# Patient Record
Sex: MALE | Race: WHITE | NOT HISPANIC OR LATINO | Employment: UNEMPLOYED | URBAN - NONMETROPOLITAN AREA
[De-identification: names, ages, dates, MRNs, and addresses within clinical notes are randomized per-mention and may not be internally consistent; named-entity substitution may affect disease eponyms.]

---

## 2017-01-16 RX ORDER — CLONAZEPAM 0.5 MG/1
TABLET ORAL
Qty: 60 TABLET | Refills: 0 | OUTPATIENT
Start: 2017-01-16 | End: 2017-02-14 | Stop reason: SDUPTHER

## 2017-01-23 RX ORDER — ATORVASTATIN CALCIUM 40 MG/1
40 TABLET, FILM COATED ORAL DAILY
Qty: 90 TABLET | Refills: 0 | Status: SHIPPED | OUTPATIENT
Start: 2017-01-23 | End: 2017-04-24 | Stop reason: SDUPTHER

## 2017-01-23 RX ORDER — AMLODIPINE BESYLATE 10 MG/1
10 TABLET ORAL DAILY
Qty: 90 TABLET | Refills: 0 | Status: SHIPPED | OUTPATIENT
Start: 2017-01-23 | End: 2017-05-05 | Stop reason: SDUPTHER

## 2017-01-23 RX ORDER — LOSARTAN POTASSIUM 100 MG/1
100 TABLET ORAL DAILY
Qty: 90 TABLET | Refills: 0 | Status: SHIPPED | OUTPATIENT
Start: 2017-01-23 | End: 2017-05-05 | Stop reason: SDUPTHER

## 2017-01-23 RX ORDER — METOPROLOL TARTRATE 100 MG/1
100 TABLET ORAL 2 TIMES DAILY
Qty: 180 TABLET | Refills: 0 | Status: SHIPPED | OUTPATIENT
Start: 2017-01-23 | End: 2017-05-05 | Stop reason: SDUPTHER

## 2017-02-01 ENCOUNTER — OFFICE VISIT (OUTPATIENT)
Dept: CARDIOLOGY | Facility: CLINIC | Age: 52
End: 2017-02-01

## 2017-02-01 VITALS
WEIGHT: 208.6 LBS | SYSTOLIC BLOOD PRESSURE: 130 MMHG | HEART RATE: 71 BPM | HEIGHT: 75 IN | BODY MASS INDEX: 25.94 KG/M2 | OXYGEN SATURATION: 99 % | DIASTOLIC BLOOD PRESSURE: 88 MMHG

## 2017-02-01 DIAGNOSIS — I10 ESSENTIAL HYPERTENSION: ICD-10-CM

## 2017-02-01 DIAGNOSIS — F41.1 GENERALIZED ANXIETY DISORDER: ICD-10-CM

## 2017-02-01 DIAGNOSIS — E83.52 HYPERCALCEMIA: ICD-10-CM

## 2017-02-01 DIAGNOSIS — E78.1 HYPERLIPIDEMIA TYPE IV: Primary | ICD-10-CM

## 2017-02-01 DIAGNOSIS — K21.9 GASTROESOPHAGEAL REFLUX DISEASE WITHOUT ESOPHAGITIS: ICD-10-CM

## 2017-02-01 DIAGNOSIS — R53.82 CHRONIC FATIGUE: ICD-10-CM

## 2017-02-01 DIAGNOSIS — F10.21 ALCOHOL DEPENDENCE IN REMISSION (HCC): ICD-10-CM

## 2017-02-01 DIAGNOSIS — I25.10 CORONARY ARTERY DISEASE INVOLVING NATIVE CORONARY ARTERY OF NATIVE HEART WITHOUT ANGINA PECTORIS: ICD-10-CM

## 2017-02-01 PROCEDURE — 99214 OFFICE O/P EST MOD 30 MIN: CPT | Performed by: INTERNAL MEDICINE

## 2017-02-01 RX ORDER — ASPIRIN 81 MG/1
81 TABLET ORAL DAILY
Qty: 90 TABLET | Refills: 1 | Status: SHIPPED | OUTPATIENT
Start: 2017-02-01 | End: 2017-12-04 | Stop reason: SDUPTHER

## 2017-02-01 NOTE — PROGRESS NOTES
subjective     Chief Complaint   Patient presents with   • Fatigue   • Coronary Artery Disease   • Hypertension     History of Present Illness    Coronary artery disease  Onel Jimenez has known coronary artery disease   Patient had coronary angiography done by Dr. Almanzar and 6/8/2015.  Patient was found to have severe multivessel disease showing 40% ostial left main 40% LAD after the takeoff of first diagonal, total occlusion of secondary to his marginal receiving left left collaterals.  RCA was dominant vessel there was 6 cm long 80-90% lesion in the proximal portion of PDA.  Posterolateral artery was totally occluded receiving left-to-right collaterals both PDA and posterolateral artery filled by collaterals.   It was felt that is not amenable to intervention and patient will require coronary angiography.  Patient is aware that he will need coronary artery bypass graft surgery however is completely asymptomatic and wishes to continue on medical management at this time.   he has not used any nitroglycerin. No drug side effects. he is trying to follow diet also. he is quite satisfied with the progress.     HYPERTENSION  Onel Jimenez has long-standing essential hypertension for years. he is taking medications regularly. There are no medication side effects. Blood pressure is mostly very well controlled. There has been no headache nausea chest pain. There has been no syncopal or presyncopal episode. he denies episodes of hypo-tension or accelerated hypertension. Patient feels that he needs to increase his Norvasc to 15 mg daily. Patient was advised that he needs to stay with 10 and will add a diuretic pill     Hyperlipidemia     Onel Jimenez has long-standing history of hyperlipidemia. Has been trying to lose weight following diet and exercise. Patient is tolerating medications very well. There has been no side effects. Latest lipid levels have been good. But no lab work in 6 months     Anxiety  Patient has mild anxiety  disorder.  He is taking Klonopin 0.5 daily with that he is doing very well.  He has had this anxiety for quite some time so mild and does not interfere with his daily activities.    GERD  Onel Jimenez has gastroesophageal reflux disorder however he significantly better on medications. There is no nausea or vomiting. No hematemesis or melena. No abdominal pain. Mild epigastric heartburns are noted. Appetite is good bowel movements are normal.     Patient Active Problem List   Diagnosis   • CAD (coronary artery disease), 90% proximal RCA, total occlusion of posterior lateral branch of RCA, total occlusion of second marginal with left to left collaterals, 40% LAD, 40% left main   • Anxiety disorder   • Chronic alcoholism   • Essential hypertension   • Hyperlipidemia type IV,uncontrolled   • Gastroesophageal reflux disease without esophagitis       Social History   Substance Use Topics   • Smoking status: Former Smoker     Packs/day: 1.00     Years: 20.00     Types: Cigarettes     Quit date: 2003   • Smokeless tobacco: Current User     Types: Chew      Comment: chew 1 can per week   • Alcohol use 6.0 oz/week     6 Cans of beer, 4 Shots of liquor per week       No Known Allergies      Current Outpatient Prescriptions:   •  amLODIPine (NORVASC) 10 MG tablet, Take 1 tablet by mouth Daily., Disp: 90 tablet, Rfl: 0  •  aspirin 81 MG EC tablet, Take 1 tablet by mouth Daily., Disp: 90 tablet, Rfl: 1  •  atorvastatin (LIPITOR) 40 MG tablet, Take 1 tablet by mouth Daily., Disp: 90 tablet, Rfl: 0  •  clonazePAM (KlonoPIN) 0.5 MG tablet, TAKE ONE TABLET BY MOUTH TWO TIMES A DAY FOR ANXIETY, Disp: 60 tablet, Rfl: 0  •  clopidogrel (PLAVIX) 75 MG tablet, TAKE ONE TABLET BY MOUTH EVERY DAY FOR BLOOD CIRCULATION, Disp: 90 tablet, Rfl: 0  •  fenofibrate (TRICOR) 145 MG tablet, TAKE ONE TABLET BY MOUTH DAILY FOR CHOLESTROL, Disp: 90 tablet, Rfl: 0  •  hydrochlorothiazide (MICROZIDE) 12.5 MG capsule, Take 1 capsule by mouth Daily., Disp:  "90 capsule, Rfl: 3  •  losartan (COZAAR) 100 MG tablet, Take 1 tablet by mouth Daily., Disp: 90 tablet, Rfl: 0  •  metoprolol tartrate (LOPRESSOR) 100 MG tablet, Take 1 tablet by mouth 2 (Two) Times a Day., Disp: 180 tablet, Rfl: 0  •  pantoprazole (PROTONIX) 40 MG EC tablet, Take 1 tablet by mouth Daily., Disp: 90 tablet, Rfl: 1      The following portions of the patient's history were reviewed and updated as appropriate: allergies, current medications, past family history, past medical history, past social history, past surgical history and problem list.    Review of Systems   Constitution: Negative.   HENT: Negative.    Eyes: Negative.    Cardiovascular: Negative.    Respiratory: Negative.    Hematologic/Lymphatic: Negative.    Musculoskeletal: Negative.    Gastrointestinal: Negative.    Neurological: Negative.           Objective:     Visit Vitals   • /88 (BP Location: Left arm, Patient Position: Sitting)   • Pulse 71   • Ht 75\" (190.5 cm)   • Wt 208 lb 9.6 oz (94.6 kg)   • SpO2 99%   • BMI 26.07 kg/m2     Physical Exam   Constitutional: He appears well-developed and well-nourished.   HENT:   Head: Normocephalic and atraumatic.   Mouth/Throat: Oropharynx is clear and moist.   Eyes: Conjunctivae and EOM are normal. Pupils are equal, round, and reactive to light. No scleral icterus.   Neck: Normal range of motion. Neck supple. No JVD present. No tracheal deviation present. No thyromegaly present.   Cardiovascular: Normal rate, regular rhythm, normal heart sounds and intact distal pulses.  Exam reveals no friction rub.    No murmur heard.  Pulmonary/Chest: Effort normal and breath sounds normal. No respiratory distress. He has no wheezes. He has no rales. He exhibits no tenderness.   Abdominal: Soft. Bowel sounds are normal. He exhibits no distension and no mass. There is no tenderness. There is no rebound and no guarding.   Musculoskeletal: Normal range of motion. He exhibits no edema, tenderness or " deformity.   Lymphadenopathy:     He has no cervical adenopathy.   Neurological: He is alert. He has normal reflexes. No cranial nerve deficit. He exhibits normal muscle tone. Coordination normal.   Skin: Skin is warm and dry.   Psychiatric: He has a normal mood and affect. His behavior is normal. Judgment and thought content normal.         Lab Review  Lab Results   Component Value Date     11/16/2016    K 4.9 11/16/2016     11/16/2016    BUN 12 11/16/2016    CREATININE 1.08 11/16/2016    GLUCOSE 86 11/16/2016    CALCIUM 11.1 (H) 11/16/2016    ALT 19 11/16/2016    ALKPHOS 29 (L) 11/16/2016    LABIL2 1.7 11/16/2016     Lab Results   Component Value Date    CKTOTAL 53 11/16/2016     Lab Results   Component Value Date    WBC 8.21 11/16/2016    HGB 15.4 11/16/2016    HCT 47.5 11/16/2016     11/16/2016     Lab Results   Component Value Date    INR 0.99 06/05/2015     Lab Results   Component Value Date    MG 1.9 01/07/2016     Lab Results   Component Value Date    PSA 0.38 05/04/2016    TSH 1.009 06/05/2015     No results found for: BNP  Lab Results   Component Value Date    CHLPL 182 05/04/2016     Lab Results   Component Value Date    CHOL 221 (H) 11/16/2016    TRIG 236 (H) 11/16/2016    HDL 53 (L) 11/16/2016    LDLCALC 121 (H) 11/16/2016    VLDL 47.2 11/16/2016    LDLHDL 2.28 11/16/2016         Procedures     I personally viewed and interpreted the patient's LAB data         Assessment:     1. Hyperlipidemia type IV,uncontrolled    2. Essential hypertension    3. Coronary artery disease involving native coronary artery of native heart without angina pectoris    4. Gastroesophageal reflux disease without esophagitis    5. Alcohol dependence in remission    6. Generalized anxiety disorder    7. Chronic fatigue    8. Hypercalcemia          Plan:   Lab work was abnormal last time with LDL of 121.  Total cholesterol 221 triglyceride 236     Patient is taking Lipitor 40 mg daily and TriCor 145 mg daily  lab work will be checked next visit meanwhile aggressive risk factor modification was stressed.    Coronary artery disease patient has significant coronary artery disease he is totally asymptomatic.  He is aware that he will need to notify me as soon as possible with even minor symptoms.  Currently he is active and is asymptomatic.  Patient is taking Plavix and aspirin.    Hypertension is very well controlled.  Patient is taking Norvasc 10 mg daily metoprolol 100 twice a day Cozaar 100 mg daily and hydrochlorothiazide 12.5 daily.    Patient has no GI symptoms currently controlled with Protonix.  Risk factor modification was again stressed.  His calcium was elevated will check parathormone level next visit.        The nature of cardiac risk has been fully discussed with this patient. I have made him aware of his LDL target goal given his cardiovascular risk analysis. I have discussed the appropriate diet. The need for lifelong compliance in order to reduce risk is stressed. A regular exercise program is recommended to help achieve and maintain normal body weight, fitness and improve lipid balance.    Return in about 3 months (around 5/1/2017).

## 2017-02-09 RX ORDER — FENOFIBRATE 145 MG/1
145 TABLET, COATED ORAL DAILY
Qty: 90 TABLET | Refills: 0 | Status: SHIPPED | OUTPATIENT
Start: 2017-02-09 | End: 2017-06-05 | Stop reason: SDUPTHER

## 2017-02-09 RX ORDER — HYDROCHLOROTHIAZIDE 12.5 MG/1
12.5 CAPSULE, GELATIN COATED ORAL DAILY
Qty: 90 CAPSULE | Refills: 0 | Status: SHIPPED | OUTPATIENT
Start: 2017-02-09 | End: 2017-05-05 | Stop reason: SDUPTHER

## 2017-02-15 ENCOUNTER — HOSPITAL ENCOUNTER (EMERGENCY)
Facility: HOSPITAL | Age: 52
Discharge: HOME | End: 2017-02-15
Attending: EMERGENCY MEDICINE | Admitting: EMERGENCY MEDICINE

## 2017-02-15 ENCOUNTER — APPOINTMENT (OUTPATIENT)
Dept: GENERAL RADIOLOGY | Facility: HOSPITAL | Age: 52
End: 2017-02-15

## 2017-02-15 VITALS
RESPIRATION RATE: 18 BRPM | OXYGEN SATURATION: 100 % | BODY MASS INDEX: 26.11 KG/M2 | DIASTOLIC BLOOD PRESSURE: 84 MMHG | TEMPERATURE: 98 F | WEIGHT: 210 LBS | SYSTOLIC BLOOD PRESSURE: 117 MMHG | HEIGHT: 75 IN | HEART RATE: 61 BPM

## 2017-02-15 DIAGNOSIS — R07.89 CHEST PAIN, ATYPICAL: Primary | ICD-10-CM

## 2017-02-15 LAB
ALBUMIN SERPL-MCNC: 5 G/DL (ref 3.5–5)
ALBUMIN/GLOB SERPL: 1.7 G/DL (ref 1.5–2.5)
ALP SERPL-CCNC: 30 U/L (ref 40–129)
ALT SERPL W P-5'-P-CCNC: 20 U/L (ref 10–44)
ANION GAP SERPL CALCULATED.3IONS-SCNC: 10.4 MMOL/L (ref 3.6–11.2)
AST SERPL-CCNC: 25 U/L (ref 10–34)
BASOPHILS # BLD AUTO: 0.05 10*3/MM3 (ref 0–0.3)
BASOPHILS NFR BLD AUTO: 0.6 % (ref 0–2)
BILIRUB SERPL-MCNC: 0.8 MG/DL (ref 0.2–1.8)
BUN BLD-MCNC: 15 MG/DL (ref 7–21)
BUN/CREAT SERPL: 11.8 (ref 7–25)
CALCIUM SPEC-SCNC: 10.1 MG/DL (ref 7.7–10)
CHLORIDE SERPL-SCNC: 98 MMOL/L (ref 99–112)
CK MB SERPL-CCNC: 0.24 NG/ML (ref 0–5)
CK MB SERPL-RTO: 0.3 % (ref 0–3)
CK SERPL-CCNC: 81 U/L (ref 24–204)
CO2 SERPL-SCNC: 26.6 MMOL/L (ref 24.3–31.9)
CREAT BLD-MCNC: 1.27 MG/DL (ref 0.43–1.29)
DEPRECATED RDW RBC AUTO: 42.3 FL (ref 37–54)
EOSINOPHIL # BLD AUTO: 0.07 10*3/MM3 (ref 0–0.7)
EOSINOPHIL NFR BLD AUTO: 0.8 % (ref 0–5)
ERYTHROCYTE [DISTWIDTH] IN BLOOD BY AUTOMATED COUNT: 12.9 % (ref 11.5–14.5)
GFR SERPL CREATININE-BSD FRML MDRD: 59 ML/MIN/1.73
GLOBULIN UR ELPH-MCNC: 2.9 GM/DL
GLUCOSE BLD-MCNC: 89 MG/DL (ref 70–110)
HCT VFR BLD AUTO: 46.2 % (ref 42–52)
HGB BLD-MCNC: 15.7 G/DL (ref 14–18)
IMM GRANULOCYTES # BLD: 0.01 10*3/MM3 (ref 0–0.03)
IMM GRANULOCYTES NFR BLD: 0.1 % (ref 0–0.5)
LIPASE SERPL-CCNC: 92 U/L (ref 13–60)
LYMPHOCYTES # BLD AUTO: 2.34 10*3/MM3 (ref 1–3)
LYMPHOCYTES NFR BLD AUTO: 27.9 % (ref 21–51)
MCH RBC QN AUTO: 30.7 PG (ref 27–33)
MCHC RBC AUTO-ENTMCNC: 34 G/DL (ref 33–37)
MCV RBC AUTO: 90.2 FL (ref 80–94)
MONOCYTES # BLD AUTO: 1.2 10*3/MM3 (ref 0.1–0.9)
MONOCYTES NFR BLD AUTO: 14.3 % (ref 0–10)
MYOGLOBIN SERPL-MCNC: 32 NG/ML (ref 0–109)
NEUTROPHILS # BLD AUTO: 4.73 10*3/MM3 (ref 1.4–6.5)
NEUTROPHILS NFR BLD AUTO: 56.3 % (ref 30–70)
OSMOLALITY SERPL CALC.SUM OF ELEC: 270.4 MOSM/KG (ref 273–305)
PLATELET # BLD AUTO: 281 10*3/MM3 (ref 130–400)
PMV BLD AUTO: 10.2 FL (ref 6–10)
POTASSIUM BLD-SCNC: 3.9 MMOL/L (ref 3.5–5.3)
PROT SERPL-MCNC: 7.9 G/DL (ref 6–8)
RBC # BLD AUTO: 5.12 10*6/MM3 (ref 4.7–6.1)
SODIUM BLD-SCNC: 135 MMOL/L (ref 135–153)
TROPONIN I SERPL-MCNC: <0.006 NG/ML
TROPONIN I SERPL-MCNC: <0.006 NG/ML
WBC NRBC COR # BLD: 8.4 10*3/MM3 (ref 4.5–12.5)

## 2017-02-15 PROCEDURE — 99285 EMERGENCY DEPT VISIT HI MDM: CPT

## 2017-02-15 PROCEDURE — 71010 HC CHEST PA OR AP: CPT

## 2017-02-15 PROCEDURE — 84484 ASSAY OF TROPONIN QUANT: CPT | Performed by: EMERGENCY MEDICINE

## 2017-02-15 PROCEDURE — 83690 ASSAY OF LIPASE: CPT | Performed by: EMERGENCY MEDICINE

## 2017-02-15 PROCEDURE — 80053 COMPREHEN METABOLIC PANEL: CPT | Performed by: EMERGENCY MEDICINE

## 2017-02-15 PROCEDURE — 85025 COMPLETE CBC W/AUTO DIFF WBC: CPT | Performed by: EMERGENCY MEDICINE

## 2017-02-15 PROCEDURE — 71010 XR CHEST 1 VW: CPT | Performed by: RADIOLOGY

## 2017-02-15 PROCEDURE — 93005 ELECTROCARDIOGRAM TRACING: CPT | Performed by: EMERGENCY MEDICINE

## 2017-02-15 PROCEDURE — 82553 CREATINE MB FRACTION: CPT | Performed by: EMERGENCY MEDICINE

## 2017-02-15 PROCEDURE — 82550 ASSAY OF CK (CPK): CPT | Performed by: EMERGENCY MEDICINE

## 2017-02-15 PROCEDURE — 83874 ASSAY OF MYOGLOBIN: CPT | Performed by: EMERGENCY MEDICINE

## 2017-02-15 PROCEDURE — 93010 ELECTROCARDIOGRAM REPORT: CPT | Performed by: INTERNAL MEDICINE

## 2017-02-15 PROCEDURE — 36415 COLL VENOUS BLD VENIPUNCTURE: CPT

## 2017-02-15 RX ORDER — CLONAZEPAM 0.5 MG/1
TABLET ORAL
Qty: 60 TABLET | Refills: 0 | OUTPATIENT
Start: 2017-02-15 | End: 2017-03-16 | Stop reason: SDUPTHER

## 2017-02-15 RX ORDER — SODIUM CHLORIDE 0.9 % (FLUSH) 0.9 %
10 SYRINGE (ML) INJECTION AS NEEDED
Status: DISCONTINUED | OUTPATIENT
Start: 2017-02-15 | End: 2017-02-16 | Stop reason: HOSPADM

## 2017-02-15 NOTE — ED NOTES
Pt reports that he has been having right shoulder pain all week, but that he has also been working on cars all week.  Pt reports that he did not have any specific type of chest pains but that he had a discomfort in his chest x 2 hours ago and denies any pains at this time.  Pt reports that he took on Baby ASA at home prior to EMS arrival.  Pt denies any pain at this time.  Pt also reports that he has a blockage in his heart but has not had a stent placed.  Pt complaining that he has been having dizziness.  Kanika Montgomery RN  02/15/17 1810       Kanika Montgomery RN  02/15/17 1812       Kanika Montgomery RN  02/15/17 1832

## 2017-02-16 NOTE — ED NOTES
Report received from Kanika NESBITT. Patient is resting in bed with no acute distress. Call light in reach.      Eufemia Alvarado RN  02/15/17 2218

## 2017-02-16 NOTE — DISCHARGE INSTRUCTIONS
Home in care of family.  Follow-up with Dr. Canales in the office tomorrow for recheck.  Return to the emergency department immediately if symptoms worsen/any problems.

## 2017-03-17 RX ORDER — CLONAZEPAM 0.5 MG/1
TABLET ORAL
Qty: 60 TABLET | Refills: 0 | OUTPATIENT
Start: 2017-03-17 | End: 2017-04-19 | Stop reason: SDUPTHER

## 2017-04-19 ENCOUNTER — LAB (OUTPATIENT)
Dept: CARDIOLOGY | Facility: CLINIC | Age: 52
End: 2017-04-19

## 2017-04-19 DIAGNOSIS — E78.1 HYPERLIPIDEMIA TYPE IV: ICD-10-CM

## 2017-04-19 DIAGNOSIS — I25.10 CAD IN NATIVE ARTERY: ICD-10-CM

## 2017-04-19 DIAGNOSIS — Z79.899 HIGH RISK MEDICATIONS (NOT ANTICOAGULANTS) LONG-TERM USE: ICD-10-CM

## 2017-04-19 DIAGNOSIS — E83.52 HYPERCALCEMIA: ICD-10-CM

## 2017-04-19 DIAGNOSIS — F10.21 ALCOHOL DEPENDENCE IN REMISSION (HCC): ICD-10-CM

## 2017-04-19 DIAGNOSIS — E78.2 MIXED HYPERLIPIDEMIA: ICD-10-CM

## 2017-04-19 DIAGNOSIS — I10 ESSENTIAL HYPERTENSION: ICD-10-CM

## 2017-04-19 LAB
25(OH)D3 SERPL-MCNC: 16 NG/ML
ALBUMIN SERPL-MCNC: 5.1 G/DL (ref 3.5–5)
ALBUMIN/GLOB SERPL: 1.8 G/DL (ref 1.5–2.5)
ALP SERPL-CCNC: 30 U/L (ref 40–129)
ALT SERPL W P-5'-P-CCNC: 19 U/L (ref 10–44)
ANION GAP SERPL CALCULATED.3IONS-SCNC: 9.9 MMOL/L (ref 3.6–11.2)
AST SERPL-CCNC: 25 U/L (ref 10–34)
BASOPHILS # BLD AUTO: 0.06 10*3/MM3 (ref 0–0.3)
BASOPHILS NFR BLD AUTO: 1 % (ref 0–2)
BILIRUB SERPL-MCNC: 0.5 MG/DL (ref 0.2–1.8)
BUN BLD-MCNC: 18 MG/DL (ref 7–21)
BUN/CREAT SERPL: 15.3 (ref 7–25)
CALCIUM SPEC-SCNC: 10 MG/DL (ref 7.7–10)
CALCIUM SPEC-SCNC: 10.1 MG/DL (ref 7.7–10)
CHLORIDE SERPL-SCNC: 107 MMOL/L (ref 99–112)
CHOLEST SERPL-MCNC: 207 MG/DL (ref 0–200)
CK SERPL-CCNC: 92 U/L (ref 24–204)
CO2 SERPL-SCNC: 25.1 MMOL/L (ref 24.3–31.9)
CREAT BLD-MCNC: 1.18 MG/DL (ref 0.43–1.29)
DEPRECATED RDW RBC AUTO: 44.7 FL (ref 37–54)
EOSINOPHIL # BLD AUTO: 0.08 10*3/MM3 (ref 0–0.7)
EOSINOPHIL NFR BLD AUTO: 1.3 % (ref 0–5)
ERYTHROCYTE [DISTWIDTH] IN BLOOD BY AUTOMATED COUNT: 13.5 % (ref 11.5–14.5)
GFR SERPL CREATININE-BSD FRML MDRD: 64 ML/MIN/1.73
GLOBULIN UR ELPH-MCNC: 2.8 GM/DL
GLUCOSE BLD-MCNC: 107 MG/DL (ref 70–110)
HCT VFR BLD AUTO: 47.4 % (ref 42–52)
HDLC SERPL-MCNC: 44 MG/DL (ref 60–100)
HGB BLD-MCNC: 15.5 G/DL (ref 14–18)
IMM GRANULOCYTES # BLD: 0.02 10*3/MM3 (ref 0–0.03)
IMM GRANULOCYTES NFR BLD: 0.3 % (ref 0–0.5)
LDLC SERPL CALC-MCNC: 94 MG/DL (ref 0–100)
LDLC/HDLC SERPL: 2.14 {RATIO}
LYMPHOCYTES # BLD AUTO: 1.5 10*3/MM3 (ref 1–3)
LYMPHOCYTES NFR BLD AUTO: 24.2 % (ref 21–51)
MCH RBC QN AUTO: 30.2 PG (ref 27–33)
MCHC RBC AUTO-ENTMCNC: 32.7 G/DL (ref 33–37)
MCV RBC AUTO: 92.2 FL (ref 80–94)
MONOCYTES # BLD AUTO: 0.61 10*3/MM3 (ref 0.1–0.9)
MONOCYTES NFR BLD AUTO: 9.8 % (ref 0–10)
NEUTROPHILS # BLD AUTO: 3.94 10*3/MM3 (ref 1.4–6.5)
NEUTROPHILS NFR BLD AUTO: 63.4 % (ref 30–70)
OSMOLALITY SERPL CALC.SUM OF ELEC: 285.5 MOSM/KG (ref 273–305)
PLATELET # BLD AUTO: 233 10*3/MM3 (ref 130–400)
PMV BLD AUTO: 9.9 FL (ref 6–10)
POTASSIUM BLD-SCNC: 3.4 MMOL/L (ref 3.5–5.3)
PROT SERPL-MCNC: 7.9 G/DL (ref 6–8)
PTH-INTACT SERPL-MCNC: <5.5 PG/ML (ref 14–72)
RBC # BLD AUTO: 5.14 10*6/MM3 (ref 4.7–6.1)
SODIUM BLD-SCNC: 142 MMOL/L (ref 135–153)
TRIGL SERPL-MCNC: 344 MG/DL (ref 0–150)
VIT B12 BLD-MCNC: 290 PG/ML (ref 211–911)
VLDLC SERPL-MCNC: 68.8 MG/DL
WBC NRBC COR # BLD: 6.21 10*3/MM3 (ref 4.5–12.5)

## 2017-04-19 PROCEDURE — 85025 COMPLETE CBC W/AUTO DIFF WBC: CPT | Performed by: INTERNAL MEDICINE

## 2017-04-19 PROCEDURE — 82550 ASSAY OF CK (CPK): CPT | Performed by: INTERNAL MEDICINE

## 2017-04-19 PROCEDURE — 83970 ASSAY OF PARATHORMONE: CPT | Performed by: INTERNAL MEDICINE

## 2017-04-19 PROCEDURE — 82306 VITAMIN D 25 HYDROXY: CPT | Performed by: INTERNAL MEDICINE

## 2017-04-19 PROCEDURE — 82607 VITAMIN B-12: CPT | Performed by: INTERNAL MEDICINE

## 2017-04-19 PROCEDURE — 80053 COMPREHEN METABOLIC PANEL: CPT | Performed by: INTERNAL MEDICINE

## 2017-04-19 PROCEDURE — 80061 LIPID PANEL: CPT | Performed by: INTERNAL MEDICINE

## 2017-04-19 RX ORDER — CLONAZEPAM 0.5 MG/1
0.5 TABLET ORAL 2 TIMES DAILY PRN
Qty: 60 TABLET | Refills: 0 | OUTPATIENT
Start: 2017-04-19 | End: 2017-05-17 | Stop reason: SDUPTHER

## 2017-04-20 ENCOUNTER — OFFICE VISIT (OUTPATIENT)
Dept: CARDIOLOGY | Facility: CLINIC | Age: 52
End: 2017-04-20

## 2017-04-20 VITALS
SYSTOLIC BLOOD PRESSURE: 125 MMHG | BODY MASS INDEX: 25.84 KG/M2 | HEART RATE: 71 BPM | HEIGHT: 75 IN | WEIGHT: 207.8 LBS | DIASTOLIC BLOOD PRESSURE: 83 MMHG | OXYGEN SATURATION: 96 %

## 2017-04-20 DIAGNOSIS — I10 ESSENTIAL HYPERTENSION: ICD-10-CM

## 2017-04-20 DIAGNOSIS — E78.1 HYPERLIPIDEMIA TYPE IV: ICD-10-CM

## 2017-04-20 DIAGNOSIS — I25.10 CORONARY ARTERY DISEASE INVOLVING NATIVE CORONARY ARTERY OF NATIVE HEART WITHOUT ANGINA PECTORIS: Primary | ICD-10-CM

## 2017-04-20 DIAGNOSIS — K21.9 GASTROESOPHAGEAL REFLUX DISEASE WITHOUT ESOPHAGITIS: ICD-10-CM

## 2017-04-20 DIAGNOSIS — R07.9 CHEST PAIN, UNSPECIFIED TYPE: ICD-10-CM

## 2017-04-20 DIAGNOSIS — F41.1 GENERALIZED ANXIETY DISORDER: ICD-10-CM

## 2017-04-20 DIAGNOSIS — F10.21 ALCOHOL DEPENDENCE IN REMISSION (HCC): ICD-10-CM

## 2017-04-20 PROCEDURE — 99214 OFFICE O/P EST MOD 30 MIN: CPT | Performed by: INTERNAL MEDICINE

## 2017-04-20 PROCEDURE — 93000 ELECTROCARDIOGRAM COMPLETE: CPT | Performed by: INTERNAL MEDICINE

## 2017-04-20 NOTE — PROGRESS NOTES
subjective     Chief Complaint   Patient presents with   • Coronary Artery Disease   • Hypertension   • Hyperlipidemia     History of Present Illness  Patient is here for cardiac follow-up.  He states that he has been doing very well.  He went to the emergency room in February and was told to follow-up with me the next day.  Patient did not keep the appointment.  He says that he was doing so well he did not feel he needed to be seen that soon.    Coronary artery disease  Onel Jimenez has known coronary artery disease   Patient had coronary angiography done by Dr. Almanzar and 6/8/2015. Patient was found to have severe multivessel disease showing 40% ostial left main, 40% LAD after the takeoff of first diagonal, total occlusion of circumflex marginal receiving left to  left collaterals. RCA was dominant vessel there was 6 cm long 80-90% lesion in the proximal portion of PDA. Posterolateral artery was totally occluded receiving left-to-right collaterals both PDA and posterolateral artery filled by collaterals.     It was felt that disease is not amenable to intervention and patient will require coronary bypass surgery.  Patient is aware that he will need coronary artery bypass graft surgery however is completely asymptomatic and wishes to continue on medical management at this time.  he has not used any nitroglycerin. No drug side effects. he is trying to follow diet also. he is quite satisfied with the progress.      HYPERTENSION  Onel Jimenez has long-standing essential hypertension for years. he is taking medications regularly. There are no medication side effects. Blood pressure is mostly very well controlled. There has been no headache nausea chest pain. There has been no syncopal or presyncopal episode. he denies episodes of hypo-tension or accelerated hypertension. Patient feels that he needs to increase his Norvasc to 15 mg daily. Patient was advised that he needs to stay with 10 and will add a diuretic  pill      Hyperlipidemia      Onel Jimenez has long-standing history of hyperlipidemia. Has been trying to lose weight following diet and exercise. Patient is tolerating medications very well. There has been no side effects. Latest lipid levels have been good. But no lab work in 6 months      Anxiety  Patient has mild anxiety disorder. He is taking Klonopin 0.5 daily with that he is doing very well. He has had this anxiety for quite some time so mild and does not interfere with his daily activities.     GERD  Onel Jimenez has gastroesophageal reflux disorder however he significantly better on medications. There is no nausea or vomiting. No hematemesis or melena. No abdominal pain. Mild epigastric heartburns are noted. Appetite is good bowel movements are normal.      Patient Active Problem List   Diagnosis   • CAD (coronary artery disease), 90% proximal RCA, total occlusion of posterior lateral branch of RCA, total occlusion of second marginal with left to left collaterals, 40% LAD, 40% left main   • Anxiety disorder   • Chronic alcoholism   • Essential hypertension   • Hyperlipidemia type IV,uncontrolled   • Gastroesophageal reflux disease without esophagitis       Social History   Substance Use Topics   • Smoking status: Former Smoker     Packs/day: 1.00     Years: 20.00     Types: Cigarettes     Quit date: 2003   • Smokeless tobacco: Current User     Types: Chew      Comment: chew 1 can per week   • Alcohol use 6.0 oz/week     6 Cans of beer, 4 Shots of liquor per week       No Known Allergies      Current Outpatient Prescriptions:   •  amLODIPine (NORVASC) 10 MG tablet, Take 1 tablet by mouth Daily., Disp: 90 tablet, Rfl: 0  •  aspirin 81 MG EC tablet, Take 1 tablet by mouth Daily., Disp: 90 tablet, Rfl: 1  •  atorvastatin (LIPITOR) 40 MG tablet, Take 1 tablet by mouth Daily., Disp: 90 tablet, Rfl: 0  •  clonazePAM (KlonoPIN) 0.5 MG tablet, Take 1 tablet by mouth 2 (Two) Times a Day As Needed for Seizures., Disp:  "60 tablet, Rfl: 0  •  clopidogrel (PLAVIX) 75 MG tablet, TAKE ONE TABLET BY MOUTH EVERY DAY FOR BLOOD CIRCULATION, Disp: 90 tablet, Rfl: 0  •  fenofibrate (TRICOR) 145 MG tablet, Take 1 tablet by mouth Daily., Disp: 90 tablet, Rfl: 0  •  hydrochlorothiazide (MICROZIDE) 12.5 MG capsule, Take 1 capsule by mouth Daily., Disp: 90 capsule, Rfl: 0  •  losartan (COZAAR) 100 MG tablet, Take 1 tablet by mouth Daily., Disp: 90 tablet, Rfl: 0  •  metoprolol tartrate (LOPRESSOR) 100 MG tablet, Take 1 tablet by mouth 2 (Two) Times a Day., Disp: 180 tablet, Rfl: 0  •  pantoprazole (PROTONIX) 40 MG EC tablet, Take 1 tablet by mouth Daily., Disp: 90 tablet, Rfl: 1      The following portions of the patient's history were reviewed and updated as appropriate: allergies, current medications, past family history, past medical history, past social history, past surgical history and problem list.    Review of Systems   Constitution: Negative.   HENT: Negative.    Eyes: Negative.    Cardiovascular: Negative.  Negative for chest pain, claudication, cyanosis, dyspnea on exertion, irregular heartbeat, leg swelling, near-syncope, orthopnea, palpitations, paroxysmal nocturnal dyspnea and syncope.   Respiratory: Negative.    Hematologic/Lymphatic: Negative.    Musculoskeletal: Negative.    Gastrointestinal: Negative.    Neurological: Negative.           Objective:     /83 (BP Location: Left arm, Patient Position: Sitting)  Pulse 71  Ht 75\" (190.5 cm)  Wt 207 lb 12.8 oz (94.3 kg)  SpO2 96%  BMI 25.97 kg/m2  Physical Exam   Constitutional: He appears well-developed and well-nourished.   HENT:   Head: Normocephalic and atraumatic.   Mouth/Throat: Oropharynx is clear and moist.   Eyes: Conjunctivae and EOM are normal. Pupils are equal, round, and reactive to light. No scleral icterus.   Neck: Normal range of motion. Neck supple. No JVD present. No tracheal deviation present. No thyromegaly present.   Cardiovascular: Normal rate, regular " rhythm, normal heart sounds and intact distal pulses.  Exam reveals no friction rub.    No murmur heard.  Pulmonary/Chest: Effort normal and breath sounds normal. No respiratory distress. He has no wheezes. He has no rales. He exhibits no tenderness.   Abdominal: Soft. Bowel sounds are normal. He exhibits no distension and no mass. There is no tenderness. There is no rebound and no guarding.   Musculoskeletal: Normal range of motion. He exhibits no edema, tenderness or deformity.   Lymphadenopathy:     He has no cervical adenopathy.   Neurological: He is alert. He has normal reflexes. No cranial nerve deficit. He exhibits normal muscle tone. Coordination normal.   Skin: Skin is warm and dry.   Psychiatric: He has a normal mood and affect. His behavior is normal. Judgment and thought content normal.         Lab Review  Lab Results   Component Value Date     04/19/2017    K 3.4 (L) 04/19/2017     04/19/2017    BUN 18 04/19/2017    CREATININE 1.18 04/19/2017    GLUCOSE 107 04/19/2017    CALCIUM 10.1 (H) 04/19/2017    CALCIUM 10.0 04/19/2017    ALT 19 04/19/2017    ALKPHOS 30 (L) 04/19/2017    LABIL2 1.8 04/19/2017     Lab Results   Component Value Date    CKTOTAL 92 04/19/2017     Lab Results   Component Value Date    WBC 6.21 04/19/2017    HGB 15.5 04/19/2017    HCT 47.4 04/19/2017     04/19/2017     Lab Results   Component Value Date    INR 0.99 06/05/2015     Lab Results   Component Value Date    MG 1.9 01/07/2016     Lab Results   Component Value Date    PSA 0.38 05/04/2016    TSH 1.009 06/05/2015     No results found for: BNP  Lab Results   Component Value Date    CHLPL 182 05/04/2016     Lab Results   Component Value Date    CHOL 207 (H) 04/19/2017    TRIG 344 (H) 04/19/2017    HDL 44 (L) 04/19/2017    LDLCALC 94 04/19/2017    VLDL 68.8 04/19/2017    LDLHDL 2.14 04/19/2017           ECG 12 Lead  Date/Time: 4/23/2017 1:04 PM  Performed by: SHIRA BARRAZA  Authorized by: SHIRA BARRAZA  WALKER   Rhythm: sinus rhythm and A-V block  Rate: normal  Conduction: 1st degree  ST Segments: ST segments normal  T Waves: T waves normal  QRS axis: normal  Other: no other findings  Clinical impression: abnormal ECG  Comments: Borderline first degree AV block otherwise normal EKG             I personally viewed and interpreted the patient's LAB data         Assessment:     1. Coronary artery disease involving native coronary artery of native heart without angina pectoris    2. Essential hypertension    3. Hyperlipidemia type IV,uncontrolled    4. Gastroesophageal reflux disease without esophagitis    5. Generalized anxiety disorder    6. Alcohol dependence in remission          Plan:      Patient has severe coronary artery disease and will require coronary artery bypass graft surgery.  Patient is still asymptomatic and does not wish to pursue.  Overall he seems to be doing very well from cardiac standpoint.    Lipids are better.  LDL was 121 and now it is 94.  Goal of below 70 was discussed with the patient.  Total cholesterol and triglyceride still significantly elevated.    Hypertension is very well controlled. Patient is taking Norvasc 10 mg daily metoprolol 100 twice a day Cozaar 100 mg daily and hydrochlorothiazide 12.5 daily.    Patient is still drinking alcohol he says that he drinks 1 heart drink in the morning and about 3 beers a day.  Further reduction in alcohol consumption was discussed with the patient    Potassium is little bit low.  K Dur 10 daily was recommended.  Follow-up scheduled    No Follow-up on file.

## 2017-04-24 RX ORDER — ATORVASTATIN CALCIUM 40 MG/1
40 TABLET, FILM COATED ORAL DAILY
Qty: 90 TABLET | Refills: 0 | Status: SHIPPED | OUTPATIENT
Start: 2017-04-24 | End: 2017-05-10 | Stop reason: SDUPTHER

## 2017-04-24 RX ORDER — POTASSIUM CHLORIDE 750 MG/1
10 TABLET, FILM COATED, EXTENDED RELEASE ORAL DAILY
Qty: 90 TABLET | Refills: 0 | Status: SHIPPED | OUTPATIENT
Start: 2017-04-24 | End: 2017-08-03 | Stop reason: SDUPTHER

## 2017-04-27 RX ORDER — PANTOPRAZOLE SODIUM 40 MG/1
40 TABLET, DELAYED RELEASE ORAL DAILY
Qty: 90 TABLET | Refills: 1 | Status: SHIPPED | OUTPATIENT
Start: 2017-04-27 | End: 2017-12-04 | Stop reason: SDUPTHER

## 2017-05-05 RX ORDER — METOPROLOL TARTRATE 100 MG/1
100 TABLET ORAL 2 TIMES DAILY
Qty: 180 TABLET | Refills: 0 | Status: SHIPPED | OUTPATIENT
Start: 2017-05-05 | End: 2017-08-03 | Stop reason: SDUPTHER

## 2017-05-05 RX ORDER — LOSARTAN POTASSIUM 100 MG/1
100 TABLET ORAL DAILY
Qty: 90 TABLET | Refills: 0 | Status: SHIPPED | OUTPATIENT
Start: 2017-05-05 | End: 2017-08-03 | Stop reason: SDUPTHER

## 2017-05-05 RX ORDER — AMLODIPINE BESYLATE 10 MG/1
10 TABLET ORAL DAILY
Qty: 90 TABLET | Refills: 0 | Status: SHIPPED | OUTPATIENT
Start: 2017-05-05 | End: 2017-05-11 | Stop reason: SDUPTHER

## 2017-05-05 RX ORDER — HYDROCHLOROTHIAZIDE 12.5 MG/1
12.5 CAPSULE, GELATIN COATED ORAL DAILY
Qty: 90 CAPSULE | Refills: 0 | Status: SHIPPED | OUTPATIENT
Start: 2017-05-05 | End: 2017-05-11 | Stop reason: SDUPTHER

## 2017-05-10 RX ORDER — ATORVASTATIN CALCIUM 40 MG/1
40 TABLET, FILM COATED ORAL DAILY
Qty: 90 TABLET | Refills: 0 | Status: SHIPPED | OUTPATIENT
Start: 2017-05-10 | End: 2017-08-03 | Stop reason: SDUPTHER

## 2017-05-11 ENCOUNTER — TELEPHONE (OUTPATIENT)
Dept: CARDIOLOGY | Facility: CLINIC | Age: 52
End: 2017-05-11

## 2017-05-11 RX ORDER — HYDROCHLOROTHIAZIDE 12.5 MG/1
12.5 CAPSULE, GELATIN COATED ORAL DAILY
Qty: 90 CAPSULE | Refills: 0 | Status: SHIPPED | OUTPATIENT
Start: 2017-05-11 | End: 2017-08-03 | Stop reason: SDUPTHER

## 2017-05-11 RX ORDER — AMLODIPINE BESYLATE 10 MG/1
10 TABLET ORAL DAILY
Qty: 90 TABLET | Refills: 0 | Status: SHIPPED | OUTPATIENT
Start: 2017-05-11 | End: 2017-10-20 | Stop reason: SDUPTHER

## 2017-05-17 RX ORDER — CLONAZEPAM 0.5 MG/1
0.5 TABLET ORAL 2 TIMES DAILY PRN
Qty: 60 TABLET | Refills: 0 | OUTPATIENT
Start: 2017-05-17 | End: 2017-06-19 | Stop reason: SDUPTHER

## 2017-05-22 RX ORDER — CLOPIDOGREL BISULFATE 75 MG/1
75 TABLET ORAL DAILY
Qty: 90 TABLET | Refills: 0 | Status: SHIPPED | OUTPATIENT
Start: 2017-05-22 | End: 2017-09-05 | Stop reason: SDUPTHER

## 2017-06-05 RX ORDER — FENOFIBRATE 145 MG/1
TABLET, COATED ORAL
Qty: 30 TABLET | Refills: 0 | Status: SHIPPED | OUTPATIENT
Start: 2017-06-05 | End: 2017-07-11 | Stop reason: SDUPTHER

## 2017-06-19 RX ORDER — CLONAZEPAM 0.5 MG/1
0.5 TABLET ORAL 2 TIMES DAILY PRN
Qty: 60 TABLET | Refills: 0 | OUTPATIENT
Start: 2017-06-19 | End: 2017-07-19 | Stop reason: SDUPTHER

## 2017-07-11 RX ORDER — FENOFIBRATE 145 MG/1
145 TABLET, COATED ORAL DAILY
Qty: 30 TABLET | Refills: 0 | Status: SHIPPED | OUTPATIENT
Start: 2017-07-11 | End: 2017-08-03 | Stop reason: SDUPTHER

## 2017-07-19 RX ORDER — CLONAZEPAM 0.5 MG/1
0.5 TABLET ORAL 2 TIMES DAILY PRN
Qty: 60 TABLET | Refills: 0 | OUTPATIENT
Start: 2017-07-19 | End: 2017-08-18 | Stop reason: SDUPTHER

## 2017-07-24 ENCOUNTER — OFFICE VISIT (OUTPATIENT)
Dept: CARDIOLOGY | Facility: CLINIC | Age: 52
End: 2017-07-24

## 2017-07-24 VITALS
DIASTOLIC BLOOD PRESSURE: 70 MMHG | RESPIRATION RATE: 18 BRPM | WEIGHT: 202 LBS | BODY MASS INDEX: 25.12 KG/M2 | HEIGHT: 75 IN | HEART RATE: 69 BPM | OXYGEN SATURATION: 97 % | SYSTOLIC BLOOD PRESSURE: 110 MMHG

## 2017-07-24 DIAGNOSIS — Z12.5 SCREENING FOR PROSTATE CANCER: ICD-10-CM

## 2017-07-24 DIAGNOSIS — Z12.11 ENCOUNTER FOR SCREENING COLONOSCOPY: ICD-10-CM

## 2017-07-24 DIAGNOSIS — K21.9 GASTROESOPHAGEAL REFLUX DISEASE WITHOUT ESOPHAGITIS: ICD-10-CM

## 2017-07-24 DIAGNOSIS — I25.10 CORONARY ARTERY DISEASE INVOLVING NATIVE CORONARY ARTERY OF NATIVE HEART WITHOUT ANGINA PECTORIS: Primary | ICD-10-CM

## 2017-07-24 DIAGNOSIS — F41.1 GENERALIZED ANXIETY DISORDER: ICD-10-CM

## 2017-07-24 DIAGNOSIS — E78.1 HYPERLIPIDEMIA TYPE IV: ICD-10-CM

## 2017-07-24 DIAGNOSIS — I10 ESSENTIAL HYPERTENSION: ICD-10-CM

## 2017-07-24 PROCEDURE — 99214 OFFICE O/P EST MOD 30 MIN: CPT | Performed by: INTERNAL MEDICINE

## 2017-07-24 NOTE — PROGRESS NOTES
subjective     Chief Complaint   Patient presents with   • Coronary Artery Disease   • Hypertension   • Hyperlipidemia     History of Present Illness  Coronary artery disease  Onel Jimenez has known coronary artery disease   Patient had coronary angiography done by Dr. Almanzar and 6/8/2015. Patient was found to have severe multivessel disease showing 40% ostial left main, 40% LAD after the takeoff of first diagonal, total occlusion of circumflex marginal receiving left to  left collaterals. RCA was dominant vessel there was 6 cm long 80-90% lesion in the proximal portion of PDA. Posterolateral artery was totally occluded receiving left-to-right collaterals both PDA and posterolateral artery filled by collaterals.      It was felt that disease is not amenable to intervention and patient will require coronary bypass surgery.  Patient is aware that he will need coronary artery bypass graft surgery however is completely asymptomatic and wishes to continue on medical management at this time.  he has not used any nitroglycerin. No drug side effects. he is trying to follow diet also. he is quite satisfied with the progress.      HYPERTENSION  Onel Jimenez has long-standing essential hypertension for years. he is taking medications regularly. There are no medication side effects. Blood pressure is mostly very well controlled. There has been no headache nausea chest pain. There has been no syncopal or presyncopal episode. he denies episodes of hypo-tension or accelerated hypertension. Patient feels that he needs to increase his Norvasc to 15 mg daily. Patient was advised that he needs to stay with 10 and will add a diuretic pill      Hyperlipidemia      Onel Jimenez has long-standing history of hyperlipidemia. Has been trying to lose weight following diet and exercise. Patient is tolerating medications very well. There has been no side effects. Latest lipid levels have been good. But no lab work in 6  months       Anxiety  Patient has mild anxiety disorder. He is taking Klonopin 0.5 daily with that he is doing very well. He has had this anxiety for quite some time so mild and does not interfere with his daily activities.      GERD  Onel Jimenez has gastroesophageal reflux disorder however he significantly better on medications. There is no nausea or vomiting. No hematemesis or melena. No abdominal pain. Mild epigastric heartburns are noted. Appetite is good bowel movements are normal.        Past Surgical History:   Procedure Laterality Date   • CARDIAC CATHETERIZATION  06/06/2015   • CARDIOVASCULAR STRESS TEST  06/06/2015   • ECHO - CONVERTED  06/06/2015   • EYE SURGERY       Family History   Problem Relation Age of Onset   • Heart attack Father    • Heart disease Father    • Heart failure Father    • Heart disease Sister    • Heart failure Sister    • Heart disease Brother    • Heart failure Brother    • Heart failure Sister    • Heart disease Sister      Past Medical History:   Diagnosis Date   • Anxiety disorder    • CAD (coronary artery disease)    • Chronic alcoholism    • Hyperlipidemia    • Hypertension    • WPW (Zakiya-Parkinson-White syndrome)      Patient Active Problem List   Diagnosis   • CAD (coronary artery disease), 90% proximal RCA, total occlusion of posterior lateral branch of RCA, total occlusion of second marginal with left to left collaterals, 40% LAD, 40% left main   • Anxiety disorder   • Chronic alcoholism   • Essential hypertension   • Hyperlipidemia type IV,uncontrolled   • Gastroesophageal reflux disease without esophagitis   • Encounter for screening colonoscopy       Social History   Substance Use Topics   • Smoking status: Former Smoker     Packs/day: 1.00     Years: 20.00     Types: Cigarettes     Quit date: 2003   • Smokeless tobacco: Current User     Types: Chew      Comment: chew 1 can per week   • Alcohol use 6.0 oz/week     6 Cans of beer, 4 Shots of liquor per week       No  Known Allergies    Current Outpatient Prescriptions on File Prior to Visit   Medication Sig   • amLODIPine (NORVASC) 10 MG tablet Take 1 tablet by mouth Daily.   • aspirin 81 MG EC tablet Take 1 tablet by mouth Daily.   • atorvastatin (LIPITOR) 40 MG tablet Take 1 tablet by mouth Daily.   • clonazePAM (KlonoPIN) 0.5 MG tablet Take 1 tablet by mouth 2 (Two) Times a Day As Needed for Seizures.   • clopidogrel (PLAVIX) 75 MG tablet Take 1 tablet by mouth Daily.   • fenofibrate (TRICOR) 145 MG tablet Take 1 tablet by mouth Daily.   • hydrochlorothiazide (MICROZIDE) 12.5 MG capsule Take 1 capsule by mouth Daily.   • losartan (COZAAR) 100 MG tablet Take 1 tablet by mouth Daily.   • metoprolol tartrate (LOPRESSOR) 100 MG tablet Take 1 tablet by mouth 2 (Two) Times a Day.   • pantoprazole (PROTONIX) 40 MG EC tablet Take 1 tablet by mouth Daily.   • potassium chloride (K-DUR) 10 MEQ CR tablet Take 1 tablet by mouth Daily.     No current facility-administered medications on file prior to visit.          The following portions of the patient's history were reviewed and updated as appropriate: allergies, current medications, past family history, past medical history, past social history, past surgical history and problem list.    Review of Systems   Constitution: Negative.   HENT: Negative.  Negative for congestion and headaches.    Eyes: Negative.    Cardiovascular: Negative.  Negative for chest pain, cyanosis, dyspnea on exertion, irregular heartbeat, leg swelling, near-syncope, orthopnea, palpitations, paroxysmal nocturnal dyspnea and syncope.   Respiratory: Negative.  Negative for shortness of breath.    Endocrine: Negative.    Hematologic/Lymphatic: Negative.    Skin: Negative.    Musculoskeletal: Negative.    Gastrointestinal: Positive for heartburn.   Genitourinary: Negative.    Neurological: Negative.    Psychiatric/Behavioral: The patient is nervous/anxious.    Allergic/Immunologic: Negative.           Objective:  "    /70 (BP Location: Left arm, Patient Position: Sitting, Cuff Size: Adult)  Pulse 69  Resp 18  Ht 75\" (190.5 cm)  Wt 202 lb (91.6 kg)  SpO2 97%  BMI 25.25 kg/m2  Physical Exam   Constitutional: He appears well-developed and well-nourished.   HENT:   Head: Normocephalic and atraumatic.   Mouth/Throat: Oropharynx is clear and moist.   Eyes: Conjunctivae and EOM are normal. Pupils are equal, round, and reactive to light. No scleral icterus.   Neck: Normal range of motion. Neck supple. No JVD present. No tracheal deviation present. No thyromegaly present.   Cardiovascular: Normal rate, regular rhythm, normal heart sounds and intact distal pulses.  Exam reveals no friction rub.    No murmur heard.  Pulmonary/Chest: Effort normal and breath sounds normal. No respiratory distress. He has no wheezes. He has no rales. He exhibits no tenderness.   Abdominal: Soft. Bowel sounds are normal. He exhibits no distension and no mass. There is no tenderness. There is no rebound and no guarding.   Musculoskeletal: Normal range of motion. He exhibits no edema, tenderness or deformity.   Lymphadenopathy:     He has no cervical adenopathy.   Neurological: He is alert. He has normal reflexes. No cranial nerve deficit. He exhibits normal muscle tone. Coordination normal.   Skin: Skin is warm and dry.   Psychiatric: He has a normal mood and affect. His behavior is normal. Judgment and thought content normal.         Lab Review  Lab Results   Component Value Date     04/19/2017    K 3.4 (L) 04/19/2017     04/19/2017    BUN 18 04/19/2017    CREATININE 1.18 04/19/2017    GLUCOSE 107 04/19/2017    CALCIUM 10.1 (H) 04/19/2017    CALCIUM 10.0 04/19/2017    ALT 19 04/19/2017    ALKPHOS 30 (L) 04/19/2017    LABIL2 1.8 04/19/2017     Lab Results   Component Value Date    CKTOTAL 92 04/19/2017     Lab Results   Component Value Date    WBC 6.21 04/19/2017    HGB 15.5 04/19/2017    HCT 47.4 04/19/2017     04/19/2017 "     Lab Results   Component Value Date    INR 0.99 06/05/2015     Lab Results   Component Value Date    MG 1.9 01/07/2016     Lab Results   Component Value Date    PSA 0.38 05/04/2016    TSH 1.009 06/05/2015     No results found for: BNP  Lab Results   Component Value Date    CHOL 207 (H) 04/19/2017    CHLPL 182 05/04/2016    TRIG 344 (H) 04/19/2017    HDL 44 (L) 04/19/2017    LDLCALC 94 04/19/2017    VLDL 68.8 04/19/2017    LDLHDL 2.14 04/19/2017         Procedures       I personally viewed and interpreted the patient's LAB data         Assessment:     1. Coronary artery disease involving native coronary artery of native heart without angina pectoris    2. Essential hypertension    3. Hyperlipidemia type IV,uncontrolled    4. Gastroesophageal reflux disease without esophagitis    5. Generalized anxiety disorder    6. Screening for prostate cancer    7. Encounter for screening colonoscopy          Plan:      Patient has known coronary artery disease with 90% proximal RCA and total occlusion of posterior lateral branch of RCA, total occlusion of second marginal with left to left collaterals.  Patient also has 40% LAD and left main.  Patient seems to be doing exceptionally well.  He is fairly active and is totally asymptomatic.  Aggressive risk factor modification was emphasized.  Lab work has been scheduled for next visit.  Blood pressure is very well controlled.   health maintenance discussed.  Patient wishes to undergo colonoscopy which was arranged.  Patient will see Dr. Fiore    Cessation of tobacco use and alcohol use was stressed.    No Follow-up on file.

## 2017-08-03 RX ORDER — HYDROCHLOROTHIAZIDE 12.5 MG/1
12.5 CAPSULE, GELATIN COATED ORAL DAILY
Qty: 30 CAPSULE | Refills: 0 | Status: SHIPPED | OUTPATIENT
Start: 2017-08-03 | End: 2017-09-05 | Stop reason: SDUPTHER

## 2017-08-03 RX ORDER — FENOFIBRATE 145 MG/1
145 TABLET, COATED ORAL DAILY
Qty: 30 TABLET | Refills: 0 | Status: SHIPPED | OUTPATIENT
Start: 2017-08-03 | End: 2017-09-05 | Stop reason: SDUPTHER

## 2017-08-03 RX ORDER — LOSARTAN POTASSIUM 100 MG/1
100 TABLET ORAL DAILY
Qty: 30 TABLET | Refills: 0 | Status: SHIPPED | OUTPATIENT
Start: 2017-08-03 | End: 2017-08-30 | Stop reason: SDUPTHER

## 2017-08-03 RX ORDER — METOPROLOL TARTRATE 100 MG/1
100 TABLET ORAL 2 TIMES DAILY
Qty: 60 TABLET | Refills: 0 | Status: SHIPPED | OUTPATIENT
Start: 2017-08-03 | End: 2017-09-05 | Stop reason: SDUPTHER

## 2017-08-03 RX ORDER — ATORVASTATIN CALCIUM 40 MG/1
40 TABLET, FILM COATED ORAL DAILY
Qty: 30 TABLET | Refills: 0 | Status: SHIPPED | OUTPATIENT
Start: 2017-08-03 | End: 2017-09-06 | Stop reason: SDUPTHER

## 2017-08-03 RX ORDER — POTASSIUM CHLORIDE 750 MG/1
10 TABLET, FILM COATED, EXTENDED RELEASE ORAL DAILY
Qty: 30 TABLET | Refills: 0 | Status: SHIPPED | OUTPATIENT
Start: 2017-08-03 | End: 2017-09-05 | Stop reason: SDUPTHER

## 2017-08-18 RX ORDER — CLONAZEPAM 0.5 MG/1
0.5 TABLET ORAL 2 TIMES DAILY PRN
Qty: 60 TABLET | Refills: 0 | OUTPATIENT
Start: 2017-08-18 | End: 2017-09-19 | Stop reason: SDUPTHER

## 2017-08-30 RX ORDER — LOSARTAN POTASSIUM 100 MG/1
100 TABLET ORAL DAILY
Qty: 30 TABLET | Refills: 0 | Status: SHIPPED | OUTPATIENT
Start: 2017-08-30 | End: 2017-10-12 | Stop reason: SDUPTHER

## 2017-09-05 RX ORDER — HYDROCHLOROTHIAZIDE 12.5 MG/1
12.5 CAPSULE, GELATIN COATED ORAL DAILY
Qty: 90 CAPSULE | Refills: 0 | Status: SHIPPED | OUTPATIENT
Start: 2017-09-05 | End: 2017-11-24 | Stop reason: SDUPTHER

## 2017-09-05 RX ORDER — METOPROLOL TARTRATE 100 MG/1
100 TABLET ORAL 2 TIMES DAILY
Qty: 180 TABLET | Refills: 0 | Status: SHIPPED | OUTPATIENT
Start: 2017-09-05 | End: 2017-11-24 | Stop reason: SDUPTHER

## 2017-09-05 RX ORDER — CLOPIDOGREL BISULFATE 75 MG/1
75 TABLET ORAL DAILY
Qty: 90 TABLET | Refills: 0 | Status: SHIPPED | OUTPATIENT
Start: 2017-09-05 | End: 2017-11-24 | Stop reason: SDUPTHER

## 2017-09-05 RX ORDER — FENOFIBRATE 145 MG/1
145 TABLET, COATED ORAL DAILY
Qty: 90 TABLET | Refills: 0 | Status: SHIPPED | OUTPATIENT
Start: 2017-09-05 | End: 2017-11-24 | Stop reason: SDUPTHER

## 2017-09-05 RX ORDER — POTASSIUM CHLORIDE 750 MG/1
10 TABLET, FILM COATED, EXTENDED RELEASE ORAL DAILY
Qty: 90 TABLET | Refills: 0 | Status: SHIPPED | OUTPATIENT
Start: 2017-09-05 | End: 2017-11-24 | Stop reason: SDUPTHER

## 2017-09-06 RX ORDER — ATORVASTATIN CALCIUM 40 MG/1
40 TABLET, FILM COATED ORAL DAILY
Qty: 30 TABLET | Refills: 0 | Status: SHIPPED | OUTPATIENT
Start: 2017-09-06 | End: 2017-10-19 | Stop reason: SDUPTHER

## 2017-09-19 RX ORDER — CLONAZEPAM 0.5 MG/1
0.5 TABLET ORAL 2 TIMES DAILY PRN
Qty: 60 TABLET | Refills: 0 | OUTPATIENT
Start: 2017-09-19 | End: 2017-10-23 | Stop reason: SDUPTHER

## 2017-10-12 RX ORDER — LOSARTAN POTASSIUM 100 MG/1
100 TABLET ORAL DAILY
Qty: 30 TABLET | Refills: 0 | Status: SHIPPED | OUTPATIENT
Start: 2017-10-12 | End: 2017-12-04 | Stop reason: SDUPTHER

## 2017-10-19 RX ORDER — ATORVASTATIN CALCIUM 40 MG/1
40 TABLET, FILM COATED ORAL DAILY
Qty: 30 TABLET | Refills: 0 | Status: SHIPPED | OUTPATIENT
Start: 2017-10-19 | End: 2017-12-04 | Stop reason: SDUPTHER

## 2017-10-20 RX ORDER — AMLODIPINE BESYLATE 10 MG/1
10 TABLET ORAL DAILY
Qty: 30 TABLET | Refills: 0 | Status: SHIPPED | OUTPATIENT
Start: 2017-10-20 | End: 2017-11-20 | Stop reason: SDUPTHER

## 2017-10-23 RX ORDER — CLONAZEPAM 0.5 MG/1
0.5 TABLET ORAL 2 TIMES DAILY PRN
Qty: 60 TABLET | Refills: 0 | OUTPATIENT
Start: 2017-10-23 | End: 2017-12-04 | Stop reason: SDUPTHER

## 2017-11-20 RX ORDER — AMLODIPINE BESYLATE 10 MG/1
10 TABLET ORAL DAILY
Qty: 30 TABLET | Refills: 0 | Status: SHIPPED | OUTPATIENT
Start: 2017-11-20 | End: 2017-12-04 | Stop reason: SDUPTHER

## 2017-11-22 ENCOUNTER — TELEPHONE (OUTPATIENT)
Dept: CARDIOLOGY | Facility: CLINIC | Age: 52
End: 2017-11-22

## 2017-11-22 NOTE — TELEPHONE ENCOUNTER
Tried to call the patient to let him know that Dr. Canales would send him Nexium 40 mg or Prilosec 40 mg in to his pharmacy. Was unable to reach the patient. Will not send in either until I speak with the patient.                 ----- Message from Alex Canales MD sent at 11/22/2017 12:03 PM EST -----  Regarding: RE: Medication PA Brienial   Nexium 40 mg daily , dexilant or omeprazole  ----- Message -----     From: Aspen Butts MA     Sent: 11/22/2017  10:02 AM       To: Alex Canales MD  Subject: Medication PA Brienalayna                             Received a letter from [facility] that said they would not cover Pantoprazole Sodium 40 mg for this patient. Would you like to prescribe another medication that may be paid for by his insurance?

## 2017-11-27 RX ORDER — METOPROLOL TARTRATE 100 MG/1
TABLET ORAL
Qty: 60 TABLET | Refills: 0 | Status: SHIPPED | OUTPATIENT
Start: 2017-11-27 | End: 2017-12-04 | Stop reason: SDUPTHER

## 2017-11-27 RX ORDER — POTASSIUM CHLORIDE 750 MG/1
TABLET, FILM COATED, EXTENDED RELEASE ORAL
Qty: 30 TABLET | Refills: 0 | Status: SHIPPED | OUTPATIENT
Start: 2017-11-27 | End: 2017-12-04 | Stop reason: SDUPTHER

## 2017-11-27 RX ORDER — HYDROCHLOROTHIAZIDE 12.5 MG/1
CAPSULE, GELATIN COATED ORAL
Qty: 30 CAPSULE | Refills: 0 | Status: SHIPPED | OUTPATIENT
Start: 2017-11-27 | End: 2017-12-04 | Stop reason: SDUPTHER

## 2017-11-27 RX ORDER — FENOFIBRATE 145 MG/1
TABLET, COATED ORAL
Qty: 30 TABLET | Refills: 0 | Status: SHIPPED | OUTPATIENT
Start: 2017-11-27 | End: 2017-12-04 | Stop reason: SDUPTHER

## 2017-11-27 RX ORDER — CLOPIDOGREL BISULFATE 75 MG/1
TABLET ORAL
Qty: 30 TABLET | Refills: 0 | Status: SHIPPED | OUTPATIENT
Start: 2017-11-27 | End: 2017-12-04 | Stop reason: SDUPTHER

## 2017-12-02 ENCOUNTER — LAB (OUTPATIENT)
Dept: LAB | Facility: HOSPITAL | Age: 52
End: 2017-12-02
Attending: INTERNAL MEDICINE

## 2017-12-02 DIAGNOSIS — E78.1 HYPERLIPIDEMIA TYPE IV: ICD-10-CM

## 2017-12-02 LAB
CHOLEST SERPL-MCNC: 211 MG/DL (ref 0–200)
HDLC SERPL-MCNC: 45 MG/DL (ref 60–100)
LDLC SERPL CALC-MCNC: 124 MG/DL (ref 0–100)
LDLC/HDLC SERPL: 2.76 {RATIO}
TRIGL SERPL-MCNC: 209 MG/DL (ref 0–150)
VLDLC SERPL-MCNC: 41.8 MG/DL

## 2017-12-02 PROCEDURE — 80061 LIPID PANEL: CPT

## 2017-12-04 ENCOUNTER — OFFICE VISIT (OUTPATIENT)
Dept: CARDIOLOGY | Facility: CLINIC | Age: 52
End: 2017-12-04

## 2017-12-04 VITALS
DIASTOLIC BLOOD PRESSURE: 72 MMHG | HEART RATE: 76 BPM | OXYGEN SATURATION: 96 % | WEIGHT: 186.8 LBS | SYSTOLIC BLOOD PRESSURE: 118 MMHG | BODY MASS INDEX: 23.23 KG/M2 | HEIGHT: 75 IN

## 2017-12-04 DIAGNOSIS — E78.1 HYPERLIPIDEMIA TYPE IV: ICD-10-CM

## 2017-12-04 DIAGNOSIS — I25.10 CORONARY ARTERY DISEASE INVOLVING NATIVE CORONARY ARTERY OF NATIVE HEART WITHOUT ANGINA PECTORIS: Primary | ICD-10-CM

## 2017-12-04 DIAGNOSIS — F41.1 GENERALIZED ANXIETY DISORDER: ICD-10-CM

## 2017-12-04 DIAGNOSIS — N40.0 BENIGN PROSTATIC HYPERPLASIA WITHOUT LOWER URINARY TRACT SYMPTOMS: ICD-10-CM

## 2017-12-04 DIAGNOSIS — I10 ESSENTIAL HYPERTENSION: ICD-10-CM

## 2017-12-04 DIAGNOSIS — K21.9 GASTROESOPHAGEAL REFLUX DISEASE WITHOUT ESOPHAGITIS: ICD-10-CM

## 2017-12-04 PROCEDURE — 90686 IIV4 VACC NO PRSV 0.5 ML IM: CPT | Performed by: INTERNAL MEDICINE

## 2017-12-04 PROCEDURE — 99214 OFFICE O/P EST MOD 30 MIN: CPT | Performed by: INTERNAL MEDICINE

## 2017-12-04 PROCEDURE — 90471 IMMUNIZATION ADMIN: CPT | Performed by: INTERNAL MEDICINE

## 2017-12-04 RX ORDER — POTASSIUM CHLORIDE 750 MG/1
10 TABLET, FILM COATED, EXTENDED RELEASE ORAL DAILY
Qty: 30 TABLET | Refills: 3 | Status: SHIPPED | OUTPATIENT
Start: 2017-12-04 | End: 2018-03-20 | Stop reason: SDUPTHER

## 2017-12-04 RX ORDER — METOPROLOL TARTRATE 100 MG/1
100 TABLET ORAL 2 TIMES DAILY
Qty: 60 TABLET | Refills: 3 | Status: SHIPPED | OUTPATIENT
Start: 2017-12-04 | End: 2018-03-20 | Stop reason: SDUPTHER

## 2017-12-04 RX ORDER — LOSARTAN POTASSIUM 100 MG/1
TABLET ORAL
Qty: 30 TABLET | Refills: 0 | Status: CANCELLED | OUTPATIENT
Start: 2017-12-04

## 2017-12-04 RX ORDER — AMLODIPINE BESYLATE 10 MG/1
10 TABLET ORAL DAILY
Qty: 30 TABLET | Refills: 0 | Status: SHIPPED | OUTPATIENT
Start: 2017-12-04 | End: 2018-01-14 | Stop reason: SDUPTHER

## 2017-12-04 RX ORDER — PANTOPRAZOLE SODIUM 40 MG/1
40 TABLET, DELAYED RELEASE ORAL DAILY
Qty: 30 TABLET | Refills: 3 | Status: SHIPPED | OUTPATIENT
Start: 2017-12-04 | End: 2018-03-20 | Stop reason: SDUPTHER

## 2017-12-04 RX ORDER — ASPIRIN 81 MG/1
81 TABLET ORAL DAILY
Qty: 90 TABLET | Refills: 1 | Status: SHIPPED | OUTPATIENT
Start: 2017-12-04 | End: 2018-03-20 | Stop reason: SDUPTHER

## 2017-12-04 RX ORDER — LOSARTAN POTASSIUM 100 MG/1
100 TABLET ORAL DAILY
Qty: 30 TABLET | Refills: 0 | Status: SHIPPED | OUTPATIENT
Start: 2017-12-04 | End: 2018-01-23 | Stop reason: SDUPTHER

## 2017-12-04 RX ORDER — FENOFIBRATE 145 MG/1
145 TABLET, COATED ORAL DAILY
Qty: 30 TABLET | Refills: 3 | Status: SHIPPED | OUTPATIENT
Start: 2017-12-04 | End: 2018-03-20 | Stop reason: SDUPTHER

## 2017-12-04 RX ORDER — ATORVASTATIN CALCIUM 40 MG/1
40 TABLET, FILM COATED ORAL DAILY
Qty: 30 TABLET | Refills: 0 | Status: SHIPPED | OUTPATIENT
Start: 2017-12-04 | End: 2017-12-31 | Stop reason: SDUPTHER

## 2017-12-04 RX ORDER — CLONAZEPAM 0.5 MG/1
0.5 TABLET ORAL 2 TIMES DAILY PRN
Qty: 60 TABLET | Refills: 0 | OUTPATIENT
Start: 2017-12-04 | End: 2018-01-10 | Stop reason: SDUPTHER

## 2017-12-04 RX ORDER — HYDROCHLOROTHIAZIDE 12.5 MG/1
12.5 CAPSULE, GELATIN COATED ORAL EVERY MORNING
Qty: 30 CAPSULE | Refills: 3 | Status: SHIPPED | OUTPATIENT
Start: 2017-12-04 | End: 2018-03-20 | Stop reason: SDUPTHER

## 2017-12-04 RX ORDER — CLOPIDOGREL BISULFATE 75 MG/1
75 TABLET ORAL DAILY
Qty: 90 TABLET | Refills: 0 | Status: SHIPPED | OUTPATIENT
Start: 2017-12-04 | End: 2018-03-20 | Stop reason: SDUPTHER

## 2017-12-04 NOTE — PROGRESS NOTES
subjective     Chief Complaint   Patient presents with   • Coronary Artery Disease   • Hypertension   • Heartburn     History of Present Illness    Patient is 52 years old white male who is here for follow-up.  He has known coronary artery disease.  He is doing very well denies any chest pain palpitations or shortness of breath.    He also has hypertension blood pressure is very well controlled with current medications.  There has been no drug side effects.    Patient has hyperlipidemia.  He is taking his medications regularly however lipids are abnormal.  Lab work also showed hypokalemia.  Patient has a gastroesophageal reflux disorder.  He is taking Protonix and feeling okay with it.    Past Surgical History:   Procedure Laterality Date   • CARDIAC CATHETERIZATION  06/06/2015   • CARDIOVASCULAR STRESS TEST  06/06/2015   • ECHO - CONVERTED  06/06/2015   • EYE SURGERY       Family History   Problem Relation Age of Onset   • Heart attack Father    • Heart disease Father    • Heart failure Father    • Heart disease Sister    • Heart failure Sister    • Heart disease Brother    • Heart failure Brother    • Heart failure Sister    • Heart disease Sister      Past Medical History:   Diagnosis Date   • Anxiety disorder    • CAD (coronary artery disease)    • Chronic alcoholism    • Hyperlipidemia    • Hypertension    • WPW (Zakiya-Parkinson-White syndrome)      Patient Active Problem List   Diagnosis   • CAD (coronary artery disease), 90% proximal RCA, total occlusion of posterior lateral branch of RCA, total occlusion of second marginal with left to left collaterals, 40% LAD, 40% left main   • Anxiety disorder   • Chronic alcoholism   • Essential hypertension   • Hyperlipidemia type IV,uncontrolled   • Gastroesophageal reflux disease without esophagitis   • Encounter for screening colonoscopy       Social History   Substance Use Topics   • Smoking status: Former Smoker     Packs/day: 1.00     Years: 20.00     Types:  Cigarettes     Quit date: 2003   • Smokeless tobacco: Current User     Types: Chew      Comment: chew 1 can per week   • Alcohol use 6.0 oz/week     6 Cans of beer, 4 Shots of liquor per week       No Known Allergies    Current Outpatient Prescriptions on File Prior to Visit   Medication Sig   • amLODIPine (NORVASC) 10 MG tablet Take 1 tablet by mouth Daily.   • aspirin 81 MG EC tablet Take 1 tablet by mouth Daily.   • atorvastatin (LIPITOR) 40 MG tablet Take 1 tablet by mouth Daily.   • clonazePAM (KlonoPIN) 0.5 MG tablet Take 1 tablet by mouth 2 (Two) Times a Day As Needed for Seizures.   • clopidogrel (PLAVIX) 75 MG tablet TAKE ONE TABLET BY MOUTH DAILY   • fenofibrate (TRICOR) 145 MG tablet TAKE ONE TABLET BY MOUTH DAILY FOR CHOLESTEROL   • hydrochlorothiazide (MICROZIDE) 12.5 MG capsule TAKE ONE CAPSULE BY MOUTH DAILY FOR FLUID   • losartan (COZAAR) 100 MG tablet Take 1 tablet by mouth Daily.   • metoprolol tartrate (LOPRESSOR) 100 MG tablet TAKE ONE TABLET BY MOUTH TWICE A DAY FOR BLOOD PRESSURE   • pantoprazole (PROTONIX) 40 MG EC tablet Take 1 tablet by mouth Daily.   • potassium chloride (K-DUR) 10 MEQ CR tablet TAKE ONE TABLET BY MOUTH DAILY     No current facility-administered medications on file prior to visit.          The following portions of the patient's history were reviewed and updated as appropriate: allergies, current medications, past family history, past medical history, past social history, past surgical history and problem list.    Review of Systems   Constitution: Negative.   HENT: Negative.  Negative for congestion.    Eyes: Negative.    Cardiovascular: Negative.  Negative for chest pain, cyanosis, dyspnea on exertion, irregular heartbeat, leg swelling, near-syncope, orthopnea, palpitations, paroxysmal nocturnal dyspnea and syncope.   Respiratory: Negative.  Negative for shortness of breath.    Hematologic/Lymphatic: Negative.    Musculoskeletal: Negative.    Gastrointestinal: Negative.   "  Neurological: Negative.  Negative for headaches.          Objective:     /72 (BP Location: Left arm, Patient Position: Sitting)  Pulse 76  Ht 75\" (190.5 cm)  Wt 186 lb 12.8 oz (84.7 kg)  SpO2 96%  BMI 23.35 kg/m2  Physical Exam   Constitutional: He appears well-developed and well-nourished. No distress.   HENT:   Head: Normocephalic and atraumatic.   Mouth/Throat: Oropharynx is clear and moist. No oropharyngeal exudate.   Eyes: Conjunctivae and EOM are normal. Pupils are equal, round, and reactive to light. No scleral icterus.   Neck: Normal range of motion. Neck supple. No JVD present. No tracheal deviation present. No thyromegaly present.   Cardiovascular: Normal rate, regular rhythm, normal heart sounds and intact distal pulses.  PMI is not displaced.  Exam reveals no gallop, no friction rub and no decreased pulses.    No murmur heard.  Pulses:       Carotid pulses are 3+ on the right side, and 3+ on the left side.       Radial pulses are 3+ on the right side, and 3+ on the left side.   Pulmonary/Chest: Effort normal and breath sounds normal. No respiratory distress. He has no wheezes. He has no rales. He exhibits no tenderness.   Abdominal: Soft. Bowel sounds are normal. He exhibits no distension, no abdominal bruit and no mass. There is no splenomegaly or hepatomegaly. There is no tenderness. There is no rebound and no guarding.   Musculoskeletal: Normal range of motion. He exhibits no edema, tenderness or deformity.   Lymphadenopathy:     He has no cervical adenopathy.   Neurological: He is alert. He has normal reflexes. No cranial nerve deficit. He exhibits normal muscle tone. Coordination normal.   Skin: Skin is warm and dry. No rash noted. He is not diaphoretic. No erythema.   Psychiatric: He has a normal mood and affect. His behavior is normal. Judgment and thought content normal.         Lab Review  Lab Results   Component Value Date     04/19/2017    K 3.4 (L) 04/19/2017     " 04/19/2017    BUN 18 04/19/2017    CREATININE 1.18 04/19/2017    GLUCOSE 107 04/19/2017    CALCIUM 10.1 (H) 04/19/2017    CALCIUM 10.0 04/19/2017    ALT 19 04/19/2017    ALKPHOS 30 (L) 04/19/2017    LABIL2 1.8 04/19/2017     Lab Results   Component Value Date    CKTOTAL 92 04/19/2017     Lab Results   Component Value Date    WBC 6.21 04/19/2017    HGB 15.5 04/19/2017    HCT 47.4 04/19/2017     04/19/2017     Lab Results   Component Value Date    INR 0.99 06/05/2015     Lab Results   Component Value Date    MG 1.9 01/07/2016     Lab Results   Component Value Date    PSA 0.38 05/04/2016    TSH 1.009 06/05/2015     No results found for: BNP  Lab Results   Component Value Date    CHLPL 182 05/04/2016    CHOL 211 (H) 12/02/2017    TRIG 209 (H) 12/02/2017    HDL 45 (L) 12/02/2017    LDLCALC 124 (H) 12/02/2017    VLDL 41.8 12/02/2017    LDLHDL 2.76 12/02/2017         Procedures       I personally viewed and interpreted the patient's LAB data         Assessment:     1. Coronary artery disease involving native coronary artery of native heart without angina pectoris    2. Essential hypertension    3. Hyperlipidemia type IV,uncontrolled    4. Gastroesophageal reflux disease without esophagitis    5. Generalized anxiety disorder          Plan:      Patient has known coronary artery disease with 90% proximal RCA, total occlusion of posterior lateral branch of RCA and total occlusion of second marginal with left to left collateral.  Patient also has 40% LAD and 40% left main.  He has been doing very well.  He denies any chest pain palpitations or shortness of breath.  Currently is doing very well.  Aggressive risk factor modification will be continued.    Patient will continue taking aspirin and Plavix.  Blood pressure is very well controlled.  Patient was advised to continue losartan 100 mg daily, Lopressor 100 mg twice a day,  Norvasc 10 mg daily and hydrochlorothiazide 12.5 daily.    Lipids have not been very well  controlled  LDL is 124.  Patient was advised to increase Lipitor 80 mg daily .  He will continue TriCor 145 daily.  Potassium was low at 3.4 patient will take K-Dur 10 milliequivalent daily.  Her medications were continued.  Healthy lifestyle discussed with the patient.  Follow-up scheduled.

## 2017-12-05 ENCOUNTER — TELEPHONE (OUTPATIENT)
Dept: CARDIOLOGY | Facility: CLINIC | Age: 52
End: 2017-12-05

## 2017-12-28 NOTE — ED PROVIDER NOTES
Subjective   HPI Comments: Patient is a 51-year-old white male who tells me that he pulled his shoulder about a week ago working on a car, and he's been sore in his right shoulder ever since.  Today he had some sharp pain shooting from his right shoulder into his right pectoralis area and thus comes to the emergency department to be checked as he has been instructed to do for any chest pain of any kind.  He is not having any chest pain now.  He states that it was brief, sharp pains shooting down from his right shoulder.  Nothing of a lasting nature.  He has not had any shortness of breath, nausea, vomiting, diaphoresis, radiation of this pain, syncope/near syncope or any other associated symptoms or complaints.    His past medical history is notable for coronary artery disease, but he has not had to undergo any interventions.  He is treated medically and followed closely by Dr. Canales    Social history the patient reports that he drinks alcohol 2-3 times weekly.  He reports that he does not smoke or use any drugs.    Patient is a 51 y.o. male presenting with chest pain.   History provided by:  Patient  Chest Pain   Associated symptoms: altered mental status    Associated symptoms: no abdominal pain, no back pain, no claudication, no cough, no diaphoresis, no dizziness, no fever, no headache, no lower extremity edema, no nausea, no near-syncope, no palpitations, no shortness of breath, no syncope and no vomiting        Review of Systems   Constitutional: Negative for chills, diaphoresis and fever.   HENT: Negative for congestion and sinus pressure.    Eyes: Negative for pain.   Respiratory: Negative for cough, shortness of breath, wheezing and stridor.    Cardiovascular: Positive for chest pain. Negative for palpitations, claudication, syncope and near-syncope.   Gastrointestinal: Negative for abdominal distention, abdominal pain, diarrhea, nausea and vomiting.   Endocrine: Negative for polydipsia and polyphagia.    Genitourinary: Negative for difficulty urinating and flank pain.   Musculoskeletal: Negative for back pain, neck pain and neck stiffness.   Skin: Negative for color change and rash.   Neurological: Negative for dizziness, seizures, syncope and headaches.   Psychiatric/Behavioral: Negative for confusion.   All other systems reviewed and are negative.      Past Medical History   Diagnosis Date   • Anxiety disorder    • CAD (coronary artery disease)    • Chronic alcoholism    • Hyperlipidemia    • Hypertension    • WPW (Zakiya-Parkinson-White syndrome)        No Known Allergies    Past Surgical History   Procedure Laterality Date   • Eye surgery         Family History   Problem Relation Age of Onset   • Heart attack Father    • Heart disease Father    • Heart failure Father    • Heart disease Sister    • Heart failure Sister    • Heart disease Brother    • Heart failure Brother    • Heart failure Sister    • Heart disease Sister        Social History     Social History   • Marital status:      Spouse name: N/A   • Number of children: N/A   • Years of education: N/A     Social History Main Topics   • Smoking status: Former Smoker     Packs/day: 1.00     Years: 20.00     Types: Cigarettes     Quit date: 2003   • Smokeless tobacco: Current User     Types: Chew      Comment: chew 1 can per week   • Alcohol use 6.0 oz/week     6 Cans of beer, 4 Shots of liquor per week   • Drug use: Yes     Special: Hydrocodone      Comment: occ   • Sexual activity: Not Asked     Other Topics Concern   • None     Social History Narrative   • None           Objective   Physical Exam   Constitutional: He is oriented to person, place, and time. He appears well-developed and well-nourished. No distress.   HENT:   Head: Normocephalic and atraumatic.   Eyes: EOM are normal. Pupils are equal, round, and reactive to light. No scleral icterus.   Neck: Normal range of motion. Neck supple. No rigidity. No tracheal deviation present.    Cardiovascular: Normal rate, regular rhythm and intact distal pulses.    Pulmonary/Chest: Effort normal and breath sounds normal. No respiratory distress. He exhibits tenderness (there is some mild tenderness of the right pectoralis musculature.  Patient reports that this  palpation reproduces a pain that is similar to that of his chief complaint.).   Abdominal: Soft. Bowel sounds are normal. There is no tenderness. There is no rebound and no guarding.   Musculoskeletal: Normal range of motion. He exhibits no edema or deformity.   There is some mild tenderness of the muscles about the right shoulder.   Neurological: He is alert and oriented to person, place, and time. He has normal strength. No sensory deficit. He exhibits normal muscle tone. Coordination normal. GCS eye subscore is 4. GCS verbal subscore is 5. GCS motor subscore is 6.   Skin: Skin is warm and dry. No cyanosis. No pallor.   Psychiatric: He has a normal mood and affect. His behavior is normal.   Vitals reviewed.      Procedures  EKG shows sinus rhythm with a rate of 64.  There is some baseline artifact.  No apparent acute ischemia or injury pattern.  XR Chest 1 View   ED Interpretation   No apparent acute disease pending radiologist        Results for orders placed or performed during the hospital encounter of 02/15/17   Comprehensive Metabolic Panel   Result Value Ref Range    Glucose 89 70 - 110 mg/dL    BUN 15 7 - 21 mg/dL    Creatinine 1.27 0.43 - 1.29 mg/dL    Sodium 135 135 - 153 mmol/L    Potassium 3.9 3.5 - 5.3 mmol/L    Chloride 98 (L) 99 - 112 mmol/L    CO2 26.6 24.3 - 31.9 mmol/L    Calcium 10.1 (H) 7.7 - 10.0 mg/dL    Total Protein 7.9 6.0 - 8.0 g/dL    Albumin 5.00 3.50 - 5.00 g/dL    ALT (SGPT) 20 10 - 44 U/L    AST (SGOT) 25 10 - 34 U/L    Alkaline Phosphatase 30 (L) 40 - 129 U/L    Total Bilirubin 0.8 0.2 - 1.8 mg/dL    eGFR Non African Amer 59 (L) >60 mL/min/1.73    Globulin 2.9 gm/dL    A/G Ratio 1.7 1.5 - 2.5 g/dL     BUN/Creatinine Ratio 11.8 7.0 - 25.0    Anion Gap 10.4 3.6 - 11.2 mmol/L   Lipase   Result Value Ref Range    Lipase 92 (H) 13 - 60 U/L   CK   Result Value Ref Range    Creatine Kinase 81 24 - 204 U/L   Myoglobin, Serum   Result Value Ref Range    Myoglobin 32.0 0.0 - 109.0 ng/mL   CK-MB   Result Value Ref Range    CKMB 0.24 0.00 - 5.00 ng/mL   Troponin   Result Value Ref Range    Troponin I <0.006 <=0.040 ng/mL   CBC Auto Differential   Result Value Ref Range    WBC 8.40 4.50 - 12.50 10*3/mm3    RBC 5.12 4.70 - 6.10 10*6/mm3    Hemoglobin 15.7 14.0 - 18.0 g/dL    Hematocrit 46.2 42.0 - 52.0 %    MCV 90.2 80.0 - 94.0 fL    MCH 30.7 27.0 - 33.0 pg    MCHC 34.0 33.0 - 37.0 g/dL    RDW 12.9 11.5 - 14.5 %    RDW-SD 42.3 37.0 - 54.0 fl    MPV 10.2 (H) 6.0 - 10.0 fL    Platelets 281 130 - 400 10*3/mm3    Neutrophil % 56.3 30.0 - 70.0 %    Lymphocyte % 27.9 21.0 - 51.0 %    Monocyte % 14.3 (H) 0.0 - 10.0 %    Eosinophil % 0.8 0.0 - 5.0 %    Basophil % 0.6 0.0 - 2.0 %    Immature Grans % 0.1 0.0 - 0.5 %    Neutrophils, Absolute 4.73 1.40 - 6.50 10*3/mm3    Lymphocytes, Absolute 2.34 1.00 - 3.00 10*3/mm3    Monocytes, Absolute 1.20 (H) 0.10 - 0.90 10*3/mm3    Eosinophils, Absolute 0.07 0.00 - 0.70 10*3/mm3    Basophils, Absolute 0.05 0.00 - 0.30 10*3/mm3    Immature Grans, Absolute 0.01 0.00 - 0.03 10*3/mm3   Osmolality, Calculated   Result Value Ref Range    Osmolality Calc 270.4 (L) 273.0 - 305.0 mOsm/kg   CK-MB Index   Result Value Ref Range    CK-MB Index 0.3 0.0 - 3.0 %   Troponin   Result Value Ref Range    Troponin I <0.006 <=0.040 ng/mL              ED Course  ED Course   Comment By Time   Patient's emergency department course is been entirely uneventful.  He is remained asymptomatic throughout his stay.  We discussed his case and all of his test results.  He does not wish to be admitted to the hospital for further evaluation, but rather prefers to follow up with Dr. Canales in the office.  He agrees that if his  symptoms worsen in any way he will return to the emergency department immediately for further evaluation, but he does not think that this was very likely to be his heart tonight and therefore does not want to be admitted for further workup. Grant Mar MD 02/15 2151                  MDM  Number of Diagnoses or Management Options  Chest pain, atypical:   Patient Progress  Patient progress: stable      Final diagnoses:   Chest pain, atypical             Please note that portions of this note were completed with a voice recognition program. Efforts were made to edit the dictations, but occasionally words are mistranscribed.       Grant Mar MD  02/15/17 [id number]     EMS

## 2018-01-03 RX ORDER — ATORVASTATIN CALCIUM 40 MG/1
TABLET, FILM COATED ORAL
Qty: 30 TABLET | Refills: 0 | Status: SHIPPED | OUTPATIENT
Start: 2018-01-03 | End: 2018-01-31 | Stop reason: SDUPTHER

## 2018-01-10 RX ORDER — CLONAZEPAM 0.5 MG/1
TABLET ORAL
Qty: 60 TABLET | Refills: 0 | Status: SHIPPED | OUTPATIENT
Start: 2018-01-10 | End: 2018-02-08 | Stop reason: SDUPTHER

## 2018-01-15 RX ORDER — AMLODIPINE BESYLATE 10 MG/1
TABLET ORAL
Qty: 30 TABLET | Refills: 0 | Status: SHIPPED | OUTPATIENT
Start: 2018-01-15 | End: 2018-02-14 | Stop reason: SDUPTHER

## 2018-01-23 RX ORDER — LOSARTAN POTASSIUM 100 MG/1
TABLET ORAL
Qty: 90 TABLET | Refills: 0 | Status: SHIPPED | OUTPATIENT
Start: 2018-01-23 | End: 2018-03-20 | Stop reason: SDUPTHER

## 2018-01-31 RX ORDER — ATORVASTATIN CALCIUM 40 MG/1
TABLET, FILM COATED ORAL
Qty: 30 TABLET | Refills: 0 | Status: SHIPPED | OUTPATIENT
Start: 2018-01-31 | End: 2018-02-26 | Stop reason: SDUPTHER

## 2018-02-09 RX ORDER — CLONAZEPAM 0.5 MG/1
TABLET ORAL
Qty: 56 TABLET | Refills: 0 | OUTPATIENT
Start: 2018-02-09 | End: 2018-03-20 | Stop reason: SDUPTHER

## 2018-02-14 RX ORDER — AMLODIPINE BESYLATE 10 MG/1
TABLET ORAL
Qty: 30 TABLET | Refills: 0 | Status: SHIPPED | OUTPATIENT
Start: 2018-02-14 | End: 2018-03-15 | Stop reason: SDUPTHER

## 2018-02-27 RX ORDER — ATORVASTATIN CALCIUM 40 MG/1
TABLET, FILM COATED ORAL
Qty: 30 TABLET | Refills: 0 | Status: SHIPPED | OUTPATIENT
Start: 2018-02-27 | End: 2018-03-20 | Stop reason: SDUPTHER

## 2018-03-15 RX ORDER — AMLODIPINE BESYLATE 10 MG/1
TABLET ORAL
Qty: 30 TABLET | Refills: 5 | Status: SHIPPED | OUTPATIENT
Start: 2018-03-15 | End: 2018-03-20 | Stop reason: SDUPTHER

## 2018-03-16 RX ORDER — CLONAZEPAM 0.5 MG/1
TABLET ORAL
Qty: 56 TABLET | Refills: 0 | OUTPATIENT
Start: 2018-03-16

## 2018-03-20 ENCOUNTER — LAB (OUTPATIENT)
Dept: LAB | Facility: HOSPITAL | Age: 53
End: 2018-03-20

## 2018-03-20 ENCOUNTER — OFFICE VISIT (OUTPATIENT)
Dept: CARDIOLOGY | Facility: CLINIC | Age: 53
End: 2018-03-20

## 2018-03-20 VITALS
BODY MASS INDEX: 23.38 KG/M2 | DIASTOLIC BLOOD PRESSURE: 64 MMHG | OXYGEN SATURATION: 97 % | HEIGHT: 75 IN | SYSTOLIC BLOOD PRESSURE: 110 MMHG | WEIGHT: 188 LBS | HEART RATE: 72 BPM

## 2018-03-20 DIAGNOSIS — I25.10 CORONARY ARTERY DISEASE INVOLVING NATIVE CORONARY ARTERY OF NATIVE HEART WITHOUT ANGINA PECTORIS: Primary | ICD-10-CM

## 2018-03-20 DIAGNOSIS — E78.1 HYPERLIPIDEMIA TYPE IV: ICD-10-CM

## 2018-03-20 DIAGNOSIS — I10 ESSENTIAL HYPERTENSION: ICD-10-CM

## 2018-03-20 DIAGNOSIS — K21.9 GASTROESOPHAGEAL REFLUX DISEASE WITHOUT ESOPHAGITIS: ICD-10-CM

## 2018-03-20 DIAGNOSIS — Z12.5 SCREENING FOR PROSTATE CANCER: ICD-10-CM

## 2018-03-20 DIAGNOSIS — F41.1 GENERALIZED ANXIETY DISORDER: ICD-10-CM

## 2018-03-20 LAB
ALBUMIN SERPL-MCNC: 4.8 G/DL (ref 3.5–5)
ALBUMIN/GLOB SERPL: 1.6 G/DL (ref 1.5–2.5)
ALP SERPL-CCNC: 37 U/L (ref 40–129)
ALT SERPL W P-5'-P-CCNC: 13 U/L (ref 10–44)
ANION GAP SERPL CALCULATED.3IONS-SCNC: 5.6 MMOL/L (ref 3.6–11.2)
AST SERPL-CCNC: 17 U/L (ref 10–34)
BASOPHILS # BLD AUTO: 0.08 10*3/MM3 (ref 0–0.3)
BASOPHILS NFR BLD AUTO: 0.8 % (ref 0–2)
BILIRUB SERPL-MCNC: 0.4 MG/DL (ref 0.2–1.8)
BUN BLD-MCNC: 26 MG/DL (ref 7–21)
BUN/CREAT SERPL: 22.6 (ref 7–25)
CALCIUM SPEC-SCNC: 9.9 MG/DL (ref 7.7–10)
CHLORIDE SERPL-SCNC: 104 MMOL/L (ref 99–112)
CHOLEST SERPL-MCNC: 199 MG/DL (ref 0–200)
CK SERPL-CCNC: 34 U/L (ref 24–204)
CO2 SERPL-SCNC: 30.4 MMOL/L (ref 24.3–31.9)
CREAT BLD-MCNC: 1.15 MG/DL (ref 0.43–1.29)
DEPRECATED RDW RBC AUTO: 43.6 FL (ref 37–54)
EOSINOPHIL # BLD AUTO: 0.22 10*3/MM3 (ref 0–0.7)
EOSINOPHIL NFR BLD AUTO: 2.2 % (ref 0–5)
ERYTHROCYTE [DISTWIDTH] IN BLOOD BY AUTOMATED COUNT: 13.1 % (ref 11.5–14.5)
GFR SERPL CREATININE-BSD FRML MDRD: 66 ML/MIN/1.73
GLOBULIN UR ELPH-MCNC: 3 GM/DL
GLUCOSE BLD-MCNC: 99 MG/DL (ref 70–110)
HCT VFR BLD AUTO: 44.7 % (ref 42–52)
HDLC SERPL-MCNC: 48 MG/DL (ref 60–100)
HGB BLD-MCNC: 14.6 G/DL (ref 14–18)
IMM GRANULOCYTES # BLD: 0.04 10*3/MM3 (ref 0–0.03)
IMM GRANULOCYTES NFR BLD: 0.4 % (ref 0–0.5)
LDLC SERPL CALC-MCNC: 97 MG/DL (ref 0–100)
LDLC/HDLC SERPL: 2.02 {RATIO}
LYMPHOCYTES # BLD AUTO: 2.49 10*3/MM3 (ref 1–3)
LYMPHOCYTES NFR BLD AUTO: 24.9 % (ref 21–51)
MCH RBC QN AUTO: 30.6 PG (ref 27–33)
MCHC RBC AUTO-ENTMCNC: 32.7 G/DL (ref 33–37)
MCV RBC AUTO: 93.7 FL (ref 80–94)
MONOCYTES # BLD AUTO: 0.98 10*3/MM3 (ref 0.1–0.9)
MONOCYTES NFR BLD AUTO: 9.8 % (ref 0–10)
NEUTROPHILS # BLD AUTO: 6.17 10*3/MM3 (ref 1.4–6.5)
NEUTROPHILS NFR BLD AUTO: 61.9 % (ref 30–70)
OSMOLALITY SERPL CALC.SUM OF ELEC: 284.2 MOSM/KG (ref 273–305)
PLATELET # BLD AUTO: 346 10*3/MM3 (ref 130–400)
PMV BLD AUTO: 9.9 FL (ref 6–10)
POTASSIUM BLD-SCNC: 5 MMOL/L (ref 3.5–5.3)
PROT SERPL-MCNC: 7.8 G/DL (ref 6–8)
PSA SERPL-MCNC: 0.3 NG/ML (ref 0–4)
RBC # BLD AUTO: 4.77 10*6/MM3 (ref 4.7–6.1)
SODIUM BLD-SCNC: 140 MMOL/L (ref 135–153)
TRIGL SERPL-MCNC: 271 MG/DL (ref 0–150)
VLDLC SERPL-MCNC: 54.2 MG/DL
WBC NRBC COR # BLD: 9.98 10*3/MM3 (ref 4.5–12.5)

## 2018-03-20 PROCEDURE — 80053 COMPREHEN METABOLIC PANEL: CPT

## 2018-03-20 PROCEDURE — 84153 ASSAY OF PSA TOTAL: CPT

## 2018-03-20 PROCEDURE — 82550 ASSAY OF CK (CPK): CPT

## 2018-03-20 PROCEDURE — 85025 COMPLETE CBC W/AUTO DIFF WBC: CPT

## 2018-03-20 PROCEDURE — 99214 OFFICE O/P EST MOD 30 MIN: CPT | Performed by: INTERNAL MEDICINE

## 2018-03-20 PROCEDURE — 80061 LIPID PANEL: CPT

## 2018-03-20 RX ORDER — METOPROLOL TARTRATE 100 MG/1
100 TABLET ORAL EVERY 12 HOURS SCHEDULED
Qty: 180 TABLET | Refills: 3 | Status: SHIPPED | OUTPATIENT
Start: 2018-03-20 | End: 2019-03-20 | Stop reason: SDUPTHER

## 2018-03-20 RX ORDER — AMLODIPINE BESYLATE 10 MG/1
10 TABLET ORAL DAILY
Qty: 90 TABLET | Refills: 3 | Status: SHIPPED | OUTPATIENT
Start: 2018-03-20 | End: 2018-07-11 | Stop reason: SDUPTHER

## 2018-03-20 RX ORDER — PANTOPRAZOLE SODIUM 40 MG/1
40 TABLET, DELAYED RELEASE ORAL DAILY
Qty: 90 TABLET | Refills: 3 | Status: SHIPPED | OUTPATIENT
Start: 2018-03-20 | End: 2018-12-03 | Stop reason: SDUPTHER

## 2018-03-20 RX ORDER — FENOFIBRATE 145 MG/1
145 TABLET, COATED ORAL DAILY
Qty: 90 TABLET | Refills: 3 | Status: SHIPPED | OUTPATIENT
Start: 2018-03-20 | End: 2019-03-20 | Stop reason: SDUPTHER

## 2018-03-20 RX ORDER — HYDROCHLOROTHIAZIDE 12.5 MG/1
12.5 CAPSULE, GELATIN COATED ORAL EVERY MORNING
Qty: 90 CAPSULE | Refills: 3 | Status: SHIPPED | OUTPATIENT
Start: 2018-03-20 | End: 2018-07-11

## 2018-03-20 RX ORDER — POTASSIUM CHLORIDE 750 MG/1
10 TABLET, FILM COATED, EXTENDED RELEASE ORAL DAILY
Qty: 90 TABLET | Refills: 3 | Status: SHIPPED | OUTPATIENT
Start: 2018-03-20 | End: 2019-03-20 | Stop reason: SDUPTHER

## 2018-03-20 RX ORDER — ATORVASTATIN CALCIUM 40 MG/1
40 TABLET, FILM COATED ORAL DAILY
Qty: 90 TABLET | Refills: 1 | Status: SHIPPED | OUTPATIENT
Start: 2018-03-20 | End: 2018-09-21 | Stop reason: SDUPTHER

## 2018-03-20 RX ORDER — CLONAZEPAM 0.5 MG/1
0.5 TABLET ORAL 2 TIMES DAILY
Qty: 60 TABLET | Refills: 2 | Status: SHIPPED | OUTPATIENT
Start: 2018-03-20 | End: 2018-07-11 | Stop reason: SDUPTHER

## 2018-03-20 RX ORDER — CLOPIDOGREL BISULFATE 75 MG/1
75 TABLET ORAL DAILY
Qty: 90 TABLET | Refills: 3 | Status: SHIPPED | OUTPATIENT
Start: 2018-03-20 | End: 2019-03-20 | Stop reason: SDUPTHER

## 2018-03-20 RX ORDER — LOSARTAN POTASSIUM 100 MG/1
100 TABLET ORAL DAILY
Qty: 90 TABLET | Refills: 3 | Status: SHIPPED | OUTPATIENT
Start: 2018-03-20 | End: 2019-03-20 | Stop reason: SDUPTHER

## 2018-03-20 RX ORDER — ASPIRIN 81 MG/1
81 TABLET ORAL DAILY
Qty: 90 TABLET | Refills: 3 | Status: SHIPPED | OUTPATIENT
Start: 2018-03-20

## 2018-03-20 NOTE — PROGRESS NOTES
subjective     Chief Complaint   Patient presents with   • Coronary Artery Disease   • Hypertension   • Anxiety   • Follow-up     wanting refills     History of Present Illness  Patient is 53 years old white male who has known coronary artery disease.  Patient has history of 90% RCA lesion total occlusion of posterior lateral branch of RCA and total occlusion of second marginal with left to left collaterals.  He also has 40% LAD and 40% left main disease.  Patient is doing remarkably well.  He denies any chest pain palpitations or shortness of breath.  He is fairly active and is asymptomatic.    Patient has a essential hypertension and blood pressure is very well controlled with hydrochlorothiazide, Norvasc, losartan and Lopressor.    Asians has hyperlipidemia and is taking Lipitor 40 mg daily.  There are no drug side effects.  Lab work reviewed and will be discussed with the patient.  He is also taking TriCor because of high triglycerides.    Patient is on antiplatelet therapy with aspirin and Plavix.    GI symptoms are controlled with the Protonix.  Patient also complains of anxiety which is controlled with Klonopin.  She sees be doing very well.  He is trying to follow diet.      Past Surgical History:   Procedure Laterality Date   • CARDIAC CATHETERIZATION  06/06/2015   • CARDIOVASCULAR STRESS TEST  06/06/2015   • ECHO - CONVERTED  06/06/2015   • EYE SURGERY       Family History   Problem Relation Age of Onset   • Heart attack Father    • Heart disease Father    • Heart failure Father    • Heart disease Sister    • Heart failure Sister    • Heart disease Brother    • Heart failure Brother    • Heart failure Sister    • Heart disease Sister      Past Medical History:   Diagnosis Date   • Anxiety disorder    • CAD (coronary artery disease)    • Chronic alcoholism    • Hyperlipidemia    • Hypertension    • WPW (Zakiya-Parkinson-White syndrome)      Patient Active Problem List   Diagnosis   • CAD (coronary artery  disease), 90% proximal RCA, total occlusion of posterior lateral branch of RCA, total occlusion of second marginal with left to left collaterals, 40% LAD, 40% left main   • Anxiety disorder   • Chronic alcoholism   • Essential hypertension   • Hyperlipidemia type IV,uncontrolled   • Gastroesophageal reflux disease without esophagitis   • Encounter for screening colonoscopy       Social History   Substance Use Topics   • Smoking status: Former Smoker     Packs/day: 1.00     Years: 20.00     Types: Cigarettes     Quit date: 2003   • Smokeless tobacco: Current User     Types: Chew      Comment: chew 1 can per week   • Alcohol use 6.0 oz/week     6 Cans of beer, 4 Shots of liquor per week       No Known Allergies    Current Outpatient Prescriptions on File Prior to Visit   Medication Sig   • amLODIPine (NORVASC) 10 MG tablet TAKE ONE TABLET BY MOUTH DAILY FOR BLOOD PRESSURE   • aspirin 81 MG EC tablet Take 1 tablet by mouth Daily.   • atorvastatin (LIPITOR) 40 MG tablet TAKE ONE TABLET BY MOUTH DAILY FOR CHOLESTEROL   • clonazePAM (KlonoPIN) 0.5 MG tablet TAKE ONE TABLET BY MOUTH TWICE A DAY   • clopidogrel (PLAVIX) 75 MG tablet Take 1 tablet by mouth Daily.   • fenofibrate (TRICOR) 145 MG tablet Take 1 tablet by mouth Daily.   • hydrochlorothiazide (MICROZIDE) 12.5 MG capsule Take 1 capsule by mouth Every Morning.   • losartan (COZAAR) 100 MG tablet TAKE ONE TABLET BY MOUTH DAILY FOR BLOOD PRESSURE   • metoprolol tartrate (LOPRESSOR) 100 MG tablet Take 1 tablet by mouth 2 (Two) Times a Day.   • pantoprazole (PROTONIX) 40 MG EC tablet Take 1 tablet by mouth Daily.   • potassium chloride (K-DUR) 10 MEQ CR tablet Take 1 tablet by mouth Daily.     No current facility-administered medications on file prior to visit.          The following portions of the patient's history were reviewed and updated as appropriate: allergies, current medications, past family history, past medical history, past social history, past surgical  "history and problem list.    Review of Systems   Constitution: Negative.   HENT: Negative.  Negative for congestion.    Eyes: Negative.    Cardiovascular: Negative.  Negative for chest pain, cyanosis, dyspnea on exertion, irregular heartbeat, leg swelling, near-syncope, orthopnea, palpitations, paroxysmal nocturnal dyspnea and syncope.   Respiratory: Negative.  Negative for shortness of breath.    Hematologic/Lymphatic: Negative.    Musculoskeletal: Negative.    Gastrointestinal: Negative.    Neurological: Negative.  Negative for headaches.          Objective:     /64 (BP Location: Left arm, Patient Position: Sitting)   Pulse 72   Ht 190.5 cm (75\")   Wt 85.3 kg (188 lb)   SpO2 97%   BMI 23.50 kg/m²   Physical Exam   Constitutional: He appears well-developed and well-nourished. No distress.   HENT:   Head: Normocephalic and atraumatic.   Mouth/Throat: Oropharynx is clear and moist. No oropharyngeal exudate.   Eyes: Conjunctivae and EOM are normal. Pupils are equal, round, and reactive to light. No scleral icterus.   Neck: Normal range of motion. Neck supple. No JVD present. No tracheal deviation present. No thyromegaly present.   Cardiovascular: Normal rate, regular rhythm, normal heart sounds and intact distal pulses.  PMI is not displaced.  Exam reveals no gallop, no friction rub and no decreased pulses.    No murmur heard.  Pulses:       Carotid pulses are 3+ on the right side, and 3+ on the left side.       Radial pulses are 3+ on the right side, and 3+ on the left side.   Pulmonary/Chest: Effort normal and breath sounds normal. No respiratory distress. He has no wheezes. He has no rales. He exhibits no tenderness.   Abdominal: Soft. Bowel sounds are normal. He exhibits no distension, no abdominal bruit and no mass. There is no splenomegaly or hepatomegaly. There is no tenderness. There is no rebound and no guarding.   Musculoskeletal: Normal range of motion. He exhibits no edema, tenderness or " deformity.   Lymphadenopathy:     He has no cervical adenopathy.   Neurological: He is alert. He has normal reflexes. No cranial nerve deficit. He exhibits normal muscle tone. Coordination normal.   Skin: Skin is warm and dry. No rash noted. He is not diaphoretic. No erythema.   Psychiatric: He has a normal mood and affect. His behavior is normal. Judgment and thought content normal.         Lab Review  Lab Results   Component Value Date     03/20/2018    K 5.0 03/20/2018     03/20/2018    BUN 26 (H) 03/20/2018    CREATININE 1.15 03/20/2018    GLUCOSE 99 03/20/2018    CALCIUM 9.9 03/20/2018    ALT 13 03/20/2018    ALKPHOS 37 (L) 03/20/2018    LABIL2 1.6 03/20/2018     Lab Results   Component Value Date    CKTOTAL 34 03/20/2018     Lab Results   Component Value Date    WBC 9.98 03/20/2018    HGB 14.6 03/20/2018    HCT 44.7 03/20/2018     03/20/2018     Lab Results   Component Value Date    INR 0.99 06/05/2015     Lab Results   Component Value Date    MG 1.9 01/07/2016     Lab Results   Component Value Date    PSA 0.300 03/20/2018    TSH 1.009 06/05/2015     No results found for: BNP  Lab Results   Component Value Date    CHLPL 182 05/04/2016    CHOL 199 03/20/2018    TRIG 271 (H) 03/20/2018    HDL 48 (L) 03/20/2018    VLDL 54.2 03/20/2018    LDLHDL 2.02 03/20/2018         Procedures       I personally viewed and interpreted the patient's LAB data         Assessment:     1. Coronary artery disease involving native coronary artery of native heart without angina pectoris    2. Essential hypertension    3. Hyperlipidemia type IV,uncontrolled    4. Gastroesophageal reflux disease without esophagitis    5. Generalized anxiety disorder          Plan:      Patient has severe three-vessel coronary artery disease.  He is doing very well and is asymptomatic.  Aggressive risk factor modification will be continued.  Antiplatelet therapy with aspirin and Plavix was continued.  Blood pressure is also controlled  he will continue taking Norvasc, hydrochlorothiazide, losartan and Lopressor 100 twice a day.    Patient has hyperlipidemia and he was given Lipitor 40 and TriCor 145 daily.    Anxiety disorder is controlled with Klonopin.  In  Refills were given.  Labs discussed with the patient and explained to him.  LDL goal of 70 was emphasized.  Follow-up in 3 months.        No Follow-up on file.

## 2018-05-28 ENCOUNTER — HOSPITAL ENCOUNTER (EMERGENCY)
Facility: HOSPITAL | Age: 53
Discharge: HOME | End: 2018-05-28
Attending: EMERGENCY MEDICINE | Admitting: EMERGENCY MEDICINE

## 2018-05-28 ENCOUNTER — APPOINTMENT (OUTPATIENT)
Dept: GENERAL RADIOLOGY | Facility: HOSPITAL | Age: 53
End: 2018-05-28

## 2018-05-28 VITALS
TEMPERATURE: 97.8 F | WEIGHT: 200 LBS | HEIGHT: 75 IN | HEART RATE: 77 BPM | BODY MASS INDEX: 24.87 KG/M2 | RESPIRATION RATE: 18 BRPM | DIASTOLIC BLOOD PRESSURE: 62 MMHG | SYSTOLIC BLOOD PRESSURE: 108 MMHG | OXYGEN SATURATION: 99 %

## 2018-05-28 DIAGNOSIS — R11.2 NON-INTRACTABLE VOMITING WITH NAUSEA, UNSPECIFIED VOMITING TYPE: Primary | ICD-10-CM

## 2018-05-28 DIAGNOSIS — E87.6 HYPOKALEMIA: ICD-10-CM

## 2018-05-28 DIAGNOSIS — N28.9 RENAL INSUFFICIENCY: ICD-10-CM

## 2018-05-28 LAB
6-ACETYL MORPHINE: NEGATIVE
ALBUMIN SERPL-MCNC: 5.1 G/DL (ref 3.5–5)
ALBUMIN/GLOB SERPL: 1.9 G/DL (ref 1.5–2.5)
ALP SERPL-CCNC: 32 U/L (ref 40–129)
ALT SERPL W P-5'-P-CCNC: 14 U/L (ref 10–44)
AMPHET+METHAMPHET UR QL: POSITIVE
AMYLASE SERPL-CCNC: 58 U/L (ref 28–100)
ANION GAP SERPL CALCULATED.3IONS-SCNC: 9.6 MMOL/L (ref 3.6–11.2)
AST SERPL-CCNC: 23 U/L (ref 10–34)
BACTERIA UR QL AUTO: ABNORMAL /HPF
BARBITURATES UR QL SCN: NEGATIVE
BASOPHILS # BLD AUTO: 0.04 10*3/MM3 (ref 0–0.3)
BASOPHILS NFR BLD AUTO: 0.4 % (ref 0–2)
BENZODIAZ UR QL SCN: NEGATIVE
BILIRUB SERPL-MCNC: 0.9 MG/DL (ref 0.2–1.8)
BILIRUB UR QL STRIP: ABNORMAL
BUN BLD-MCNC: 24 MG/DL (ref 7–21)
BUN/CREAT SERPL: 17.1 (ref 7–25)
BUPRENORPHINE SERPL-MCNC: NEGATIVE NG/ML
CALCIUM SPEC-SCNC: 9.4 MG/DL (ref 7.7–10)
CANNABINOIDS SERPL QL: NEGATIVE
CHLORIDE SERPL-SCNC: 100 MMOL/L (ref 99–112)
CLARITY UR: ABNORMAL
CO2 SERPL-SCNC: 31.4 MMOL/L (ref 24.3–31.9)
COCAINE UR QL: NEGATIVE
COLOR UR: ABNORMAL
CREAT BLD-MCNC: 1.4 MG/DL (ref 0.43–1.29)
DEPRECATED RDW RBC AUTO: 43 FL (ref 37–54)
EOSINOPHIL # BLD AUTO: 0.11 10*3/MM3 (ref 0–0.7)
EOSINOPHIL NFR BLD AUTO: 1.2 % (ref 0–5)
ERYTHROCYTE [DISTWIDTH] IN BLOOD BY AUTOMATED COUNT: 13.3 % (ref 11.5–14.5)
ETHANOL BLD-MCNC: <10 MG/DL
ETHANOL UR QL: <0.01 %
GFR SERPL CREATININE-BSD FRML MDRD: 53 ML/MIN/1.73
GLOBULIN UR ELPH-MCNC: 2.7 GM/DL
GLUCOSE BLD-MCNC: 101 MG/DL (ref 70–110)
GLUCOSE UR STRIP-MCNC: NEGATIVE MG/DL
HCT VFR BLD AUTO: 44.1 % (ref 42–52)
HGB BLD-MCNC: 14.8 G/DL (ref 14–18)
HGB UR QL STRIP.AUTO: NEGATIVE
HYALINE CASTS UR QL AUTO: ABNORMAL /LPF
IMM GRANULOCYTES # BLD: 0.02 10*3/MM3 (ref 0–0.03)
IMM GRANULOCYTES NFR BLD: 0.2 % (ref 0–0.5)
KETONES UR QL STRIP: ABNORMAL
LEUKOCYTE ESTERASE UR QL STRIP.AUTO: ABNORMAL
LIPASE SERPL-CCNC: 40 U/L (ref 13–60)
LYMPHOCYTES # BLD AUTO: 1.3 10*3/MM3 (ref 1–3)
LYMPHOCYTES NFR BLD AUTO: 13.9 % (ref 21–51)
MCH RBC QN AUTO: 30 PG (ref 27–33)
MCHC RBC AUTO-ENTMCNC: 33.6 G/DL (ref 33–37)
MCV RBC AUTO: 89.3 FL (ref 80–94)
METHADONE UR QL SCN: NEGATIVE
MONOCYTES # BLD AUTO: 0.94 10*3/MM3 (ref 0.1–0.9)
MONOCYTES NFR BLD AUTO: 10.1 % (ref 0–10)
MUCOUS THREADS URNS QL MICRO: ABNORMAL /HPF
NEUTROPHILS # BLD AUTO: 6.91 10*3/MM3 (ref 1.4–6.5)
NEUTROPHILS NFR BLD AUTO: 74.2 % (ref 30–70)
NITRITE UR QL STRIP: POSITIVE
OPIATES UR QL: NEGATIVE
OSMOLALITY SERPL CALC.SUM OF ELEC: 285.4 MOSM/KG (ref 273–305)
OXYCODONE UR QL SCN: NEGATIVE
PCP UR QL SCN: NEGATIVE
PH UR STRIP.AUTO: 6.5 [PH] (ref 5–8)
PLATELET # BLD AUTO: 236 10*3/MM3 (ref 130–400)
PMV BLD AUTO: 10.3 FL (ref 6–10)
POTASSIUM BLD-SCNC: 3.1 MMOL/L (ref 3.5–5.3)
PROT SERPL-MCNC: 7.8 G/DL (ref 6–8)
PROT UR QL STRIP: ABNORMAL
RBC # BLD AUTO: 4.94 10*6/MM3 (ref 4.7–6.1)
RBC # UR: ABNORMAL /HPF
REF LAB TEST METHOD: ABNORMAL
SODIUM BLD-SCNC: 141 MMOL/L (ref 135–153)
SP GR UR STRIP: >1.03 (ref 1–1.03)
SQUAMOUS #/AREA URNS HPF: ABNORMAL /HPF
TROPONIN I SERPL-MCNC: <0.006 NG/ML
TROPONIN I SERPL-MCNC: <0.006 NG/ML
UROBILINOGEN UR QL STRIP: ABNORMAL
WBC NRBC COR # BLD: 9.32 10*3/MM3 (ref 4.5–12.5)
WBC UR QL AUTO: ABNORMAL /HPF

## 2018-05-28 PROCEDURE — 80307 DRUG TEST PRSMV CHEM ANLYZR: CPT | Performed by: EMERGENCY MEDICINE

## 2018-05-28 PROCEDURE — 93005 ELECTROCARDIOGRAM TRACING: CPT | Performed by: EMERGENCY MEDICINE

## 2018-05-28 PROCEDURE — 74022 RADEX COMPL AQT ABD SERIES: CPT | Performed by: RADIOLOGY

## 2018-05-28 PROCEDURE — 93010 ELECTROCARDIOGRAM REPORT: CPT | Performed by: INTERNAL MEDICINE

## 2018-05-28 PROCEDURE — 96361 HYDRATE IV INFUSION ADD-ON: CPT

## 2018-05-28 PROCEDURE — 25010000002 PROMETHAZINE PER 50 MG: Performed by: EMERGENCY MEDICINE

## 2018-05-28 PROCEDURE — 96375 TX/PRO/DX INJ NEW DRUG ADDON: CPT

## 2018-05-28 PROCEDURE — 80053 COMPREHEN METABOLIC PANEL: CPT | Performed by: EMERGENCY MEDICINE

## 2018-05-28 PROCEDURE — 99284 EMERGENCY DEPT VISIT MOD MDM: CPT

## 2018-05-28 PROCEDURE — 84484 ASSAY OF TROPONIN QUANT: CPT | Performed by: EMERGENCY MEDICINE

## 2018-05-28 PROCEDURE — 85025 COMPLETE CBC W/AUTO DIFF WBC: CPT | Performed by: EMERGENCY MEDICINE

## 2018-05-28 PROCEDURE — 25010000003 POTASSIUM CHLORIDE 10 MEQ/100ML SOLUTION: Performed by: EMERGENCY MEDICINE

## 2018-05-28 PROCEDURE — 81001 URINALYSIS AUTO W/SCOPE: CPT | Performed by: EMERGENCY MEDICINE

## 2018-05-28 PROCEDURE — 82150 ASSAY OF AMYLASE: CPT | Performed by: EMERGENCY MEDICINE

## 2018-05-28 PROCEDURE — 74022 RADEX COMPL AQT ABD SERIES: CPT

## 2018-05-28 PROCEDURE — 36415 COLL VENOUS BLD VENIPUNCTURE: CPT

## 2018-05-28 PROCEDURE — 83690 ASSAY OF LIPASE: CPT | Performed by: EMERGENCY MEDICINE

## 2018-05-28 PROCEDURE — 96365 THER/PROPH/DIAG IV INF INIT: CPT

## 2018-05-28 RX ORDER — POTASSIUM CHLORIDE 20 MEQ/1
40 TABLET, EXTENDED RELEASE ORAL ONCE
Status: COMPLETED | OUTPATIENT
Start: 2018-05-28 | End: 2018-05-28

## 2018-05-28 RX ORDER — FAMOTIDINE 10 MG/ML
20 INJECTION, SOLUTION INTRAVENOUS ONCE
Status: COMPLETED | OUTPATIENT
Start: 2018-05-28 | End: 2018-05-28

## 2018-05-28 RX ORDER — POTASSIUM CHLORIDE 7.45 MG/ML
10 INJECTION INTRAVENOUS ONCE
Status: COMPLETED | OUTPATIENT
Start: 2018-05-28 | End: 2018-05-28

## 2018-05-28 RX ORDER — ONDANSETRON 4 MG/1
4 TABLET, ORALLY DISINTEGRATING ORAL EVERY 6 HOURS PRN
Qty: 12 TABLET | Refills: 0 | Status: SHIPPED | OUTPATIENT
Start: 2018-05-28 | End: 2019-04-10

## 2018-05-28 RX ORDER — SODIUM CHLORIDE 9 MG/ML
250 INJECTION, SOLUTION INTRAVENOUS CONTINUOUS
Status: DISCONTINUED | OUTPATIENT
Start: 2018-05-28 | End: 2018-05-28 | Stop reason: HOSPADM

## 2018-05-28 RX ADMIN — POTASSIUM CHLORIDE 40 MEQ: 1500 TABLET, EXTENDED RELEASE ORAL at 11:03

## 2018-05-28 RX ADMIN — SODIUM CHLORIDE 1000 ML: 9 INJECTION, SOLUTION INTRAVENOUS at 09:17

## 2018-05-28 RX ADMIN — SODIUM CHLORIDE 250 ML/HR: 9 INJECTION, SOLUTION INTRAVENOUS at 09:49

## 2018-05-28 RX ADMIN — FAMOTIDINE 20 MG: 10 INJECTION INTRAVENOUS at 09:15

## 2018-05-28 RX ADMIN — POTASSIUM CHLORIDE 10 MEQ: 10 INJECTION, SOLUTION INTRAVENOUS at 11:04

## 2018-05-28 RX ADMIN — PROMETHAZINE HYDROCHLORIDE 12.5 MG: 25 INJECTION INTRAMUSCULAR; INTRAVENOUS at 09:18

## 2018-05-30 ENCOUNTER — HOSPITAL ENCOUNTER (EMERGENCY)
Facility: HOSPITAL | Age: 53
Discharge: HOME | End: 2018-05-30
Attending: EMERGENCY MEDICINE | Admitting: EMERGENCY MEDICINE

## 2018-05-30 ENCOUNTER — APPOINTMENT (OUTPATIENT)
Dept: GENERAL RADIOLOGY | Facility: HOSPITAL | Age: 53
End: 2018-05-30

## 2018-05-30 VITALS
HEIGHT: 75 IN | WEIGHT: 200 LBS | TEMPERATURE: 98.2 F | SYSTOLIC BLOOD PRESSURE: 92 MMHG | OXYGEN SATURATION: 94 % | BODY MASS INDEX: 24.87 KG/M2 | DIASTOLIC BLOOD PRESSURE: 64 MMHG | RESPIRATION RATE: 22 BRPM | HEART RATE: 78 BPM

## 2018-05-30 DIAGNOSIS — R20.0 NUMBNESS AND TINGLING IN BOTH HANDS: Primary | ICD-10-CM

## 2018-05-30 DIAGNOSIS — R20.2 NUMBNESS AND TINGLING IN BOTH HANDS: Primary | ICD-10-CM

## 2018-05-30 LAB
ALBUMIN SERPL-MCNC: 4.1 G/DL (ref 3.5–5)
ALBUMIN/GLOB SERPL: 2 G/DL (ref 1.5–2.5)
ALP SERPL-CCNC: 26 U/L (ref 40–129)
ALT SERPL W P-5'-P-CCNC: 11 U/L (ref 10–44)
ANION GAP SERPL CALCULATED.3IONS-SCNC: 8.8 MMOL/L (ref 3.6–11.2)
AST SERPL-CCNC: 17 U/L (ref 10–34)
BASOPHILS # BLD AUTO: 0.03 10*3/MM3 (ref 0–0.3)
BASOPHILS NFR BLD AUTO: 0.5 % (ref 0–2)
BILIRUB SERPL-MCNC: 0.5 MG/DL (ref 0.2–1.8)
BUN BLD-MCNC: 17 MG/DL (ref 7–21)
BUN/CREAT SERPL: 12.7 (ref 7–25)
CALCIUM SPEC-SCNC: 7.1 MG/DL (ref 7.7–10)
CHLORIDE SERPL-SCNC: 108 MMOL/L (ref 99–112)
CO2 SERPL-SCNC: 23.2 MMOL/L (ref 24.3–31.9)
CREAT BLD-MCNC: 1.34 MG/DL (ref 0.43–1.29)
DEPRECATED RDW RBC AUTO: 42.8 FL (ref 37–54)
EOSINOPHIL # BLD AUTO: 0.26 10*3/MM3 (ref 0–0.7)
EOSINOPHIL NFR BLD AUTO: 4.1 % (ref 0–5)
ERYTHROCYTE [DISTWIDTH] IN BLOOD BY AUTOMATED COUNT: 13.1 % (ref 11.5–14.5)
ETHANOL BLD-MCNC: 211 MG/DL
ETHANOL UR QL: 0.21 %
GFR SERPL CREATININE-BSD FRML MDRD: 55 ML/MIN/1.73
GLOBULIN UR ELPH-MCNC: 2.1 GM/DL
GLUCOSE BLD-MCNC: 77 MG/DL (ref 70–110)
HCT VFR BLD AUTO: 36.5 % (ref 42–52)
HGB BLD-MCNC: 12.2 G/DL (ref 14–18)
HOLD SPECIMEN: NORMAL
HOLD SPECIMEN: NORMAL
IMM GRANULOCYTES # BLD: 0.02 10*3/MM3 (ref 0–0.03)
IMM GRANULOCYTES NFR BLD: 0.3 % (ref 0–0.5)
INR PPP: 1.24 (ref 0.9–1.1)
LIPASE SERPL-CCNC: 52 U/L (ref 13–60)
LYMPHOCYTES # BLD AUTO: 2.4 10*3/MM3 (ref 1–3)
LYMPHOCYTES NFR BLD AUTO: 38.3 % (ref 21–51)
MAGNESIUM SERPL-MCNC: <0.7 MG/DL (ref 1.7–2.6)
MCH RBC QN AUTO: 30.4 PG (ref 27–33)
MCHC RBC AUTO-ENTMCNC: 33.4 G/DL (ref 33–37)
MCV RBC AUTO: 91 FL (ref 80–94)
MONOCYTES # BLD AUTO: 0.77 10*3/MM3 (ref 0.1–0.9)
MONOCYTES NFR BLD AUTO: 12.3 % (ref 0–10)
NEUTROPHILS # BLD AUTO: 2.79 10*3/MM3 (ref 1.4–6.5)
NEUTROPHILS NFR BLD AUTO: 44.5 % (ref 30–70)
OSMOLALITY SERPL CALC.SUM OF ELEC: 279.7 MOSM/KG (ref 273–305)
PLATELET # BLD AUTO: 181 10*3/MM3 (ref 130–400)
PMV BLD AUTO: 10.2 FL (ref 6–10)
POTASSIUM BLD-SCNC: 2.7 MMOL/L (ref 3.5–5.3)
PROT SERPL-MCNC: 6.2 G/DL (ref 6–8)
PROTHROMBIN TIME: 15.8 SECONDS (ref 11–15.4)
RBC # BLD AUTO: 4.01 10*6/MM3 (ref 4.7–6.1)
SODIUM BLD-SCNC: 140 MMOL/L (ref 135–153)
TROPONIN I SERPL-MCNC: <0.006 NG/ML
WBC NRBC COR # BLD: 6.27 10*3/MM3 (ref 4.5–12.5)
WHOLE BLOOD HOLD SPECIMEN: NORMAL
WHOLE BLOOD HOLD SPECIMEN: NORMAL

## 2018-05-30 PROCEDURE — 84484 ASSAY OF TROPONIN QUANT: CPT | Performed by: PHYSICIAN ASSISTANT

## 2018-05-30 PROCEDURE — 83735 ASSAY OF MAGNESIUM: CPT | Performed by: PHYSICIAN ASSISTANT

## 2018-05-30 PROCEDURE — 99285 EMERGENCY DEPT VISIT HI MDM: CPT

## 2018-05-30 PROCEDURE — 71045 X-RAY EXAM CHEST 1 VIEW: CPT

## 2018-05-30 PROCEDURE — 93010 ELECTROCARDIOGRAM REPORT: CPT | Performed by: INTERNAL MEDICINE

## 2018-05-30 PROCEDURE — 83690 ASSAY OF LIPASE: CPT | Performed by: PHYSICIAN ASSISTANT

## 2018-05-30 PROCEDURE — 71045 X-RAY EXAM CHEST 1 VIEW: CPT | Performed by: RADIOLOGY

## 2018-05-30 PROCEDURE — 80307 DRUG TEST PRSMV CHEM ANLYZR: CPT | Performed by: PHYSICIAN ASSISTANT

## 2018-05-30 PROCEDURE — 80053 COMPREHEN METABOLIC PANEL: CPT | Performed by: PHYSICIAN ASSISTANT

## 2018-05-30 PROCEDURE — 93005 ELECTROCARDIOGRAM TRACING: CPT | Performed by: EMERGENCY MEDICINE

## 2018-05-30 PROCEDURE — 85025 COMPLETE CBC W/AUTO DIFF WBC: CPT | Performed by: PHYSICIAN ASSISTANT

## 2018-05-30 PROCEDURE — 85610 PROTHROMBIN TIME: CPT | Performed by: PHYSICIAN ASSISTANT

## 2018-05-30 RX ORDER — MAGNESIUM SULFATE HEPTAHYDRATE 40 MG/ML
2 INJECTION, SOLUTION INTRAVENOUS
Status: DISCONTINUED | OUTPATIENT
Start: 2018-05-30 | End: 2018-05-30

## 2018-05-30 RX ORDER — SODIUM CHLORIDE 0.9 % (FLUSH) 0.9 %
10 SYRINGE (ML) INJECTION AS NEEDED
Status: DISCONTINUED | OUTPATIENT
Start: 2018-05-30 | End: 2018-05-31 | Stop reason: HOSPADM

## 2018-05-30 RX ORDER — ASPIRIN 325 MG
325 TABLET ORAL ONCE
Status: COMPLETED | OUTPATIENT
Start: 2018-05-30 | End: 2018-05-30

## 2018-05-30 RX ADMIN — ASPIRIN 325 MG: 325 TABLET ORAL at 20:58

## 2018-07-11 ENCOUNTER — OFFICE VISIT (OUTPATIENT)
Dept: CARDIOLOGY | Facility: CLINIC | Age: 53
End: 2018-07-11

## 2018-07-11 VITALS
HEIGHT: 75 IN | HEART RATE: 68 BPM | DIASTOLIC BLOOD PRESSURE: 70 MMHG | BODY MASS INDEX: 23.5 KG/M2 | OXYGEN SATURATION: 98 % | WEIGHT: 189 LBS | SYSTOLIC BLOOD PRESSURE: 118 MMHG

## 2018-07-11 DIAGNOSIS — E87.6 HYPOKALEMIA: ICD-10-CM

## 2018-07-11 DIAGNOSIS — F10.20 CHRONIC ALCOHOLISM (HCC): ICD-10-CM

## 2018-07-11 DIAGNOSIS — I25.10 CORONARY ARTERY DISEASE INVOLVING NATIVE CORONARY ARTERY OF NATIVE HEART WITHOUT ANGINA PECTORIS: Primary | ICD-10-CM

## 2018-07-11 DIAGNOSIS — E78.1 HYPERLIPIDEMIA TYPE IV: ICD-10-CM

## 2018-07-11 DIAGNOSIS — E83.51 HYPOCALCEMIA: ICD-10-CM

## 2018-07-11 DIAGNOSIS — E83.42 HYPOMAGNESEMIA: ICD-10-CM

## 2018-07-11 DIAGNOSIS — F41.1 GENERALIZED ANXIETY DISORDER: ICD-10-CM

## 2018-07-11 DIAGNOSIS — I10 ESSENTIAL HYPERTENSION: ICD-10-CM

## 2018-07-11 DIAGNOSIS — J02.9 SORETHROAT: ICD-10-CM

## 2018-07-11 PROCEDURE — 99214 OFFICE O/P EST MOD 30 MIN: CPT | Performed by: INTERNAL MEDICINE

## 2018-07-11 RX ORDER — AMLODIPINE BESYLATE 10 MG/1
5 TABLET ORAL DAILY
Qty: 90 TABLET | Refills: 3 | Status: SHIPPED | OUTPATIENT
Start: 2018-07-11 | End: 2019-04-10 | Stop reason: SDUPTHER

## 2018-07-11 RX ORDER — CLONAZEPAM 0.5 MG/1
0.5 TABLET ORAL 2 TIMES DAILY
Qty: 60 TABLET | Refills: 2 | Status: SHIPPED | OUTPATIENT
Start: 2018-07-11 | End: 2018-10-30 | Stop reason: SDUPTHER

## 2018-07-11 NOTE — PROGRESS NOTES
subjective     Chief Complaint   Patient presents with   • Anxiety   • Coronary Artery Disease   • Hypertension   • Follow-up     History of Present Illness  Patient is 53 years old white male who has known coronary artery disease.  Patient states that he is very nervous and anxious.  He is taking Klonopin 0.5 twice a day.  According to him this is not helping.      He is drinking a lot of alcohol.    He went to the emergency room twice for severe anxiety.  His alcohol level was quite high.  Patient also had low potassium very low magnesium and calcium.  Patient states that he got very upset with them and left the hospital because they're very rude.    He has been taking his medications regularly.  Patient is willing to have psychiatric consultation.    Patient has known coronary artery disease with a 90% proximal RCA, total occlusion of posterior lateral branch of RCA total occlusion of second marginal with left to left collaterals.  Patient also has 40% LAD and 40% left main disease.  There was no intervention options it was felt that patient will require CABG.  Patient is asymptomatic and had decided to wait for now.  He denies any chest pain.    Blood pressure has been very well controlled actually it is running low.  He is taking Norvasc and hydrochlorothiazide losartan and Lopressor.    Patient also has hyperlipidemia which is treated with Lipitor.  There has been no Lipitor side effects.    Patient has chronic anxiety and is on Klonopin.  He also has heavy alcohol intake.    Past Surgical History:   Procedure Laterality Date   • CARDIAC CATHETERIZATION  06/06/2015   • CARDIOVASCULAR STRESS TEST  06/06/2015   • ECHO - CONVERTED  06/06/2015   • EYE SURGERY       Family History   Problem Relation Age of Onset   • Heart attack Father    • Heart disease Father    • Heart failure Father    • Heart disease Sister    • Heart failure Sister    • Heart disease Brother    • Heart failure Brother    • Heart failure  Sister    • Heart disease Sister      Past Medical History:   Diagnosis Date   • Anxiety disorder    • CAD (coronary artery disease)    • Chronic alcoholism (CMS/HCC)    • Hyperlipidemia    • Hypertension    • WPW (Zakiya-Parkinson-White syndrome)      Patient Active Problem List   Diagnosis   • CAD (coronary artery disease), 90% proximal RCA, total occlusion of posterior lateral branch of RCA, total occlusion of second marginal with left to left collaterals, 40% LAD, 40% left main   • Anxiety disorder   • Chronic alcoholism (CMS/HCC)   • Essential hypertension   • Hyperlipidemia type IV,uncontrolled   • Gastroesophageal reflux disease without esophagitis   • Encounter for screening colonoscopy   • Sorethroat   • Hypokalemia   • Hypomagnesemia   • Hypocalcemia       Social History   Substance Use Topics   • Smoking status: Former Smoker     Packs/day: 1.00     Years: 20.00     Types: Cigarettes     Quit date: 2003   • Smokeless tobacco: Current User     Types: Chew      Comment: chew 1 can per week   • Alcohol use 6.0 oz/week     6 Cans of beer, 4 Shots of liquor per week       No Known Allergies    Current Outpatient Prescriptions on File Prior to Visit   Medication Sig   • aspirin 81 MG EC tablet Take 1 tablet by mouth Daily.   • atorvastatin (LIPITOR) 40 MG tablet Take 1 tablet by mouth Daily. for cholesterol.   • clopidogrel (PLAVIX) 75 MG tablet Take 1 tablet by mouth Daily.   • fenofibrate (TRICOR) 145 MG tablet Take 1 tablet by mouth Daily.   • losartan (COZAAR) 100 MG tablet Take 1 tablet by mouth Daily. FOR BLOOD PRESSURE   • metoprolol tartrate (LOPRESSOR) 100 MG tablet Take 1 tablet by mouth Every 12 (Twelve) Hours.   • ondansetron ODT (ZOFRAN-ODT) 4 MG disintegrating tablet Take 1 tablet by mouth Every 6 (Six) Hours As Needed for Nausea or Vomiting.   • pantoprazole (PROTONIX) 40 MG EC tablet Take 1 tablet by mouth Daily.   • potassium chloride (K-DUR) 10 MEQ CR tablet Take 1 tablet by mouth Daily.   •  "[DISCONTINUED] amLODIPine (NORVASC) 10 MG tablet Take 1 tablet by mouth Daily. FOR BLOOD PRESSURE   • [DISCONTINUED] clonazePAM (KlonoPIN) 0.5 MG tablet Take 1 tablet by mouth 2 (Two) Times a Day.   • [DISCONTINUED] hydrochlorothiazide (MICROZIDE) 12.5 MG capsule Take 1 capsule by mouth Every Morning.     No current facility-administered medications on file prior to visit.          The following portions of the patient's history were reviewed and updated as appropriate: allergies, current medications, past family history, past medical history, past social history, past surgical history and problem list.    Review of Systems   Constitution: Negative.   HENT: Negative.  Negative for congestion.    Eyes: Negative.    Cardiovascular: Negative.  Negative for chest pain, cyanosis, dyspnea on exertion, irregular heartbeat, leg swelling, near-syncope, orthopnea, palpitations, paroxysmal nocturnal dyspnea and syncope.   Respiratory: Negative.  Negative for shortness of breath.    Hematologic/Lymphatic: Negative.    Musculoskeletal: Negative.    Gastrointestinal: Positive for nausea.   Neurological: Negative.  Negative for headaches.   Psychiatric/Behavioral: The patient has insomnia and is nervous/anxious.           Objective:     /70 (BP Location: Left arm, Patient Position: Sitting)   Pulse 68   Ht 190.5 cm (75\")   Wt 85.7 kg (189 lb)   SpO2 98%   BMI 23.62 kg/m²   Physical Exam   Constitutional: He appears well-developed and well-nourished. No distress.   HENT:   Head: Normocephalic and atraumatic.   Mouth/Throat: Oropharynx is clear and moist. No oropharyngeal exudate.   Eyes: Conjunctivae and EOM are normal. Pupils are equal, round, and reactive to light. No scleral icterus.   Neck: Normal range of motion. Neck supple. No JVD present. No tracheal deviation present. No thyromegaly present.   Cardiovascular: Normal rate, regular rhythm, normal heart sounds and intact distal pulses.  PMI is not displaced.  Exam " reveals no gallop, no friction rub and no decreased pulses.    No murmur heard.  Pulses:       Carotid pulses are 3+ on the right side, and 3+ on the left side.       Radial pulses are 3+ on the right side, and 3+ on the left side.   Pulmonary/Chest: Effort normal and breath sounds normal. No respiratory distress. He has no wheezes. He has no rales. He exhibits no tenderness.   Abdominal: Soft. Bowel sounds are normal. He exhibits no distension, no abdominal bruit and no mass. There is no splenomegaly or hepatomegaly. There is no tenderness. There is no rebound and no guarding.   Musculoskeletal: Normal range of motion. He exhibits no edema, tenderness or deformity.   Lymphadenopathy:     He has no cervical adenopathy.   Neurological: He is alert. He has normal reflexes. No cranial nerve deficit. He exhibits normal muscle tone. Coordination normal.   Skin: Skin is warm and dry. No rash noted. He is not diaphoretic. No erythema.   Psychiatric: He has a normal mood and affect. His behavior is normal. Judgment and thought content normal.         Lab Review  Lab Results   Component Value Date     05/30/2018    K 2.7 (C) 05/30/2018     05/30/2018    BUN 17 05/30/2018    CREATININE 1.34 (H) 05/30/2018    GLUCOSE 77 05/30/2018    CALCIUM 7.1 (L) 05/30/2018    ALT 11 05/30/2018    ALKPHOS 26 (L) 05/30/2018    LABIL2 2.0 05/30/2018     Lab Results   Component Value Date    CKTOTAL 34 03/20/2018     Lab Results   Component Value Date    WBC 6.27 05/30/2018    HGB 12.2 (L) 05/30/2018    HCT 36.5 (L) 05/30/2018     05/30/2018     Lab Results   Component Value Date    INR 1.24 (H) 05/30/2018    INR 0.99 06/05/2015     Lab Results   Component Value Date    MG <0.7 (C) 05/30/2018     Lab Results   Component Value Date    PSA 0.300 03/20/2018    TSH 1.009 06/05/2015     No results found for: BNP  Lab Results   Component Value Date    CHLPL 182 05/04/2016    CHOL 199 03/20/2018    TRIG 271 (H) 03/20/2018    HDL 48  (L) 03/20/2018    VLDL 54.2 03/20/2018    LDLHDL 2.02 03/20/2018     Lab Results   Component Value Date    LDL 97 03/20/2018     (H) 12/02/2017       Procedures       I personally viewed and interpreted the patient's LAB data         Assessment:     1. Coronary artery disease involving native coronary artery of native heart without angina pectoris    2. Generalized anxiety disorder    3. Sorethroat    4. Essential hypertension    5. Hyperlipidemia type IV,uncontrolled    6. Chronic alcoholism (CMS/[facility])    7. Hypocalcemia    8. Hypokalemia    9. Hypomagnesemia          Plan:   Patient is stable from cardiac standpoint  He has severe anxiety and problem with alcohol intake.    Patient  needs psychiatric consult for anxiety and alcohol detoxification.  pt wants to increase Klonopin dose which was denied.    Last lab work 2 months ago when he went to the emergency room was markedly abnormal with low magnesium potassium and calcium.  Lab work was scheduled.  Patient was started on magnesium replacement  Hydrochlorothiazide was discontinued  Blood pressure is low  Norvasc was decreased to 5 mg daily.  Follow-up scheduled             Return in about 3 months (around 10/11/2018).

## 2018-07-13 ENCOUNTER — TELEPHONE (OUTPATIENT)
Dept: CARDIOLOGY | Facility: CLINIC | Age: 53
End: 2018-07-13

## 2018-07-13 ENCOUNTER — LAB (OUTPATIENT)
Dept: LAB | Facility: HOSPITAL | Age: 53
End: 2018-07-13

## 2018-07-13 DIAGNOSIS — E78.1 HYPERLIPIDEMIA TYPE IV: ICD-10-CM

## 2018-07-13 LAB
ALBUMIN SERPL-MCNC: 4.6 G/DL (ref 3.5–5)
ALBUMIN/GLOB SERPL: 1.8 G/DL (ref 1.5–2.5)
ALP SERPL-CCNC: 25 U/L (ref 40–129)
ALT SERPL W P-5'-P-CCNC: 12 U/L (ref 10–44)
ANION GAP SERPL CALCULATED.3IONS-SCNC: 6.9 MMOL/L (ref 3.6–11.2)
AST SERPL-CCNC: 15 U/L (ref 10–34)
BASOPHILS # BLD AUTO: 0.08 10*3/MM3 (ref 0–0.3)
BASOPHILS NFR BLD AUTO: 1.3 % (ref 0–2)
BILIRUB SERPL-MCNC: 0.4 MG/DL (ref 0.2–1.8)
BUN BLD-MCNC: 34 MG/DL (ref 7–21)
BUN/CREAT SERPL: 30.4 (ref 7–25)
CALCIUM SPEC-SCNC: 9.8 MG/DL (ref 7.7–10)
CHLORIDE SERPL-SCNC: 109 MMOL/L (ref 99–112)
CHOLEST SERPL-MCNC: 174 MG/DL (ref 0–200)
CK SERPL-CCNC: 38 U/L (ref 24–204)
CO2 SERPL-SCNC: 23.1 MMOL/L (ref 24.3–31.9)
CREAT BLD-MCNC: 1.12 MG/DL (ref 0.43–1.29)
DEPRECATED RDW RBC AUTO: 42.9 FL (ref 37–54)
EOSINOPHIL # BLD AUTO: 0.15 10*3/MM3 (ref 0–0.7)
EOSINOPHIL NFR BLD AUTO: 2.5 % (ref 0–5)
ERYTHROCYTE [DISTWIDTH] IN BLOOD BY AUTOMATED COUNT: 13.2 % (ref 11.5–14.5)
GFR SERPL CREATININE-BSD FRML MDRD: 68 ML/MIN/1.73
GLOBULIN UR ELPH-MCNC: 2.5 GM/DL
GLUCOSE BLD-MCNC: 93 MG/DL (ref 70–110)
HCT VFR BLD AUTO: 42.2 % (ref 42–52)
HDLC SERPL-MCNC: 53 MG/DL (ref 60–100)
HGB BLD-MCNC: 13.7 G/DL (ref 14–18)
IMM GRANULOCYTES # BLD: 0.02 10*3/MM3 (ref 0–0.03)
IMM GRANULOCYTES NFR BLD: 0.3 % (ref 0–0.5)
LDLC SERPL CALC-MCNC: 88 MG/DL (ref 0–100)
LDLC/HDLC SERPL: 1.67 {RATIO}
LYMPHOCYTES # BLD AUTO: 1.72 10*3/MM3 (ref 1–3)
LYMPHOCYTES NFR BLD AUTO: 28.4 % (ref 21–51)
MAGNESIUM SERPL-MCNC: 0.7 MG/DL (ref 1.7–2.6)
MCH RBC QN AUTO: 30 PG (ref 27–33)
MCHC RBC AUTO-ENTMCNC: 32.5 G/DL (ref 33–37)
MCV RBC AUTO: 92.5 FL (ref 80–94)
MONOCYTES # BLD AUTO: 0.77 10*3/MM3 (ref 0.1–0.9)
MONOCYTES NFR BLD AUTO: 12.7 % (ref 0–10)
NEUTROPHILS # BLD AUTO: 3.32 10*3/MM3 (ref 1.4–6.5)
NEUTROPHILS NFR BLD AUTO: 54.8 % (ref 30–70)
OSMOLALITY SERPL CALC.SUM OF ELEC: 284.8 MOSM/KG (ref 273–305)
PLATELET # BLD AUTO: 215 10*3/MM3 (ref 130–400)
PMV BLD AUTO: 10.2 FL (ref 6–10)
POTASSIUM BLD-SCNC: 4.2 MMOL/L (ref 3.5–5.3)
PROT SERPL-MCNC: 7.1 G/DL (ref 6–8)
RBC # BLD AUTO: 4.56 10*6/MM3 (ref 4.7–6.1)
SODIUM BLD-SCNC: 139 MMOL/L (ref 135–153)
TRIGL SERPL-MCNC: 163 MG/DL (ref 0–150)
VLDLC SERPL-MCNC: 32.6 MG/DL
WBC NRBC COR # BLD: 6.06 10*3/MM3 (ref 4.5–12.5)

## 2018-07-13 PROCEDURE — 83735 ASSAY OF MAGNESIUM: CPT

## 2018-07-13 PROCEDURE — 80061 LIPID PANEL: CPT

## 2018-07-13 PROCEDURE — 82550 ASSAY OF CK (CPK): CPT

## 2018-07-13 PROCEDURE — 80053 COMPREHEN METABOLIC PANEL: CPT

## 2018-07-13 PROCEDURE — 36415 COLL VENOUS BLD VENIPUNCTURE: CPT

## 2018-07-13 PROCEDURE — 85025 COMPLETE CBC W/AUTO DIFF WBC: CPT

## 2018-07-13 NOTE — TELEPHONE ENCOUNTER
----- Message from Alex Canales MD sent at 7/13/2018  1:22 PM EDT -----  Increase magnesium twice a day and check it in 1 week

## 2018-07-13 NOTE — TELEPHONE ENCOUNTER
Contacted [facility] pharmacy spoke to Jagdish.  has NOT picked up the magnesium RX written yesterday due to out of stock. The patient was told to come back to . Per  changed Magnesium to Mag 64mg BID. Jagdish the pharmacist is also going to let  be aware to contact the office on Monday due to needing labs repeated.

## 2018-07-13 NOTE — TELEPHONE ENCOUNTER
"nAttempted to reach  however all numbers listed in the chart are disconnected. Yesterday when the patient was in office I was explaining that we would call him with appts for referrals that was that ordered . The patient stated \" I don't have any phone numbers that I can be reached at\". I instructed the patient we needed some type of  the patient once again stated he has no one who we could contact that could reach him.     After consulting with  about this we decided to overnight the patient a letter due to the critical lab results instructing the patient to increase the medications.   "

## 2018-07-30 ENCOUNTER — LAB (OUTPATIENT)
Dept: LAB | Facility: HOSPITAL | Age: 53
End: 2018-07-30

## 2018-07-30 ENCOUNTER — TELEPHONE (OUTPATIENT)
Dept: CARDIOLOGY | Facility: CLINIC | Age: 53
End: 2018-07-30

## 2018-07-30 DIAGNOSIS — E83.51 HYPOCALCEMIA: ICD-10-CM

## 2018-07-30 DIAGNOSIS — F10.20 CHRONIC ALCOHOLISM (HCC): ICD-10-CM

## 2018-07-30 DIAGNOSIS — E83.42 HYPOMAGNESEMIA: ICD-10-CM

## 2018-07-30 DIAGNOSIS — E83.51 HYPOCALCEMIA: Primary | ICD-10-CM

## 2018-07-30 DIAGNOSIS — E83.42 HYPOMAGNESEMIA: Primary | ICD-10-CM

## 2018-07-30 DIAGNOSIS — Z12.11 ENCOUNTER FOR SCREENING COLONOSCOPY: ICD-10-CM

## 2018-07-30 LAB
ALBUMIN SERPL-MCNC: 4.5 G/DL (ref 3.5–5)
ALBUMIN/GLOB SERPL: 1.9 G/DL (ref 1.5–2.5)
ALP SERPL-CCNC: 26 U/L (ref 40–129)
ALT SERPL W P-5'-P-CCNC: 18 U/L (ref 10–44)
ANION GAP SERPL CALCULATED.3IONS-SCNC: 4.1 MMOL/L (ref 3.6–11.2)
AST SERPL-CCNC: 22 U/L (ref 10–34)
BASOPHILS # BLD AUTO: 0.07 10*3/MM3 (ref 0–0.3)
BASOPHILS NFR BLD AUTO: 1.2 % (ref 0–2)
BILIRUB SERPL-MCNC: 0.5 MG/DL (ref 0.2–1.8)
BUN BLD-MCNC: 18 MG/DL (ref 7–21)
BUN/CREAT SERPL: 14.8 (ref 7–25)
CALCIUM SPEC-SCNC: 9.1 MG/DL (ref 7.7–10)
CHLORIDE SERPL-SCNC: 113 MMOL/L (ref 99–112)
CO2 SERPL-SCNC: 25.9 MMOL/L (ref 24.3–31.9)
CREAT BLD-MCNC: 1.22 MG/DL (ref 0.43–1.29)
DEPRECATED RDW RBC AUTO: 44.6 FL (ref 37–54)
EOSINOPHIL # BLD AUTO: 0.18 10*3/MM3 (ref 0–0.7)
EOSINOPHIL NFR BLD AUTO: 3 % (ref 0–5)
ERYTHROCYTE [DISTWIDTH] IN BLOOD BY AUTOMATED COUNT: 13.4 % (ref 11.5–14.5)
GFR SERPL CREATININE-BSD FRML MDRD: 62 ML/MIN/1.73
GLOBULIN UR ELPH-MCNC: 2.4 GM/DL
GLUCOSE BLD-MCNC: 90 MG/DL (ref 70–110)
HCT VFR BLD AUTO: 40.7 % (ref 42–52)
HGB BLD-MCNC: 13.3 G/DL (ref 14–18)
IMM GRANULOCYTES # BLD: 0.01 10*3/MM3 (ref 0–0.03)
IMM GRANULOCYTES NFR BLD: 0.2 % (ref 0–0.5)
LYMPHOCYTES # BLD AUTO: 1.75 10*3/MM3 (ref 1–3)
LYMPHOCYTES NFR BLD AUTO: 29.2 % (ref 21–51)
MAGNESIUM SERPL-MCNC: 0.8 MG/DL (ref 1.7–2.6)
MCH RBC QN AUTO: 29.8 PG (ref 27–33)
MCHC RBC AUTO-ENTMCNC: 32.7 G/DL (ref 33–37)
MCV RBC AUTO: 91.3 FL (ref 80–94)
MONOCYTES # BLD AUTO: 0.76 10*3/MM3 (ref 0.1–0.9)
MONOCYTES NFR BLD AUTO: 12.7 % (ref 0–10)
NEUTROPHILS # BLD AUTO: 3.23 10*3/MM3 (ref 1.4–6.5)
NEUTROPHILS NFR BLD AUTO: 53.7 % (ref 30–70)
OSMOLALITY SERPL CALC.SUM OF ELEC: 286.4 MOSM/KG (ref 273–305)
PLATELET # BLD AUTO: 216 10*3/MM3 (ref 130–400)
PMV BLD AUTO: 10.6 FL (ref 6–10)
POTASSIUM BLD-SCNC: 3.6 MMOL/L (ref 3.5–5.3)
PROT SERPL-MCNC: 6.9 G/DL (ref 6–8)
RBC # BLD AUTO: 4.46 10*6/MM3 (ref 4.7–6.1)
SODIUM BLD-SCNC: 143 MMOL/L (ref 135–153)
WBC NRBC COR # BLD: 6 10*3/MM3 (ref 4.5–12.5)

## 2018-07-30 PROCEDURE — 85025 COMPLETE CBC W/AUTO DIFF WBC: CPT

## 2018-07-30 PROCEDURE — 80053 COMPREHEN METABOLIC PANEL: CPT

## 2018-07-30 PROCEDURE — 83735 ASSAY OF MAGNESIUM: CPT

## 2018-07-30 PROCEDURE — 36415 COLL VENOUS BLD VENIPUNCTURE: CPT

## 2018-07-30 RX ORDER — ADHESIVE TAPE 3"X 2.3 YD
200 TAPE, NON-MEDICATED TOPICAL DAILY
Qty: 30 TABLET | Refills: 0 | Status: SHIPPED | OUTPATIENT
Start: 2018-07-30 | End: 2018-10-15 | Stop reason: SDUPTHER

## 2018-07-30 NOTE — TELEPHONE ENCOUNTER
Patient has no working phone number will contact pharmacy about updated dosage and try to reach patient VIA Mail again.  Recheck labs in 2 weeks.

## 2018-07-30 NOTE — TELEPHONE ENCOUNTER
----- Message from Alex Canales MD sent at 7/30/2018 11:52 AM EDT -----  Magnesium is still very low but rest of the lab work is good.  Take magnesium oxide 200 mg once a day.  Stop the old magnesium

## 2018-09-21 RX ORDER — ATORVASTATIN CALCIUM 40 MG/1
TABLET, FILM COATED ORAL
Qty: 30 TABLET | Refills: 1 | Status: SHIPPED | OUTPATIENT
Start: 2018-09-21 | End: 2018-11-26 | Stop reason: SDUPTHER

## 2018-10-15 ENCOUNTER — LAB (OUTPATIENT)
Dept: LAB | Facility: HOSPITAL | Age: 53
End: 2018-10-15

## 2018-10-15 ENCOUNTER — TELEPHONE (OUTPATIENT)
Dept: CARDIOLOGY | Facility: CLINIC | Age: 53
End: 2018-10-15

## 2018-10-15 DIAGNOSIS — E83.42 HYPOMAGNESEMIA: ICD-10-CM

## 2018-10-15 LAB — MAGNESIUM SERPL-MCNC: <0.7 MG/DL (ref 1.7–2.6)

## 2018-10-15 PROCEDURE — 36415 COLL VENOUS BLD VENIPUNCTURE: CPT

## 2018-10-15 PROCEDURE — 83735 ASSAY OF MAGNESIUM: CPT

## 2018-10-15 RX ORDER — ADHESIVE TAPE 3"X 2.3 YD
200 TAPE, NON-MEDICATED TOPICAL 3 TIMES DAILY
Qty: 90 TABLET | Refills: 0 | Status: SHIPPED | OUTPATIENT
Start: 2018-10-15 | End: 2018-10-30

## 2018-10-15 NOTE — TELEPHONE ENCOUNTER
----- Message from Alex Canales MD sent at 10/15/2018  1:15 PM EDT -----  Increase magnesium oxide 200 mg 3 times a day, and BMP and magnesium in 2 weeks

## 2018-10-30 ENCOUNTER — OFFICE VISIT (OUTPATIENT)
Dept: CARDIOLOGY | Facility: CLINIC | Age: 53
End: 2018-10-30

## 2018-10-30 VITALS
SYSTOLIC BLOOD PRESSURE: 134 MMHG | HEIGHT: 75 IN | WEIGHT: 198 LBS | HEART RATE: 68 BPM | BODY MASS INDEX: 24.62 KG/M2 | DIASTOLIC BLOOD PRESSURE: 76 MMHG | OXYGEN SATURATION: 99 %

## 2018-10-30 DIAGNOSIS — I25.10 CORONARY ARTERY DISEASE INVOLVING NATIVE CORONARY ARTERY OF NATIVE HEART WITHOUT ANGINA PECTORIS: Primary | ICD-10-CM

## 2018-10-30 DIAGNOSIS — E78.1 HYPERLIPIDEMIA TYPE IV: ICD-10-CM

## 2018-10-30 DIAGNOSIS — F10.20 CHRONIC ALCOHOLISM (HCC): ICD-10-CM

## 2018-10-30 DIAGNOSIS — F41.1 GENERALIZED ANXIETY DISORDER: ICD-10-CM

## 2018-10-30 DIAGNOSIS — E83.42 HYPOMAGNESEMIA: ICD-10-CM

## 2018-10-30 DIAGNOSIS — K21.9 GASTROESOPHAGEAL REFLUX DISEASE WITHOUT ESOPHAGITIS: ICD-10-CM

## 2018-10-30 DIAGNOSIS — I10 ESSENTIAL HYPERTENSION: ICD-10-CM

## 2018-10-30 PROCEDURE — 90674 CCIIV4 VAC NO PRSV 0.5 ML IM: CPT | Performed by: INTERNAL MEDICINE

## 2018-10-30 PROCEDURE — 90471 IMMUNIZATION ADMIN: CPT | Performed by: INTERNAL MEDICINE

## 2018-10-30 PROCEDURE — 99214 OFFICE O/P EST MOD 30 MIN: CPT | Performed by: INTERNAL MEDICINE

## 2018-10-30 RX ORDER — ADHESIVE TAPE 3"X 2.3 YD
200 TAPE, NON-MEDICATED TOPICAL 4 TIMES DAILY
Qty: 90 TABLET | Refills: 0 | OUTPATIENT
Start: 2018-10-30 | End: 2019-04-10

## 2018-10-30 RX ORDER — CLONAZEPAM 0.5 MG/1
0.5 TABLET ORAL 2 TIMES DAILY
Qty: 60 TABLET | Refills: 2 | Status: CANCELLED | OUTPATIENT
Start: 2018-10-30

## 2018-10-30 RX ORDER — CLONAZEPAM 0.5 MG/1
0.5 TABLET ORAL 2 TIMES DAILY
Qty: 60 TABLET | Refills: 2 | Status: SHIPPED | OUTPATIENT
Start: 2018-10-30 | End: 2019-04-10

## 2018-10-30 NOTE — PROGRESS NOTES
subjective     Chief Complaint   Patient presents with   • Coronary Artery Disease   • Follow-up     History of Present Illness    Patient is 53 years old white male who has known coronary artery disease.  Details are not meter described in the problem list.  He is doing very well he denies any chest pain palpitations or shortness of breath.  Patient is heavily alcoholic.  He has always low magnesium and low potassium.  Last potassium was good with magnesium is still quite low.  He is taking mag oxide 200 mg 3 pills daily.    Blood pressure is very well controlled with current medications.    Patient also complains of lot of anxiety and is taking Klonopin.  Risks of combining that with our call was again described to the patient    He has hyperlipidemia and is taking Lipitor 40 mg daily.  Side effects of alcoholism and statin therapy combination was discussed.    GI symptoms are controlled with the Zofran and Protonix.    Patient is taking dual antiplatelet therapy    Past Surgical History:   Procedure Laterality Date   • CARDIAC CATHETERIZATION  06/06/2015   • CARDIOVASCULAR STRESS TEST  06/06/2015   • ECHO - CONVERTED  06/06/2015   • EYE SURGERY       Family History   Problem Relation Age of Onset   • Heart attack Father    • Heart disease Father    • Heart failure Father    • Heart disease Sister    • Heart failure Sister    • Heart disease Brother    • Heart failure Brother    • Heart failure Sister    • Heart disease Sister      Past Medical History:   Diagnosis Date   • Anxiety disorder    • CAD (coronary artery disease)    • Chronic alcoholism (CMS/[facility])    • Hyperlipidemia    • Hypertension    • WPW (Zakiya-Parkinson-White syndrome)      Patient Active Problem List   Diagnosis   • CAD (coronary artery disease), 90% proximal RCA, total occlusion of posterior lateral branch of RCA, total occlusion of second marginal with left to left collaterals, 40% LAD, 40% left main   • Anxiety disorder   • Chronic alcoholism  (CMS/[facility])   • Essential hypertension   • Hyperlipidemia type IV,uncontrolled   • Gastroesophageal reflux disease without esophagitis   • Encounter for screening colonoscopy   • Sorethroat   • Hypokalemia   • Hypomagnesemia   • Hypocalcemia       Social History   Substance Use Topics   • Smoking status: Former Smoker     Packs/day: 1.00     Years: 20.00     Types: Cigarettes     Quit date: 2003   • Smokeless tobacco: Current User     Types: Chew      Comment: chew 1 can per week   • Alcohol use 6.0 oz/week     6 Cans of beer, 4 Shots of liquor per week       No Known Allergies    Current Outpatient Prescriptions on File Prior to Visit   Medication Sig   • amLODIPine (NORVASC) 10 MG tablet Take 0.5 tablets by mouth Daily. FOR BLOOD PRESSURE   • aspirin 81 MG EC tablet Take 1 tablet by mouth Daily.   • atorvastatin (LIPITOR) 40 MG tablet TAKE ONE TABLET BY MOUTH DAILY FOR CHOLESTEROL   • clopidogrel (PLAVIX) 75 MG tablet Take 1 tablet by mouth Daily.   • fenofibrate (TRICOR) 145 MG tablet Take 1 tablet by mouth Daily.   • losartan (COZAAR) 100 MG tablet Take 1 tablet by mouth Daily. FOR BLOOD PRESSURE   • metoprolol tartrate (LOPRESSOR) 100 MG tablet Take 1 tablet by mouth Every 12 (Twelve) Hours.   • ondansetron ODT (ZOFRAN-ODT) 4 MG disintegrating tablet Take 1 tablet by mouth Every 6 (Six) Hours As Needed for Nausea or Vomiting.   • pantoprazole (PROTONIX) 40 MG EC tablet Take 1 tablet by mouth Daily.   • potassium chloride (K-DUR) 10 MEQ CR tablet Take 1 tablet by mouth Daily.   • [DISCONTINUED] clonazePAM (KlonoPIN) 0.5 MG tablet Take 1 tablet by mouth 2 (Two) Times a Day.   • [DISCONTINUED] Magnesium Oxide 200 MG tablet Take 1 tablet by mouth 3 (Three) Times a Day.     No current facility-administered medications on file prior to visit.          The following portions of the patient's history were reviewed and updated as appropriate: allergies, current medications, past family history, past medical history, past  "social history, past surgical history and problem list.    Review of Systems   Constitution: Positive for weakness and malaise/fatigue.   HENT: Negative.  Negative for congestion.    Eyes: Negative.    Cardiovascular: Negative.  Negative for chest pain, cyanosis, dyspnea on exertion, irregular heartbeat, leg swelling, near-syncope, orthopnea, palpitations, paroxysmal nocturnal dyspnea and syncope.   Respiratory: Negative.  Negative for shortness of breath.    Hematologic/Lymphatic: Negative.    Musculoskeletal: Positive for arthritis and joint pain.   Gastrointestinal: Negative.    Neurological: Negative for headaches.   Psychiatric/Behavioral: The patient is nervous/anxious.           Objective:     /76 (BP Location: Left arm, Patient Position: Sitting)   Pulse 68   Ht 190.5 cm (75\")   Wt 89.8 kg (198 lb)   SpO2 99%   BMI 24.75 kg/m²   Physical Exam   Constitutional: He appears well-developed and well-nourished. No distress.   HENT:   Head: Normocephalic and atraumatic.   Mouth/Throat: Oropharynx is clear and moist. No oropharyngeal exudate.   Eyes: Pupils are equal, round, and reactive to light. Conjunctivae and EOM are normal. No scleral icterus.   Neck: Normal range of motion. Neck supple. No JVD present. No tracheal deviation present. No thyromegaly present.   Cardiovascular: Normal rate, regular rhythm, normal heart sounds and intact distal pulses.  PMI is not displaced.  Exam reveals no gallop, no friction rub and no decreased pulses.    No murmur heard.  Pulses:       Carotid pulses are 3+ on the right side, and 3+ on the left side.       Radial pulses are 3+ on the right side, and 3+ on the left side.   Pulmonary/Chest: Effort normal and breath sounds normal. No respiratory distress. He has no wheezes. He has no rales. He exhibits no tenderness.   Abdominal: Soft. Bowel sounds are normal. He exhibits no distension, no abdominal bruit and no mass. There is no splenomegaly or hepatomegaly. There is " no tenderness. There is no rebound and no guarding.   Musculoskeletal: Normal range of motion. He exhibits no edema, tenderness or deformity.   Lymphadenopathy:     He has no cervical adenopathy.   Neurological: He is alert. He has normal reflexes. No cranial nerve deficit. He exhibits normal muscle tone. Coordination normal.   Skin: Skin is warm and dry. No rash noted. He is not diaphoretic. No erythema.   Psychiatric: He has a normal mood and affect. His behavior is normal. Judgment and thought content normal.         Lab Review  Lab Results   Component Value Date     07/30/2018    K 3.6 07/30/2018     (H) 07/30/2018    BUN 18 07/30/2018    CREATININE 1.22 07/30/2018    GLUCOSE 90 07/30/2018    CALCIUM 9.1 07/30/2018    ALT 18 07/30/2018    ALKPHOS 26 (L) 07/30/2018    LABIL2 1.7 05/04/2016     Lab Results   Component Value Date    CKTOTAL 38 07/13/2018     Lab Results   Component Value Date    WBC 6.00 07/30/2018    HGB 13.3 (L) 07/30/2018    HCT 40.7 (L) 07/30/2018     07/30/2018     Lab Results   Component Value Date    INR 1.24 (H) 05/30/2018    INR 0.99 06/05/2015     Lab Results   Component Value Date    MG <0.7 (C) 10/15/2018     Lab Results   Component Value Date    PSA 0.300 03/20/2018    TSH 1.009 06/05/2015     No results found for: BNP  Lab Results   Component Value Date    CHLPL 182 05/04/2016    CHOL 174 07/13/2018    TRIG 163 (H) 07/13/2018    HDL 53 (L) 07/13/2018    VLDL 32.6 07/13/2018    LDLHDL 1.67 07/13/2018     Lab Results   Component Value Date    LDL 88 07/13/2018    LDL 97 03/20/2018       Procedures       I personally viewed and interpreted the patient's LAB data         Assessment:     1. Coronary artery disease involving native coronary artery of native heart without angina pectoris    2. Essential hypertension    3. Hyperlipidemia type IV,uncontrolled    4. Hypomagnesemia    5. Gastroesophageal reflux disease without esophagitis    6. Chronic alcoholism (CMS/[facility])     7. Generalized anxiety disorder          Plan:      Patient has known coronary artery disease as described.  Patient is doing very well he denies any cardiac symptoms.  He will continue dual antiplatelet therapy, beta blocker therapy and statin therapy.    Patient has heavy alcohol abuse problem and risks were explained patient is not interested in trying to slow down on alcohol.    He gets recurrent hypomagnesemia.  He is taking Mag-Ox 203 times a day he was advised to increase it to 5 pills daily which will make it 1000 mg.  Lab work will be checked again.  Lipids have been normal he will continue Lipitor.  Side effects of combination of Lipitor with statin therapy with liver dysfunction was discussed.    Patient still complains of a lot of anxiety Klonopin was renewed.  Side effects and problems with alcoholism was discussed.    Follow-up scheduled        No Follow-up on file.

## 2018-11-26 RX ORDER — ATORVASTATIN CALCIUM 40 MG/1
TABLET, FILM COATED ORAL
Qty: 30 TABLET | Refills: 0 | Status: SHIPPED | OUTPATIENT
Start: 2018-11-26 | End: 2018-12-22 | Stop reason: SDUPTHER

## 2018-12-03 ENCOUNTER — TELEPHONE (OUTPATIENT)
Dept: CARDIOLOGY | Facility: CLINIC | Age: 53
End: 2018-12-03

## 2018-12-03 RX ORDER — PANTOPRAZOLE SODIUM 40 MG/1
40 TABLET, DELAYED RELEASE ORAL DAILY
Qty: 90 TABLET | Refills: 3 | Status: SHIPPED | OUTPATIENT
Start: 2018-12-03 | End: 2019-10-10 | Stop reason: SDUPTHER

## 2018-12-24 RX ORDER — ATORVASTATIN CALCIUM 40 MG/1
TABLET, FILM COATED ORAL
Qty: 30 TABLET | Refills: 0 | Status: SHIPPED | OUTPATIENT
Start: 2018-12-24 | End: 2019-01-02 | Stop reason: SDUPTHER

## 2019-01-02 RX ORDER — ATORVASTATIN CALCIUM 40 MG/1
TABLET, FILM COATED ORAL
Qty: 30 TABLET | Refills: 0 | Status: SHIPPED | OUTPATIENT
Start: 2019-01-02 | End: 2019-03-20 | Stop reason: SDUPTHER

## 2019-03-12 ENCOUNTER — HOSPITAL ENCOUNTER (EMERGENCY)
Facility: HOSPITAL | Age: 54
Discharge: HOME | End: 2019-03-12
Attending: EMERGENCY MEDICINE | Admitting: EMERGENCY MEDICINE

## 2019-03-12 ENCOUNTER — APPOINTMENT (OUTPATIENT)
Dept: GENERAL RADIOLOGY | Facility: HOSPITAL | Age: 54
End: 2019-03-12

## 2019-03-12 VITALS
RESPIRATION RATE: 20 BRPM | TEMPERATURE: 98.6 F | WEIGHT: 190 LBS | BODY MASS INDEX: 23.62 KG/M2 | DIASTOLIC BLOOD PRESSURE: 79 MMHG | HEIGHT: 75 IN | SYSTOLIC BLOOD PRESSURE: 100 MMHG | OXYGEN SATURATION: 98 % | HEART RATE: 75 BPM

## 2019-03-12 DIAGNOSIS — S63.501A SPRAIN OF RIGHT WRIST, INITIAL ENCOUNTER: Primary | ICD-10-CM

## 2019-03-12 PROCEDURE — 73090 X-RAY EXAM OF FOREARM: CPT

## 2019-03-12 PROCEDURE — 73110 X-RAY EXAM OF WRIST: CPT

## 2019-03-12 PROCEDURE — 73110 X-RAY EXAM OF WRIST: CPT | Performed by: RADIOLOGY

## 2019-03-12 PROCEDURE — 73090 X-RAY EXAM OF FOREARM: CPT | Performed by: RADIOLOGY

## 2019-03-12 PROCEDURE — 99283 EMERGENCY DEPT VISIT LOW MDM: CPT

## 2019-03-12 RX ORDER — HYDROCODONE BITARTRATE AND ACETAMINOPHEN 7.5; 325 MG/1; MG/1
1 TABLET ORAL ONCE
Status: COMPLETED | OUTPATIENT
Start: 2019-03-12 | End: 2019-03-12

## 2019-03-12 RX ADMIN — HYDROCODONE BITARTRATE AND ACETAMINOPHEN 1 TABLET: 7.5; 325 TABLET ORAL at 12:34

## 2019-03-12 NOTE — ED PROVIDER NOTES
Subjective   53-year-old male presents the ED today for a right wrist injury.  He states he was feeding his animals when he tripped over 1 of his cats and fell.  This occurred approximately 90 minutes ago.  He states he is having right wrist pain.  He states it is mildly swollen and has pain with range of motion.  He states he has broken this wrist several times in the past.        History provided by:  Patient  Upper Extremity Issue   Location:  Wrist  Wrist location:  R wrist  Injury: yes    Time since incident:  90 minutes  Mechanism of injury: fall    Fall:     Fall occurred:  Tripped    Point of impact:  Outstretched arms    Entrapped after fall: no    Pain details:     Quality:  Aching    Radiates to:  Does not radiate    Severity:  Moderate    Onset quality:  Sudden    Timing:  Constant    Progression:  Unchanged  Dislocation: no    Prior injury to area:  Yes  Relieved by:  Nothing  Worsened by:  Movement  Ineffective treatments:  None tried  Associated symptoms: decreased range of motion and swelling    Associated symptoms: no back pain, no fatigue, no fever, no muscle weakness, no neck pain, no numbness, no stiffness and no tingling        Review of Systems   Constitutional: Negative for fatigue and fever.   HENT: Negative.    Eyes: Negative.    Respiratory: Negative.    Cardiovascular: Negative.    Gastrointestinal: Negative.    Genitourinary: Negative.    Musculoskeletal: Positive for arthralgias. Negative for back pain, neck pain and stiffness.   Skin: Negative.    Neurological: Negative.    Psychiatric/Behavioral: Negative.    All other systems reviewed and are negative.      Past Medical History:   Diagnosis Date   • Anxiety disorder    • CAD (coronary artery disease)    • Chronic alcoholism (CMS/HCC)    • Hyperlipidemia    • Hypertension    • WPW (Zakiya-Parkinson-White syndrome)        Allergies   Allergen Reactions   • Niacin Rash     Whelps       Past Surgical History:   Procedure Laterality Date    • CARDIAC CATHETERIZATION  2015   • CARDIOVASCULAR STRESS TEST  2015   • ECHO - CONVERTED  2015   • EYE SURGERY         Family History   Problem Relation Age of Onset   • Heart attack Father    • Heart disease Father    • Heart failure Father    • Heart disease Sister    • Heart failure Sister    • Heart disease Brother    • Heart failure Brother    • Heart failure Sister    • Heart disease Sister        Social History     Socioeconomic History   • Marital status:      Spouse name: Not on file   • Number of children: Not on file   • Years of education: Not on file   • Highest education level: Not on file   Tobacco Use   • Smoking status: Former Smoker     Packs/day: 1.00     Years: 20.00     Pack years: 20.00     Types: Cigarettes     Last attempt to quit:      Years since quittin.2   • Smokeless tobacco: Current User     Types: Chew   • Tobacco comment: chew 1 can per week   Substance and Sexual Activity   • Alcohol use: Yes     Alcohol/week: 6.0 oz     Types: 6 Cans of beer, 4 Shots of liquor per week   • Drug use: Yes     Types: Hydrocodone     Comment: occ   • Sexual activity: Defer           Objective   Physical Exam   Constitutional: He is oriented to person, place, and time. He appears well-developed and well-nourished. No distress.   HENT:   Head: Normocephalic and atraumatic.   Right Ear: External ear normal.   Left Ear: External ear normal.   Nose: Nose normal.   Mouth/Throat: Oropharynx is clear and moist.   Eyes: Conjunctivae and EOM are normal. Pupils are equal, round, and reactive to light.   Neck: Normal range of motion. Neck supple.   Cardiovascular: Normal rate, regular rhythm, normal heart sounds and intact distal pulses.   Pulmonary/Chest: Effort normal and breath sounds normal.   Abdominal: Soft. Bowel sounds are normal.   Musculoskeletal: He exhibits tenderness (tenderness to the radial side of the right wrist, mildly swollen). He exhibits no deformity.    Radial pulse is 2+   Neurological: He is alert and oriented to person, place, and time.   Skin: Skin is warm and dry. Capillary refill takes less than 2 seconds.   Psychiatric: He has a normal mood and affect. His behavior is normal. Judgment and thought content normal.   Nursing note and vitals reviewed.      Splint - Cast - Strapping  Date/Time: 3/12/2019 1:30 PM  Performed by: Mirna Ferguson PA  Authorized by: Grant Mar MD     Consent:     Consent obtained:  Verbal    Consent given by:  Patient  Pre-procedure details:     Sensation:  Normal  Procedure details:     Laterality:  Right    Location:  Wrist    Wrist:  R wrist    Splint type:  Wrist    Supplies:  Prefabricated splint  Post-procedure details:     Pain:  Improved    Sensation:  Normal    Patient tolerance of procedure:  Tolerated well, no immediate complications               ED Course  ED Course as of Mar 12 1519   Tue Mar 12, 2019   1518 No acute findings on x-rays.  Plan is to place in a velcro wrist splint and he will follow up outpatient as needed.  [AH]      ED Course User Index  [AH] Mirna Ferguson PA                  [facility]  Number of Diagnoses or Management Options  Sprain of right wrist, initial encounter:      Amount and/or Complexity of Data Reviewed  Tests in the radiology section of CPT®: reviewed    Patient Progress  Patient progress: stable        Final diagnoses:   Sprain of right wrist, initial encounter            Mirna Ferguson PA  03/12/19 1519

## 2019-03-20 RX ORDER — ATORVASTATIN CALCIUM 40 MG/1
TABLET, FILM COATED ORAL
Qty: 30 TABLET | Refills: 0 | Status: SHIPPED | OUTPATIENT
Start: 2019-03-20 | End: 2019-05-07 | Stop reason: SDUPTHER

## 2019-03-20 RX ORDER — METOPROLOL TARTRATE 100 MG/1
TABLET ORAL
Qty: 60 TABLET | Refills: 0 | Status: SHIPPED | OUTPATIENT
Start: 2019-03-20 | End: 2019-05-07 | Stop reason: SDUPTHER

## 2019-03-20 RX ORDER — POTASSIUM CHLORIDE 750 MG/1
TABLET, FILM COATED, EXTENDED RELEASE ORAL
Qty: 30 TABLET | Refills: 0 | Status: SHIPPED | OUTPATIENT
Start: 2019-03-20 | End: 2019-05-07 | Stop reason: SDUPTHER

## 2019-03-20 RX ORDER — HYDROCHLOROTHIAZIDE 12.5 MG/1
CAPSULE, GELATIN COATED ORAL
Qty: 30 CAPSULE | Refills: 3 | Status: SHIPPED | OUTPATIENT
Start: 2019-03-20 | End: 2019-04-10

## 2019-03-20 RX ORDER — LOSARTAN POTASSIUM 100 MG/1
TABLET ORAL
Qty: 30 TABLET | Refills: 3 | Status: SHIPPED | OUTPATIENT
Start: 2019-03-20 | End: 2019-04-10 | Stop reason: SDUPTHER

## 2019-03-20 RX ORDER — FENOFIBRATE 145 MG/1
TABLET, COATED ORAL
Qty: 30 TABLET | Refills: 0 | Status: SHIPPED | OUTPATIENT
Start: 2019-03-20 | End: 2019-05-07 | Stop reason: SDUPTHER

## 2019-03-20 RX ORDER — CLOPIDOGREL BISULFATE 75 MG/1
TABLET ORAL
Qty: 30 TABLET | Refills: 3 | Status: SHIPPED | OUTPATIENT
Start: 2019-03-20 | End: 2019-08-07 | Stop reason: SDUPTHER

## 2019-03-28 ENCOUNTER — HOSPITAL ENCOUNTER (EMERGENCY)
Facility: HOSPITAL | Age: 54
Discharge: ANOTHER HOSPITAL | End: 2019-03-29
Attending: EMERGENCY MEDICINE | Admitting: EMERGENCY MEDICINE

## 2019-03-28 ENCOUNTER — APPOINTMENT (OUTPATIENT)
Dept: CT IMAGING | Facility: HOSPITAL | Age: 54
End: 2019-03-28

## 2019-03-28 ENCOUNTER — APPOINTMENT (OUTPATIENT)
Dept: GENERAL RADIOLOGY | Facility: HOSPITAL | Age: 54
End: 2019-03-28

## 2019-03-28 DIAGNOSIS — E83.42 HYPOMAGNESEMIA: ICD-10-CM

## 2019-03-28 DIAGNOSIS — E87.5 HYPERKALEMIA: ICD-10-CM

## 2019-03-28 DIAGNOSIS — E87.20 LACTIC ACIDOSIS: ICD-10-CM

## 2019-03-28 DIAGNOSIS — F10.931 DELIRIUM TREMENS (HCC): Primary | ICD-10-CM

## 2019-03-28 LAB
A-A DO2: 51 MMHG (ref 0–300)
ALBUMIN SERPL-MCNC: 4.53 G/DL (ref 3.5–5.2)
ALBUMIN/GLOB SERPL: 1.6 G/DL
ALP SERPL-CCNC: 31 U/L (ref 39–117)
ALT SERPL W P-5'-P-CCNC: 14 U/L (ref 1–41)
ANION GAP SERPL CALCULATED.3IONS-SCNC: 24.1 MMOL/L
APAP SERPL-MCNC: <5 MCG/ML (ref 10–30)
ARTERIAL PATENCY WRIST A: POSITIVE
AST SERPL-CCNC: 20 U/L (ref 1–40)
ATMOSPHERIC PRESS: 735 MMHG
BASE EXCESS BLDA CALC-SCNC: -1.9 MMOL/L
BASOPHILS # BLD AUTO: 0.03 10*3/MM3 (ref 0–0.2)
BASOPHILS NFR BLD AUTO: 0.3 % (ref 0–1.5)
BDY SITE: ABNORMAL
BILIRUB SERPL-MCNC: 1 MG/DL (ref 0.2–1.2)
BODY TEMPERATURE: 98.6 C
BUN BLD-MCNC: 21 MG/DL (ref 6–20)
BUN/CREAT SERPL: 13.3 (ref 7–25)
CALCIUM SPEC-SCNC: 9.4 MG/DL (ref 8.6–10.5)
CHLORIDE SERPL-SCNC: 100 MMOL/L (ref 98–107)
CO2 SERPL-SCNC: 17.9 MMOL/L (ref 22–29)
COHGB MFR BLD: 1.1 % (ref 0–5)
CREAT BLD-MCNC: 1.58 MG/DL (ref 0.76–1.27)
D-LACTATE SERPL-SCNC: 4.2 MMOL/L (ref 0.5–2)
DEPRECATED RDW RBC AUTO: 42.9 FL (ref 37–54)
EOSINOPHIL # BLD AUTO: 0.04 10*3/MM3 (ref 0–0.4)
EOSINOPHIL NFR BLD AUTO: 0.3 % (ref 0.3–6.2)
ERYTHROCYTE [DISTWIDTH] IN BLOOD BY AUTOMATED COUNT: 13.5 % (ref 12.3–15.4)
ETHANOL BLD-MCNC: <10 MG/DL (ref 0–10)
ETHANOL UR QL: <0.01 %
GFR SERPL CREATININE-BSD FRML MDRD: 46 ML/MIN/1.73
GLOBULIN UR ELPH-MCNC: 2.8 GM/DL
GLUCOSE BLD-MCNC: 177 MG/DL (ref 65–99)
HCO3 BLDA-SCNC: 14.7 MMOL/L (ref 22–26)
HCT VFR BLD AUTO: 41 % (ref 37.5–51)
HCT VFR BLD CALC: 43 % (ref 42–52)
HGB BLD-MCNC: 13.8 G/DL (ref 13–17.7)
HGB BLDA-MCNC: 14.6 G/DL (ref 12–16)
HOROWITZ INDEX BLD+IHG-RTO: 21 %
IMM GRANULOCYTES # BLD AUTO: 0.02 10*3/MM3 (ref 0–0.05)
IMM GRANULOCYTES NFR BLD AUTO: 0.2 % (ref 0–0.5)
LYMPHOCYTES # BLD AUTO: 1.12 10*3/MM3 (ref 0.7–3.1)
LYMPHOCYTES NFR BLD AUTO: 9.7 % (ref 19.6–45.3)
MAGNESIUM SERPL-MCNC: <0.3 MG/DL (ref 1.6–2.6)
MCH RBC QN AUTO: 29.7 PG (ref 26.6–33)
MCHC RBC AUTO-ENTMCNC: 33.7 G/DL (ref 31.5–35.7)
MCV RBC AUTO: 88.2 FL (ref 79–97)
METHGB BLD QL: 0.1 % (ref 0–3)
MODALITY: ABNORMAL
MONOCYTES # BLD AUTO: 0.88 10*3/MM3 (ref 0.1–0.9)
MONOCYTES NFR BLD AUTO: 7.7 % (ref 5–12)
NEUTROPHILS # BLD AUTO: 9.4 10*3/MM3 (ref 1.4–7)
NEUTROPHILS NFR BLD AUTO: 81.8 % (ref 42.7–76)
OXYHGB MFR BLDV: 95.9 % (ref 85–100)
PCO2 BLDA: 13.2 MM HG (ref 35–45)
PH BLDA: 7.67 PH UNITS (ref 7.35–7.45)
PLATELET # BLD AUTO: 254 10*3/MM3 (ref 140–450)
PMV BLD AUTO: 10.7 FL (ref 6–12)
PO2 BLDA: 77.7 MM HG (ref 80–100)
POTASSIUM BLD-SCNC: 2.7 MMOL/L (ref 3.5–5.2)
PROT SERPL-MCNC: 7.3 G/DL (ref 6–8.5)
RBC # BLD AUTO: 4.65 10*6/MM3 (ref 4.14–5.8)
SALICYLATES SERPL-MCNC: 0.4 MG/DL
SAO2 % BLDCOA: 97.1 % (ref 90–100)
SODIUM BLD-SCNC: 142 MMOL/L (ref 136–145)
TROPONIN T SERPL-MCNC: <0.01 NG/ML (ref 0–0.03)
WBC NRBC COR # BLD: 11.49 10*3/MM3 (ref 3.4–10.8)

## 2019-03-28 PROCEDURE — 25010000002 THIAMINE PER 100 MG: Performed by: NURSE PRACTITIONER

## 2019-03-28 PROCEDURE — 25010000002 MAGNESIUM SULFATE PER 500 MG OF MAGNESIUM: Performed by: NURSE PRACTITIONER

## 2019-03-28 PROCEDURE — 25010000002 MAGNESIUM SULFATE 2 GM/50ML SOLUTION: Performed by: NURSE PRACTITIONER

## 2019-03-28 PROCEDURE — 99284 EMERGENCY DEPT VISIT MOD MDM: CPT

## 2019-03-28 PROCEDURE — 83605 ASSAY OF LACTIC ACID: CPT | Performed by: NURSE PRACTITIONER

## 2019-03-28 PROCEDURE — 93010 ELECTROCARDIOGRAM REPORT: CPT | Performed by: INTERNAL MEDICINE

## 2019-03-28 PROCEDURE — 93005 ELECTROCARDIOGRAM TRACING: CPT | Performed by: EMERGENCY MEDICINE

## 2019-03-28 PROCEDURE — 85025 COMPLETE CBC W/AUTO DIFF WBC: CPT | Performed by: NURSE PRACTITIONER

## 2019-03-28 PROCEDURE — 80307 DRUG TEST PRSMV CHEM ANLYZR: CPT | Performed by: NURSE PRACTITIONER

## 2019-03-28 PROCEDURE — 82805 BLOOD GASES W/O2 SATURATION: CPT | Performed by: NURSE PRACTITIONER

## 2019-03-28 PROCEDURE — 25010000002 LORAZEPAM PER 2 MG: Performed by: EMERGENCY MEDICINE

## 2019-03-28 PROCEDURE — 83735 ASSAY OF MAGNESIUM: CPT | Performed by: NURSE PRACTITIONER

## 2019-03-28 PROCEDURE — 71045 X-RAY EXAM CHEST 1 VIEW: CPT

## 2019-03-28 PROCEDURE — 96365 THER/PROPH/DIAG IV INF INIT: CPT

## 2019-03-28 PROCEDURE — 71045 X-RAY EXAM CHEST 1 VIEW: CPT | Performed by: RADIOLOGY

## 2019-03-28 PROCEDURE — 84484 ASSAY OF TROPONIN QUANT: CPT | Performed by: NURSE PRACTITIONER

## 2019-03-28 PROCEDURE — 70450 CT HEAD/BRAIN W/O DYE: CPT | Performed by: RADIOLOGY

## 2019-03-28 PROCEDURE — 96367 TX/PROPH/DG ADDL SEQ IV INF: CPT

## 2019-03-28 PROCEDURE — 70450 CT HEAD/BRAIN W/O DYE: CPT

## 2019-03-28 PROCEDURE — 83050 HGB METHEMOGLOBIN QUAN: CPT | Performed by: NURSE PRACTITIONER

## 2019-03-28 PROCEDURE — 96366 THER/PROPH/DIAG IV INF ADDON: CPT

## 2019-03-28 PROCEDURE — 96368 THER/DIAG CONCURRENT INF: CPT

## 2019-03-28 PROCEDURE — 82375 ASSAY CARBOXYHB QUANT: CPT | Performed by: NURSE PRACTITIONER

## 2019-03-28 PROCEDURE — 25010000003 POTASSIUM CHLORIDE 10 MEQ/100ML SOLUTION: Performed by: NURSE PRACTITIONER

## 2019-03-28 PROCEDURE — 87040 BLOOD CULTURE FOR BACTERIA: CPT | Performed by: NURSE PRACTITIONER

## 2019-03-28 PROCEDURE — 96375 TX/PRO/DX INJ NEW DRUG ADDON: CPT

## 2019-03-28 PROCEDURE — 80053 COMPREHEN METABOLIC PANEL: CPT | Performed by: NURSE PRACTITIONER

## 2019-03-28 PROCEDURE — 36600 WITHDRAWAL OF ARTERIAL BLOOD: CPT | Performed by: NURSE PRACTITIONER

## 2019-03-28 RX ORDER — MAGNESIUM SULFATE HEPTAHYDRATE 40 MG/ML
2 INJECTION, SOLUTION INTRAVENOUS
Status: DISCONTINUED | OUTPATIENT
Start: 2019-03-28 | End: 2019-03-29 | Stop reason: HOSPADM

## 2019-03-28 RX ORDER — SODIUM CHLORIDE 0.9 % (FLUSH) 0.9 %
10 SYRINGE (ML) INJECTION AS NEEDED
Status: DISCONTINUED | OUTPATIENT
Start: 2019-03-28 | End: 2019-03-29 | Stop reason: HOSPADM

## 2019-03-28 RX ORDER — POTASSIUM CHLORIDE 7.45 MG/ML
10 INJECTION INTRAVENOUS
Status: DISCONTINUED | OUTPATIENT
Start: 2019-03-28 | End: 2019-03-29 | Stop reason: HOSPADM

## 2019-03-28 RX ORDER — LORAZEPAM 2 MG/ML
1 INJECTION INTRAMUSCULAR ONCE
Status: COMPLETED | OUTPATIENT
Start: 2019-03-28 | End: 2019-03-28

## 2019-03-28 RX ORDER — SODIUM CHLORIDE 9 MG/ML
125 INJECTION, SOLUTION INTRAVENOUS CONTINUOUS
Status: DISCONTINUED | OUTPATIENT
Start: 2019-03-28 | End: 2019-03-29 | Stop reason: HOSPADM

## 2019-03-28 RX ADMIN — MAGNESIUM SULFATE IN WATER 2 G: 40 INJECTION, SOLUTION INTRAVENOUS at 23:39

## 2019-03-28 RX ADMIN — MAGNESIUM SULFATE IN WATER 2 G: 40 INJECTION, SOLUTION INTRAVENOUS at 22:15

## 2019-03-28 RX ADMIN — LORAZEPAM 1 MG: 2 INJECTION INTRAMUSCULAR; INTRAVENOUS at 21:20

## 2019-03-28 RX ADMIN — SODIUM CHLORIDE 125 ML/HR: 9 INJECTION, SOLUTION INTRAVENOUS at 22:53

## 2019-03-28 RX ADMIN — CALCIUM CHLORIDE 1 G: 100 INJECTION, SOLUTION INTRAVENOUS at 22:57

## 2019-03-28 RX ADMIN — POTASSIUM CHLORIDE 10 MEQ: 10 INJECTION, SOLUTION INTRAVENOUS at 22:52

## 2019-03-28 RX ADMIN — THIAMINE HYDROCHLORIDE 1000 ML/HR: 100 INJECTION, SOLUTION INTRAMUSCULAR; INTRAVENOUS at 21:39

## 2019-03-29 VITALS
TEMPERATURE: 98 F | HEART RATE: 90 BPM | OXYGEN SATURATION: 98 % | DIASTOLIC BLOOD PRESSURE: 82 MMHG | WEIGHT: 209 LBS | SYSTOLIC BLOOD PRESSURE: 130 MMHG | HEIGHT: 75 IN | BODY MASS INDEX: 25.99 KG/M2 | RESPIRATION RATE: 24 BRPM

## 2019-03-29 LAB — HOLD SPECIMEN: NORMAL

## 2019-03-29 PROCEDURE — 96376 TX/PRO/DX INJ SAME DRUG ADON: CPT

## 2019-03-29 PROCEDURE — 96366 THER/PROPH/DIAG IV INF ADDON: CPT

## 2019-03-29 PROCEDURE — 25010000002 MAGNESIUM SULFATE 2 GM/50ML SOLUTION: Performed by: NURSE PRACTITIONER

## 2019-03-29 PROCEDURE — 25010000002 LORAZEPAM PER 2 MG: Performed by: EMERGENCY MEDICINE

## 2019-03-29 PROCEDURE — 25010000003 POTASSIUM CHLORIDE 10 MEQ/100ML SOLUTION: Performed by: NURSE PRACTITIONER

## 2019-03-29 RX ORDER — LORAZEPAM 2 MG/ML
1 INJECTION INTRAMUSCULAR ONCE
Status: COMPLETED | OUTPATIENT
Start: 2019-03-29 | End: 2019-03-29

## 2019-03-29 RX ADMIN — MAGNESIUM SULFATE IN WATER 2 G: 40 INJECTION, SOLUTION INTRAVENOUS at 00:54

## 2019-03-29 RX ADMIN — LORAZEPAM 1 MG: 2 INJECTION INTRAMUSCULAR; INTRAVENOUS at 01:00

## 2019-03-29 RX ADMIN — POTASSIUM CHLORIDE 10 MEQ: 10 INJECTION, SOLUTION INTRAVENOUS at 01:03

## 2019-03-29 RX ADMIN — POTASSIUM CHLORIDE 10 MEQ: 10 INJECTION, SOLUTION INTRAVENOUS at 00:16

## 2019-03-31 ENCOUNTER — HOSPITAL ENCOUNTER (EMERGENCY)
Facility: HOSPITAL | Age: 54
Discharge: HOME | End: 2019-04-01
Attending: EMERGENCY MEDICINE | Admitting: EMERGENCY MEDICINE

## 2019-03-31 ENCOUNTER — APPOINTMENT (OUTPATIENT)
Dept: GENERAL RADIOLOGY | Facility: HOSPITAL | Age: 54
End: 2019-03-31

## 2019-03-31 ENCOUNTER — APPOINTMENT (OUTPATIENT)
Dept: CT IMAGING | Facility: HOSPITAL | Age: 54
End: 2019-03-31

## 2019-03-31 DIAGNOSIS — E83.42 HYPOMAGNESEMIA: Primary | ICD-10-CM

## 2019-03-31 LAB
BASOPHILS # BLD AUTO: 0.04 10*3/MM3 (ref 0–0.2)
BASOPHILS NFR BLD AUTO: 0.5 % (ref 0–1.5)
DEPRECATED RDW RBC AUTO: 42.2 FL (ref 37–54)
EOSINOPHIL # BLD AUTO: 0.06 10*3/MM3 (ref 0–0.4)
EOSINOPHIL NFR BLD AUTO: 0.7 % (ref 0.3–6.2)
ERYTHROCYTE [DISTWIDTH] IN BLOOD BY AUTOMATED COUNT: 13.2 % (ref 12.3–15.4)
HCT VFR BLD AUTO: 37.9 % (ref 37.5–51)
HGB BLD-MCNC: 12.6 G/DL (ref 13–17.7)
IMM GRANULOCYTES # BLD AUTO: 0.01 10*3/MM3 (ref 0–0.05)
IMM GRANULOCYTES NFR BLD AUTO: 0.1 % (ref 0–0.5)
LYMPHOCYTES # BLD AUTO: 0.78 10*3/MM3 (ref 0.7–3.1)
LYMPHOCYTES NFR BLD AUTO: 9.2 % (ref 19.6–45.3)
MCH RBC QN AUTO: 29.6 PG (ref 26.6–33)
MCHC RBC AUTO-ENTMCNC: 33.2 G/DL (ref 31.5–35.7)
MCV RBC AUTO: 89.2 FL (ref 79–97)
MONOCYTES # BLD AUTO: 0.77 10*3/MM3 (ref 0.1–0.9)
MONOCYTES NFR BLD AUTO: 9.1 % (ref 5–12)
NEUTROPHILS # BLD AUTO: 6.84 10*3/MM3 (ref 1.4–7)
NEUTROPHILS NFR BLD AUTO: 80.4 % (ref 42.7–76)
PLATELET # BLD AUTO: 175 10*3/MM3 (ref 140–450)
PMV BLD AUTO: 10 FL (ref 6–12)
RBC # BLD AUTO: 4.25 10*6/MM3 (ref 4.14–5.8)
WBC NRBC COR # BLD: 8.5 10*3/MM3 (ref 3.4–10.8)

## 2019-03-31 PROCEDURE — 80307 DRUG TEST PRSMV CHEM ANLYZR: CPT | Performed by: PHYSICIAN ASSISTANT

## 2019-03-31 PROCEDURE — 96366 THER/PROPH/DIAG IV INF ADDON: CPT

## 2019-03-31 PROCEDURE — 99285 EMERGENCY DEPT VISIT HI MDM: CPT

## 2019-03-31 PROCEDURE — 85025 COMPLETE CBC W/AUTO DIFF WBC: CPT | Performed by: PHYSICIAN ASSISTANT

## 2019-03-31 PROCEDURE — 70450 CT HEAD/BRAIN W/O DYE: CPT | Performed by: RADIOLOGY

## 2019-03-31 PROCEDURE — 96365 THER/PROPH/DIAG IV INF INIT: CPT

## 2019-03-31 PROCEDURE — 83880 ASSAY OF NATRIURETIC PEPTIDE: CPT | Performed by: PHYSICIAN ASSISTANT

## 2019-03-31 PROCEDURE — 84484 ASSAY OF TROPONIN QUANT: CPT | Performed by: PHYSICIAN ASSISTANT

## 2019-03-31 PROCEDURE — 83735 ASSAY OF MAGNESIUM: CPT | Performed by: PHYSICIAN ASSISTANT

## 2019-03-31 PROCEDURE — 71045 X-RAY EXAM CHEST 1 VIEW: CPT | Performed by: RADIOLOGY

## 2019-03-31 PROCEDURE — 71045 X-RAY EXAM CHEST 1 VIEW: CPT

## 2019-03-31 PROCEDURE — 80053 COMPREHEN METABOLIC PANEL: CPT | Performed by: PHYSICIAN ASSISTANT

## 2019-03-31 PROCEDURE — 96375 TX/PRO/DX INJ NEW DRUG ADDON: CPT

## 2019-03-31 PROCEDURE — 93010 ELECTROCARDIOGRAM REPORT: CPT | Performed by: INTERNAL MEDICINE

## 2019-03-31 PROCEDURE — 70450 CT HEAD/BRAIN W/O DYE: CPT

## 2019-03-31 PROCEDURE — 93005 ELECTROCARDIOGRAM TRACING: CPT | Performed by: PHYSICIAN ASSISTANT

## 2019-03-31 PROCEDURE — 81003 URINALYSIS AUTO W/O SCOPE: CPT | Performed by: PHYSICIAN ASSISTANT

## 2019-03-31 RX ORDER — SODIUM CHLORIDE 0.9 % (FLUSH) 0.9 %
10 SYRINGE (ML) INJECTION AS NEEDED
Status: DISCONTINUED | OUTPATIENT
Start: 2019-03-31 | End: 2019-04-01 | Stop reason: HOSPADM

## 2019-04-01 VITALS
DIASTOLIC BLOOD PRESSURE: 91 MMHG | TEMPERATURE: 98.4 F | WEIGHT: 178.38 LBS | RESPIRATION RATE: 20 BRPM | BODY MASS INDEX: 22.89 KG/M2 | OXYGEN SATURATION: 96 % | SYSTOLIC BLOOD PRESSURE: 124 MMHG | HEART RATE: 105 BPM | HEIGHT: 74 IN

## 2019-04-01 LAB
6-ACETYL MORPHINE: NEGATIVE
ALBUMIN SERPL-MCNC: 4.26 G/DL (ref 3.5–5.2)
ALBUMIN/GLOB SERPL: 1.6 G/DL
ALP SERPL-CCNC: 36 U/L (ref 39–117)
ALT SERPL W P-5'-P-CCNC: 11 U/L (ref 1–41)
AMPHET+METHAMPHET UR QL: NEGATIVE
ANION GAP SERPL CALCULATED.3IONS-SCNC: 16.4 MMOL/L
AST SERPL-CCNC: 17 U/L (ref 1–40)
BARBITURATES UR QL SCN: NEGATIVE
BENZODIAZ UR QL SCN: NEGATIVE
BILIRUB SERPL-MCNC: 0.5 MG/DL (ref 0.2–1.2)
BILIRUB UR QL STRIP: NEGATIVE
BUN BLD-MCNC: 11 MG/DL (ref 6–20)
BUN/CREAT SERPL: 11.3 (ref 7–25)
BUPRENORPHINE SERPL-MCNC: NEGATIVE NG/ML
CALCIUM SPEC-SCNC: 9.4 MG/DL (ref 8.6–10.5)
CANNABINOIDS SERPL QL: NEGATIVE
CHLORIDE SERPL-SCNC: 103 MMOL/L (ref 98–107)
CLARITY UR: CLEAR
CO2 SERPL-SCNC: 19.6 MMOL/L (ref 22–29)
COCAINE UR QL: NEGATIVE
COLOR UR: YELLOW
CREAT BLD-MCNC: 0.97 MG/DL (ref 0.76–1.27)
ETHANOL BLD-MCNC: 19 MG/DL (ref 0–10)
ETHANOL UR QL: 0.02 %
GFR SERPL CREATININE-BSD FRML MDRD: 80 ML/MIN/1.73
GLOBULIN UR ELPH-MCNC: 2.6 GM/DL
GLUCOSE BLD-MCNC: 99 MG/DL (ref 65–99)
GLUCOSE UR STRIP-MCNC: NEGATIVE MG/DL
HGB UR QL STRIP.AUTO: NEGATIVE
KETONES UR QL STRIP: NEGATIVE
LEUKOCYTE ESTERASE UR QL STRIP.AUTO: NEGATIVE
MAGNESIUM SERPL-MCNC: 1.3 MG/DL (ref 1.6–2.6)
METHADONE UR QL SCN: NEGATIVE
NITRITE UR QL STRIP: NEGATIVE
NT-PROBNP SERPL-MCNC: 769.9 PG/ML (ref 5–900)
OPIATES UR QL: NEGATIVE
OXYCODONE UR QL SCN: NEGATIVE
PCP UR QL SCN: NEGATIVE
PH UR STRIP.AUTO: 5.5 [PH] (ref 5–8)
POTASSIUM BLD-SCNC: 3.6 MMOL/L (ref 3.5–5.2)
PROT SERPL-MCNC: 6.9 G/DL (ref 6–8.5)
PROT UR QL STRIP: NEGATIVE
SODIUM BLD-SCNC: 139 MMOL/L (ref 136–145)
SP GR UR STRIP: 1.01 (ref 1–1.03)
TROPONIN T SERPL-MCNC: <0.01 NG/ML (ref 0–0.03)
UROBILINOGEN UR QL STRIP: NORMAL

## 2019-04-01 PROCEDURE — 96366 THER/PROPH/DIAG IV INF ADDON: CPT

## 2019-04-01 PROCEDURE — 96365 THER/PROPH/DIAG IV INF INIT: CPT

## 2019-04-01 PROCEDURE — 96375 TX/PRO/DX INJ NEW DRUG ADDON: CPT

## 2019-04-01 PROCEDURE — 25010000002 MAGNESIUM SULFATE 2 GM/50ML SOLUTION: Performed by: PHYSICIAN ASSISTANT

## 2019-04-01 PROCEDURE — 25010000002 LORAZEPAM PER 2 MG: Performed by: EMERGENCY MEDICINE

## 2019-04-01 RX ORDER — ACETAMINOPHEN 500 MG
1000 TABLET ORAL ONCE
Status: COMPLETED | OUTPATIENT
Start: 2019-04-01 | End: 2019-04-01

## 2019-04-01 RX ORDER — LORAZEPAM 2 MG/ML
1 INJECTION INTRAMUSCULAR ONCE
Status: COMPLETED | OUTPATIENT
Start: 2019-04-01 | End: 2019-04-01

## 2019-04-01 RX ORDER — MAGNESIUM SULFATE HEPTAHYDRATE 40 MG/ML
2 INJECTION, SOLUTION INTRAVENOUS ONCE
Status: COMPLETED | OUTPATIENT
Start: 2019-04-01 | End: 2019-04-01

## 2019-04-01 RX ADMIN — LORAZEPAM 1 MG: 2 INJECTION INTRAMUSCULAR; INTRAVENOUS at 02:50

## 2019-04-01 RX ADMIN — MAGNESIUM SULFATE IN WATER 2 G: 40 INJECTION, SOLUTION INTRAVENOUS at 01:00

## 2019-04-01 RX ADMIN — ACETAMINOPHEN 1000 MG: 500 TABLET ORAL at 01:20

## 2019-04-01 NOTE — NURSING NOTE
"Pt presents to Er complaining of dizziness,  During medical assessment pt inquired about possible detox.  Intake assessment complete at this time. Pt poor historian, stating different amount of alcohol usage. Pt states at one point \" I use 1/5 alcohol every other day for 45 years.\"  No objective sign  No objective or reported signs of SI or acute distress, or self harm.  Last inpatient admission 01/2016 for detox. Pt guarded with answers. Ciwa 2.  Intake assessment completed at this time. Pt agreeable.  Pt remains in room 15.  Room swept for safety.    "

## 2019-04-01 NOTE — ED NOTES
Faxed a request to Release Medical records to [phone] [facility]. Per Mary Lopes.     Janel Enciso  03/31/19 9695

## 2019-04-01 NOTE — ED NOTES
Pt discharged to vehicle at this time with . SENIA at the time of discharge. Pt skin PWD, AAOX4, no respiratory distress noted. Pt had no further questions.       Kristin Moulton, RN  04/01/19 0611

## 2019-04-01 NOTE — NURSING NOTE
Spoke with  presenting pt clinicals.  MD advised pt does not meet criteria for admission.  Advised to offer output counseling for substance use. Pt declined.  Advised if change of condition or worsening of symptoms return to ed and/or seek outpt services.

## 2019-04-01 NOTE — ED NOTES
Report received from Rosy Montgomery RN at this time.   Pt resting quietly on stretcher with eyes closed.  No resp distress noted, respirations even and unlabored.  Pt denies any needs at this time.  Skin PWD.   Will continue to monitor and follow plan of care.  Bed rails up x2, bed in lowest position, call light in reach.           Kristin Moulton, RN  04/01/19 0538

## 2019-04-01 NOTE — ED PROVIDER NOTES
"Subjective   55 y/o male presents to the ED with complaints of dizziness. Patient states that he has was discharged from [facility] today.  States that he went home and drank a beer and now is feeling dizzy again. Patient states \" I feel like I am going to fall forward.\" He denies any syncopal episodes. Pt was recently admitted to [facility] for DTs and hypomagnesium (less than 0.03).  Patient denies any fevers, cough, congestion, N/V/D, chest pain, or SOB.         History provided by:  Patient   used: No        Review of Systems   Constitutional: Negative.    HENT: Negative.    Eyes: Negative.    Respiratory: Negative.    Cardiovascular: Negative.    Gastrointestinal: Negative.    Endocrine: Negative.    Genitourinary: Negative.    Musculoskeletal: Negative.    Skin: Negative.    Allergic/Immunologic: Negative.    Neurological: Positive for dizziness.   Hematological: Negative.    Psychiatric/Behavioral: Negative.    All other systems reviewed and are negative.      Past Medical History:   Diagnosis Date   • Anxiety disorder    • CAD (coronary artery disease)    • Chronic alcoholism (CMS/[facility])    • Hyperlipidemia    • Hypertension    • WPW (Zakiya-Parkinson-White syndrome)        Allergies   Allergen Reactions   • Niacin Rash     Whelps       Past Surgical History:   Procedure Laterality Date   • CARDIAC CATHETERIZATION  06/06/2015   • CARDIOVASCULAR STRESS TEST  06/06/2015   • ECHO - CONVERTED  06/06/2015   • EYE SURGERY         Family History   Problem Relation Age of Onset   • Heart attack Father    • Heart disease Father    • Heart failure Father    • Heart disease Sister    • Heart failure Sister    • Heart disease Brother    • Heart failure Brother    • Heart failure Sister    • Heart disease Sister        Social History     Socioeconomic History   • Marital status:      Spouse name: Not on file   • Number of children: Not on file   • Years of education: Not on file   • Highest " education level: Not on file   Tobacco Use   • Smoking status: Former Smoker     Packs/day: 1.00     Years: 20.00     Pack years: 20.00     Types: Cigarettes     Last attempt to quit: 2003     Years since quittin.2   • Smokeless tobacco: Current User     Types: Chew   • Tobacco comment: chew 1 can per week   Substance and Sexual Activity   • Alcohol use: Yes     Alcohol/week: 6.0 oz     Types: 6 Cans of beer, 4 Shots of liquor per week   • Drug use: Yes     Types: Hydrocodone     Comment: occ   • Sexual activity: Defer           Objective   Physical Exam   Constitutional: He is oriented to person, place, and time. He appears well-developed and well-nourished.   HENT:   Head: Normocephalic and atraumatic.   Right Ear: External ear normal.   Left Ear: External ear normal.   Nose: Nose normal.   Mouth/Throat: Oropharynx is clear and moist.   Eyes: Conjunctivae and EOM are normal. Pupils are equal, round, and reactive to light.   Neck: Normal range of motion. Neck supple.   Cardiovascular: Normal rate, regular rhythm, normal heart sounds and intact distal pulses.   Pulmonary/Chest: Effort normal and breath sounds normal.   Abdominal: Soft. Bowel sounds are normal.   Musculoskeletal: Normal range of motion.   Neurological: He is alert and oriented to person, place, and time.   Skin: Skin is warm and dry.   Psychiatric: He has a normal mood and affect. His speech is normal and behavior is normal. Judgment and thought content normal. He is not actively hallucinating. Cognition and memory are normal. He is attentive.   Nursing note and vitals reviewed.      Procedures           ED Course  ED Course as of    0156 Mild age appropriate atrophy and chronic microvascular change per [facility]  CT Head Without Contrast [ML]   0204 Negative for Dr. Mead XR Chest 1 View [ML]   0204 D/W Dr. Mead - recommends magnesium and DC home to f/u with PCP.   [ML]   0207 [id number]- Reviewed by   Alyce, rate 97, NSR, no ischemia  ECG 12 Lead [ML]   0300 Agreeable to being evaluated by psychiatry intake in the ER however when intake explaining the detox program to him he was not agreeable with that.  Patient said that he was not staining if he had to follow rules.  Patient requesting discharge home.  States that he is not interested in quitting drinking.  Magnesium replenished while in the emergency room.  [ML]   0648 Family member at bedside to sign patient out as he was given Ativan while in the ER.  Family member voices understanding of risk.  Discussed symptoms that would warrant return to the ER.  [ML]      ED Course User Index  [ML] Mary Lopes PA                  [facility]      Final diagnoses:   Hypomagnesemia            Mary Lopes PA  04/02/19 0411

## 2019-04-02 LAB
BACTERIA SPEC AEROBE CULT: NORMAL
BACTERIA SPEC AEROBE CULT: NORMAL

## 2019-04-10 ENCOUNTER — OFFICE VISIT (OUTPATIENT)
Dept: CARDIOLOGY | Facility: CLINIC | Age: 54
End: 2019-04-10

## 2019-04-10 VITALS
OXYGEN SATURATION: 98 % | BODY MASS INDEX: 24.27 KG/M2 | WEIGHT: 189 LBS | SYSTOLIC BLOOD PRESSURE: 118 MMHG | HEART RATE: 78 BPM | DIASTOLIC BLOOD PRESSURE: 82 MMHG

## 2019-04-10 DIAGNOSIS — E83.51 HYPOCALCEMIA: ICD-10-CM

## 2019-04-10 DIAGNOSIS — I10 ESSENTIAL HYPERTENSION: ICD-10-CM

## 2019-04-10 DIAGNOSIS — I25.10 CORONARY ARTERY DISEASE INVOLVING NATIVE CORONARY ARTERY OF NATIVE HEART WITHOUT ANGINA PECTORIS: Primary | ICD-10-CM

## 2019-04-10 DIAGNOSIS — E78.1 HYPERLIPIDEMIA TYPE IV: ICD-10-CM

## 2019-04-10 DIAGNOSIS — E83.42 HYPOMAGNESEMIA: ICD-10-CM

## 2019-04-10 PROCEDURE — 99214 OFFICE O/P EST MOD 30 MIN: CPT | Performed by: INTERNAL MEDICINE

## 2019-04-10 RX ORDER — LOSARTAN POTASSIUM 100 MG/1
50 TABLET ORAL DAILY
Qty: 90 TABLET | Refills: 0 | Status: SHIPPED | OUTPATIENT
Start: 2019-04-10 | End: 2019-08-07 | Stop reason: SDUPTHER

## 2019-04-10 RX ORDER — AMLODIPINE BESYLATE 5 MG/1
5 TABLET ORAL DAILY
Qty: 90 TABLET | Refills: 1 | Status: ON HOLD | OUTPATIENT
Start: 2019-04-10 | End: 2019-08-01

## 2019-04-10 RX ORDER — CLONAZEPAM 0.5 MG/1
0.5 TABLET ORAL 2 TIMES DAILY
Qty: 60 TABLET | Refills: 2 | Status: CANCELLED | OUTPATIENT
Start: 2019-04-10

## 2019-04-10 NOTE — PROGRESS NOTES
subjective     Chief Complaint   Patient presents with   • Follow-up     [facility]    • Coronary Artery Disease     History of Present Illness  Patient is 54 years old white male who was at [facility].  Patient was admitted there with acute DTs and alcohol withdrawal.  Patient denied heavy drinking patient has severe hypomagnesemia and hypokalemia  Patient received IV thiamine folic acid.  He was admitted on March 29, 2019 and discharged on March 31, 2019.  It was felt the patient's severe hypomagnesemia was due to alcoholism.    Patient has known coronary artery disease with 90% proximal RCA total occlusion of posterior lateral branch of RCA total occlusion of second marginal with left to left collateral.  He also has 40% LAD and 40% left main disease.  It was felt the patient has severe multivessel disease and should be considered for coronary intervention with surgical backup however patient has been stable and has been pain-free.  Patient is noncompliant and was last seen by me in October.    Blood pressure is very well controlled.  Patient is taking Norvasc 5 mg daily, losartan 50 mg daily and Lopressor 100 twice daily.  Along with dual antiplatelet therapy he denies any cardiac symptoms    Patient also has hyperlipidemia and is taking Lipitor 40 mg and TriCor 145 mg daily.  No drug side effects      Past Surgical History:   Procedure Laterality Date   • CARDIAC CATHETERIZATION  06/06/2015   • CARDIOVASCULAR STRESS TEST  06/06/2015   • ECHO - CONVERTED  06/06/2015   • EYE SURGERY       Family History   Problem Relation Age of Onset   • Heart attack Father    • Heart disease Father    • Heart failure Father    • Heart disease Sister    • Heart failure Sister    • Heart disease Brother    • Heart failure Brother    • Heart failure Sister    • Heart disease Sister      Past Medical History:   Diagnosis Date   • Anxiety disorder    • CAD (coronary artery disease)    • Chronic alcoholism  (CMS/[facility])    • Hyperlipidemia    • Hypertension    • WPW (Zakiya-Parkinson-White syndrome)      Patient Active Problem List   Diagnosis   • CAD (coronary artery disease), 90% proximal RCA, total occlusion of posterior lateral branch of RCA, total occlusion of second marginal with left to left collaterals, 40% LAD, 40% left main   • Anxiety disorder   • Chronic alcoholism (CMS/[facility])   • Essential hypertension   • Hyperlipidemia type IV,uncontrolled   • Gastroesophageal reflux disease without esophagitis   • Encounter for screening colonoscopy   • Sorethroat   • Hypokalemia   • Hypomagnesemia   • Hypocalcemia       Social History     Tobacco Use   • Smoking status: Former Smoker     Packs/day: 1.00     Years: 20.00     Pack years: 20.00     Types: Cigarettes     Last attempt to quit:      Years since quittin.2   • Smokeless tobacco: Current User     Types: Chew   • Tobacco comment: chew 1 can per week   Substance Use Topics   • Alcohol use: Yes     Alcohol/week: 6.0 oz     Types: 6 Cans of beer, 4 Shots of liquor per week   • Drug use: Yes     Types: Hydrocodone     Comment: occ       Allergies   Allergen Reactions   • Niacin Rash     Whelps       Current Outpatient Medications on File Prior to Visit   Medication Sig   • aspirin 81 MG EC tablet Take 1 tablet by mouth Daily.   • atorvastatin (LIPITOR) 40 MG tablet TAKE ONE TABLET BY MOUTH ONE TIME DAILY   • clopidogrel (PLAVIX) 75 MG tablet TAKE ONE TABLET BY MOUTH ONE TIME DAILY   • fenofibrate (TRICOR) 145 MG tablet TAKE ONE TABLET BY MOUTH ONE TIME DAILY   • metoprolol tartrate (LOPRESSOR) 100 MG tablet TAKE ONE TABLET BY MOUTH EVERY TWELVE HOURS   • pantoprazole (PROTONIX) 40 MG EC tablet Take 1 tablet by mouth Daily.   • potassium chloride (K-DUR) 10 MEQ CR tablet TAKE ONE TABLET BY MOUTH ONE TIME DAILY   • [DISCONTINUED] amLODIPine (NORVASC) 10 MG tablet Take 0.5 tablets by mouth Daily. FOR BLOOD PRESSURE   • [DISCONTINUED] clonazePAM (KlonoPIN) 0.5 MG  tablet Take 1 tablet by mouth 2 (Two) Times a Day.   • [DISCONTINUED] hydrochlorothiazide (MICROZIDE) 12.5 MG capsule TAKE ONE CAPSULE BY MOUTH IN THE MORNING   • [DISCONTINUED] losartan (COZAAR) 100 MG tablet TAKE ONE TABLET BY MOUTH EVERY DAY FOR BLOOD PRESSURE   • [DISCONTINUED] Magnesium Oxide 200 MG tablet Take 1 tablet by mouth 4 (Four) Times a Day.   • [DISCONTINUED] ondansetron ODT (ZOFRAN-ODT) 4 MG disintegrating tablet Take 1 tablet by mouth Every 6 (Six) Hours As Needed for Nausea or Vomiting.     No current facility-administered medications on file prior to visit.          The following portions of the patient's history were reviewed and updated as appropriate: allergies, current medications, past family history, past medical history, past social history, past surgical history and problem list.    Review of Systems   Constitution: Negative.   HENT: Negative.  Negative for congestion.    Eyes: Negative.    Cardiovascular: Negative.  Negative for chest pain, cyanosis, dyspnea on exertion, irregular heartbeat, leg swelling, near-syncope, orthopnea, palpitations, paroxysmal nocturnal dyspnea and syncope.   Respiratory: Negative.  Negative for shortness of breath.    Hematologic/Lymphatic: Negative.    Musculoskeletal: Negative.    Gastrointestinal: Negative.    Neurological: Negative.  Negative for headaches.          Objective:     /82 (BP Location: Left arm, Patient Position: Sitting)   Pulse 78   Wt 85.7 kg (189 lb)   SpO2 98%   BMI 24.27 kg/m²   Physical Exam   Constitutional: He appears well-developed and well-nourished. No distress.   HENT:   Head: Normocephalic and atraumatic.   Mouth/Throat: Oropharynx is clear and moist. No oropharyngeal exudate.   Eyes: Conjunctivae and EOM are normal. Pupils are equal, round, and reactive to light. No scleral icterus.   Neck: Normal range of motion. Neck supple. No JVD present. No tracheal deviation present. No thyromegaly present.   Cardiovascular:  Normal rate, regular rhythm, normal heart sounds and intact distal pulses. PMI is not displaced. Exam reveals no gallop, no friction rub and no decreased pulses.   No murmur heard.  Pulses:       Carotid pulses are 3+ on the right side, and 3+ on the left side.       Radial pulses are 3+ on the right side, and 3+ on the left side.   Pulmonary/Chest: Effort normal and breath sounds normal. No respiratory distress. He has no wheezes. He has no rales. He exhibits no tenderness.   Abdominal: Soft. Bowel sounds are normal. He exhibits no distension, no abdominal bruit and no mass. There is no splenomegaly or hepatomegaly. There is no tenderness. There is no rebound and no guarding.   Musculoskeletal: Normal range of motion. He exhibits no edema, tenderness or deformity.   Lymphadenopathy:     He has no cervical adenopathy.   Neurological: He is alert. He has normal reflexes. No cranial nerve deficit. He exhibits normal muscle tone. Coordination normal.   Skin: Skin is warm and dry. No rash noted. He is not diaphoretic. No erythema.   Psychiatric: He has a normal mood and affect. His behavior is normal. Judgment and thought content normal.         Lab Review  Lab Results   Component Value Date     03/31/2019    K 3.6 03/31/2019     03/31/2019    BUN 11 03/31/2019    CREATININE 0.97 03/31/2019    GLUCOSE 99 03/31/2019    CALCIUM 9.4 03/31/2019    ALT 11 03/31/2019    ALKPHOS 36 (L) 03/31/2019    LABIL2 1.7 05/04/2016     Lab Results   Component Value Date    CKTOTAL 38 07/13/2018     Lab Results   Component Value Date    WBC 8.50 03/31/2019    HGB 12.6 (L) 03/31/2019    HCT 37.9 03/31/2019     03/31/2019     Lab Results   Component Value Date    INR 1.24 (H) 05/30/2018    INR 0.99 06/05/2015     Lab Results   Component Value Date    MG 1.3 (L) 03/31/2019     Lab Results   Component Value Date    PSA 0.300 03/20/2018    TSH 1.009 06/05/2015     No results found for: BNP  Lab Results   Component Value Date     CHLPL 182 05/04/2016    CHOL 174 07/13/2018    TRIG 163 (H) 07/13/2018    HDL 53 (L) 07/13/2018    VLDL 32.6 07/13/2018    LDLHDL 1.67 07/13/2018     Lab Results   Component Value Date    LDL 88 07/13/2018    LDL 97 03/20/2018                 Procedures       I personally viewed and interpreted the patient's LAB data         Assessment:     1. Coronary artery disease involving native coronary artery of native heart without angina pectoris    2. Hyperlipidemia type IV,uncontrolled    3. Hypocalcemia    4. Hypomagnesemia    5. Essential hypertension          Plan:     Patient has known coronary artery disease as described.  His multivessel severe coronary artery disease.  Patient is quite stable and is asymptomatic.  Aggressive risk factor modification again was emphasized.  Patient should be considered for coronary intervention but he feels that he is stable and is totally asymptomatic.    Patient has hyperlipidemia and is taking Lipitor and TriCor however he has not had lipid panel checked importance of that was emphasized because of side effects of statin therapy also    Blood pressure is very well controlled.  Patient has chronic alcoholism and gets recurrent hypokalemia and hypomagnesemia.  Currently lab work is normal for magnesium and potassium.  Patient states that he has not taken any nerve pill in over 3 months.  But he wants refills of Klonopin.  That was denied.  Patient has history of chronic alcoholism and should need psychiatric consult for anxiety.  Patient refused psychiatric consult.  Lab work was recommended and scheduled.      No Follow-up on file.

## 2019-05-08 RX ORDER — POTASSIUM CHLORIDE 750 MG/1
TABLET, FILM COATED, EXTENDED RELEASE ORAL
Qty: 30 TABLET | Refills: 0 | Status: SHIPPED | OUTPATIENT
Start: 2019-05-08 | End: 2019-06-06 | Stop reason: SDUPTHER

## 2019-05-08 RX ORDER — ATORVASTATIN CALCIUM 40 MG/1
TABLET, FILM COATED ORAL
Qty: 30 TABLET | Refills: 0 | Status: SHIPPED | OUTPATIENT
Start: 2019-05-08 | End: 2019-06-06 | Stop reason: SDUPTHER

## 2019-05-08 RX ORDER — FENOFIBRATE 145 MG/1
TABLET, COATED ORAL
Qty: 30 TABLET | Refills: 0 | Status: SHIPPED | OUTPATIENT
Start: 2019-05-08 | End: 2019-06-06 | Stop reason: SDUPTHER

## 2019-05-08 RX ORDER — METOPROLOL TARTRATE 100 MG/1
TABLET ORAL
Qty: 60 TABLET | Refills: 0 | Status: SHIPPED | OUTPATIENT
Start: 2019-05-08 | End: 2019-06-06 | Stop reason: SDUPTHER

## 2019-06-06 RX ORDER — FENOFIBRATE 145 MG/1
TABLET, COATED ORAL
Qty: 30 TABLET | Refills: 0 | Status: SHIPPED | OUTPATIENT
Start: 2019-06-06 | End: 2019-07-08 | Stop reason: SDUPTHER

## 2019-06-06 RX ORDER — METOPROLOL TARTRATE 100 MG/1
TABLET ORAL
Qty: 60 TABLET | Refills: 0 | Status: SHIPPED | OUTPATIENT
Start: 2019-06-06 | End: 2019-07-08 | Stop reason: SDUPTHER

## 2019-06-06 RX ORDER — POTASSIUM CHLORIDE 750 MG/1
TABLET, FILM COATED, EXTENDED RELEASE ORAL
Qty: 30 TABLET | Refills: 0 | Status: SHIPPED | OUTPATIENT
Start: 2019-06-06 | End: 2019-07-08 | Stop reason: SDUPTHER

## 2019-06-06 RX ORDER — ATORVASTATIN CALCIUM 40 MG/1
TABLET, FILM COATED ORAL
Qty: 30 TABLET | Refills: 0 | Status: SHIPPED | OUTPATIENT
Start: 2019-06-06 | End: 2019-07-08 | Stop reason: SDUPTHER

## 2019-06-26 ENCOUNTER — HOSPITAL ENCOUNTER (EMERGENCY)
Facility: HOSPITAL | Age: 54
Discharge: LEFT BEFORE DISCHARGE | End: 2019-06-27
Attending: EMERGENCY MEDICINE | Admitting: EMERGENCY MEDICINE

## 2019-06-26 ENCOUNTER — APPOINTMENT (OUTPATIENT)
Dept: GENERAL RADIOLOGY | Facility: HOSPITAL | Age: 54
End: 2019-06-26

## 2019-06-26 DIAGNOSIS — F10.20 ALCOHOLISM (HCC): ICD-10-CM

## 2019-06-26 DIAGNOSIS — R07.9 CHEST PAIN, UNSPECIFIED TYPE: Primary | ICD-10-CM

## 2019-06-26 LAB
ALBUMIN SERPL-MCNC: 4.44 G/DL (ref 3.5–5.2)
ALBUMIN/GLOB SERPL: 1.8 G/DL
ALP SERPL-CCNC: 28 U/L (ref 39–117)
ALT SERPL W P-5'-P-CCNC: 11 U/L (ref 1–41)
ANION GAP SERPL CALCULATED.3IONS-SCNC: 16.5 MMOL/L (ref 5–15)
AST SERPL-CCNC: 16 U/L (ref 1–40)
BASOPHILS # BLD AUTO: 0.07 10*3/MM3 (ref 0–0.2)
BASOPHILS NFR BLD AUTO: 1 % (ref 0–1.5)
BILIRUB SERPL-MCNC: 0.3 MG/DL (ref 0.2–1.2)
BUN BLD-MCNC: 10 MG/DL (ref 6–20)
BUN/CREAT SERPL: 10.4 (ref 7–25)
CALCIUM SPEC-SCNC: 8.9 MG/DL (ref 8.6–10.5)
CHLORIDE SERPL-SCNC: 100 MMOL/L (ref 98–107)
CO2 SERPL-SCNC: 27.5 MMOL/L (ref 22–29)
CREAT BLD-MCNC: 0.96 MG/DL (ref 0.76–1.27)
DEPRECATED RDW RBC AUTO: 45.5 FL (ref 37–54)
EOSINOPHIL # BLD AUTO: 0.15 10*3/MM3 (ref 0–0.4)
EOSINOPHIL NFR BLD AUTO: 2.2 % (ref 0.3–6.2)
ERYTHROCYTE [DISTWIDTH] IN BLOOD BY AUTOMATED COUNT: 14 % (ref 12.3–15.4)
ETHANOL BLD-MCNC: 14 MG/DL (ref 0–10)
ETHANOL UR QL: 0.01 %
GFR SERPL CREATININE-BSD FRML MDRD: 81 ML/MIN/1.73
GLOBULIN UR ELPH-MCNC: 2.5 GM/DL
GLUCOSE BLD-MCNC: 102 MG/DL (ref 65–99)
HCT VFR BLD AUTO: 41.3 % (ref 37.5–51)
HGB BLD-MCNC: 13.2 G/DL (ref 13–17.7)
HOLD SPECIMEN: NORMAL
HOLD SPECIMEN: NORMAL
IMM GRANULOCYTES # BLD AUTO: 0.02 10*3/MM3 (ref 0–0.05)
IMM GRANULOCYTES NFR BLD AUTO: 0.3 % (ref 0–0.5)
LYMPHOCYTES # BLD AUTO: 2.24 10*3/MM3 (ref 0.7–3.1)
LYMPHOCYTES NFR BLD AUTO: 32.4 % (ref 19.6–45.3)
MCH RBC QN AUTO: 28.9 PG (ref 26.6–33)
MCHC RBC AUTO-ENTMCNC: 32 G/DL (ref 31.5–35.7)
MCV RBC AUTO: 90.4 FL (ref 79–97)
MONOCYTES # BLD AUTO: 0.83 10*3/MM3 (ref 0.1–0.9)
MONOCYTES NFR BLD AUTO: 12 % (ref 5–12)
NEUTROPHILS # BLD AUTO: 3.61 10*3/MM3 (ref 1.7–7)
NEUTROPHILS NFR BLD AUTO: 52.1 % (ref 42.7–76)
PLATELET # BLD AUTO: 246 10*3/MM3 (ref 140–450)
PMV BLD AUTO: 10.4 FL (ref 6–12)
POTASSIUM BLD-SCNC: 3.1 MMOL/L (ref 3.5–5.2)
PROT SERPL-MCNC: 6.9 G/DL (ref 6–8.5)
RBC # BLD AUTO: 4.57 10*6/MM3 (ref 4.14–5.8)
SODIUM BLD-SCNC: 144 MMOL/L (ref 136–145)
TROPONIN T SERPL-MCNC: <0.01 NG/ML (ref 0–0.03)
WBC NRBC COR # BLD: 6.92 10*3/MM3 (ref 3.4–10.8)
WHOLE BLOOD HOLD SPECIMEN: NORMAL
WHOLE BLOOD HOLD SPECIMEN: NORMAL

## 2019-06-26 PROCEDURE — 80307 DRUG TEST PRSMV CHEM ANLYZR: CPT | Performed by: EMERGENCY MEDICINE

## 2019-06-26 PROCEDURE — 36415 COLL VENOUS BLD VENIPUNCTURE: CPT

## 2019-06-26 PROCEDURE — 99285 EMERGENCY DEPT VISIT HI MDM: CPT

## 2019-06-26 PROCEDURE — 71046 X-RAY EXAM CHEST 2 VIEWS: CPT | Performed by: RADIOLOGY

## 2019-06-26 PROCEDURE — 71046 X-RAY EXAM CHEST 2 VIEWS: CPT

## 2019-06-26 PROCEDURE — 93005 ELECTROCARDIOGRAM TRACING: CPT | Performed by: EMERGENCY MEDICINE

## 2019-06-26 PROCEDURE — 84484 ASSAY OF TROPONIN QUANT: CPT | Performed by: EMERGENCY MEDICINE

## 2019-06-26 PROCEDURE — 93010 ELECTROCARDIOGRAM REPORT: CPT | Performed by: INTERNAL MEDICINE

## 2019-06-26 PROCEDURE — 85025 COMPLETE CBC W/AUTO DIFF WBC: CPT | Performed by: EMERGENCY MEDICINE

## 2019-06-26 PROCEDURE — 80053 COMPREHEN METABOLIC PANEL: CPT | Performed by: EMERGENCY MEDICINE

## 2019-06-26 RX ORDER — ASPIRIN 325 MG
325 TABLET ORAL ONCE
Status: COMPLETED | OUTPATIENT
Start: 2019-06-26 | End: 2019-06-26

## 2019-06-26 RX ORDER — SODIUM CHLORIDE 0.9 % (FLUSH) 0.9 %
10 SYRINGE (ML) INJECTION AS NEEDED
Status: DISCONTINUED | OUTPATIENT
Start: 2019-06-26 | End: 2019-06-27 | Stop reason: HOSPADM

## 2019-06-26 RX ADMIN — ASPIRIN 325 MG: 325 TABLET ORAL at 22:40

## 2019-06-27 VITALS
BODY MASS INDEX: 23.62 KG/M2 | DIASTOLIC BLOOD PRESSURE: 94 MMHG | HEIGHT: 75 IN | WEIGHT: 190 LBS | OXYGEN SATURATION: 99 % | SYSTOLIC BLOOD PRESSURE: 144 MMHG | RESPIRATION RATE: 18 BRPM | TEMPERATURE: 98.2 F | HEART RATE: 59 BPM

## 2019-06-27 LAB — TROPONIN T SERPL-MCNC: <0.01 NG/ML (ref 0–0.03)

## 2019-06-27 PROCEDURE — 84484 ASSAY OF TROPONIN QUANT: CPT | Performed by: EMERGENCY MEDICINE

## 2019-06-27 RX ORDER — POTASSIUM CHLORIDE 20 MEQ/1
40 TABLET, EXTENDED RELEASE ORAL ONCE
Status: COMPLETED | OUTPATIENT
Start: 2019-06-27 | End: 2019-06-27

## 2019-06-27 RX ADMIN — POTASSIUM CHLORIDE 40 MEQ: 1500 TABLET, EXTENDED RELEASE ORAL at 02:01

## 2019-07-02 ENCOUNTER — TRANSCRIBE ORDERS (OUTPATIENT)
Dept: ADMINISTRATIVE | Facility: HOSPITAL | Age: 54
End: 2019-07-02

## 2019-07-02 DIAGNOSIS — R07.9 CHEST PAIN, UNSPECIFIED TYPE: Primary | ICD-10-CM

## 2019-07-08 RX ORDER — POTASSIUM CHLORIDE 750 MG/1
TABLET, FILM COATED, EXTENDED RELEASE ORAL
Qty: 30 TABLET | Refills: 0 | Status: SHIPPED | OUTPATIENT
Start: 2019-07-08 | End: 2019-08-07 | Stop reason: SDUPTHER

## 2019-07-08 RX ORDER — METOPROLOL TARTRATE 100 MG/1
TABLET ORAL
Qty: 60 TABLET | Refills: 0 | Status: SHIPPED | OUTPATIENT
Start: 2019-07-08 | End: 2019-08-07 | Stop reason: SDUPTHER

## 2019-07-08 RX ORDER — ATORVASTATIN CALCIUM 40 MG/1
TABLET, FILM COATED ORAL
Qty: 30 TABLET | Refills: 0 | Status: SHIPPED | OUTPATIENT
Start: 2019-07-08 | End: 2019-08-07 | Stop reason: SDUPTHER

## 2019-07-08 RX ORDER — FENOFIBRATE 145 MG/1
TABLET, COATED ORAL
Qty: 30 TABLET | Refills: 0 | Status: SHIPPED | OUTPATIENT
Start: 2019-07-08 | End: 2019-08-07 | Stop reason: SDUPTHER

## 2019-08-01 ENCOUNTER — APPOINTMENT (OUTPATIENT)
Dept: CARDIOLOGY | Facility: HOSPITAL | Age: 54
End: 2019-08-01

## 2019-08-01 ENCOUNTER — APPOINTMENT (OUTPATIENT)
Dept: CT IMAGING | Facility: HOSPITAL | Age: 54
End: 2019-08-01

## 2019-08-01 ENCOUNTER — APPOINTMENT (OUTPATIENT)
Dept: ULTRASOUND IMAGING | Facility: HOSPITAL | Age: 54
End: 2019-08-01

## 2019-08-01 ENCOUNTER — HOSPITAL ENCOUNTER (INPATIENT)
Facility: HOSPITAL | Age: 54
LOS: 1 days | Discharge: HOME | End: 2019-08-02
Attending: FAMILY MEDICINE | Admitting: INTERNAL MEDICINE

## 2019-08-01 DIAGNOSIS — E83.42 HYPOMAGNESEMIA: Primary | ICD-10-CM

## 2019-08-01 DIAGNOSIS — F10.29 ALCOHOL DEPENDENCE WITH UNSPECIFIED ALCOHOL-INDUCED DISORDER (HCC): ICD-10-CM

## 2019-08-01 DIAGNOSIS — E87.6 HYPOKALEMIA: ICD-10-CM

## 2019-08-01 DIAGNOSIS — R00.1 BRADYCARDIA: ICD-10-CM

## 2019-08-01 DIAGNOSIS — R53.1 GENERALIZED WEAKNESS: ICD-10-CM

## 2019-08-01 LAB
6-ACETYL MORPHINE: NEGATIVE
A-A DO2: 55.5 MMHG (ref 0–300)
ACETONE BLD QL: NEGATIVE
ALBUMIN SERPL-MCNC: 4.73 G/DL (ref 3.5–5.2)
ALBUMIN/GLOB SERPL: 1.8 G/DL
ALP SERPL-CCNC: 26 U/L (ref 39–117)
ALT SERPL W P-5'-P-CCNC: 14 U/L (ref 1–41)
AMPHET+METHAMPHET UR QL: NEGATIVE
ANION GAP SERPL CALCULATED.3IONS-SCNC: 21 MMOL/L (ref 5–15)
ARTERIAL PATENCY WRIST A: ABNORMAL
AST SERPL-CCNC: 17 U/L (ref 1–40)
ATMOSPHERIC PRESS: 729 MMHG
BARBITURATES UR QL SCN: NEGATIVE
BASE EXCESS BLDA CALC-SCNC: -0.9 MMOL/L
BASOPHILS # BLD AUTO: 0.02 10*3/MM3 (ref 0–0.2)
BASOPHILS NFR BLD AUTO: 0.3 % (ref 0–1.5)
BDY SITE: ABNORMAL
BENZODIAZ UR QL SCN: NEGATIVE
BH CV ECHO MEAS - % IVS THICK: -14.2 %
BH CV ECHO MEAS - % LVPW THICK: 23.8 %
BH CV ECHO MEAS - ACS: 2.8 CM
BH CV ECHO MEAS - AO MAX PG: 8 MMHG
BH CV ECHO MEAS - AO MEAN PG: 4 MMHG
BH CV ECHO MEAS - AO ROOT AREA (BSA CORRECTED): 1.9
BH CV ECHO MEAS - AO ROOT AREA: 12.9 CM^2
BH CV ECHO MEAS - AO ROOT DIAM: 3.7 CM
BH CV ECHO MEAS - AO V2 MAX: 141 CM/SEC
BH CV ECHO MEAS - AO V2 MEAN: 92.4 CM/SEC
BH CV ECHO MEAS - AO V2 VTI: 34.9 CM
BH CV ECHO MEAS - BSA(HAYCOCK): 2.1 M^2
BH CV ECHO MEAS - BSA: 2.1 M^2
BH CV ECHO MEAS - BZI_BMI: 24.4 KILOGRAMS/M^2
BH CV ECHO MEAS - BZI_METRIC_HEIGHT: 185.4 CM
BH CV ECHO MEAS - BZI_METRIC_WEIGHT: 83.9 KG
BH CV ECHO MEAS - EDV(CUBED): 103.2 ML
BH CV ECHO MEAS - EDV(MOD-SP4): 60 ML
BH CV ECHO MEAS - EDV(TEICH): 101.9 ML
BH CV ECHO MEAS - EF(CUBED): 83.6 %
BH CV ECHO MEAS - EF(MOD-SP4): 65 %
BH CV ECHO MEAS - EF(TEICH): 76.6 %
BH CV ECHO MEAS - ESV(CUBED): 16.9 ML
BH CV ECHO MEAS - ESV(MOD-SP4): 21 ML
BH CV ECHO MEAS - ESV(TEICH): 23.8 ML
BH CV ECHO MEAS - FS: 45.3 %
BH CV ECHO MEAS - IVS/LVPW: 1.1
BH CV ECHO MEAS - IVSD: 1.3 CM
BH CV ECHO MEAS - IVSS: 1.1 CM
BH CV ECHO MEAS - LA DIMENSION: 3.7 CM
BH CV ECHO MEAS - LA/AO: 0.9
BH CV ECHO MEAS - LV DIASTOLIC VOL/BSA (35-75): 28.8 ML/M^2
BH CV ECHO MEAS - LV MASS(C)D: 218.9 GRAMS
BH CV ECHO MEAS - LV MASS(C)DI: 105.1 GRAMS/M^2
BH CV ECHO MEAS - LV MASS(C)S: 97.9 GRAMS
BH CV ECHO MEAS - LV MASS(C)SI: 47 GRAMS/M^2
BH CV ECHO MEAS - LV SYSTOLIC VOL/BSA (12-30): 10.1 ML/M^2
BH CV ECHO MEAS - LVIDD: 4.7 CM
BH CV ECHO MEAS - LVIDS: 2.6 CM
BH CV ECHO MEAS - LVLD AP4: 6.1 CM
BH CV ECHO MEAS - LVLS AP4: 5.9 CM
BH CV ECHO MEAS - LVOT AREA (M): 4.2 CM^2
BH CV ECHO MEAS - LVOT AREA: 4.2 CM^2
BH CV ECHO MEAS - LVOT DIAM: 2.3 CM
BH CV ECHO MEAS - LVPWD: 1.2 CM
BH CV ECHO MEAS - LVPWS: 1.4 CM
BH CV ECHO MEAS - MV A MAX VEL: 96.7 CM/SEC
BH CV ECHO MEAS - MV E MAX VEL: 70.1 CM/SEC
BH CV ECHO MEAS - MV E/A: 0.72
BH CV ECHO MEAS - PA ACC SLOPE: 959 CM/SEC^2
BH CV ECHO MEAS - PA ACC TIME: 0.13 SEC
BH CV ECHO MEAS - PA PR(ACCEL): 20.5 MMHG
BH CV ECHO MEAS - RAP SYSTOLE: 10 MMHG
BH CV ECHO MEAS - RVSP: 32.1 MMHG
BH CV ECHO MEAS - SI(AO): 216 ML/M^2
BH CV ECHO MEAS - SI(CUBED): 41.5 ML/M^2
BH CV ECHO MEAS - SI(MOD-SP4): 18.7 ML/M^2
BH CV ECHO MEAS - SI(TEICH): 37.5 ML/M^2
BH CV ECHO MEAS - SV(AO): 449.6 ML
BH CV ECHO MEAS - SV(CUBED): 86.3 ML
BH CV ECHO MEAS - SV(MOD-SP4): 39 ML
BH CV ECHO MEAS - SV(TEICH): 78.1 ML
BH CV ECHO MEAS - TR MAX VEL: 235 CM/SEC
BILIRUB SERPL-MCNC: 1.2 MG/DL (ref 0.2–1.2)
BILIRUB UR QL STRIP: NEGATIVE
BODY TEMPERATURE: 98.6 C
BUN BLD-MCNC: 19 MG/DL (ref 6–20)
BUN/CREAT SERPL: 17.8 (ref 7–25)
BUPRENORPHINE SERPL-MCNC: NEGATIVE NG/ML
CALCIUM SPEC-SCNC: 9 MG/DL (ref 8.6–10.5)
CANNABINOIDS SERPL QL: NEGATIVE
CHLORIDE SERPL-SCNC: 98 MMOL/L (ref 98–107)
CK SERPL-CCNC: 72 U/L (ref 20–200)
CLARITY UR: CLEAR
CO2 SERPL-SCNC: 20 MMOL/L (ref 22–29)
COCAINE UR QL: NEGATIVE
COHGB MFR BLD: 1.3 % (ref 0–5)
COLOR UR: YELLOW
CREAT BLD-MCNC: 1.07 MG/DL (ref 0.76–1.27)
D-LACTATE SERPL-SCNC: 0.8 MMOL/L (ref 0.5–2)
D-LACTATE SERPL-SCNC: 3.8 MMOL/L (ref 0.5–2)
DEPRECATED RDW RBC AUTO: 42 FL (ref 37–54)
EOSINOPHIL # BLD AUTO: 0.05 10*3/MM3 (ref 0–0.4)
EOSINOPHIL NFR BLD AUTO: 0.7 % (ref 0.3–6.2)
ERYTHROCYTE [DISTWIDTH] IN BLOOD BY AUTOMATED COUNT: 13.5 % (ref 12.3–15.4)
ETHANOL BLD-MCNC: <10 MG/DL (ref 0–10)
ETHANOL UR QL: <0.01 %
GFR SERPL CREATININE-BSD FRML MDRD: 71 ML/MIN/1.73
GLOBULIN UR ELPH-MCNC: 2.6 GM/DL
GLUCOSE BLD-MCNC: 144 MG/DL (ref 65–99)
GLUCOSE BLDC GLUCOMTR-MCNC: 128 MG/DL (ref 70–130)
GLUCOSE UR STRIP-MCNC: NEGATIVE MG/DL
HCO3 BLDA-SCNC: 19.3 MMOL/L (ref 22–26)
HCT VFR BLD AUTO: 41.3 % (ref 37.5–51)
HCT VFR BLD CALC: 43 % (ref 42–52)
HGB BLD-MCNC: 13.9 G/DL (ref 13–17.7)
HGB BLDA-MCNC: 14.7 G/DL (ref 12–16)
HGB UR QL STRIP.AUTO: NEGATIVE
HOLD SPECIMEN: NORMAL
HOROWITZ INDEX BLD+IHG-RTO: 21 %
IMM GRANULOCYTES # BLD AUTO: 0.01 10*3/MM3 (ref 0–0.05)
IMM GRANULOCYTES NFR BLD AUTO: 0.1 % (ref 0–0.5)
KETONES UR QL STRIP: NEGATIVE
LEUKOCYTE ESTERASE UR QL STRIP.AUTO: NEGATIVE
LIPASE SERPL-CCNC: 71 U/L (ref 13–60)
LYMPHOCYTES # BLD AUTO: 2.98 10*3/MM3 (ref 0.7–3.1)
LYMPHOCYTES NFR BLD AUTO: 39 % (ref 19.6–45.3)
MAGNESIUM SERPL-MCNC: 0.3 MG/DL (ref 1.6–2.6)
MCH RBC QN AUTO: 29.4 PG (ref 26.6–33)
MCHC RBC AUTO-ENTMCNC: 33.7 G/DL (ref 31.5–35.7)
MCV RBC AUTO: 87.3 FL (ref 79–97)
METHADONE UR QL SCN: NEGATIVE
METHGB BLD QL: 0.1 % (ref 0–3)
MODALITY: ABNORMAL
MONOCYTES # BLD AUTO: 0.68 10*3/MM3 (ref 0.1–0.9)
MONOCYTES NFR BLD AUTO: 8.9 % (ref 5–12)
NEUTROPHILS # BLD AUTO: 3.9 10*3/MM3 (ref 1.7–7)
NEUTROPHILS NFR BLD AUTO: 51 % (ref 42.7–76)
NITRITE UR QL STRIP: NEGATIVE
OPIATES UR QL: NEGATIVE
OXYCODONE UR QL SCN: NEGATIVE
OXYHGB MFR BLDV: 91.9 % (ref 85–100)
PCO2 BLDA: 22.6 MM HG (ref 35–45)
PCP UR QL SCN: NEGATIVE
PH BLDA: 7.55 PH UNITS (ref 7.35–7.45)
PH UR STRIP.AUTO: 8 [PH] (ref 5–8)
PLATELET # BLD AUTO: 218 10*3/MM3 (ref 140–450)
PMV BLD AUTO: 11 FL (ref 6–12)
PO2 BLDA: 60.7 MM HG (ref 80–100)
POTASSIUM BLD-SCNC: 2.4 MMOL/L (ref 3.5–5.2)
POTASSIUM BLD-SCNC: 2.8 MMOL/L (ref 3.5–5.2)
PROT SERPL-MCNC: 7.3 G/DL (ref 6–8.5)
PROT UR QL STRIP: NEGATIVE
RBC # BLD AUTO: 4.73 10*6/MM3 (ref 4.14–5.8)
SAO2 % BLDCOA: 93.2 % (ref 90–100)
SODIUM BLD-SCNC: 139 MMOL/L (ref 136–145)
SP GR UR STRIP: 1.01 (ref 1–1.03)
TROPONIN T SERPL-MCNC: <0.01 NG/ML (ref 0–0.03)
TSH SERPL DL<=0.05 MIU/L-ACNC: 0.66 MIU/ML (ref 0.27–4.2)
UROBILINOGEN UR QL STRIP: NORMAL
WBC NRBC COR # BLD: 7.64 10*3/MM3 (ref 3.4–10.8)

## 2019-08-01 PROCEDURE — 80307 DRUG TEST PRSMV CHEM ANLYZR: CPT | Performed by: FAMILY MEDICINE

## 2019-08-01 PROCEDURE — 93880 EXTRACRANIAL BILAT STUDY: CPT

## 2019-08-01 PROCEDURE — 83605 ASSAY OF LACTIC ACID: CPT | Performed by: FAMILY MEDICINE

## 2019-08-01 PROCEDURE — 84484 ASSAY OF TROPONIN QUANT: CPT | Performed by: PHYSICIAN ASSISTANT

## 2019-08-01 PROCEDURE — 25010000002 MAGNESIUM SULFATE 2 GM/50ML SOLUTION: Performed by: FAMILY MEDICINE

## 2019-08-01 PROCEDURE — 84484 ASSAY OF TROPONIN QUANT: CPT | Performed by: FAMILY MEDICINE

## 2019-08-01 PROCEDURE — 25010000002 THIAMINE PER 100 MG: Performed by: INTERNAL MEDICINE

## 2019-08-01 PROCEDURE — 25010000002 MAGNESIUM SULFATE 2 GM/50ML SOLUTION: Performed by: INTERNAL MEDICINE

## 2019-08-01 PROCEDURE — 84443 ASSAY THYROID STIM HORMONE: CPT | Performed by: PHYSICIAN ASSISTANT

## 2019-08-01 PROCEDURE — 82375 ASSAY CARBOXYHB QUANT: CPT | Performed by: FAMILY MEDICINE

## 2019-08-01 PROCEDURE — 82009 KETONE BODYS QUAL: CPT | Performed by: PHYSICIAN ASSISTANT

## 2019-08-01 PROCEDURE — 82962 GLUCOSE BLOOD TEST: CPT

## 2019-08-01 PROCEDURE — 99285 EMERGENCY DEPT VISIT HI MDM: CPT

## 2019-08-01 PROCEDURE — 70450 CT HEAD/BRAIN W/O DYE: CPT | Performed by: RADIOLOGY

## 2019-08-01 PROCEDURE — 84132 ASSAY OF SERUM POTASSIUM: CPT | Performed by: INTERNAL MEDICINE

## 2019-08-01 PROCEDURE — 93005 ELECTROCARDIOGRAM TRACING: CPT | Performed by: FAMILY MEDICINE

## 2019-08-01 PROCEDURE — 82805 BLOOD GASES W/O2 SATURATION: CPT | Performed by: FAMILY MEDICINE

## 2019-08-01 PROCEDURE — 25010000002 LORAZEPAM PER 2 MG: Performed by: INTERNAL MEDICINE

## 2019-08-01 PROCEDURE — 36600 WITHDRAWAL OF ARTERIAL BLOOD: CPT | Performed by: FAMILY MEDICINE

## 2019-08-01 PROCEDURE — 83735 ASSAY OF MAGNESIUM: CPT | Performed by: FAMILY MEDICINE

## 2019-08-01 PROCEDURE — 83690 ASSAY OF LIPASE: CPT | Performed by: FAMILY MEDICINE

## 2019-08-01 PROCEDURE — 25010000002 LORAZEPAM PER 2 MG: Performed by: FAMILY MEDICINE

## 2019-08-01 PROCEDURE — 25010000003 POTASSIUM CHLORIDE 10 MEQ/100ML SOLUTION: Performed by: FAMILY MEDICINE

## 2019-08-01 PROCEDURE — 25010000002 PROMETHAZINE PER 50 MG: Performed by: FAMILY MEDICINE

## 2019-08-01 PROCEDURE — 93306 TTE W/DOPPLER COMPLETE: CPT | Performed by: INTERNAL MEDICINE

## 2019-08-01 PROCEDURE — 80053 COMPREHEN METABOLIC PANEL: CPT | Performed by: FAMILY MEDICINE

## 2019-08-01 PROCEDURE — 25010000002 ONDANSETRON PER 1 MG: Performed by: FAMILY MEDICINE

## 2019-08-01 PROCEDURE — 70450 CT HEAD/BRAIN W/O DYE: CPT

## 2019-08-01 PROCEDURE — 99223 1ST HOSP IP/OBS HIGH 75: CPT | Performed by: INTERNAL MEDICINE

## 2019-08-01 PROCEDURE — 36415 COLL VENOUS BLD VENIPUNCTURE: CPT

## 2019-08-01 PROCEDURE — 93880 EXTRACRANIAL BILAT STUDY: CPT | Performed by: RADIOLOGY

## 2019-08-01 PROCEDURE — 81003 URINALYSIS AUTO W/O SCOPE: CPT | Performed by: FAMILY MEDICINE

## 2019-08-01 PROCEDURE — 82550 ASSAY OF CK (CPK): CPT | Performed by: FAMILY MEDICINE

## 2019-08-01 PROCEDURE — 93306 TTE W/DOPPLER COMPLETE: CPT

## 2019-08-01 PROCEDURE — 85025 COMPLETE CBC W/AUTO DIFF WBC: CPT | Performed by: FAMILY MEDICINE

## 2019-08-01 PROCEDURE — 83050 HGB METHEMOGLOBIN QUAN: CPT | Performed by: FAMILY MEDICINE

## 2019-08-01 RX ORDER — HYDROCHLOROTHIAZIDE 25 MG/1
12.5 TABLET ORAL DAILY
Status: CANCELLED | OUTPATIENT
Start: 2019-08-01

## 2019-08-01 RX ORDER — MAGNESIUM SULFATE HEPTAHYDRATE 40 MG/ML
4 INJECTION, SOLUTION INTRAVENOUS AS NEEDED
Status: DISCONTINUED | OUTPATIENT
Start: 2019-08-01 | End: 2019-08-02 | Stop reason: HOSPADM

## 2019-08-01 RX ORDER — PANTOPRAZOLE SODIUM 40 MG/1
40 TABLET, DELAYED RELEASE ORAL DAILY
Status: CANCELLED | OUTPATIENT
Start: 2019-08-01

## 2019-08-01 RX ORDER — SODIUM CHLORIDE 0.9 % (FLUSH) 0.9 %
3 SYRINGE (ML) INJECTION EVERY 12 HOURS SCHEDULED
Status: DISCONTINUED | OUTPATIENT
Start: 2019-08-01 | End: 2019-08-02 | Stop reason: HOSPADM

## 2019-08-01 RX ORDER — CHLORDIAZEPOXIDE HYDROCHLORIDE 25 MG/1
25 CAPSULE, GELATIN COATED ORAL EVERY 6 HOURS SCHEDULED
Status: DISCONTINUED | OUTPATIENT
Start: 2019-08-01 | End: 2019-08-02 | Stop reason: HOSPADM

## 2019-08-01 RX ORDER — LORAZEPAM 2 MG/ML
2 INJECTION INTRAMUSCULAR
Status: DISCONTINUED | OUTPATIENT
Start: 2019-08-01 | End: 2019-08-02 | Stop reason: HOSPADM

## 2019-08-01 RX ORDER — LOSARTAN POTASSIUM 50 MG/1
50 TABLET ORAL DAILY
Status: CANCELLED | OUTPATIENT
Start: 2019-08-01

## 2019-08-01 RX ORDER — ASPIRIN 81 MG/1
81 TABLET ORAL DAILY
Status: CANCELLED | OUTPATIENT
Start: 2019-08-01

## 2019-08-01 RX ORDER — LORAZEPAM 2 MG/ML
1 INJECTION INTRAMUSCULAR
Status: DISCONTINUED | OUTPATIENT
Start: 2019-08-01 | End: 2019-08-02 | Stop reason: HOSPADM

## 2019-08-01 RX ORDER — MAGNESIUM SULFATE HEPTAHYDRATE 40 MG/ML
2 INJECTION, SOLUTION INTRAVENOUS AS NEEDED
Status: DISCONTINUED | OUTPATIENT
Start: 2019-08-01 | End: 2019-08-02 | Stop reason: HOSPADM

## 2019-08-01 RX ORDER — POTASSIUM CHLORIDE 1.5 G/1.77G
40 POWDER, FOR SOLUTION ORAL AS NEEDED
Status: DISCONTINUED | OUTPATIENT
Start: 2019-08-01 | End: 2019-08-02 | Stop reason: HOSPADM

## 2019-08-01 RX ORDER — NITROGLYCERIN 0.4 MG/1
0.4 TABLET SUBLINGUAL
Status: CANCELLED | OUTPATIENT
Start: 2019-08-01

## 2019-08-01 RX ORDER — CHLORDIAZEPOXIDE HYDROCHLORIDE 25 MG/1
25 CAPSULE, GELATIN COATED ORAL ONCE
Status: COMPLETED | OUTPATIENT
Start: 2019-08-01 | End: 2019-08-01

## 2019-08-01 RX ORDER — METOPROLOL TARTRATE 100 MG/1
100 TABLET ORAL EVERY 12 HOURS
Status: CANCELLED | OUTPATIENT
Start: 2019-08-01

## 2019-08-01 RX ORDER — MAGNESIUM SULFATE HEPTAHYDRATE 40 MG/ML
2 INJECTION, SOLUTION INTRAVENOUS
Status: COMPLETED | OUTPATIENT
Start: 2019-08-01 | End: 2019-08-02

## 2019-08-01 RX ORDER — LORAZEPAM 2 MG/1
2 TABLET ORAL
Status: DISCONTINUED | OUTPATIENT
Start: 2019-08-01 | End: 2019-08-02 | Stop reason: HOSPADM

## 2019-08-01 RX ORDER — LORAZEPAM 2 MG/ML
1 INJECTION INTRAMUSCULAR ONCE
Status: COMPLETED | OUTPATIENT
Start: 2019-08-01 | End: 2019-08-01

## 2019-08-01 RX ORDER — ATORVASTATIN CALCIUM 40 MG/1
40 TABLET, FILM COATED ORAL DAILY
Status: CANCELLED | OUTPATIENT
Start: 2019-08-01

## 2019-08-01 RX ORDER — SODIUM CHLORIDE 0.9 % (FLUSH) 0.9 %
3-10 SYRINGE (ML) INJECTION AS NEEDED
Status: DISCONTINUED | OUTPATIENT
Start: 2019-08-01 | End: 2019-08-02 | Stop reason: HOSPADM

## 2019-08-01 RX ORDER — LORAZEPAM 1 MG/1
1 TABLET ORAL
Status: DISCONTINUED | OUTPATIENT
Start: 2019-08-01 | End: 2019-08-02 | Stop reason: HOSPADM

## 2019-08-01 RX ORDER — POTASSIUM CHLORIDE 750 MG/1
10 TABLET, FILM COATED, EXTENDED RELEASE ORAL DAILY
Status: CANCELLED | OUTPATIENT
Start: 2019-08-01

## 2019-08-01 RX ORDER — MAGNESIUM SULFATE HEPTAHYDRATE 40 MG/ML
2 INJECTION, SOLUTION INTRAVENOUS ONCE
Status: COMPLETED | OUTPATIENT
Start: 2019-08-01 | End: 2019-08-01

## 2019-08-01 RX ORDER — ONDANSETRON 2 MG/ML
4 INJECTION INTRAMUSCULAR; INTRAVENOUS ONCE
Status: COMPLETED | OUTPATIENT
Start: 2019-08-01 | End: 2019-08-01

## 2019-08-01 RX ORDER — NITROGLYCERIN 0.4 MG/1
0.4 TABLET SUBLINGUAL
COMMUNITY
End: 2020-01-13 | Stop reason: SDUPTHER

## 2019-08-01 RX ORDER — POTASSIUM CHLORIDE 20 MEQ/1
40 TABLET, EXTENDED RELEASE ORAL EVERY 4 HOURS
Status: COMPLETED | OUTPATIENT
Start: 2019-08-01 | End: 2019-08-02

## 2019-08-01 RX ORDER — CLOPIDOGREL BISULFATE 75 MG/1
75 TABLET ORAL DAILY
Status: CANCELLED | OUTPATIENT
Start: 2019-08-01

## 2019-08-01 RX ORDER — HYDROCHLOROTHIAZIDE 12.5 MG/1
12.5 TABLET ORAL DAILY
COMMUNITY
End: 2019-08-02 | Stop reason: HOSPADM

## 2019-08-01 RX ORDER — SODIUM CHLORIDE 0.9 % (FLUSH) 0.9 %
10 SYRINGE (ML) INJECTION AS NEEDED
Status: DISCONTINUED | OUTPATIENT
Start: 2019-08-01 | End: 2019-08-02 | Stop reason: HOSPADM

## 2019-08-01 RX ORDER — POTASSIUM CHLORIDE 20 MEQ/1
40 TABLET, EXTENDED RELEASE ORAL AS NEEDED
Status: DISCONTINUED | OUTPATIENT
Start: 2019-08-01 | End: 2019-08-02 | Stop reason: HOSPADM

## 2019-08-01 RX ORDER — POTASSIUM CHLORIDE 7.45 MG/ML
10 INJECTION INTRAVENOUS ONCE
Status: COMPLETED | OUTPATIENT
Start: 2019-08-01 | End: 2019-08-01

## 2019-08-01 RX ADMIN — SODIUM CHLORIDE, PRESERVATIVE FREE 3 ML: 5 INJECTION INTRAVENOUS at 21:40

## 2019-08-01 RX ADMIN — MAGNESIUM SULFATE IN WATER 2 G: 40 INJECTION, SOLUTION INTRAVENOUS at 10:11

## 2019-08-01 RX ADMIN — POTASSIUM CHLORIDE 40 MEQ: 1500 TABLET, EXTENDED RELEASE ORAL at 16:25

## 2019-08-01 RX ADMIN — PROMETHAZINE HYDROCHLORIDE 12.5 MG: 25 INJECTION, SOLUTION INTRAMUSCULAR; INTRAVENOUS at 08:55

## 2019-08-01 RX ADMIN — LORAZEPAM 2 MG: 2 INJECTION INTRAMUSCULAR; INTRAVENOUS at 16:49

## 2019-08-01 RX ADMIN — SODIUM CHLORIDE 1000 ML: 9 INJECTION, SOLUTION INTRAVENOUS at 09:37

## 2019-08-01 RX ADMIN — LORAZEPAM 1 MG: 2 INJECTION INTRAMUSCULAR; INTRAVENOUS at 09:01

## 2019-08-01 RX ADMIN — ONDANSETRON 4 MG: 2 INJECTION INTRAMUSCULAR; INTRAVENOUS at 08:53

## 2019-08-01 RX ADMIN — CHLORDIAZEPOXIDE HYDROCHLORIDE 25 MG: 25 CAPSULE ORAL at 16:25

## 2019-08-01 RX ADMIN — POTASSIUM CHLORIDE 10 MEQ: 10 INJECTION, SOLUTION INTRAVENOUS at 09:37

## 2019-08-01 RX ADMIN — MAGNESIUM SULFATE IN WATER 2 G: 40 INJECTION, SOLUTION INTRAVENOUS at 16:12

## 2019-08-01 RX ADMIN — MAGNESIUM SULFATE IN WATER 2 G: 40 INJECTION, SOLUTION INTRAVENOUS at 21:40

## 2019-08-01 RX ADMIN — MAGNESIUM SULFATE IN WATER 2 G: 40 INJECTION, SOLUTION INTRAVENOUS at 18:28

## 2019-08-01 RX ADMIN — MAGNESIUM SULFATE IN WATER 2 G: 40 INJECTION, SOLUTION INTRAVENOUS at 22:41

## 2019-08-01 RX ADMIN — CHLORDIAZEPOXIDE HYDROCHLORIDE 25 MG: 25 CAPSULE ORAL at 10:15

## 2019-08-01 RX ADMIN — THIAMINE HYDROCHLORIDE 100 ML/HR: 100 INJECTION, SOLUTION INTRAMUSCULAR; INTRAVENOUS at 16:11

## 2019-08-01 RX ADMIN — POTASSIUM CHLORIDE 40 MEQ: 1500 TABLET, EXTENDED RELEASE ORAL at 21:39

## 2019-08-01 NOTE — ED NOTES
Attempt to call report at this time, nurse states she will call back.     Sam Coleman, RN  08/01/19 1259

## 2019-08-01 NOTE — ED NOTES
Dr. Morgan verbalize she does not want to redraw magnesium prior to transportation to PCU.     Sam Coleman, RN  08/01/19 4677

## 2019-08-01 NOTE — ED NOTES
Still unable to get urine sample from pt. I informed him we needed one as soon as possible     Megha Sethi  08/01/19 0951

## 2019-08-01 NOTE — H&P
[facility] HOSPITALIST HISTORY AND PHYSICAL    Patient Identification:  Name:  Onel Jimenez  Age:  54 y.o.  Sex:  male  :  1965  MRN:  [id number]   Visit Number:  [id number]  Primary Care Physician:  Alex Canales MD     2019  8:23 AM    Subjective     Chief complaint:   Chief Complaint   Patient presents with   • Vomiting   • Headache   • Weakness - Generalized     History of presenting illness:   Mr. Jimenez is a 54 y.o. male with past medical history significant for alcoholism with previous delirium tremens, hypertension, dyslipidemia, multivessel CAD, and reported WPW syndrome. He presented to the emergency department of [facility] on 2019 with complaints of nausea/vomiting, headache, and generalized weakness.  He reports having recurrent nausea and vomiting for the last week.  He has had generalized abdominal cramps, but no specific abdominal pain.  No changes in bowel movements.  No chest pains or shortness of breath.  He reports taking his cardiac medications regularly and appears to keep regular follow-up with his cardiologist, Dr. Canales.  He continues to drink large quantities of alcohol.  He drinks at least a pint of hard liquor daily.  His last drink was about 2 days ago per the patient.  He reports falling a couple days ago with reported syncopal episode as well with no reported injury.    Upon arrival to the ED, vitals were /77, respirations 26, heart rate 78, SPO2 98%, afebrile.  Initial troponin is negative.  Potassium is 2.4 and magnesium is 0.3.  Lipase is mildly elevated at 71.  Lactic acid elevated at 3.8.  Anion gap is elevated at 21 with bicarb of 20.0.  Subsequent ABG shows pH 7.550, PCO2 22.6, PO2 60.7, HCO3 19.3, arterial O2 saturation 93.2% on room air.  CBC is completely unremarkable.  UA, urine toxicology screen, and ethanol levels are negative.  He had a CT scan of the head without contrast which was unremarkable per radiology.  Patient has been admitted to the progressive care unit of [facility] for further evaluation and therapy.   ---------------------------------------------------------------------------------------------------------------------   Review of Systems   Constitutional: Positive for activity change, appetite change and fatigue. Negative for chills and fever.   HENT: Negative for congestion, postnasal drip, sinus pressure and sinus pain.    Respiratory: Negative for cough, shortness of breath and wheezing.    Cardiovascular: Negative for chest pain, palpitations and leg swelling.   Gastrointestinal: Positive for nausea and vomiting. Negative for abdominal distention, abdominal pain, constipation and diarrhea.   Genitourinary: Negative for difficulty urinating, dysuria, frequency and hematuria.   Musculoskeletal: Negative for arthralgias and back pain.   Skin: Negative for color change, pallor and rash.   Neurological: Positive for headaches. Negative for syncope and weakness.   Psychiatric/Behavioral: Negative for agitation, behavioral problems and confusion.      ---------------------------------------------------------------------------------------------------------------------   Past Medical History:   Diagnosis Date   • Anxiety disorder    • CAD (coronary artery disease)    • Chronic alcoholism (CMS/HCC)    • Hyperlipidemia    • Hypertension    • WPW (Zakiya-Parkinson-White syndrome)      Past Surgical History:   Procedure Laterality Date   • CARDIAC CATHETERIZATION  06/06/2015   • CARDIOVASCULAR STRESS TEST  06/06/2015   • ECHO - CONVERTED  06/06/2015   • EYE SURGERY       Family History   Problem Relation Age of Onset   • Heart attack Father    • Heart disease Father    • Heart failure Father    • Heart disease Sister    • Heart failure Sister    • Heart disease Brother    • Heart failure Brother    • Heart failure Sister    • Heart disease Sister      Social History     Socioeconomic History   • Marital  status:      Spouse name: Not on file   • Number of children: Not on file   • Years of education: Not on file   • Highest education level: Not on file   Tobacco Use   • Smoking status: Former Smoker     Packs/day: 1.00     Years: 20.00     Pack years: 20.00     Types: Cigarettes     Last attempt to quit:      Years since quittin.5   • Smokeless tobacco: Current User     Types: Chew   • Tobacco comment: chew 1 can per week   Substance and Sexual Activity   • Alcohol use: Yes     Alcohol/week: 6.0 oz     Types: 6 Cans of beer, 4 Shots of liquor per week     Comment: pint of alcohol every other day    • Drug use: Yes     Types: Hydrocodone     Comment: occ   • Sexual activity: Defer     ---------------------------------------------------------------------------------------------------------------------   Allergies:  Niacin  ---------------------------------------------------------------------------------------------------------------------   Prior to Admission Medications     Prescriptions Last Dose Informant Patient Reported? Taking?    amLODIPine (NORVASC) 5 MG tablet   No No    Take 1 tablet by mouth Daily.    aspirin 81 MG EC tablet   No No    Take 1 tablet by mouth Daily.    atorvastatin (LIPITOR) 40 MG tablet   No No    TAKE ONE TABLET BY MOUTH ONE TIME DAILY    clopidogrel (PLAVIX) 75 MG tablet   No No    TAKE ONE TABLET BY MOUTH ONE TIME DAILY    fenofibrate (TRICOR) 145 MG tablet   No No    TAKE ONE TABLET BY MOUTH ONE TIME DAILY    losartan (COZAAR) 100 MG tablet   No No    Take 0.5 tablets by mouth Daily. for blood pressure    metoprolol tartrate (LOPRESSOR) 100 MG tablet   No No    TAKE ONE TABLET BY MOUTH EVERY TWELVE HOURS    pantoprazole (PROTONIX) 40 MG EC tablet   No No    Take 1 tablet by mouth Daily.    potassium chloride (K-DUR) 10 MEQ CR tablet   No No    TAKE ONE TABLET BY MOUTH ONE TIME DAILY         ---------------------------------------------------------------------------------------------------------------------  Objective   Hospital Scheduled Meds:    magnesium sulfate 2 g Intravenous Once        ---------------------------------------------------------------------------------------------------------------------   Vital Signs:  Temp:  [97.6 °F (36.4 °C)] 97.6 °F (36.4 °C)  Heart Rate:  [43-81] 73  Resp:  [26] 26  BP: (117-154)/(71-96) 140/96  Mean Arterial Pressure (Non-Invasive) for the past 24 hrs (Last 3 readings):   Noninvasive MAP (mmHg)   08/01/19 1047 112   08/01/19 1016 106   08/01/19 1002 106     SpO2 Percentage    08/01/19 1002 08/01/19 1016 08/01/19 1047   SpO2: 100% 100% 98%     SpO2:  [94 %-100 %] 98 %  on   ;        Body mass index is 22.5 kg/m².  Wt Readings from Last 3 Encounters:   08/01/19 81.6 kg (180 lb)   06/26/19 86.2 kg (190 lb)   04/10/19 85.7 kg (189 lb)   ---------------------------------------------------------------------------------------------------------------------   Physical Exam:  Constitutional:  Well-developed and well-nourished.  No respiratory distress. Drowsy but arousable.      HENT:  Head: Normocephalic and atraumatic.  Mouth:  Moist mucous membranes.    Eyes:  Conjunctivae and EOM are normal. No scleral icterus. No erythema or drainage.   Neck:  Neck supple.  No JVD present. No carotid bruits.   Cardiovascular:  Normal rate, regular rhythm and normal heart sounds with no murmur.  Pulmonary/Chest:  No respiratory distress, no wheezes, no crackles, with normal breath sounds and good air movement.  Abdominal:  Soft.  Bowel sounds are present x4.  No distension and no tenderness.   Musculoskeletal:  No edema, no tenderness, and no deformity.  No red or swollen joints anywhere.    Neurological:  Alert and oriented to person, place, and time.  No cranial nerve deficit.  No tongue deviation.  No facial droop.  No slurred speech.   Skin:  Skin is warm and dry.  No rash  noted.  No pallor.   Psychiatric:  Normal mood and affect.  Behavior is normal.  Judgment and thought content normal.   Peripheral vascular:  No edema and 2+ pedal pulses bilaterally.   Genitourinary: No alejandra catheter in place.   ---------------------------------------------------------------------------------------------------------------------  EKG:        I have personally reviewed the EKG.  ---------------------------------------------------------------------------------------------------------------------   Results from last 7 days   Lab Units 08/01/19  0840   CK TOTAL U/L 72   TROPONIN T ng/mL <0.010         Results from last 7 days   Lab Units 08/01/19  0837   PH, ARTERIAL pH units 7.550*   PO2 ART mm Hg 60.7*   PCO2, ARTERIAL mm Hg 22.6*   HCO3 ART mmol/L 19.3*     Results from last 7 days   Lab Units 08/01/19  0840   LACTATE mmol/L 3.8*   WBC 10*3/mm3 7.64   HEMOGLOBIN g/dL 13.9   HEMATOCRIT % 41.3   MCV fL 87.3   MCHC g/dL 33.7   PLATELETS 10*3/mm3 218     Results from last 7 days   Lab Units 08/01/19  0840   SODIUM mmol/L 139   POTASSIUM mmol/L 2.4*   MAGNESIUM mg/dL 0.3*   CHLORIDE mmol/L 98   CO2 mmol/L 20.0*   BUN mg/dL 19   CREATININE mg/dL 1.07   EGFR IF NONAFRICN AM mL/min/1.73 71   CALCIUM mg/dL 9.0   GLUCOSE mg/dL 144*   ALBUMIN g/dL 4.73   BILIRUBIN mg/dL 1.2   ALK PHOS U/L 26*   AST (SGOT) U/L 17   ALT (SGPT) U/L 14   Estimated Creatinine Clearance: 89 mL/min (by C-G formula based on SCr of 1.07 mg/dL).  No results found for: AMMONIA    Glucose   Date/Time Value Ref Range Status   08/01/2019 0839 128 70 - 130 mg/dL Final     Lab Results   Component Value Date    HGBA1C 5.5 06/05/2015     Lab Results   Component Value Date    TSH 1.009 06/05/2015    FREET4 1.04 06/05/2015        Pain Management Panel     Pain Management Panel Latest Ref Rng & Units 8/1/2019 3/31/2019    AMPHETAMINES SCREEN, URINE Negative Negative Negative    BARBITURATES SCREEN Negative Negative Negative    BENZODIAZEPINE  SCREEN, URINE Negative Negative Negative    BUPRENORPHINEUR Negative Negative Negative    COCAINE SCREEN, URINE Negative Negative Negative    METHADONE SCREEN, URINE Negative Negative Negative        I have personally reviewed the above laboratory results.   ---------------------------------------------------------------------------------------------------------------------  Imaging Results (last 7 days)     Procedure Component Value Units Date/Time    CT Head Without Contrast [[id number]] Collected:  08/01/19 0918     Updated:  08/01/19 0931    Narrative:       EXAMINATION: CT HEAD WO CONTRAST-      CLINICAL INDICATION:     hand tingling     COMPARISON:    None     Technique: Multiple CT axial images were obtained through the level of  the brain without IV contrast administration. Reformatted images in the  coronal and/or sagittal plane(s) were generated from the axial data set  to facilitate diagnostic accuracy and/or surgical planning.     Radiation dose reduction techniques were utilized per ALARA protocol.  Automated exposure control was initiated through either or [employer] or  DoseRight software packages by  protocol.       DOSE (DLP mGy-cm):     FINDINGS:     Brain: Unremarkable. No parenchymal hemorrhage or mass. No white matter  abnormality. No areas of mass effect. Mild generalized cerebral atrophy  and chronic small vessel ischemic disease.  Ventricles: Unremarkable. No hydrocephalus.  Extra-axial spaces: Unremarkable. No extra-axial hemorrhage. No  extra-axial masses.  Bones: Unremarkable. No acute fracture identified.  Sinuses: Unremarkable as visualized. No air-fluid levels.  Mastoids: Unremarkable. No mastoid effusions.  Soft Tissues: Unremarkable.          Impression:       No CT evidence of acute intracranial abnormality.     This report was finalized on 8/1/2019 9:21 AM by Dr. Say Olmedo MD.           Left heart catheterization 6/5/2015      I have personally reviewed the above  radiology results.   ---------------------------------------------------------------------------------------------------------------------  Assessment & Plan      -Acute hypomagnesemia and hypokalemia: Replace electrolytes per protocol. Repeat levels in AM. Monitor on telemetry.     -Sinus bradycardia: Hold home metoprolol. Check TSH.  Replace magnesium and potassium.  Monitor on telemetry.    -Reported episode of syncope 2 days ago: No reported injury. CT head unremarkable.  Check carotid dopplers and transthoracic echocardiogram.  He has had some sinus bradycardia in the ED.  Hold metoprolol.  Monitor on telemetry.     -Alcoholism with history of delirium tremens: Placed on CIWA protocol with scheduled libriuim 50mg every 6 hours and will taper as tolerated. PRN Ativan. Place on seizure and fall precautions.    -Nausea/vomiting: Lipase mildly elevated. Abdomen nontender. Repeat lipase in AM. Continue IV fluids. Place on low dose PRN IV phenergan. QT is borderline prolonged. Will try to avoid zofran for now.     -Borderline prolonged QTc, 449ms: Replace magnesium. Avoid QT prolonging agents.     -Acute respiratory alkalosis with partial metabolic compensation: Normal RR at this time after receiving Librium and Ativan in the ED. Continue to monitor.     -Lactic acidosis: Likely related to alcoholism.     -Severe, multi-vessel coronary artery disease with some anterolateral ST/T wave abnormalities on EKG: No intervention performed as of yet.  He has been maintained on medical therapy which he reports compliance with. Continue home aspirin, statin, losartan, and amlodipine.  Beta-blocker being held secondary to sinus bradycardia.  Denies any recent chest pains or discomfort.  Will follow serial troponins.  Lipid panel in a.m. Echo pending.     -Essential hypertension: Continue home losartan and amlodipine once confirmed by pharmacy.  Hold metoprolol due to bradycardia.    -Dyslipidemia: Continue home statin. Check  lipid panel in AM.     -History of Marcelino-Parkinson-White Syndrome: Complete history unavailable. Correct electrolytes. Monitor on telemetry.     -DVT Prophylaxis: TEDS/SCDS.     The patient is considered to be a high risk patient due to: Multiple electrolyte abnormalities, alcoholism, history of DTs.     Code Status: FULL CODE.     I have discussed the patient's assessment and plan with the patient and admitting physician, Dr. Ny.     NAINA Narayan  08/01/19  11:51 AM  ---------------------------------------------------------------------------------------------------------------------   I have seen and examined the patient. I agree with the assessment and plan of NAINA Narayan.    54-year-old male with past history significant for alcohol abuse/addiction presented to the emergency department with complaints of dizziness and lightheadedness.  He states that he has been nauseated for 1 week and has had abdominal pain which is resolved now.  Patient states that he has been getting dizzy and lightheaded and therefore presented to the emergency department.  Patient was found to have hypokalemia and hypomagnesemia and was admitted.    On examination patient was lying comfortably in bed, in no acute distress.  His lungs are clear to auscultation bilaterally.  Cardia vascular examination revealed S1 plus S2 and no murmurs, rubs or gallops.  Abdomen was soft and nontender.  No viscera was palpable.  Bowel sounds were audible.  Patient was awake, alert and oriented to place, person and time.  Strength was bilaterally symmetrical in upper and lower extremities.  Labs reviewed.    A/P:  54-year-old male admitted with severe hypomagnesemia and hypokalemia likely due to poor p.o. intake and alcohol use.  Patient will have electrolyte replacement per protocol.  We will continue him on CIWA protocol due to history of alcohol abuse and history of alcohol withdrawal.  We will continue him on multivitamins and IV  fluids.  Patient has an anion gap acidosis due to elevated lactic acid however, there is no evidence of infection so far.  His lactic acid elevation is likely secondary to decreased p.o. intake, nausea and alcohol intake.  We will continue him on IV fluids and recheck lactic acid level.

## 2019-08-01 NOTE — ED PROVIDER NOTES
"Subjective   Patient is a 54 year old male who presents to the ED for vomiting since yesterday.  The patient is a poor historian, because he is nauseated at the time of examination and is reluctant to speak.  The patient states that he has \" felt bad\" for the last few days. The patient states that this has happened to him in the past.  He has a history of alcohol abuse and drinks at least a pint of hard liquor per day.  He denies any fecer, chills or diarrhea.  The patient states that he is also having some upper abdominal pain associated with vomiting.  He last drank alcohol yesterday.  States he is unable to keep down and food or liquids.  He denies any additional symptoms at this time.   He has not noticed any blood in his vomit.        History provided by:  Patient  History limited by:  Acuity of condition   used: No    Nausea   The primary symptoms include fatigue, abdominal pain, nausea and vomiting. Primary symptoms do not include fever, weight loss, diarrhea, hematemesis, arthralgias or rash. The illness began yesterday. The onset was sudden.   The illness is also significant for anorexia. The illness does not include chills, dysphagia, constipation or back pain. Significant associated medical issues include alcohol abuse.       Review of Systems   Constitutional: Positive for appetite change and fatigue. Negative for activity change, chills, diaphoresis, fever and weight loss.   HENT: Negative for congestion, postnasal drip, rhinorrhea, sinus pressure, sinus pain, sneezing, sore throat and tinnitus.    Eyes: Negative for pain, discharge, redness and itching.   Respiratory: Negative for apnea, cough, choking, chest tightness, shortness of breath and stridor.    Cardiovascular: Negative for chest pain, palpitations and leg swelling.   Gastrointestinal: Positive for abdominal pain, anorexia, nausea and vomiting. Negative for abdominal distention, blood in stool, constipation, diarrhea, " dysphagia and hematemesis.   Endocrine: Negative for cold intolerance and heat intolerance.   Genitourinary: Negative for difficulty urinating, flank pain, frequency and hematuria.   Musculoskeletal: Negative for arthralgias and back pain.   Skin: Negative for color change, pallor and rash.   Neurological: Negative for dizziness, facial asymmetry and headaches.   Psychiatric/Behavioral: Negative for agitation, behavioral problems and confusion.       Past Medical History:   Diagnosis Date   • Anxiety disorder    • CAD (coronary artery disease)    • Chronic alcoholism (CMS/HCC)    • Hyperlipidemia    • Hypertension    • WPW (Zakiya-Parkinson-White syndrome)        Allergies   Allergen Reactions   • Niacin Rash     Whelps       Past Surgical History:   Procedure Laterality Date   • CARDIAC CATHETERIZATION  2015   • CARDIOVASCULAR STRESS TEST  2015   • ECHO - CONVERTED  2015   • EYE SURGERY         Family History   Problem Relation Age of Onset   • Heart attack Father    • Heart disease Father    • Heart failure Father    • Heart disease Sister    • Heart failure Sister    • Heart disease Brother    • Heart failure Brother    • Heart failure Sister    • Heart disease Sister        Social History     Socioeconomic History   • Marital status:      Spouse name: Not on file   • Number of children: Not on file   • Years of education: Not on file   • Highest education level: Not on file   Tobacco Use   • Smoking status: Former Smoker     Packs/day: 1.00     Years: 20.00     Pack years: 20.00     Types: Cigarettes     Last attempt to quit:      Years since quittin.5   • Smokeless tobacco: Current User     Types: Chew   • Tobacco comment: chew 1 can per week   Substance and Sexual Activity   • Alcohol use: Yes     Alcohol/week: 6.0 oz     Types: 6 Cans of beer, 4 Shots of liquor per week     Comment: pint of alcohol every other day    • Drug use: Yes     Types: Hydrocodone     Comment: occ   •  Sexual activity: Defer           Objective   Physical Exam   Constitutional: He is oriented to person, place, and time. He appears well-developed and well-nourished. No distress.   Dry heaving during examination   HENT:   Head: Normocephalic and atraumatic.   Right Ear: External ear normal.   Left Ear: External ear normal.   Nose: Nose normal.   Mouth/Throat: Oropharynx is clear and moist. No oropharyngeal exudate.   Eyes: Conjunctivae and EOM are normal. Pupils are equal, round, and reactive to light. Right eye exhibits no discharge. Left eye exhibits no discharge. No scleral icterus.   Neck: Normal range of motion. Neck supple. No JVD present. No tracheal deviation present. No thyromegaly present.   Cardiovascular: Normal rate, regular rhythm, normal heart sounds and intact distal pulses. Exam reveals no gallop and no friction rub.   No murmur heard.  Pulmonary/Chest: Effort normal and breath sounds normal. No stridor. No respiratory distress. He has no wheezes. He has no rales. He exhibits no tenderness.   Abdominal: Soft. Bowel sounds are normal. He exhibits no distension and no mass. There is no tenderness. There is no rebound and no guarding. No hernia.   Musculoskeletal: Normal range of motion. He exhibits no edema, tenderness or deformity.   Lymphadenopathy:     He has no cervical adenopathy.   Neurological: He is alert and oriented to person, place, and time.   Skin: Skin is warm and dry. Capillary refill takes less than 2 seconds. No rash noted. He is not diaphoretic. No erythema. No pallor.   Psychiatric: He has a normal mood and affect. His behavior is normal.   Nursing note and vitals reviewed.      Procedures           ED Course                  MDM  Number of Diagnoses or Management Options  Alcohol dependence with unspecified alcohol-induced disorder (CMS/HCC): new and requires workup  Bradycardia: new and requires workup  Hypokalemia: new and requires workup  Hypomagnesemia: new and requires  workup     Amount and/or Complexity of Data Reviewed  Clinical lab tests: ordered and reviewed  Tests in the radiology section of CPT®: ordered and reviewed  Tests in the medicine section of CPT®: ordered and reviewed  Decide to obtain previous medical records or to obtain history from someone other than the patient: yes  Review and summarize past medical records: yes  Discuss the patient with other providers: yes  Independent visualization of images, tracings, or specimens: yes    Risk of Complications, Morbidity, and/or Mortality  Presenting problems: high  Diagnostic procedures: high  Management options: high    Critical Care  Total time providing critical care: < 30 minutes    Patient Progress  Patient progress: stable        Final diagnoses:   Hypomagnesemia   Hypokalemia   Bradycardia   Alcohol dependence with unspecified alcohol-induced disorder (CMS/[facility])            Caridad Morgan DO  08/01/19 1021

## 2019-08-01 NOTE — NURSING NOTE
Patient's 2 year old grandaughter in the ER  and patient would like to go down and view the body.  Spoke with Dr. Ny and he said patient could go down on tele with nurse if it was ok with the house supervisor.  Spoke with house supervisor, Edis and he stated it would be ok.  Myself and DELICIA Clark accompanied patient to the ER and back, while on telemetry.  Vitals stable.

## 2019-08-01 NOTE — ED NOTES
Encouraged pt to give urine sample as soon as he could. He verbalized understanding and said he would as soon as he felt like he could.     Megha Sethi  08/01/19 [id number]

## 2019-08-01 NOTE — PLAN OF CARE
Problem: Patient Care Overview  Goal: Plan of Care Review  Outcome: Ongoing (interventions implemented as appropriate)    Goal: Individualization and Mutuality  Outcome: Ongoing (interventions implemented as appropriate)    Goal: Discharge Needs Assessment  Outcome: Ongoing (interventions implemented as appropriate)    Goal: Interprofessional Rounds/Family Conf  Outcome: Ongoing (interventions implemented as appropriate)      Problem: Nutrition, Imbalanced: Inadequate Oral Intake (Adult)  Goal: Identify Related Risk Factors and Signs and Symptoms  Outcome: Ongoing (interventions implemented as appropriate)    Goal: Improved Oral Intake  Outcome: Ongoing (interventions implemented as appropriate)    Goal: Prevent Further Weight Loss  Outcome: Ongoing (interventions implemented as appropriate)      Problem: Alcohol Withdrawal Acute, Risk/Actual (Adult)  Goal: Signs and Symptoms of Listed Potential Problems Will be Absent, Minimized or Managed (Alcohol Withdrawal Acute, Risk/Actual)  Outcome: Ongoing (interventions implemented as appropriate)      Problem: Fall Risk (Adult)  Goal: Identify Related Risk Factors and Signs and Symptoms  Outcome: Ongoing (interventions implemented as appropriate)    Goal: Absence of Fall  Outcome: Ongoing (interventions implemented as appropriate)

## 2019-08-02 VITALS
BODY MASS INDEX: 27.65 KG/M2 | HEART RATE: 120 BPM | WEIGHT: 208.6 LBS | SYSTOLIC BLOOD PRESSURE: 132 MMHG | TEMPERATURE: 97.8 F | RESPIRATION RATE: 14 BRPM | OXYGEN SATURATION: 97 % | DIASTOLIC BLOOD PRESSURE: 93 MMHG | HEIGHT: 73 IN

## 2019-08-02 PROBLEM — E83.42 HYPOMAGNESEMIA: Status: RESOLVED | Noted: 2018-07-11 | Resolved: 2019-08-02

## 2019-08-02 LAB
ALBUMIN SERPL-MCNC: 3.86 G/DL (ref 3.5–5.2)
ALBUMIN/GLOB SERPL: 1.6 G/DL
ALP SERPL-CCNC: 25 U/L (ref 39–117)
ALT SERPL W P-5'-P-CCNC: 11 U/L (ref 1–41)
ANION GAP SERPL CALCULATED.3IONS-SCNC: 14.5 MMOL/L (ref 5–15)
AST SERPL-CCNC: 18 U/L (ref 1–40)
BASOPHILS # BLD AUTO: 0.02 10*3/MM3 (ref 0–0.2)
BASOPHILS NFR BLD AUTO: 0.2 % (ref 0–1.5)
BILIRUB SERPL-MCNC: 0.6 MG/DL (ref 0.2–1.2)
BUN BLD-MCNC: 21 MG/DL (ref 6–20)
BUN/CREAT SERPL: 16 (ref 7–25)
CALCIUM SPEC-SCNC: 8.3 MG/DL (ref 8.6–10.5)
CHLORIDE SERPL-SCNC: 105 MMOL/L (ref 98–107)
CHOLEST SERPL-MCNC: 123 MG/DL (ref 0–200)
CO2 SERPL-SCNC: 21.5 MMOL/L (ref 22–29)
CREAT BLD-MCNC: 1.31 MG/DL (ref 0.76–1.27)
DEPRECATED RDW RBC AUTO: 45.3 FL (ref 37–54)
EOSINOPHIL # BLD AUTO: 0.07 10*3/MM3 (ref 0–0.4)
EOSINOPHIL NFR BLD AUTO: 0.7 % (ref 0.3–6.2)
ERYTHROCYTE [DISTWIDTH] IN BLOOD BY AUTOMATED COUNT: 14 % (ref 12.3–15.4)
GFR SERPL CREATININE-BSD FRML MDRD: 57 ML/MIN/1.73
GLOBULIN UR ELPH-MCNC: 2.3 GM/DL
GLUCOSE BLD-MCNC: 117 MG/DL (ref 65–99)
HCT VFR BLD AUTO: 38.6 % (ref 37.5–51)
HDLC SERPL-MCNC: 34 MG/DL (ref 40–60)
HGB BLD-MCNC: 12.5 G/DL (ref 13–17.7)
IMM GRANULOCYTES # BLD AUTO: 0.02 10*3/MM3 (ref 0–0.05)
IMM GRANULOCYTES NFR BLD AUTO: 0.2 % (ref 0–0.5)
LDLC SERPL CALC-MCNC: 65 MG/DL (ref 0–100)
LDLC/HDLC SERPL: 1.91 {RATIO}
LIPASE SERPL-CCNC: 100 U/L (ref 13–60)
LYMPHOCYTES # BLD AUTO: 1.23 10*3/MM3 (ref 0.7–3.1)
LYMPHOCYTES NFR BLD AUTO: 13 % (ref 19.6–45.3)
MAGNESIUM SERPL-MCNC: 2.1 MG/DL (ref 1.6–2.6)
MAGNESIUM SERPL-MCNC: 3.1 MG/DL (ref 1.6–2.6)
MCH RBC QN AUTO: 29.3 PG (ref 26.6–33)
MCHC RBC AUTO-ENTMCNC: 32.4 G/DL (ref 31.5–35.7)
MCV RBC AUTO: 90.6 FL (ref 79–97)
MONOCYTES # BLD AUTO: 1.01 10*3/MM3 (ref 0.1–0.9)
MONOCYTES NFR BLD AUTO: 10.7 % (ref 5–12)
NEUTROPHILS # BLD AUTO: 7.12 10*3/MM3 (ref 1.7–7)
NEUTROPHILS NFR BLD AUTO: 75.2 % (ref 42.7–76)
PHOSPHATE SERPL-MCNC: 2.3 MG/DL (ref 2.5–4.5)
PLATELET # BLD AUTO: 186 10*3/MM3 (ref 140–450)
PMV BLD AUTO: 11 FL (ref 6–12)
POTASSIUM BLD-SCNC: 3.1 MMOL/L (ref 3.5–5.2)
POTASSIUM BLD-SCNC: 3.3 MMOL/L (ref 3.5–5.2)
PROT SERPL-MCNC: 6.2 G/DL (ref 6–8.5)
RBC # BLD AUTO: 4.26 10*6/MM3 (ref 4.14–5.8)
SODIUM BLD-SCNC: 141 MMOL/L (ref 136–145)
TRIGL SERPL-MCNC: 121 MG/DL (ref 0–150)
VLDLC SERPL-MCNC: 24.2 MG/DL
WBC NRBC COR # BLD: 9.47 10*3/MM3 (ref 3.4–10.8)

## 2019-08-02 PROCEDURE — 83735 ASSAY OF MAGNESIUM: CPT | Performed by: INTERNAL MEDICINE

## 2019-08-02 PROCEDURE — 84132 ASSAY OF SERUM POTASSIUM: CPT | Performed by: INTERNAL MEDICINE

## 2019-08-02 PROCEDURE — 80061 LIPID PANEL: CPT | Performed by: PHYSICIAN ASSISTANT

## 2019-08-02 PROCEDURE — 25010000002 THIAMINE PER 100 MG: Performed by: INTERNAL MEDICINE

## 2019-08-02 PROCEDURE — 80053 COMPREHEN METABOLIC PANEL: CPT | Performed by: PHYSICIAN ASSISTANT

## 2019-08-02 PROCEDURE — 83690 ASSAY OF LIPASE: CPT | Performed by: PHYSICIAN ASSISTANT

## 2019-08-02 PROCEDURE — 85025 COMPLETE CBC W/AUTO DIFF WBC: CPT | Performed by: PHYSICIAN ASSISTANT

## 2019-08-02 PROCEDURE — 84100 ASSAY OF PHOSPHORUS: CPT | Performed by: PHYSICIAN ASSISTANT

## 2019-08-02 PROCEDURE — 83735 ASSAY OF MAGNESIUM: CPT | Performed by: PHYSICIAN ASSISTANT

## 2019-08-02 PROCEDURE — 99239 HOSP IP/OBS DSCHRG MGMT >30: CPT | Performed by: INTERNAL MEDICINE

## 2019-08-02 PROCEDURE — 25010000002 LORAZEPAM PER 2 MG: Performed by: INTERNAL MEDICINE

## 2019-08-02 RX ORDER — POTASSIUM CHLORIDE 750 MG/1
10 TABLET, FILM COATED, EXTENDED RELEASE ORAL DAILY
Status: DISCONTINUED | OUTPATIENT
Start: 2019-08-02 | End: 2019-08-02 | Stop reason: HOSPADM

## 2019-08-02 RX ORDER — CLOPIDOGREL BISULFATE 75 MG/1
75 TABLET ORAL DAILY
Status: DISCONTINUED | OUTPATIENT
Start: 2019-08-02 | End: 2019-08-02 | Stop reason: HOSPADM

## 2019-08-02 RX ORDER — ASPIRIN 81 MG/1
81 TABLET ORAL DAILY
Status: DISCONTINUED | OUTPATIENT
Start: 2019-08-02 | End: 2019-08-02 | Stop reason: HOSPADM

## 2019-08-02 RX ORDER — HYDROXYZINE HYDROCHLORIDE 10 MG/1
10 TABLET, FILM COATED ORAL 3 TIMES DAILY PRN
Qty: 60 TABLET | Refills: 0 | Status: SHIPPED | OUTPATIENT
Start: 2019-08-02 | End: 2019-10-10 | Stop reason: SDUPTHER

## 2019-08-02 RX ORDER — ADHESIVE TAPE 3"X 2.3 YD
200 TAPE, NON-MEDICATED TOPICAL DAILY
Qty: 30 TABLET | Refills: 0 | Status: SHIPPED | OUTPATIENT
Start: 2019-08-02 | End: 2019-09-10

## 2019-08-02 RX ORDER — PANTOPRAZOLE SODIUM 40 MG/1
40 TABLET, DELAYED RELEASE ORAL DAILY
Status: DISCONTINUED | OUTPATIENT
Start: 2019-08-02 | End: 2019-08-02 | Stop reason: HOSPADM

## 2019-08-02 RX ORDER — METOPROLOL TARTRATE 100 MG/1
100 TABLET ORAL EVERY 12 HOURS SCHEDULED
Status: DISCONTINUED | OUTPATIENT
Start: 2019-08-02 | End: 2019-08-02 | Stop reason: HOSPADM

## 2019-08-02 RX ORDER — POTASSIUM CHLORIDE 20 MEQ/1
40 TABLET, EXTENDED RELEASE ORAL EVERY 4 HOURS
Status: DISCONTINUED | OUTPATIENT
Start: 2019-08-02 | End: 2019-08-02 | Stop reason: HOSPADM

## 2019-08-02 RX ORDER — ATORVASTATIN CALCIUM 40 MG/1
40 TABLET, FILM COATED ORAL DAILY
Status: DISCONTINUED | OUTPATIENT
Start: 2019-08-02 | End: 2019-08-02 | Stop reason: HOSPADM

## 2019-08-02 RX ORDER — CLOPIDOGREL BISULFATE 75 MG/1
TABLET ORAL
Status: COMPLETED
Start: 2019-08-02 | End: 2019-08-02

## 2019-08-02 RX ORDER — MELOXICAM 7.5 MG/1
7.5 TABLET ORAL DAILY
Qty: 30 TABLET | Refills: 0 | Status: SHIPPED | OUTPATIENT
Start: 2019-08-02 | End: 2020-01-13 | Stop reason: SDUPTHER

## 2019-08-02 RX ORDER — LOSARTAN POTASSIUM 50 MG/1
50 TABLET ORAL DAILY
Status: DISCONTINUED | OUTPATIENT
Start: 2019-08-02 | End: 2019-08-02 | Stop reason: HOSPADM

## 2019-08-02 RX ADMIN — POTASSIUM CHLORIDE 40 MEQ: 1500 TABLET, EXTENDED RELEASE ORAL at 10:37

## 2019-08-02 RX ADMIN — ASPIRIN 81 MG: 81 TABLET, COATED ORAL at 11:36

## 2019-08-02 RX ADMIN — POTASSIUM CHLORIDE 40 MEQ: 1500 TABLET, EXTENDED RELEASE ORAL at 03:07

## 2019-08-02 RX ADMIN — LORAZEPAM 2 MG: 2 INJECTION INTRAMUSCULAR; INTRAVENOUS at 10:37

## 2019-08-02 RX ADMIN — CHLORDIAZEPOXIDE HYDROCHLORIDE 25 MG: 25 CAPSULE ORAL at 07:19

## 2019-08-02 RX ADMIN — ATORVASTATIN CALCIUM 40 MG: 40 TABLET, FILM COATED ORAL at 11:36

## 2019-08-02 RX ADMIN — CLOPIDOGREL BISULFATE 75 MG: 75 TABLET ORAL at 11:41

## 2019-08-02 RX ADMIN — POTASSIUM CHLORIDE 10 MEQ: 750 TABLET, FILM COATED, EXTENDED RELEASE ORAL at 11:36

## 2019-08-02 RX ADMIN — THIAMINE HYDROCHLORIDE 100 ML/HR: 100 INJECTION, SOLUTION INTRAMUSCULAR; INTRAVENOUS at 08:36

## 2019-08-02 RX ADMIN — METOPROLOL TARTRATE 100 MG: 100 TABLET, FILM COATED ORAL at 11:36

## 2019-08-02 RX ADMIN — SODIUM CHLORIDE, PRESERVATIVE FREE 3 ML: 5 INJECTION INTRAVENOUS at 08:37

## 2019-08-02 RX ADMIN — LORAZEPAM 1 MG: 1 TABLET ORAL at 03:07

## 2019-08-02 RX ADMIN — PANTOPRAZOLE SODIUM 40 MG: 40 TABLET, DELAYED RELEASE ORAL at 11:36

## 2019-08-02 RX ADMIN — CLOPIDOGREL 75 MG: 75 TABLET, FILM COATED ORAL at 11:41

## 2019-08-02 RX ADMIN — LORAZEPAM 2 MG: 2 INJECTION INTRAMUSCULAR; INTRAVENOUS at 08:36

## 2019-08-02 RX ADMIN — LOSARTAN POTASSIUM 50 MG: 50 TABLET, FILM COATED ORAL at 11:36

## 2019-08-02 NOTE — PROGRESS NOTES
Discharge Planning Assessment   John     Patient Name: Onel Jimenez  MRN: [id number]  Today's Date: 8/2/2019    Admit Date: 8/1/2019    Discharge Plan     Row Name 08/02/19 1151       Plan    Patient/Family in Agreement with Plan  yes    Final Discharge Disposition Code  01 - home or self-care    Final Note  Pt is being discharged home on this date. SS recieved an order to arrange a rolling walker. Pt has no preference on DME agency. SS contacted [facility] per Gina who is agreeable to provide the walker to pt in the hospital. SS faxed the order and pt's information to [facility]. [facility] will deliver the walker to pt's hospital room. No other needs identified.        Kristin Bejarano

## 2019-08-02 NOTE — DISCHARGE SUMMARY
[facility] HOSPITALIST MEDICINE DISCHARGE SUMMARY    Patient Identification:  Name:  Onel Jimenez  Age:  54 y.o.  Sex:  male  :  1965  MRN:  [id number]  Visit Number:  [id number]    Date of Admission: 2019  Date of Discharge:  2019     PCP: Alex Canales MD    DISCHARGE DIAGNOSIS  · Hypomagnesemia, improved  · Hypokalemia, improved  · Sinus bradycardia, improved  · EtOH abuse/dependence with history of DTs in the past  · Nausea and vomiting, improved  · Lactic acidosis likely related to alcoholism, improved  · Anion gap metabolic acidosis, improved  · Essential hypertension  · Dyslipidemia  · History of WPW syndrome  · Severe ASCVD      CONSULTS       PROCEDURES PERFORMED      REASON FOR ADMISSION  Patient is a 54 y.o. male presented to [facility] complaining of nausea, vomiting and generalized weakness.  Please see the admitting history and physical for further details.       HOSPITAL COURSE   Mr. Jimenez is a 54 y.o. male with past medical history significant for alcoholism with previous delirium tremens, hypertension, dyslipidemia, multivessel CAD, and reported WPW syndrome. He presented to the emergency department of [facility] on 2019 with complaints of nausea/vomiting, headache, and generalized weakness.  Patient was found to have severe hypomagnesemia and hypokalemia and was admitted for further evaluation and management.    Patient was started on potassium and magnesium replacement.  He also had sinus bradycardia and his metoprolol was held but his heart rate improved.  Patient has history of alcoholism with poor p.o. intake which was thought to be contributing to his hypomagnesemia and hypokalemia.  Potassium and magnesium level improved.  Patient had a episode of syncope 2 days prior to admission.  CT scan of the head was unremarkable.  Carotid Doppler study showed no significant stenosis of either right or left ICA.  2D echocardiogram showed  normal LV systolic function, EF of 61 to 65%, grade 1 diastolic dysfunction and no evidence of pericardial effusion.  Patient had slightly elevated creatinine which is unchanged since yesterday.  Patient also had anion gap metabolic acidosis and elevated lactic acid due to alcohol abuse/dependence.  This improved with IV fluids.  Patient had a death in the family was wanting to go home.  He was able to ambulate with help of walker.  Patient has not had his morning metoprolol and his heart rate was elevated.  Metoprolol has been restarted and patient will receive his metoprolol prior to discharge.  Patient is being discharged home in stable condition.    Vitals:    08/02/19 1002   BP: 146/91   Pulse: (!) 134   Resp: 17   Temp:    SpO2: 95%        On examination patient was lying comfortably in bed, in no acute distress.  His lungs are clear to auscultation bilaterally.    Cardiovascular examination revealed tachycardia with regular rhythm, no murmurs rubs or gallops.  Abdomen was soft and nontender.  No viscera was palpable.  Bowel sounds were audible.  Patient was awake, alert and oriented to place, person and time.  Strength was bilaterally symmetrical in upper and lower extremities.      DISCHARGE DISPOSITION   Stable    DISCHARGE MEDICATIONS:     Discharge Medications      New Medications      Instructions Start Date   hydrOXYzine 10 MG tablet  Commonly known as:  ATARAX   10 mg, Oral, 3 Times Daily PRN      Magnesium Oxide 200 MG tablet   200 mg, Oral, Daily      meloxicam 7.5 MG tablet  Commonly known as:  MOBIC   7.5 mg, Oral, Daily         Continue These Medications      Instructions Start Date   aspirin 81 MG EC tablet   81 mg, Oral, Daily      atorvastatin 40 MG tablet  Commonly known as:  LIPITOR   TAKE ONE TABLET BY MOUTH ONE TIME DAILY      clopidogrel 75 MG tablet  Commonly known as:  PLAVIX   TAKE ONE TABLET BY MOUTH ONE TIME DAILY      fenofibrate 145 MG tablet  Commonly known as:  TRICOR   TAKE ONE  TABLET BY MOUTH ONE TIME DAILY      losartan 100 MG tablet  Commonly known as:  COZAAR   50 mg, Oral, Daily, for blood pressure      metoprolol tartrate 100 MG tablet  Commonly known as:  LOPRESSOR   TAKE ONE TABLET BY MOUTH EVERY TWELVE HOURS      nitroglycerin 0.4 MG SL tablet  Commonly known as:  NITROSTAT   0.4 mg, Sublingual, Every 5 Minutes PRN, Take no more than 3 doses in 15 minutes.       pantoprazole 40 MG EC tablet  Commonly known as:  PROTONIX   40 mg, Oral, Daily      potassium chloride 10 MEQ CR tablet  Commonly known as:  K-DUR   TAKE ONE TABLET BY MOUTH ONE TIME DAILY         Stop These Medications    hydrochlorothiazide 12.5 MG tablet  Commonly known as:  HYDRODIURIL            Diet Instructions     Diet: Regular      Discharge Diet:  Regular          Activity Instructions     Activity as Tolerated          Additional Instructions for the Follow-ups that You Need to Schedule     Discharge Follow-up with PCP   As directed       Currently Documented PCP:    Alex Canales MD    PCP Phone Number:    [phone]     Follow Up Details:  1 week           Follow-up Information     Alex Canales MD .    Specialty:  Cardiology  Why:  1 week  Contact information:  [address] [place] [address]  [phone]                   TEST  RESULTS PENDING AT DISCHARGE       Sukumar Ny MD  08/02/19  11:22 AM    Please note that this discharge summary required more than 30 minutes to complete.    Please send a copy of this dictation to the following providers:    Alex Canales MD

## 2019-08-02 NOTE — DISCHARGE PLACEMENT REQUEST
"Onel Cunha (54 y.o. Male)     Date of Birth Social Security Number Address Home Phone MRN    1965  [address] [facility] [address] [phone] [id number]    Jain Marital Status          None        Admission Date Admission Type Admitting Provider Attending Provider Department, Room/Bed    19 Emergency Sukumar Ny MD Khan, Hafiz Sarfraz, MD [facility], P208/1P    Discharge Date Discharge Disposition Discharge Destination         Home or Self Care              Attending Provider:  Sukumar Ny MD    Allergies:  Niacin    Isolation:  None   Infection:  None   Code Status:  CPR    Ht:  185.4 cm (73\")   Wt:  94.6 kg (208 lb 9.6 oz)    Admission Cmt:  None   Principal Problem:  None                Active Insurance as of 2019     Primary Coverage     Payor Plan Insurance Group Employer/Plan Group    ANTHEM MEDICAID ANTHEM MEDICAID [id number]     Payor Plan Address Payor Plan Phone Number Payor Plan Fax Number Effective Dates    [address] [address] [phone]  2014 - None Entered    [facility] [id number]       Subscriber Name Subscriber Birth Date Member ID       ONEL CUNHA 1965 [id number]                 Emergency Contacts      (Rel.) Home Phone Work Phone Mobile Phone    Yasir Cunha (Son) [phone] -- --    [place] Cunha [phone] -- --        [facility]  [address] [facility] [address]  Dept. Phone:  [phone]  Dept. Fax:   Date Ordered: Aug 2, 2019         Patient:  Onel Cunha MRN:  [id number]   [address]  [place] [address] :  1965  SSN:    Phone: [phone] Sex:  M     Weight: 94.6 kg (208 lb 9.6 oz)         Ht Readings from Last 1 Encounters:   19 185.4 cm (73\")         Walker               (Order ID: [id number])    Diagnosis:  Generalized weakness (R53.1 [ICD-10-CM] 780.79 [ICD-9-CM])   Quantity:  1     Equipment:  Walker Folding with " Wheels  Length of Need (99 Months = Lifetime): 99 Months = Lifetime        Authorizing Provider's Phone: [phone]   Authorizing Provider:Sukumar Ny MD  Authorizing Provider's NPI: [id number]  Order Entered By: Sukumar Ny MD 8/2/2019 11:33 AM     Electronically signed by: Sukumar Ny MD 8/2/2019 11:33 AM            Emergency Contact Information     Name Relation Home Work Mobile    Yasir Jimenez [phone]      [place] Jimenez  [phone]            Insurance Information                ANTHEM MEDICAID/ANTHEM MEDICAID Phone: [phone]    Subscriber: Onel Jimenez Subscriber#: [id number]    Group#: [id number] Precert#:           Treatment Team     Provider Relationship Specialty Contact    Sukumar Ny MD Attending, Attending Provider, Physician of Record Internal Medicine [phone]    Jia Veloz RN Charge Nurse -- [phone]    Ivette Do Patient Care Technician --     Kristin Ricks CNA Unit [place] --     Aiyana Kelley RN Registered Nurse --     Emilee Hoover Patient Care Technician --     Antoine Anders CRT Respiratory Therapist --           Problem List           Codes Noted - Resolved       Non-Hospital    Sorethroat ICD-10-CM: J02.9  ICD-9-CM: 462 7/11/2018 - Present    Hypokalemia ICD-10-CM: E87.6  ICD-9-CM: 276.8 7/11/2018 - Present    Hypocalcemia ICD-10-CM: E83.51  ICD-9-CM: 275.41 7/11/2018 - Present    Encounter for screening colonoscopy ICD-10-CM: Z12.11  ICD-9-CM: V76.51 7/24/2017 - Present    Gastroesophageal reflux disease without esophagitis ICD-10-CM: K21.9  ICD-9-CM: 530.81 8/2/2016 - Present    CAD (coronary artery disease), 90% proximal RCA, total occlusion of posterior lateral branch of RCA, total occlusion of second marginal with left to left collaterals, 40% LAD, 40% left main ICD-10-CM: I25.10  ICD-9-CM: 414.00 Unknown - Present    Anxiety disorder ICD-10-CM: F41.9  ICD-9-CM: 300.00 Unknown - Present     Chronic alcoholism (CMS/HCC) ICD-10-CM: F10.20  ICD-9-CM: 303.90 Unknown - Present    Essential hypertension ICD-10-CM: I10  ICD-9-CM: 401.9 2016 - Present    Hyperlipidemia type IV,uncontrolled ICD-10-CM: E78.1  ICD-9-CM: 272.1 2016 - Present             History & Physical      Sukumar Ny MD at 2019 11:51 AM               [facility] HOSPITALIST HISTORY AND PHYSICAL    Patient Identification:  Name:  Onel Jimenez  Age:  54 y.o.  Sex:  male  :  1965  MRN:  [id number]   Visit Number:  [id number]  Primary Care Physician:  Alex Canales MD     2019  8:23 AM    Subjective     Chief complaint:   Chief Complaint   Patient presents with   • Vomiting   • Headache   • Weakness - Generalized     History of presenting illness:   Mr. Jimenez is a 54 y.o. male with past medical history significant for alcoholism with previous delirium tremens, hypertension, dyslipidemia, multivessel CAD, and reported WPW syndrome. He presented to the emergency department of [facility] on 2019 with complaints of nausea/vomiting, headache, and generalized weakness.  He reports having recurrent nausea and vomiting for the last week.  He has had generalized abdominal cramps, but no specific abdominal pain.  No changes in bowel movements.  No chest pains or shortness of breath.  He reports taking his cardiac medications regularly and appears to keep regular follow-up with his cardiologist, Dr. Canales.  He continues to drink large quantities of alcohol.  He drinks at least a pint of hard liquor daily.  His last drink was about 2 days ago per the patient.  He reports falling a couple days ago with reported syncopal episode as well with no reported injury.    Upon arrival to the ED, vitals were /77, respirations 26, heart rate 78, SPO2 98%, afebrile.  Initial troponin is negative.  Potassium is 2.4 and magnesium is 0.3.  Lipase is mildly elevated at 71.  Lactic acid elevated at  3.8.  Anion gap is elevated at 21 with bicarb of 20.0.  Subsequent ABG shows pH 7.550, PCO2 22.6, PO2 60.7, HCO3 19.3, arterial O2 saturation 93.2% on room air.  CBC is completely unremarkable.  UA, urine toxicology screen, and ethanol levels are negative.  He had a CT scan of the head without contrast which was unremarkable per radiology. Patient has been admitted to the progressive care unit of [facility] for further evaluation and therapy.   ---------------------------------------------------------------------------------------------------------------------   Review of Systems   Constitutional: Positive for activity change, appetite change and fatigue. Negative for chills and fever.   HENT: Negative for congestion, postnasal drip, sinus pressure and sinus pain.    Respiratory: Negative for cough, shortness of breath and wheezing.    Cardiovascular: Negative for chest pain, palpitations and leg swelling.   Gastrointestinal: Positive for nausea and vomiting. Negative for abdominal distention, abdominal pain, constipation and diarrhea.   Genitourinary: Negative for difficulty urinating, dysuria, frequency and hematuria.   Musculoskeletal: Negative for arthralgias and back pain.   Skin: Negative for color change, pallor and rash.   Neurological: Positive for headaches. Negative for syncope and weakness.   Psychiatric/Behavioral: Negative for agitation, behavioral problems and confusion.      ---------------------------------------------------------------------------------------------------------------------   Past Medical History:   Diagnosis Date   • Anxiety disorder    • CAD (coronary artery disease)    • Chronic alcoholism (CMS/HCC)    • Hyperlipidemia    • Hypertension    • WPW (Zakiya-Parkinson-White syndrome)      Past Surgical History:   Procedure Laterality Date   • CARDIAC CATHETERIZATION  06/06/2015   • CARDIOVASCULAR STRESS TEST  06/06/2015   • ECHO - CONVERTED  06/06/2015   • EYE SURGERY        Family History   Problem Relation Age of Onset   • Heart attack Father    • Heart disease Father    • Heart failure Father    • Heart disease Sister    • Heart failure Sister    • Heart disease Brother    • Heart failure Brother    • Heart failure Sister    • Heart disease Sister      Social History     Socioeconomic History   • Marital status:      Spouse name: Not on file   • Number of children: Not on file   • Years of education: Not on file   • Highest education level: Not on file   Tobacco Use   • Smoking status: Former Smoker     Packs/day: 1.00     Years: 20.00     Pack years: 20.00     Types: Cigarettes     Last attempt to quit:      Years since quittin.5   • Smokeless tobacco: Current User     Types: Chew   • Tobacco comment: chew 1 can per week   Substance and Sexual Activity   • Alcohol use: Yes     Alcohol/week: 6.0 oz     Types: 6 Cans of beer, 4 Shots of liquor per week     Comment: pint of alcohol every other day    • Drug use: Yes     Types: Hydrocodone     Comment: occ   • Sexual activity: Defer     ---------------------------------------------------------------------------------------------------------------------   Allergies:  Niacin  ---------------------------------------------------------------------------------------------------------------------   Prior to Admission Medications     Prescriptions Last Dose Informant Patient Reported? Taking?    amLODIPine (NORVASC) 5 MG tablet   No No    Take 1 tablet by mouth Daily.    aspirin 81 MG EC tablet   No No    Take 1 tablet by mouth Daily.    atorvastatin (LIPITOR) 40 MG tablet   No No    TAKE ONE TABLET BY MOUTH ONE TIME DAILY    clopidogrel (PLAVIX) 75 MG tablet   No No    TAKE ONE TABLET BY MOUTH ONE TIME DAILY    fenofibrate (TRICOR) 145 MG tablet   No No    TAKE ONE TABLET BY MOUTH ONE TIME DAILY    losartan (COZAAR) 100 MG tablet   No No    Take 0.5 tablets by mouth Daily. for blood pressure    metoprolol tartrate  (LOPRESSOR) 100 MG tablet   No No    TAKE ONE TABLET BY MOUTH EVERY TWELVE HOURS    pantoprazole (PROTONIX) 40 MG EC tablet   No No    Take 1 tablet by mouth Daily.    potassium chloride (K-DUR) 10 MEQ CR tablet   No No    TAKE ONE TABLET BY MOUTH ONE TIME DAILY        ---------------------------------------------------------------------------------------------------------------------  Objective   Hospital Scheduled Meds:    magnesium sulfate 2 g Intravenous Once        ---------------------------------------------------------------------------------------------------------------------   Vital Signs:  Temp:  [97.6 °F (36.4 °C)] 97.6 °F (36.4 °C)  Heart Rate:  [43-81] 73  Resp:  [26] 26  BP: (117-154)/(71-96) 140/96  Mean Arterial Pressure (Non-Invasive) for the past 24 hrs (Last 3 readings):   Noninvasive MAP (mmHg)   08/01/19 1047 112   08/01/19 1016 106   08/01/19 1002 106     SpO2 Percentage    08/01/19 1002 08/01/19 1016 08/01/19 1047   SpO2: 100% 100% 98%     SpO2:  [94 %-100 %] 98 %  on   ;        Body mass index is 22.5 kg/m².  Wt Readings from Last 3 Encounters:   08/01/19 81.6 kg (180 lb)   06/26/19 86.2 kg (190 lb)   04/10/19 85.7 kg (189 lb)   ---------------------------------------------------------------------------------------------------------------------   Physical Exam:  Constitutional:  Well-developed and well-nourished.  No respiratory distress. Drowsy but arousable.      HENT:  Head: Normocephalic and atraumatic.  Mouth:  Moist mucous membranes.    Eyes:  Conjunctivae and EOM are normal. No scleral icterus. No erythema or drainage.   Neck:  Neck supple.  No JVD present. No carotid bruits.   Cardiovascular:  Normal rate, regular rhythm and normal heart sounds with no murmur.  Pulmonary/Chest:  No respiratory distress, no wheezes, no crackles, with normal breath sounds and good air movement.  Abdominal:  Soft.  Bowel sounds are present x4.  No distension and no tenderness.   Musculoskeletal:  No  edema, no tenderness, and no deformity.  No red or swollen joints anywhere.    Neurological:  Alert and oriented to person, place, and time.  No cranial nerve deficit.  No tongue deviation.  No facial droop.  No slurred speech.   Skin:  Skin is warm and dry.  No rash noted.  No pallor.   Psychiatric:  Normal mood and affect.  Behavior is normal.  Judgment and thought content normal.   Peripheral vascular:  No edema and 2+ pedal pulses bilaterally.   Genitourinary: No alejandra catheter in place.   ---------------------------------------------------------------------------------------------------------------------  EKG:        I have personally reviewed the EKG.  ---------------------------------------------------------------------------------------------------------------------   Results from last 7 days   Lab Units 08/01/19  0840   CK TOTAL U/L 72   TROPONIN T ng/mL <0.010         Results from last 7 days   Lab Units 08/01/19  0837   PH, ARTERIAL pH units 7.550*   PO2 ART mm Hg 60.7*   PCO2, ARTERIAL mm Hg 22.6*   HCO3 ART mmol/L 19.3*     Results from last 7 days   Lab Units 08/01/19  0840   LACTATE mmol/L 3.8*   WBC 10*3/mm3 7.64   HEMOGLOBIN g/dL 13.9   HEMATOCRIT % 41.3   MCV fL 87.3   MCHC g/dL 33.7   PLATELETS 10*3/mm3 218     Results from last 7 days   Lab Units 08/01/19  0840   SODIUM mmol/L 139   POTASSIUM mmol/L 2.4*   MAGNESIUM mg/dL 0.3*   CHLORIDE mmol/L 98   CO2 mmol/L 20.0*   BUN mg/dL 19   CREATININE mg/dL 1.07   EGFR IF NONAFRICN AM mL/min/1.73 71   CALCIUM mg/dL 9.0   GLUCOSE mg/dL 144*   ALBUMIN g/dL 4.73   BILIRUBIN mg/dL 1.2   ALK PHOS U/L 26*   AST (SGOT) U/L 17   ALT (SGPT) U/L 14   Estimated Creatinine Clearance: 89 mL/min (by C-G formula based on SCr of 1.07 mg/dL).  No results found for: AMMONIA    Glucose   Date/Time Value Ref Range Status   08/01/2019 0839 128 70 - 130 mg/dL Final     Lab Results   Component Value Date    HGBA1C 5.5 06/05/2015     Lab Results   Component Value Date    TSH  1.009 06/05/2015    FREET4 1.04 06/05/2015        Pain Management Panel     Pain Management Panel Latest Ref Rng & Units 8/1/2019 3/31/2019    AMPHETAMINES SCREEN, URINE Negative Negative Negative    BARBITURATES SCREEN Negative Negative Negative    BENZODIAZEPINE SCREEN, URINE Negative Negative Negative    BUPRENORPHINEUR Negative Negative Negative    COCAINE SCREEN, URINE Negative Negative Negative    METHADONE SCREEN, URINE Negative Negative Negative        I have personally reviewed the above laboratory results.   ---------------------------------------------------------------------------------------------------------------------  Imaging Results (last 7 days)     Procedure Component Value Units Date/Time    CT Head Without Contrast [[id number]] Collected:  08/01/19 0918     Updated:  08/01/19 0931    Narrative:       EXAMINATION: CT HEAD WO CONTRAST-      CLINICAL INDICATION:     hand tingling     COMPARISON:    None     Technique: Multiple CT axial images were obtained through the level of  the brain without IV contrast administration. Reformatted images in the  coronal and/or sagittal plane(s) were generated from the axial data set  to facilitate diagnostic accuracy and/or surgical planning.     Radiation dose reduction techniques were utilized per ALARA protocol.  Automated exposure control was initiated through either or CareDoScanbuy or  DoseRigOrganizedWisdom software packages by  protocol.       DOSE (DLP mGy-cm):     FINDINGS:     Brain: Unremarkable. No parenchymal hemorrhage or mass. No white matter  abnormality. No areas of mass effect. Mild generalized cerebral atrophy  and chronic small vessel ischemic disease.  Ventricles: Unremarkable. No hydrocephalus.  Extra-axial spaces: Unremarkable. No extra-axial hemorrhage. No  extra-axial masses.  Bones: Unremarkable. No acute fracture identified.  Sinuses: Unremarkable as visualized. No air-fluid levels.  Mastoids: Unremarkable. No mastoid effusions.  Soft  Tissues: Unremarkable.          Impression:       No CT evidence of acute intracranial abnormality.     This report was finalized on 8/1/2019 9:21 AM by Dr. Say Olmedo MD.           Left heart catheterization 6/5/2015      I have personally reviewed the above radiology results.   ---------------------------------------------------------------------------------------------------------------------  Assessment & Plan      -Acute hypomagnesemia and hypokalemia: Replace electrolytes per protocol. Repeat levels in AM. Monitor on telemetry.     -Sinus bradycardia: Hold home metoprolol. Check TSH.  Replace magnesium and potassium.  Monitor on telemetry.    -Reported episode of syncope 2 days ago: No reported injury. CT head unremarkable.  Check carotid dopplers and transthoracic echocardiogram.  He has had some sinus bradycardia in the ED.  Hold metoprolol.  Monitor on telemetry.     -Alcoholism with history of delirium tremens: Placed on CIWA protocol with scheduled libriuim 50mg every 6 hours and will taper as tolerated. PRN Ativan. Place on seizure and fall precautions.    -Nausea/vomiting: Lipase mildly elevated. Abdomen nontender. Repeat lipase in AM. Continue IV fluids. Place on low dose PRN IV phenergan. QT is borderline prolonged. Will try to avoid zofran for now.     -Borderline prolonged QTc, 449ms: Replace magnesium. Avoid QT prolonging agents.     -Acute respiratory alkalosis with partial metabolic compensation: Normal RR at this time after receiving Librium and Ativan in the ED. Continue to monitor.     -Lactic acidosis: Likely related to alcoholism.     -Severe, multi-vessel coronary artery disease with some anterolateral ST/T wave abnormalities on EKG: No intervention performed as of yet.  He has been maintained on medical therapy which he reports compliance with. Continue home aspirin, statin, losartan, and amlodipine.  Beta-blocker being held secondary to sinus bradycardia.  Denies any recent chest  pains or discomfort.  Will follow serial troponins.  Lipid panel in a.m. Echo pending.     -Essential hypertension: Continue home losartan and amlodipine once confirmed by pharmacy.  Hold metoprolol due to bradycardia.    -Dyslipidemia: Continue home statin. Check lipid panel in AM.     -History of Marcelino-Parkinson-White Syndrome: Complete history unavailable. Correct electrolytes. Monitor on telemetry.     -DVT Prophylaxis: TEDS/SCDS.     The patient is considered to be a high risk patient due to: Multiple electrolyte abnormalities, alcoholism, history of DTs.     Code Status: FULL CODE.     I have discussed the patient's assessment and plan with the patient and admitting physician, Dr. Ny.     NAINA Narayan  08/01/19  11:51 AM  ---------------------------------------------------------------------------------------------------------------------   I have seen and examined the patient. I agree with the assessment and plan of NAINA Narayan.    54-year-old male with past history significant for alcohol abuse/addiction presented to the emergency department with complaints of dizziness and lightheadedness.  He states that he has been nauseated for 1 week and has had abdominal pain which is resolved now.  Patient states that he has been getting dizzy and lightheaded and therefore presented to the emergency department.  Patient was found to have hypokalemia and hypomagnesemia and was admitted.    On examination patient was lying comfortably in bed, in no acute distress.  His lungs are clear to auscultation bilaterally.  Cardia vascular examination revealed S1 plus S2 and no murmurs, rubs or gallops.  Abdomen was soft and nontender.  No viscera was palpable.  Bowel sounds were audible.  Patient was awake, alert and oriented to place, person and time.  Strength was bilaterally symmetrical in upper and lower extremities.  Labs reviewed.    A/P:  54-year-old male admitted with severe hypomagnesemia and  hypokalemia likely due to poor p.o. intake and alcohol use.  Patient will have electrolyte replacement per protocol.  We will continue him on CIWA protocol due to history of alcohol abuse and history of alcohol withdrawal.  We will continue him on multivitamins and IV fluids.  Patient has an anion gap acidosis due to elevated lactic acid however, there is no evidence of infection so far.  His lactic acid elevation is likely secondary to decreased p.o. intake, nausea and alcohol intake.  We will continue him on IV fluids and recheck lactic acid level.      Electronically signed by Sukumar Ny MD at 8/1/2019 11:48 PM

## 2019-08-02 NOTE — PROGRESS NOTES
Discharge Planning Assessment   [facility]     Patient Name: Onel Jimenez  MRN: [id number]  Today's Date: 8/2/2019    Admit Date: 8/1/2019    Discharge Needs Assessment     Row Name 08/02/19 1145       Living Environment    Lives With  child(tu), adult Pt lives at home with her son, Vladimir.    Name(s) of Who Lives With Patient  -- Son - Vladimir Jimenez    Current Living Arrangements  home/apartment/condo    Primary Care Provided by  self    Provides Primary Care For  no one    Quality of Family Relationships  supportive;helpful;involved    Able to Return to Prior Arrangements  yes       Transition Planning    Patient/Family Anticipates Transition to  home    Transportation Anticipated  family or friend will provide       Discharge Needs Assessment    Equipment Currently Used at Home  none    Equipment Needed After Discharge  walker, rolling Pt requests a rolling walker to be arranged at discharge and does not have a preference on DME agency.     Outpatient/Agency/Support Group Needs  -- Pt does not utilize home health services.         Discharge Plan     Row Name 08/02/19 1147       Plan    Plan  Pt lives at home with son and plans to return home at discharge. Pt does not utilize home health services or DME. Pt's PCP is Dr. Canales. Pt does not have a POA or advance directive. Pt utilizes [facility] for prescriptions. Pt's son to provide transportation at discharge. SS will follow and assist as needed with discharge planning.     Patient/Family in Agreement with Plan  yes     Demographic Summary     Row Name 08/02/19 1145       General Information    Admission Type  inpatient    Referral Source  nursing    Reason for Consult  discharge planning    Preferred Language  English     Used During This Interaction  no     Kristin Bejarano

## 2019-08-02 NOTE — PLAN OF CARE
Problem: Patient Care Overview  Goal: Plan of Care Review  Outcome: Ongoing (interventions implemented as appropriate)      Problem: Nutrition, Imbalanced: Inadequate Oral Intake (Adult)  Goal: Identify Related Risk Factors and Signs and Symptoms  Outcome: Ongoing (interventions implemented as appropriate)      Problem: Alcohol Withdrawal Acute, Risk/Actual (Adult)  Goal: Signs and Symptoms of Listed Potential Problems Will be Absent, Minimized or Managed (Alcohol Withdrawal Acute, Risk/Actual)  Outcome: Ongoing (interventions implemented as appropriate)      Problem: Fall Risk (Adult)  Goal: Identify Related Risk Factors and Signs and Symptoms  Outcome: Ongoing (interventions implemented as appropriate)

## 2019-08-02 NOTE — PAYOR COMM NOTE
"  [facility]  NPI: [id number]    Utilization Review   Contact:Tg Jackson MSN, APRN, NP-C  Phone: [phone]  Fax: [phone]    Philip mcaid/Attn: charlee Arambula Req  REF: [id number]  DX: E83.42, E87.6, R00.1  Onel Jimenez (54 y.o. Male)     Date of Birth Social Security Number Address [place] Phone MRN    1965  [address] [facility] [phone] [id number]    Advent Marital Status          None        Admission Date Admission Type Admitting Provider Attending Provider Department, Room/Bed    19 Emergency Sukumar Ny MD Khan, Hafiz Sarfraz, MD [facility] PROGRESS CARE, P208/1P    Discharge Date Discharge Disposition Discharge Destination                       Attending Provider:  Sukumar Ny MD    Allergies:  Niacin    Isolation:  None   Infection:  None   Code Status:  CPR    Ht:  185.4 cm (73\")   Wt:  94.6 kg (208 lb 9.6 oz)    Admission Cmt:  None   Principal Problem:  None                Active Insurance as of 2019     Primary Coverage     Payor Plan Insurance Group Employer/Plan Group    ANTH MEDICAID [facility] MEDICAID [id number]     Payor Plan Address Payor Plan Phone Number Payor Plan Fax Number Effective Dates    [address] [address] [phone]  2014 - None Entered    [facility] [id number]       Subscriber Name Subscriber Birth Date Member ID       ONEL JIMENEZ 1965 [id number]                 Emergency Contacts      (Rel.) Home Phone Work Phone Mobile Phone    Yasir Jimenez (Son) [phone] -- --    Barbara Washington [phone] -- --               History & Physical      Sukumar Ny MD at 2019 11:51 AM               [facility] HOSPITALIST HISTORY AND PHYSICAL    Patient Identification:  Name:  Onel Jimenez  Age:  54 y.o.  Sex:  male  :  1965  MRN:  [id number]   Visit Number:  [id number]  Primary Care Physician:  Alex Canales MD     2019  8:23 " AM    Subjective     Chief complaint:   Chief Complaint   Patient presents with   • Vomiting   • Headache   • Weakness - Generalized     History of presenting illness:   Mr. Jimenez is a 54 y.o. male with past medical history significant for alcoholism with previous delirium tremens, hypertension, dyslipidemia, multivessel CAD, and reported WPW syndrome. He presented to the emergency department of [facility] on 8/1/2019 with complaints of nausea/vomiting, headache, and generalized weakness.  He reports having recurrent nausea and vomiting for the last week.  He has had generalized abdominal cramps, but no specific abdominal pain.  No changes in bowel movements.  No chest pains or shortness of breath.  He reports taking his cardiac medications regularly and appears to keep regular follow-up with his cardiologist, Dr. Canales.  He continues to drink large quantities of alcohol.  He drinks at least a pint of hard liquor daily.  His last drink was about 2 days ago per the patient.  He reports falling a couple days ago with reported syncopal episode as well with no reported injury.    Upon arrival to the ED, vitals were /77, respirations 26, heart rate 78, SPO2 98%, afebrile.  Initial troponin is negative.  Potassium is 2.4 and magnesium is 0.3.  Lipase is mildly elevated at 71.  Lactic acid elevated at 3.8.  Anion gap is elevated at 21 with bicarb of 20.0.  Subsequent ABG shows pH 7.550, PCO2 22.6, PO2 60.7, HCO3 19.3, arterial O2 saturation 93.2% on room air.  CBC is completely unremarkable.  UA, urine toxicology screen, and ethanol levels are negative.  He had a CT scan of the head without contrast which was unremarkable per radiology. Patient has been admitted to the progressive care unit of [facility] for further evaluation and therapy.   ---------------------------------------------------------------------------------------------------------------------   Review of Systems   Constitutional:  Positive for activity change, appetite change and fatigue. Negative for chills and fever.   HENT: Negative for congestion, postnasal drip, sinus pressure and sinus pain.    Respiratory: Negative for cough, shortness of breath and wheezing.    Cardiovascular: Negative for chest pain, palpitations and leg swelling.   Gastrointestinal: Positive for nausea and vomiting. Negative for abdominal distention, abdominal pain, constipation and diarrhea.   Genitourinary: Negative for difficulty urinating, dysuria, frequency and hematuria.   Musculoskeletal: Negative for arthralgias and back pain.   Skin: Negative for color change, pallor and rash.   Neurological: Positive for headaches. Negative for syncope and weakness.   Psychiatric/Behavioral: Negative for agitation, behavioral problems and confusion.      ---------------------------------------------------------------------------------------------------------------------   Past Medical History:   Diagnosis Date   • Anxiety disorder    • CAD (coronary artery disease)    • Chronic alcoholism (CMS/[facility])    • Hyperlipidemia    • Hypertension    • WPW (Zakiya-Parkinson-White syndrome)      Past Surgical History:   Procedure Laterality Date   • CARDIAC CATHETERIZATION  06/06/2015   • CARDIOVASCULAR STRESS TEST  06/06/2015   • ECHO - CONVERTED  06/06/2015   • EYE SURGERY       Family History   Problem Relation Age of Onset   • Heart attack Father    • Heart disease Father    • Heart failure Father    • Heart disease Sister    • Heart failure Sister    • Heart disease Brother    • Heart failure Brother    • Heart failure Sister    • Heart disease Sister      Social History     Socioeconomic History   • Marital status:      Spouse name: Not on file   • Number of children: Not on file   • Years of education: Not on file   • Highest education level: Not on file   Tobacco Use   • Smoking status: Former Smoker     Packs/day: 1.00     Years: 20.00     Pack years: 20.00     Types:  Cigarettes     Last attempt to quit:      Years since quittin.5   • Smokeless tobacco: Current User     Types: Chew   • Tobacco comment: chew 1 can per week   Substance and Sexual Activity   • Alcohol use: Yes     Alcohol/week: 6.0 oz     Types: 6 Cans of beer, 4 Shots of liquor per week     Comment: pint of alcohol every other day    • Drug use: Yes     Types: Hydrocodone     Comment: occ   • Sexual activity: Defer     ---------------------------------------------------------------------------------------------------------------------   Allergies:  Niacin  ---------------------------------------------------------------------------------------------------------------------   Prior to Admission Medications     Prescriptions Last Dose Informant Patient Reported? Taking?    amLODIPine (NORVASC) 5 MG tablet   No No    Take 1 tablet by mouth Daily.    aspirin 81 MG EC tablet   No No    Take 1 tablet by mouth Daily.    atorvastatin (LIPITOR) 40 MG tablet   No No    TAKE ONE TABLET BY MOUTH ONE TIME DAILY    clopidogrel (PLAVIX) 75 MG tablet   No No    TAKE ONE TABLET BY MOUTH ONE TIME DAILY    fenofibrate (TRICOR) 145 MG tablet   No No    TAKE ONE TABLET BY MOUTH ONE TIME DAILY    losartan (COZAAR) 100 MG tablet   No No    Take 0.5 tablets by mouth Daily. for blood pressure    metoprolol tartrate (LOPRESSOR) 100 MG tablet   No No    TAKE ONE TABLET BY MOUTH EVERY TWELVE HOURS    pantoprazole (PROTONIX) 40 MG EC tablet   No No    Take 1 tablet by mouth Daily.    potassium chloride (K-DUR) 10 MEQ CR tablet   No No    TAKE ONE TABLET BY MOUTH ONE TIME DAILY        ---------------------------------------------------------------------------------------------------------------------  Objective   Hospital Scheduled Meds:    magnesium sulfate 2 g Intravenous Once        ---------------------------------------------------------------------------------------------------------------------   Vital Signs:  Temp:  [97.6 °F  (36.4 °C)] 97.6 °F (36.4 °C)  Heart Rate:  [43-81] 73  Resp:  [26] 26  BP: (117-154)/(71-96) 140/96  Mean Arterial Pressure (Non-Invasive) for the past 24 hrs (Last 3 readings):   Noninvasive MAP (mmHg)   08/01/19 1047 112   08/01/19 1016 106   08/01/19 1002 106     SpO2 Percentage    08/01/19 1002 08/01/19 1016 08/01/19 1047   SpO2: 100% 100% 98%     SpO2:  [94 %-100 %] 98 %  on   ;        Body mass index is 22.5 kg/m².  Wt Readings from Last 3 Encounters:   08/01/19 81.6 kg (180 lb)   06/26/19 86.2 kg (190 lb)   04/10/19 85.7 kg (189 lb)   ---------------------------------------------------------------------------------------------------------------------   Physical Exam:  Constitutional:  Well-developed and well-nourished.  No respiratory distress. Drowsy but arousable.      HENT:  Head: Normocephalic and atraumatic.  Mouth:  Moist mucous membranes.    Eyes:  Conjunctivae and EOM are normal. No scleral icterus. No erythema or drainage.   Neck:  Neck supple.  No JVD present. No carotid bruits.   Cardiovascular:  Normal rate, regular rhythm and normal heart sounds with no murmur.  Pulmonary/Chest:  No respiratory distress, no wheezes, no crackles, with normal breath sounds and good air movement.  Abdominal:  Soft.  Bowel sounds are present x4.  No distension and no tenderness.   Musculoskeletal:  No edema, no tenderness, and no deformity.  No red or swollen joints anywhere.    Neurological:  Alert and oriented to person, place, and time.  No cranial nerve deficit.  No tongue deviation.  No facial droop.  No slurred speech.   Skin:  Skin is warm and dry.  No rash noted.  No pallor.   Psychiatric:  Normal mood and affect.  Behavior is normal.  Judgment and thought content normal.   Peripheral vascular:  No edema and 2+ pedal pulses bilaterally.   Genitourinary: No alejandra catheter in place.    ---------------------------------------------------------------------------------------------------------------------  EKG:        I have personally reviewed the EKG.  ---------------------------------------------------------------------------------------------------------------------   Results from last 7 days   Lab Units 08/01/19  0840   CK TOTAL U/L 72   TROPONIN T ng/mL <0.010         Results from last 7 days   Lab Units 08/01/19  0837   PH, ARTERIAL pH units 7.550*   PO2 ART mm Hg 60.7*   PCO2, ARTERIAL mm Hg 22.6*   HCO3 ART mmol/L 19.3*     Results from last 7 days   Lab Units 08/01/19  0840   LACTATE mmol/L 3.8*   WBC 10*3/mm3 7.64   HEMOGLOBIN g/dL 13.9   HEMATOCRIT % 41.3   MCV fL 87.3   MCHC g/dL 33.7   PLATELETS 10*3/mm3 218     Results from last 7 days   Lab Units 08/01/19  0840   SODIUM mmol/L 139   POTASSIUM mmol/L 2.4*   MAGNESIUM mg/dL 0.3*   CHLORIDE mmol/L 98   CO2 mmol/L 20.0*   BUN mg/dL 19   CREATININE mg/dL 1.07   EGFR IF NONAFRICN AM mL/min/1.73 71   CALCIUM mg/dL 9.0   GLUCOSE mg/dL 144*   ALBUMIN g/dL 4.73   BILIRUBIN mg/dL 1.2   ALK PHOS U/L 26*   AST (SGOT) U/L 17   ALT (SGPT) U/L 14   Estimated Creatinine Clearance: 89 mL/min (by C-G formula based on SCr of 1.07 mg/dL).  No results found for: AMMONIA    Glucose   Date/Time Value Ref Range Status   08/01/2019 0839 128 70 - 130 mg/dL Final     Lab Results   Component Value Date    HGBA1C 5.5 06/05/2015     Lab Results   Component Value Date    TSH 1.009 06/05/2015    FREET4 1.04 06/05/2015        Pain Management Panel     Pain Management Panel Latest Ref Rng & Units 8/1/2019 3/31/2019    AMPHETAMINES SCREEN, URINE Negative Negative Negative    BARBITURATES SCREEN Negative Negative Negative    BENZODIAZEPINE SCREEN, URINE Negative Negative Negative    BUPRENORPHINEUR Negative Negative Negative    COCAINE SCREEN, URINE Negative Negative Negative    METHADONE SCREEN, URINE Negative Negative Negative        I have personally reviewed the  above laboratory results.   ---------------------------------------------------------------------------------------------------------------------  Imaging Results (last 7 days)     Procedure Component Value Units Date/Time    CT Head Without Contrast [[id number]] Collected:  08/01/19 0918     Updated:  08/01/19 0931    Narrative:       EXAMINATION: CT HEAD WO CONTRAST-      CLINICAL INDICATION:     hand tingling     COMPARISON:    None     Technique: Multiple CT axial images were obtained through the level of  the brain without IV contrast administration. Reformatted images in the  coronal and/or sagittal plane(s) were generated from the axial data set  to facilitate diagnostic accuracy and/or surgical planning.     Radiation dose reduction techniques were utilized per ALARA protocol.  Automated exposure control was initiated through either or [employer] or  D[employer]Right software packages by  protocol.       DOSE (DLP mGy-cm):     FINDINGS:     Brain: Unremarkable. No parenchymal hemorrhage or mass. No white matter  abnormality. No areas of mass effect. Mild generalized cerebral atrophy  and chronic small vessel ischemic disease.  Ventricles: Unremarkable. No hydrocephalus.  Extra-axial spaces: Unremarkable. No extra-axial hemorrhage. No  extra-axial masses.  Bones: Unremarkable. No acute fracture identified.  Sinuses: Unremarkable as visualized. No air-fluid levels.  Mastoids: Unremarkable. No mastoid effusions.  Soft Tissues: Unremarkable.          Impression:       No CT evidence of acute intracranial abnormality.     This report was finalized on 8/1/2019 9:21 AM by Dr. Say Olmedo MD.           Left heart catheterization 6/5/2015      I have personally reviewed the above radiology results.   ---------------------------------------------------------------------------------------------------------------------  Assessment & Plan      -Acute hypomagnesemia and hypokalemia: Replace electrolytes per protocol.  Repeat levels in AM. Monitor on telemetry.     -Sinus bradycardia: Hold home metoprolol. Check TSH.  Replace magnesium and potassium.  Monitor on telemetry.    -Reported episode of syncope 2 days ago: No reported injury. CT head unremarkable.  Check carotid dopplers and transthoracic echocardiogram.  He has had some sinus bradycardia in the ED.  Hold metoprolol.  Monitor on telemetry.     -Alcoholism with history of delirium tremens: Placed on CIWA protocol with scheduled libriuim 50mg every 6 hours and will taper as tolerated. PRN Ativan. Place on seizure and fall precautions.    -Nausea/vomiting: Lipase mildly elevated. Abdomen nontender. Repeat lipase in AM. Continue IV fluids. Place on low dose PRN IV phenergan. QT is borderline prolonged. Will try to avoid zofran for now.     -Borderline prolonged QTc, 449ms: Replace magnesium. Avoid QT prolonging agents.     -Acute respiratory alkalosis with partial metabolic compensation: Normal RR at this time after receiving Librium and Ativan in the ED. Continue to monitor.     -Lactic acidosis: Likely related to alcoholism.     -Severe, multi-vessel coronary artery disease with some anterolateral ST/T wave abnormalities on EKG: No intervention performed as of yet.  He has been maintained on medical therapy which he reports compliance with. Continue home aspirin, statin, losartan, and amlodipine.  Beta-blocker being held secondary to sinus bradycardia.  Denies any recent chest pains or discomfort.  Will follow serial troponins.  Lipid panel in a.m. Echo pending.     -Essential hypertension: Continue home losartan and amlodipine once confirmed by pharmacy.  Hold metoprolol due to bradycardia.    -Dyslipidemia: Continue home statin. Check lipid panel in AM.     -History of Marcelino-Parkinson-White Syndrome: Complete history unavailable. Correct electrolytes. Monitor on telemetry.     -DVT Prophylaxis: TEDS/SCDS.     The patient is considered to be a high risk patient due to:  Multiple electrolyte abnormalities, alcoholism, history of DTs.     Code Status: FULL CODE.     I have discussed the patient's assessment and plan with the patient and admitting physician, Dr. Ny.     NAINA Narayan  08/01/19  11:51 AM  ---------------------------------------------------------------------------------------------------------------------   I have seen and examined the patient. I agree with the assessment and plan of NAINA Narayan.    54-year-old male with past history significant for alcohol abuse/addiction presented to the emergency department with complaints of dizziness and lightheadedness.  He states that he has been nauseated for 1 week and has had abdominal pain which is resolved now.  Patient states that he has been getting dizzy and lightheaded and therefore presented to the emergency department.  Patient was found to have hypokalemia and hypomagnesemia and was admitted.    On examination patient was lying comfortably in bed, in no acute distress.  His lungs are clear to auscultation bilaterally.  Cardia vascular examination revealed S1 plus S2 and no murmurs, rubs or gallops.  Abdomen was soft and nontender.  No viscera was palpable.  Bowel sounds were audible.  Patient was awake, alert and oriented to place, person and time.  Strength was bilaterally symmetrical in upper and lower extremities.  Labs reviewed.    A/P:  54-year-old male admitted with severe hypomagnesemia and hypokalemia likely due to poor p.o. intake and alcohol use.  Patient will have electrolyte replacement per protocol.  We will continue him on CIWA protocol due to history of alcohol abuse and history of alcohol withdrawal.  We will continue him on multivitamins and IV fluids.  Patient has an anion gap acidosis due to elevated lactic acid however, there is no evidence of infection so far.  His lactic acid elevation is likely secondary to decreased p.o. intake, nausea and alcohol intake.  We will continue him  "on IV fluids and recheck lactic acid level.      Electronically signed by Sukumar Ny MD at 8/1/2019 11:48 PM          Emergency Department Notes      Megha Sethi at 8/1/2019  8:33 AM        ekg done @ 833 given to doctor jerel @ 834     Megha Sethi  08/01/19 0836      Electronically signed by Megha Sethi at 8/1/2019  8:36 AM     Megha Sethi at 8/1/2019  8:40 AM        Check glucose it was 128 notified nurse     Megha Sethi  08/01/19 0840      Electronically signed by Megha Sethi at 8/1/2019  8:40 AM     Megha Sethi at 8/1/2019  8:45 AM        Encouraged pt to give urine sample as soon as he could. He verbalized understanding and said he would as soon as he felt like he could.     Megha Sethi  08/01/19 0845      Electronically signed by Megha Sethi at 8/1/2019  8:45 AM     Caridad Morgan DO at 8/1/2019  8:53 AM          Subjective   Patient is a 54 year old male who presents to the ED for vomiting since yesterday.  The patient is a poor historian, because he is nauseated at the time of examination and is reluctant to speak.  The patient states that he has \" felt bad\" for the last few days. The patient states that this has happened to him in the past.  He has a history of alcohol abuse and drinks at least a pint of hard liquor per day.  He denies any fecer, chills or diarrhea.  The patient states that he is also having some upper abdominal pain associated with vomiting.  He last drank alcohol yesterday.  States he is unable to keep down and food or liquids.  He denies any additional symptoms at this time.   He has not noticed any blood in his vomit.        History provided by:  Patient  History limited by:  Acuity of condition   used: No    Nausea   The primary symptoms include fatigue, abdominal pain, nausea and vomiting. Primary symptoms do not include fever, weight loss, diarrhea, hematemesis, arthralgias or rash. The illness began " yesterday. The onset was sudden.   The illness is also significant for anorexia. The illness does not include chills, dysphagia, constipation or back pain. Significant associated medical issues include alcohol abuse.       Review of Systems   Constitutional: Positive for appetite change and fatigue. Negative for activity change, chills, diaphoresis, fever and weight loss.   HENT: Negative for congestion, postnasal drip, rhinorrhea, sinus pressure, sinus pain, sneezing, sore throat and tinnitus.    Eyes: Negative for pain, discharge, redness and itching.   Respiratory: Negative for apnea, cough, choking, chest tightness, shortness of breath and stridor.    Cardiovascular: Negative for chest pain, palpitations and leg swelling.   Gastrointestinal: Positive for abdominal pain, anorexia, nausea and vomiting. Negative for abdominal distention, blood in stool, constipation, diarrhea, dysphagia and hematemesis.   Endocrine: Negative for cold intolerance and heat intolerance.   Genitourinary: Negative for difficulty urinating, flank pain, frequency and hematuria.   Musculoskeletal: Negative for arthralgias and back pain.   Skin: Negative for color change, pallor and rash.   Neurological: Negative for dizziness, facial asymmetry and headaches.   Psychiatric/Behavioral: Negative for agitation, behavioral problems and confusion.       Past Medical History:   Diagnosis Date   • Anxiety disorder    • CAD (coronary artery disease)    • Chronic alcoholism (CMS/HCC)    • Hyperlipidemia    • Hypertension    • WPW (Zakiya-Parkinson-White syndrome)        Allergies   Allergen Reactions   • Niacin Rash     Whelps       Past Surgical History:   Procedure Laterality Date   • CARDIAC CATHETERIZATION  06/06/2015   • CARDIOVASCULAR STRESS TEST  06/06/2015   • ECHO - CONVERTED  06/06/2015   • EYE SURGERY         Family History   Problem Relation Age of Onset   • Heart attack Father    • Heart disease Father    • Heart failure Father    •  Heart disease Sister    • Heart failure Sister    • Heart disease Brother    • Heart failure Brother    • Heart failure Sister    • Heart disease Sister        Social History     Socioeconomic History   • Marital status:      Spouse name: Not on file   • Number of children: Not on file   • Years of education: Not on file   • Highest education level: Not on file   Tobacco Use   • Smoking status: Former Smoker     Packs/day: 1.00     Years: 20.00     Pack years: 20.00     Types: Cigarettes     Last attempt to quit:      Years since quittin.5   • Smokeless tobacco: Current User     Types: Chew   • Tobacco comment: chew 1 can per week   Substance and Sexual Activity   • Alcohol use: Yes     Alcohol/week: 6.0 oz     Types: 6 Cans of beer, 4 Shots of liquor per week     Comment: pint of alcohol every other day    • Drug use: Yes     Types: Hydrocodone     Comment: occ   • Sexual activity: Defer           Objective   Physical Exam   Constitutional: He is oriented to person, place, and time. He appears well-developed and well-nourished. No distress.   Dry heaving during examination   HENT:   Head: Normocephalic and atraumatic.   Right Ear: External ear normal.   Left Ear: External ear normal.   Nose: Nose normal.   Mouth/Throat: Oropharynx is clear and moist. No oropharyngeal exudate.   Eyes: Conjunctivae and EOM are normal. Pupils are equal, round, and reactive to light. Right eye exhibits no discharge. Left eye exhibits no discharge. No scleral icterus.   Neck: Normal range of motion. Neck supple. No JVD present. No tracheal deviation present. No thyromegaly present.   Cardiovascular: Normal rate, regular rhythm, normal heart sounds and intact distal pulses. Exam reveals no gallop and no friction rub.   No murmur heard.  Pulmonary/Chest: Effort normal and breath sounds normal. No stridor. No respiratory distress. He has no wheezes. He has no rales. He exhibits no tenderness.   Abdominal: Soft. Bowel  sounds are normal. He exhibits no distension and no mass. There is no tenderness. There is no rebound and no guarding. No hernia.   Musculoskeletal: Normal range of motion. He exhibits no edema, tenderness or deformity.   Lymphadenopathy:     He has no cervical adenopathy.   Neurological: He is alert and oriented to person, place, and time.   Skin: Skin is warm and dry. Capillary refill takes less than 2 seconds. No rash noted. He is not diaphoretic. No erythema. No pallor.   Psychiatric: He has a normal mood and affect. His behavior is normal.   Nursing note and vitals reviewed.      Procedures          ED Course                  MDM  Number of Diagnoses or Management Options  Alcohol dependence with unspecified alcohol-induced disorder (CMS/HCC): new and requires workup  Bradycardia: new and requires workup  Hypokalemia: new and requires workup  Hypomagnesemia: new and requires workup     Amount and/or Complexity of Data Reviewed  Clinical lab tests: ordered and reviewed  Tests in the radiology section of CPT®:  ordered and reviewed  Tests in the medicine section of CPT®:  ordered and reviewed  Decide to obtain previous medical records or to obtain history from someone other than the patient: yes  Review and summarize past medical records: yes  Discuss the patient with other providers: yes  Independent visualization of images, tracings, or specimens: yes    Risk of Complications, Morbidity, and/or Mortality  Presenting problems: high  Diagnostic procedures: high  Management options: high    Critical Care  Total time providing critical care: < 30 minutes    Patient Progress  Patient progress: stable        Final diagnoses:   Hypomagnesemia   Hypokalemia   Bradycardia   Alcohol dependence with unspecified alcohol-induced disorder (CMS/HCC)            Caridad Morgan DO  08/01/19 1021      Electronically signed by Caridad Morgan DO at 8/1/2019 10:21 AM     Megha Sethi at 8/1/2019  9:50 AM        Still  unable to get urine sample from pt. I informed him we needed one as soon as possible     Megha Sethi  08/01/19 0951      Electronically signed by Megha Sethi at 8/1/2019  9:51 AM     Sam Coleman, RN at 8/1/2019 12:05 PM        Attempt to call report at this time, nurse states she will call back.     Sam Coleman, RN  08/01/19 1205      Electronically signed by Sam Coleman, RN at 8/1/2019 12:05 PM     Sam Coleman, RN at 8/1/2019 12:35 PM        Dr. Morgan verbalize she does not want to redraw magnesium prior to transportation to PCU.     Sam Coleman, DELICIA  08/01/19 1235      Electronically signed by Sam Coleman, RN at 8/1/2019 12:35 PM     Scheduled Meds Sorted by Name   for Onel Jimenez as of 7/31/19 through 8/2/19     1 Day 3 Days 7 Days 10 Days < Today >    Legend:                           Inactive     Active     Other Encounter    Linked               Medications 07/31/19 08/01/19 08/02/19   chlordiazePOXIDE (LIBRIUM) capsule 25 mg   Dose: 25 mg  Freq: Every 6 Hours Scheduled Route: PO  Start: 08/01/19 1600 End: 08/05/19 1759    Admin Instructions:   Hold for SBP <110, oversedation, or for respiratory depression.   Caution: Look alike/sound alike drug alert.     1625          0000 [C]   0719   1200     1800            chlordiazePOXIDE (LIBRIUM) capsule 25 mg   Dose: 25 mg  Freq: Once Route: PO  Start: 08/01/19 1013 End: 08/01/19 1015    Admin Instructions:    Caution: Look alike/sound alike drug alert.     1015             LORazepam (ATIVAN) injection 1 mg   Dose: 1 mg  Freq: Once Route: IV  Start: 08/01/19 0855 End: 08/01/19 0901    Admin Instructions:    Caution: Look alike/sound alike drug alert     0901             magnesium sulfate 2g/50 mL (PREMIX) infusion   Dose: 2 g  Freq: Every 2 Hours Route: IV  Start: 08/01/19 1600 End: 08/02/19 0041    Admin Instructions:   Mag level = 0.3  Patient received 2 gm earlier.  Replace per protocol     8500 [id number] 5701 0162             magnesium  "sulfate 2g/50 mL (PREMIX) infusion   Dose: 2 g  Freq: Once Route: IV  Last Dose: Stopped (08/01/19 1211)  Start: 08/01/19 0930 End: 08/01/19 1211     1011   1211           multiple vitamin (M.V.I. Adult) 10 mL, thiamine (B-1) 100 mg, folic acid 1 mg in sodium chloride 0.9 % 1,000 mL infusion   Dose: 100 mL/hr  Freq: Daily Route: IV  Last Dose: 100 mL/hr (08/02/19 0836)  Start: 08/01/19 1500 End: 08/04/19 0859    Admin Instructions:   \"Banana bag\" or \"Rally Pack\".  Use if patient cannot take oral medications.     1611          0836            ondansetron (ZOFRAN) injection 4 mg   Dose: 4 mg  Freq: Once Route: IV  Start: 08/01/19 0827 End: 08/01/19 0853     0853             potassium chloride (K-DUR,KLOR-CON) CR tablet 40 mEq   Dose: 40 mEq  Freq: Every 4 Hours Route: PO  Start: 08/02/19 1100 End: 08/02/19 1859      1100   1500   1859-D/C'd      potassium chloride (K-DUR,KLOR-CON) CR tablet 40 mEq   Dose: 40 mEq  Freq: Every 4 Hours Route: PO  Start: 08/01/19 1600 End: 08/02/19 0307    Admin Instructions:   K+ = 2.8  Replace per protocol    Potassium 3.1 or Less Give KCl 40 mEq q4h x3 Doses   Potassium 3.2 - 3.6 Give KCl 40 mEq q4h x2 Doses     Check Potassium 4 Hours After Last Dose Given   Check Magnesium if Potassium Level Remains Low After Replacement   DO NOT GIVE if CrCl is Less Than 30 mL/minute or Urine Output Less Than 30 mL/hr     8425 [id number]            potassium chloride 10 mEq in 100 mL IVPB   Dose: 10 mEq  Freq: Once Route: IV  Last Dose: Stopped (08/01/19 1048)  Start: 08/01/19 0924 End: 08/01/19 1048    Admin Instructions:   OUTPATIENT/NON-MONITORED UNITS: Potassium Chloride standard bolus infusion rate is a maximum of 10 mEq/hr on unmonitored patients    MONITORED UNITS: Potassium Chloride standard bolus infusion rate is a maximum of 20 mEq/hr on ECG monitored patients ONLY     0967 3773           promethazine (PHENERGAN) 12.5 mg in sodium chloride 0.9 % 50 mL   Dose: 12.5 mg  Freq: Once " Route: IV  Last Dose: Stopped (08/01/19 0924)  Start: 08/01/19 0843 End: 08/01/19 0924    Admin Instructions:   Must dilute in 50 mL NS.Administer via large-bore vein (not hand or wrist).     0855 0924           sodium chloride 0.9 % bolus 1,000 mL   Dose: 1,000 mL  Freq: Once Route: IV  Last Dose: Stopped (08/01/19 1049)  Start: 08/01/19 0911 End: 08/01/19 1049     0937   1049           sodium chloride 0.9 % flush 3 mL   Dose: 3 mL  Freq: Every 12 Hours Scheduled Route: IV  Start: 08/01/19 2100     2140          0837   2100          Medications 07/31/19 08/01/19 08/02/19       Continuous Meds Sorted by Name   for Jason Jimenezcrista VOGT as of 7/31/19 through 8/2/19    Legend:                           Inactive     Active     Other Encounter    Linked               Medications 07/31/19 08/01/19 08/02/19       PRN Meds Sorted by Name   for Barbara Onel L as of 7/31/19 through 8/2/19    Legend:                           Inactive     Active     Other Encounter    Linked               Medications 07/31/19 08/01/19 08/02/19   LORazepam (ATIVAN) tablet 1 mg   Dose: 1 mg  Freq: Every 2 Hours PRN Route: PO  PRN Reason: Withdrawal  PRN Comment: For CIWA-Ar 8-10  Start: 08/01/19 1442 End: 08/11/19 1441    Admin Instructions:   Reassess 2 Hours After Administration   Caution: Look alike/sound alike drug alert     1649-Not Given:  See Alt          0307   0836-Not Given:  See Alt          Or  LORazepam (ATIVAN) injection 1 mg   Dose: 1 mg  Freq: Every 2 Hours PRN Route: IV  PRN Reason: Withdrawal  PRN Comment: For CIWA-Ar 8-10  Start: 08/01/19 1442 End: 08/11/19 1441    Admin Instructions:   Reassess 2 Hours After Administration   Caution: Look alike/sound alike drug alert     1649-Not Given:  See Alt          0307-Not Given:  See Alt   0836-Not Given:  See Alt          Or  LORazepam (ATIVAN) tablet 2 mg   Dose: 2 mg  Freq: Every 1 Hour PRN Route: PO  PRN Reason: Withdrawal  PRN Comment: For CIWA-Ar 11-15  Start: 08/01/19 1442 End:  08/11/19 1441    Admin Instructions:   Reassess 1 Hour After Administration   Caution: Look alike/sound alike drug alert     1649-Not Given:  See Alt          0307-Not Given:  See Alt   0836-Not Given:  See Alt          Or  LORazepam (ATIVAN) injection 2 mg   Dose: 2 mg  Freq: Every 1 Hour PRN Route: IV  PRN Reason: Withdrawal  PRN Comment: For CIWA-Ar 11-15  Start: 08/01/19 1442 End: 08/11/19 1441    Admin Instructions:   Reassess 1 Hour After Administration   Caution: Look alike/sound alike drug alert     1649          0307-Not Given:  See Alt   0836          Or  LORazepam (ATIVAN) injection 2 mg   Dose: 2 mg  Freq: Every 15 Minutes PRN Route: IV  PRN Reason: Anxiety  PRN Comment: For CIWA-Ar Greater Than 15.  Repeat Dose in 15 Minutes if CIWA-Ar Does Not Decrease  Start: 08/01/19 1442 End: 08/11/19 1441    Admin Instructions:   Reassess 15 Minutes After Each Administration.  If CIWA-Ar Does Not Decrease Contact Provider To Discuss Transfer to Higher Level of Care.   Caution: Look alike/sound alike drug alert     1649-Not Given:  See Alt          0307-Not Given:  See Alt   0836-Not Given:  See Alt          Or  LORazepam (ATIVAN) injection 2 mg   Dose: 2 mg  Freq: Every 15 Minutes PRN Route: IM  PRN Reason: Withdrawal  PRN Comment: If Unable to Administer IV - For CIWA-Ar Greater Than 15.  Repeat Dose in 15 Minutes if CIWA-Ar Does Not Decrease  Start: 08/01/19 1442 End: 08/11/19 1441    Admin Instructions:   Reassess 15 Minutes After Each Administration.  If CIWA-Ar Does Not Decrease Contact Provider To Discuss Transfer to Higher Level of Care.   Caution: Look alike/sound alike drug alert     1649-Not Given:  See Alt          0307-Not Given:  See Alt   0836-Not Given:  See Alt          Magnesium Sulfate 2 gram Bolus, followed by 8 gram infusion (total Mg dose 10 grams)- Mg less than or equal to 1mg/dL   Dose: 2 g  Freq: As Needed Route: IV  PRN Comment: See Administration Instructions  Start: 08/01/19 1442    Admin  Instructions:   Mg less than or equal to 1mg/dL. Give 2 gm over 30 minutes as bolus, then infuse 2 gm over 2 hours for 4 doses (8 grams) for total dose of 10 grams.  Recheck Mg levels in the AM.         Or  Magnesium Sulfate 2 gram / 50mL Infusion (GIVE X 3 BAGS TO EQUAL 6GM TOTAL DOSE) - Mg 1.1 - 1.5 mg/dl   Dose: 2 g  Freq: As Needed Route: IV  PRN Comment: See Administration Instructions  Start: 08/01/19 1442    Admin Instructions:   Mg 1.1 -1.5 mg/dL. Infuse 2 grams over 2 hours for 3 doses (for a total Mg dose of 6 grams).  Recheck Mg level in the AM.         Or  Magnesium Sulfate 4 gram infusion- Mg 1.6-1.9 mg/dL   Dose: 4 g  Freq: As Needed Route: IV  PRN Comment: See Administration Instructions  Start: 08/01/19 1442    Admin Instructions:   Mg 1.6-1.9 mg/dL. Recheck Mg level in the AM.         potassium chloride (K-DUR,KLOR-CON) CR tablet 40 mEq   Dose: 40 mEq  Freq: As Needed Route: PO  PRN Comment: Potassium Replacement.  See Admin Instructions  Start: 08/01/19 1442    Admin Instructions:   Potassium 3.1 or Less Give KCl 40 mEq q4h x3 Doses   Potassium 3.2 - 3.6 Give KCl 40 mEq q4h x2 Doses     Check Potassium 4 Hours After Last Dose Given   Check Magnesium if Potassium Level Remains Low After Replacement   DO NOT GIVE if CrCl is Less Than 30 mL/minute or Urine Output Less Than 30 mL/hr         potassium chloride (KLOR-CON) packet 40 mEq   Dose: 40 mEq  Freq: As Needed Route: PO  PRN Comment: Potassium Replacement, See Admin Instructions  Start: 08/01/19 1442    Admin Instructions:   Potassium 3.1 or Less Give KCl 40 mEq q4h x3 Doses   Potassium 3.2 - 3.6 Give KCl 40 mEq q4h x2 Doses     Check Potassium 4 Hours After Last Dose Given   Check Magnesium if Potassium Level Remains Low After Replacement   DO NOT GIVE if CrCl is Less Than 30 mL/minute or Urine Output Less Than 30 mL/hr         promethazine (PHENERGAN) 6.25 mg in sodium chloride 0.9 % 50 mL   Dose: 6.25 mg  Freq: Every 6 Hours PRN Route: IV  PRN  Reasons: Nausea,Vomiting  Start: 08/01/19 1443    Admin Instructions:   If both ondansetron (ZOFRAN) and promethazine (PHENERGAN) are ordered use ondansetron first and THEN promethazine if ondansetron is ineffective.         sodium chloride 0.9 % flush 10 mL   Dose: 10 mL  Freq: As Needed Route: IV  PRN Reason: Line Care  Start: 08/01/19 0824         sodium chloride 0.9 % flush 3-10 mL   Dose: 3-10 mL  Freq: As Needed Route: IV  PRN Reason: Line Care  Start: 08/01/19 1442         Medications 07/31/19 08/01/19 08/02/19

## 2019-08-03 ENCOUNTER — READMISSION MANAGEMENT (OUTPATIENT)
Dept: CALL CENTER | Facility: HOSPITAL | Age: 54
End: 2019-08-03

## 2019-08-03 NOTE — PAYOR COMM NOTE
"[facility]   KARI HALL  PHONE  [phone]  FAX  [phone]  NPI:  [id number]    PATIENT D/C 8/2/19    Onel Cunha (54 y.o. Male)     Date of Birth Social Security Number Address Home Phone MRN    1965  [address] [facility] [address] [phone] [id number]    Mormonism Marital Status          None        Admission Date Admission Type Admitting Provider Attending Provider Department, Room/Bed    8/1/19 Emergency Sukumar Ny MD  [facility] PROGRESS CARE, P208/1P    Discharge Date Discharge Disposition Discharge Destination        8/2/2019 Home or Self Care              Attending Provider:  (none)   Allergies:  Niacin    Isolation:  None   Infection:  None   Code Status:  Prior    Ht:  185.4 cm (73\")   Wt:  94.6 kg (208 lb 9.6 oz)    Admission Cmt:  None   Principal Problem:  None                Active Insurance as of 8/1/2019     Primary Coverage     Payor Plan Insurance Group Employer/Plan Group    ANTH MEDICAID [facility] MEDICAID [id number]     Payor Plan Address Payor Plan Phone Number Payor Plan Fax Number Effective Dates    [address] [address] [phone]  2/1/2014 - None Entered    [facility] [id number]       Subscriber Name Subscriber Birth Date Member ID       ONEL CUNHA 1965 [id number]                 Emergency Contacts      (Rel.) Home Phone Work Phone Mobile Phone    Yasir Cunha (Son) [phone] -- --    [place] Cunha [phone] -- --              "

## 2019-08-03 NOTE — OUTREACH NOTE
Prep Survey      Responses   Facility patient discharged from?  [facility]   Is patient eligible?  Yes   Discharge diagnosis  hypomagnesemia,  N/V   Does the patient have one of the following disease processes/diagnoses(primary or secondary)?  Other   Does the patient have Home health ordered?  No   Is there a DME ordered?  Yes   What DME was ordered?  [facility] - [facility]   Comments regarding appointments  see AVS   General alerts for this patient  ETOH   Prep survey completed?  Yes          Yadira Anders RN

## 2019-08-05 ENCOUNTER — READMISSION MANAGEMENT (OUTPATIENT)
Dept: CALL CENTER | Facility: HOSPITAL | Age: 54
End: 2019-08-05

## 2019-08-05 NOTE — OUTREACH NOTE
Medical Week 1 Survey      Responses   Facility patient discharged from?  [facility]   Does the patient have one of the following disease processes/diagnoses(primary or secondary)?  Other   Is there a successful TCM telephone encounter documented?  No   Week 1 attempt successful?  Yes   Call start time  1026   Call end time  1033   General alerts for this patient  ETOH- patient reports he is burying his granddaughter today   Discharge diagnosis  hypomagnesemia,  N/V   Meds reviewed with patient/caregiver?  Yes   Is the patient having any side effects they believe may be caused by any medication additions or changes?  No   Does the patient have all medications ordered at discharge?  Yes   Is the patient taking all medications as directed (includes completed medication regime)?  Yes   Does the patient have a primary care provider?   Yes   Comments regarding PCP  Dr Canales/ PCP   Has the patient kept scheduled appointments due by today?  N/A   Has home health visited the patient within 72 hours of discharge?  N/A   [facility]   Has all DME been delivered?  Yes   Psychosocial issues?  No   Did the patient receive a copy of their discharge instructions?  Yes   Nursing interventions  Reviewed instructions with patient   What is the patient's perception of their health status since discharge?  Improving   Is the patient/caregiver able to teach back signs and symptoms related to disease process for when to call PCP?  Yes   Is the patient/caregiver able to teach back signs and symptoms related to disease process for when to call 911?  Yes   Is the patient/caregiver able to teach back the hierarchy of who to call/visit for symptoms/problems? PCP, Specialist, Home health nurse, Urgent Care, ED, 911  Yes   Week 1 call completed?  Yes          Cruz Burnett RN

## 2019-08-07 RX ORDER — METOPROLOL TARTRATE 100 MG/1
TABLET ORAL
Qty: 60 TABLET | Refills: 0 | Status: SHIPPED | OUTPATIENT
Start: 2019-08-07 | End: 2019-09-09 | Stop reason: SDUPTHER

## 2019-08-07 RX ORDER — LOSARTAN POTASSIUM 100 MG/1
TABLET ORAL
Qty: 30 TABLET | Refills: 2 | Status: SHIPPED | OUTPATIENT
Start: 2019-08-07 | End: 2019-10-10 | Stop reason: SDUPTHER

## 2019-08-07 RX ORDER — HYDROCHLOROTHIAZIDE 12.5 MG/1
TABLET ORAL
Qty: 30 TABLET | Refills: 2 | Status: SHIPPED | OUTPATIENT
Start: 2019-08-07 | End: 2019-08-21

## 2019-08-07 RX ORDER — ATORVASTATIN CALCIUM 40 MG/1
TABLET, FILM COATED ORAL
Qty: 30 TABLET | Refills: 0 | Status: SHIPPED | OUTPATIENT
Start: 2019-08-07 | End: 2019-09-09 | Stop reason: SDUPTHER

## 2019-08-07 RX ORDER — POTASSIUM CHLORIDE 750 MG/1
TABLET, FILM COATED, EXTENDED RELEASE ORAL
Qty: 30 TABLET | Refills: 0 | Status: SHIPPED | OUTPATIENT
Start: 2019-08-07 | End: 2019-09-09 | Stop reason: SDUPTHER

## 2019-08-07 RX ORDER — CLOPIDOGREL BISULFATE 75 MG/1
TABLET ORAL
Qty: 30 TABLET | Refills: 2 | Status: SHIPPED | OUTPATIENT
Start: 2019-08-07 | End: 2019-10-10 | Stop reason: SDUPTHER

## 2019-08-07 RX ORDER — FENOFIBRATE 145 MG/1
TABLET, COATED ORAL
Qty: 30 TABLET | Refills: 0 | Status: SHIPPED | OUTPATIENT
Start: 2019-08-07 | End: 2019-09-09 | Stop reason: SDUPTHER

## 2019-08-09 ENCOUNTER — TELEPHONE (OUTPATIENT)
Dept: CARDIOLOGY | Facility: CLINIC | Age: 54
End: 2019-08-09

## 2019-08-09 NOTE — TELEPHONE ENCOUNTER
PT CALLED WITH QUESTIONS ABOUT THE MEDICATION THAT HE WAS PUT ON WHILE IN THE HOSPITAL AND THINKS HE SHOULD NOT BE TAKING THOSE NEW MEDS. HE WANTS THE NURSE TO CALL HIM BACK

## 2019-08-09 NOTE — TELEPHONE ENCOUNTER
We cannot make any comment on the hospital medications until he is seen.  Those are the medications he was discharged from the hospital by the hospital doctors  We never saw him at the hospital

## 2019-08-12 ENCOUNTER — READMISSION MANAGEMENT (OUTPATIENT)
Dept: CALL CENTER | Facility: HOSPITAL | Age: 54
End: 2019-08-12

## 2019-08-12 NOTE — OUTREACH NOTE
Medical Week 2 Survey      Responses   Facility patient discharged from?  [facility]   Does the patient have one of the following disease processes/diagnoses(primary or secondary)?  Other   Week 2 attempt successful?  No   Unsuccessful attempts  Attempt 1          Bettie Blum RN

## 2019-08-13 ENCOUNTER — READMISSION MANAGEMENT (OUTPATIENT)
Dept: CALL CENTER | Facility: HOSPITAL | Age: 54
End: 2019-08-13

## 2019-08-13 NOTE — OUTREACH NOTE
Medical Week 2 Survey      Responses   Facility patient discharged from?  [facility]   Does the patient have one of the following disease processes/diagnoses(primary or secondary)?  Other   Week 2 attempt successful?  Yes   Call start time  1454   Call end time  1456   Meds reviewed with patient/caregiver?  Yes   Is the patient taking all medications as directed (includes completed medication regime)?  Yes   What is preventing the patient from scheduling follow up appointments within 7 days of discharge?  Earlier appointment not available   Nursing Interventions  Verified appointment date/time/provider   Has the patient kept scheduled appointments due by today?  N/A   Comments  first appt. 8/21 Dr. Canales   Comments  cane if needed   What is the patient's perception of their health status since discharge?  Improving   Week 2 Call Completed?  Yes   Wrap up additional comments  doing better, no new issues          Jia Sen RN

## 2019-08-20 ENCOUNTER — READMISSION MANAGEMENT (OUTPATIENT)
Dept: CALL CENTER | Facility: HOSPITAL | Age: 54
End: 2019-08-20

## 2019-08-20 NOTE — OUTREACH NOTE
Medical Week 3 Survey      Responses   Facility patient discharged from?  [facility]   Does the patient have one of the following disease processes/diagnoses(primary or secondary)?  Other   Week 3 attempt successful?  No   Unsuccessful attempts  Attempt 1          Jia Sen RN

## 2019-08-21 ENCOUNTER — READMISSION MANAGEMENT (OUTPATIENT)
Dept: CALL CENTER | Facility: HOSPITAL | Age: 54
End: 2019-08-21

## 2019-08-21 ENCOUNTER — OFFICE VISIT (OUTPATIENT)
Dept: CARDIOLOGY | Facility: CLINIC | Age: 54
End: 2019-08-21

## 2019-08-21 VITALS
SYSTOLIC BLOOD PRESSURE: 140 MMHG | HEART RATE: 70 BPM | DIASTOLIC BLOOD PRESSURE: 84 MMHG | WEIGHT: 193 LBS | OXYGEN SATURATION: 99 % | HEIGHT: 75 IN | BODY MASS INDEX: 24 KG/M2

## 2019-08-21 DIAGNOSIS — I25.10 CORONARY ARTERY DISEASE INVOLVING NATIVE CORONARY ARTERY OF NATIVE HEART WITHOUT ANGINA PECTORIS: Primary | ICD-10-CM

## 2019-08-21 DIAGNOSIS — E87.6 HYPOKALEMIA: ICD-10-CM

## 2019-08-21 DIAGNOSIS — F10.20 CHRONIC ALCOHOLISM (HCC): ICD-10-CM

## 2019-08-21 DIAGNOSIS — E83.42 HYPOMAGNESEMIA: ICD-10-CM

## 2019-08-21 DIAGNOSIS — F41.1 GENERALIZED ANXIETY DISORDER: ICD-10-CM

## 2019-08-21 DIAGNOSIS — I10 ESSENTIAL HYPERTENSION: ICD-10-CM

## 2019-08-21 DIAGNOSIS — E78.1 HYPERLIPIDEMIA TYPE IV: ICD-10-CM

## 2019-08-21 PROCEDURE — 99214 OFFICE O/P EST MOD 30 MIN: CPT | Performed by: INTERNAL MEDICINE

## 2019-08-21 RX ORDER — ONDANSETRON 4 MG/1
4 TABLET, FILM COATED ORAL EVERY 12 HOURS PRN
Qty: 60 TABLET | Refills: 0 | Status: SHIPPED | OUTPATIENT
Start: 2019-08-21 | End: 2020-01-13 | Stop reason: SDUPTHER

## 2019-08-21 NOTE — OUTREACH NOTE
Medical Week 3 Survey      Responses   Facility patient discharged from?  [facility]   Does the patient have one of the following disease processes/diagnoses(primary or secondary)?  Other   Week 3 attempt successful?  No   Unsuccessful attempts  Attempt 2          Kendal Chavarria RN

## 2019-09-09 ENCOUNTER — LAB (OUTPATIENT)
Dept: LAB | Facility: HOSPITAL | Age: 54
End: 2019-09-09

## 2019-09-09 DIAGNOSIS — E83.51 HYPOCALCEMIA: ICD-10-CM

## 2019-09-09 DIAGNOSIS — E78.1 HYPERLIPIDEMIA TYPE IV: ICD-10-CM

## 2019-09-09 DIAGNOSIS — E83.42 HYPOMAGNESEMIA: ICD-10-CM

## 2019-09-09 DIAGNOSIS — E87.6 HYPOKALEMIA: ICD-10-CM

## 2019-09-09 LAB
ALBUMIN SERPL-MCNC: 4.7 G/DL (ref 3.5–5.2)
ALBUMIN/GLOB SERPL: 1.8 G/DL
ALP SERPL-CCNC: 19 U/L (ref 39–117)
ALT SERPL W P-5'-P-CCNC: 12 U/L (ref 1–41)
ANION GAP SERPL CALCULATED.3IONS-SCNC: 15.3 MMOL/L (ref 5–15)
AST SERPL-CCNC: 18 U/L (ref 1–40)
BASOPHILS # BLD AUTO: 0.09 10*3/MM3 (ref 0–0.2)
BASOPHILS NFR BLD AUTO: 1.4 % (ref 0–1.5)
BILIRUB SERPL-MCNC: 0.7 MG/DL (ref 0.2–1.2)
BUN BLD-MCNC: 18 MG/DL (ref 6–20)
BUN/CREAT SERPL: 13.3 (ref 7–25)
CALCIUM SPEC-SCNC: 8.5 MG/DL (ref 8.6–10.5)
CHLORIDE SERPL-SCNC: 96 MMOL/L (ref 98–107)
CHOLEST SERPL-MCNC: 150 MG/DL (ref 0–200)
CK SERPL-CCNC: 66 U/L (ref 20–200)
CO2 SERPL-SCNC: 27.7 MMOL/L (ref 22–29)
CREAT BLD-MCNC: 1.35 MG/DL (ref 0.76–1.27)
DEPRECATED RDW RBC AUTO: 47.8 FL (ref 37–54)
EOSINOPHIL # BLD AUTO: 0.22 10*3/MM3 (ref 0–0.4)
EOSINOPHIL NFR BLD AUTO: 3.4 % (ref 0.3–6.2)
ERYTHROCYTE [DISTWIDTH] IN BLOOD BY AUTOMATED COUNT: 13.8 % (ref 12.3–15.4)
GFR SERPL CREATININE-BSD FRML MDRD: 55 ML/MIN/1.73
GLOBULIN UR ELPH-MCNC: 2.6 GM/DL
GLUCOSE BLD-MCNC: 106 MG/DL (ref 65–99)
HCT VFR BLD AUTO: 45.2 % (ref 37.5–51)
HDLC SERPL-MCNC: 43 MG/DL (ref 40–60)
HGB BLD-MCNC: 14 G/DL (ref 13–17.7)
IMM GRANULOCYTES # BLD AUTO: 0.02 10*3/MM3 (ref 0–0.05)
IMM GRANULOCYTES NFR BLD AUTO: 0.3 % (ref 0–0.5)
LDLC SERPL CALC-MCNC: 94 MG/DL (ref 0–100)
LDLC/HDLC SERPL: 2.18 {RATIO}
LYMPHOCYTES # BLD AUTO: 1.85 10*3/MM3 (ref 0.7–3.1)
LYMPHOCYTES NFR BLD AUTO: 28.7 % (ref 19.6–45.3)
MAGNESIUM SERPL-MCNC: 0.5 MG/DL (ref 1.6–2.6)
MCH RBC QN AUTO: 29 PG (ref 26.6–33)
MCHC RBC AUTO-ENTMCNC: 31 G/DL (ref 31.5–35.7)
MCV RBC AUTO: 93.6 FL (ref 79–97)
MONOCYTES # BLD AUTO: 0.61 10*3/MM3 (ref 0.1–0.9)
MONOCYTES NFR BLD AUTO: 9.5 % (ref 5–12)
NEUTROPHILS # BLD AUTO: 3.65 10*3/MM3 (ref 1.7–7)
NEUTROPHILS NFR BLD AUTO: 56.7 % (ref 42.7–76)
NRBC BLD AUTO-RTO: 0 /100 WBC (ref 0–0.2)
PLATELET # BLD AUTO: 241 10*3/MM3 (ref 140–450)
PMV BLD AUTO: 11 FL (ref 6–12)
POTASSIUM BLD-SCNC: 3.8 MMOL/L (ref 3.5–5.2)
PROT SERPL-MCNC: 7.3 G/DL (ref 6–8.5)
RBC # BLD AUTO: 4.83 10*6/MM3 (ref 4.14–5.8)
SODIUM BLD-SCNC: 139 MMOL/L (ref 136–145)
TRIGL SERPL-MCNC: 66 MG/DL (ref 0–150)
VLDLC SERPL-MCNC: 13.2 MG/DL (ref 5–40)
WBC NRBC COR # BLD: 6.44 10*3/MM3 (ref 3.4–10.8)

## 2019-09-09 PROCEDURE — 36415 COLL VENOUS BLD VENIPUNCTURE: CPT

## 2019-09-09 PROCEDURE — 82550 ASSAY OF CK (CPK): CPT

## 2019-09-09 PROCEDURE — 80061 LIPID PANEL: CPT

## 2019-09-09 PROCEDURE — 85025 COMPLETE CBC W/AUTO DIFF WBC: CPT

## 2019-09-09 PROCEDURE — 83735 ASSAY OF MAGNESIUM: CPT

## 2019-09-09 PROCEDURE — 80053 COMPREHEN METABOLIC PANEL: CPT

## 2019-09-09 RX ORDER — METOPROLOL TARTRATE 100 MG/1
TABLET ORAL
Qty: 60 TABLET | Refills: 0 | Status: SHIPPED | OUTPATIENT
Start: 2019-09-09 | End: 2019-10-10 | Stop reason: SDUPTHER

## 2019-09-09 RX ORDER — ATORVASTATIN CALCIUM 40 MG/1
TABLET, FILM COATED ORAL
Qty: 30 TABLET | Refills: 0 | Status: SHIPPED | OUTPATIENT
Start: 2019-09-09 | End: 2019-10-10 | Stop reason: SDUPTHER

## 2019-09-09 RX ORDER — POTASSIUM CHLORIDE 750 MG/1
TABLET, FILM COATED, EXTENDED RELEASE ORAL
Qty: 30 TABLET | Refills: 0 | Status: SHIPPED | OUTPATIENT
Start: 2019-09-09 | End: 2019-10-10 | Stop reason: SDUPTHER

## 2019-09-09 RX ORDER — FENOFIBRATE 145 MG/1
TABLET, COATED ORAL
Qty: 30 TABLET | Refills: 0 | Status: SHIPPED | OUTPATIENT
Start: 2019-09-09 | End: 2019-10-10 | Stop reason: SDUPTHER

## 2019-09-10 ENCOUNTER — TELEPHONE (OUTPATIENT)
Dept: CARDIOLOGY | Facility: CLINIC | Age: 54
End: 2019-09-10

## 2019-09-10 DIAGNOSIS — R79.0 LOW MAGNESIUM LEVEL: Primary | ICD-10-CM

## 2019-09-10 RX ORDER — ADHESIVE TAPE 3"X 2.3 YD
200 TAPE, NON-MEDICATED TOPICAL 2 TIMES DAILY
Qty: 60 EACH | Refills: 1 | Status: SHIPPED | OUTPATIENT
Start: 2019-09-10 | End: 2019-10-10 | Stop reason: SDUPTHER

## 2019-10-10 RX ORDER — POTASSIUM CHLORIDE 750 MG/1
10 TABLET, FILM COATED, EXTENDED RELEASE ORAL DAILY
Qty: 30 TABLET | Refills: 2 | Status: SHIPPED | OUTPATIENT
Start: 2019-10-10 | End: 2020-01-13 | Stop reason: SDUPTHER

## 2019-10-10 RX ORDER — ATORVASTATIN CALCIUM 40 MG/1
40 TABLET, FILM COATED ORAL DAILY
Qty: 30 TABLET | Refills: 2 | Status: SHIPPED | OUTPATIENT
Start: 2019-10-10 | End: 2020-01-13 | Stop reason: SDUPTHER

## 2019-10-10 RX ORDER — LOSARTAN POTASSIUM 100 MG/1
100 TABLET ORAL DAILY
Qty: 30 TABLET | Refills: 2 | Status: SHIPPED | OUTPATIENT
Start: 2019-10-10 | End: 2020-01-13 | Stop reason: SDUPTHER

## 2019-10-10 RX ORDER — HYDROXYZINE HYDROCHLORIDE 10 MG/1
10 TABLET, FILM COATED ORAL 3 TIMES DAILY PRN
Qty: 60 TABLET | Refills: 0 | Status: SHIPPED | OUTPATIENT
Start: 2019-10-10 | End: 2020-01-13 | Stop reason: SDUPTHER

## 2019-10-10 RX ORDER — METOPROLOL TARTRATE 100 MG/1
100 TABLET ORAL EVERY 12 HOURS
Qty: 60 TABLET | Refills: 2 | Status: SHIPPED | OUTPATIENT
Start: 2019-10-10 | End: 2020-01-13 | Stop reason: SDUPTHER

## 2019-10-10 RX ORDER — FENOFIBRATE 145 MG/1
145 TABLET, COATED ORAL DAILY
Qty: 30 TABLET | Refills: 2 | Status: SHIPPED | OUTPATIENT
Start: 2019-10-10 | End: 2020-01-13 | Stop reason: SDUPTHER

## 2019-10-10 RX ORDER — PANTOPRAZOLE SODIUM 40 MG/1
40 TABLET, DELAYED RELEASE ORAL DAILY
Qty: 90 TABLET | Refills: 3 | Status: SHIPPED | OUTPATIENT
Start: 2019-10-10 | End: 2020-01-13 | Stop reason: SDUPTHER

## 2019-10-10 RX ORDER — CLOPIDOGREL BISULFATE 75 MG/1
75 TABLET ORAL DAILY
Qty: 30 TABLET | Refills: 2 | Status: SHIPPED | OUTPATIENT
Start: 2019-10-10 | End: 2020-01-13 | Stop reason: SDUPTHER

## 2019-10-10 RX ORDER — ADHESIVE TAPE 3"X 2.3 YD
200 TAPE, NON-MEDICATED TOPICAL 2 TIMES DAILY
Qty: 60 EACH | Refills: 2 | Status: SHIPPED | OUTPATIENT
Start: 2019-10-10 | End: 2020-01-13 | Stop reason: SDUPTHER

## 2019-11-11 ENCOUNTER — TELEPHONE (OUTPATIENT)
Dept: CARDIOLOGY | Facility: CLINIC | Age: 54
End: 2019-11-11

## 2019-11-11 NOTE — TELEPHONE ENCOUNTER
SUN:    Patient called request refills be sent to the pharmacy, I told him to call the pharmacy and tell them what you need, he got upset and starting yelling I go through this crap every month------ect, ect, I put him on hold and called the pharmacy they said he had not called them and he has refills on everything we give him,  I told the patient this and he said he did not call them this time because he always has to call us anyways.

## 2020-01-13 ENCOUNTER — OFFICE VISIT (OUTPATIENT)
Dept: CARDIOLOGY | Facility: CLINIC | Age: 55
End: 2020-01-13

## 2020-01-13 VITALS
SYSTOLIC BLOOD PRESSURE: 108 MMHG | OXYGEN SATURATION: 97 % | DIASTOLIC BLOOD PRESSURE: 82 MMHG | WEIGHT: 208 LBS | HEART RATE: 89 BPM | HEIGHT: 75 IN | BODY MASS INDEX: 25.86 KG/M2

## 2020-01-13 DIAGNOSIS — I10 ESSENTIAL HYPERTENSION: ICD-10-CM

## 2020-01-13 DIAGNOSIS — E78.1 HYPERLIPIDEMIA TYPE IV: Primary | ICD-10-CM

## 2020-01-13 DIAGNOSIS — I25.10 CORONARY ARTERY DISEASE INVOLVING NATIVE CORONARY ARTERY OF NATIVE HEART WITHOUT ANGINA PECTORIS: ICD-10-CM

## 2020-01-13 DIAGNOSIS — F10.20 CHRONIC ALCOHOLISM (HCC): ICD-10-CM

## 2020-01-13 DIAGNOSIS — E83.42 HYPOMAGNESEMIA: ICD-10-CM

## 2020-01-13 DIAGNOSIS — F41.1 GENERALIZED ANXIETY DISORDER: ICD-10-CM

## 2020-01-13 DIAGNOSIS — E87.6 HYPOKALEMIA: ICD-10-CM

## 2020-01-13 PROCEDURE — 90471 IMMUNIZATION ADMIN: CPT | Performed by: INTERNAL MEDICINE

## 2020-01-13 PROCEDURE — 90674 CCIIV4 VAC NO PRSV 0.5 ML IM: CPT | Performed by: INTERNAL MEDICINE

## 2020-01-13 PROCEDURE — 99214 OFFICE O/P EST MOD 30 MIN: CPT | Performed by: INTERNAL MEDICINE

## 2020-01-13 RX ORDER — PANTOPRAZOLE SODIUM 40 MG/1
40 TABLET, DELAYED RELEASE ORAL DAILY
Qty: 90 TABLET | Refills: 3 | Status: SHIPPED | OUTPATIENT
Start: 2020-01-13 | End: 2020-01-27

## 2020-01-13 RX ORDER — POTASSIUM CHLORIDE 750 MG/1
TABLET, FILM COATED, EXTENDED RELEASE ORAL
Qty: 30 TABLET | Refills: 1 | OUTPATIENT
Start: 2020-01-13

## 2020-01-13 RX ORDER — HYDROXYZINE HYDROCHLORIDE 10 MG/1
10 TABLET, FILM COATED ORAL 3 TIMES DAILY PRN
Qty: 60 TABLET | Refills: 0 | Status: SHIPPED | OUTPATIENT
Start: 2020-01-13 | End: 2020-02-12

## 2020-01-13 RX ORDER — ATORVASTATIN CALCIUM 40 MG/1
TABLET, FILM COATED ORAL
Qty: 30 TABLET | Refills: 1 | OUTPATIENT
Start: 2020-01-13

## 2020-01-13 RX ORDER — LOSARTAN POTASSIUM 100 MG/1
TABLET ORAL
Qty: 30 TABLET | Refills: 1 | OUTPATIENT
Start: 2020-01-13

## 2020-01-13 RX ORDER — ATORVASTATIN CALCIUM 40 MG/1
40 TABLET, FILM COATED ORAL DAILY
Qty: 30 TABLET | Refills: 2 | Status: SHIPPED | OUTPATIENT
Start: 2020-01-13 | End: 2020-04-14

## 2020-01-13 RX ORDER — HYDROCHLOROTHIAZIDE 12.5 MG/1
TABLET ORAL
Qty: 30 TABLET | Refills: 1 | OUTPATIENT
Start: 2020-01-13

## 2020-01-13 RX ORDER — FENOFIBRATE 145 MG/1
145 TABLET, COATED ORAL DAILY
Qty: 30 TABLET | Refills: 2 | Status: ON HOLD | OUTPATIENT
Start: 2020-01-13 | End: 2020-08-18

## 2020-01-13 RX ORDER — POTASSIUM CHLORIDE 750 MG/1
10 TABLET, FILM COATED, EXTENDED RELEASE ORAL DAILY
Qty: 30 TABLET | Refills: 2 | Status: SHIPPED | OUTPATIENT
Start: 2020-01-13 | End: 2020-04-14

## 2020-01-13 RX ORDER — ONDANSETRON 4 MG/1
4 TABLET, FILM COATED ORAL EVERY 12 HOURS PRN
Qty: 60 TABLET | Refills: 0 | Status: SHIPPED | OUTPATIENT
Start: 2020-01-13 | End: 2020-05-22 | Stop reason: SDUPTHER

## 2020-01-13 RX ORDER — CLOPIDOGREL BISULFATE 75 MG/1
75 TABLET ORAL DAILY
Qty: 30 TABLET | Refills: 2 | Status: SHIPPED | OUTPATIENT
Start: 2020-01-13 | End: 2020-04-14

## 2020-01-13 RX ORDER — METOPROLOL TARTRATE 100 MG/1
100 TABLET ORAL EVERY 12 HOURS
Qty: 60 TABLET | Refills: 2 | Status: SHIPPED | OUTPATIENT
Start: 2020-01-13 | End: 2020-04-14

## 2020-01-13 RX ORDER — MAGNESIUM OXIDE 400 MG/1
TABLET ORAL
Qty: 30 TABLET | Refills: 1 | OUTPATIENT
Start: 2020-01-13

## 2020-01-13 RX ORDER — ADHESIVE TAPE 3"X 2.3 YD
200 TAPE, NON-MEDICATED TOPICAL 2 TIMES DAILY
Qty: 60 EACH | Refills: 2 | Status: SHIPPED | OUTPATIENT
Start: 2020-01-13 | End: 2020-04-02 | Stop reason: SDUPTHER

## 2020-01-13 RX ORDER — CLOPIDOGREL BISULFATE 75 MG/1
TABLET ORAL
Qty: 30 TABLET | Refills: 1 | OUTPATIENT
Start: 2020-01-13

## 2020-01-13 RX ORDER — MELOXICAM 7.5 MG/1
7.5 TABLET ORAL DAILY
Qty: 30 TABLET | Refills: 0 | Status: SHIPPED | OUTPATIENT
Start: 2020-01-13 | End: 2020-02-12

## 2020-01-13 RX ORDER — NITROGLYCERIN 0.4 MG/1
0.4 TABLET SUBLINGUAL
Qty: 25 TABLET | Refills: 0 | Status: SHIPPED | OUTPATIENT
Start: 2020-01-13 | End: 2020-02-12

## 2020-01-13 RX ORDER — FENOFIBRATE 145 MG/1
TABLET, COATED ORAL
Qty: 30 TABLET | Refills: 1 | OUTPATIENT
Start: 2020-01-13

## 2020-01-13 RX ORDER — LOSARTAN POTASSIUM 100 MG/1
100 TABLET ORAL DAILY
Qty: 30 TABLET | Refills: 2 | Status: SHIPPED | OUTPATIENT
Start: 2020-01-13 | End: 2020-04-14

## 2020-01-13 RX ORDER — METOPROLOL TARTRATE 100 MG/1
TABLET ORAL
Qty: 60 TABLET | Refills: 1 | OUTPATIENT
Start: 2020-01-13

## 2020-01-13 NOTE — PROGRESS NOTES
subjective     Chief Complaint   Patient presents with   • Fatigue   • Coronary Artery Disease   • Joint Pain     hands, knees, elbows     History of Present Illness  Patient is 54 years old white male who has history of chronic heavy alcoholism.  He states that he still drinking quite a bit.  He develops quite frequent hypomagnesemia and is on magnesium replacement therapy.    He complains of severe anxiety and wants nerve pills which were denied and was given an appointment with psychiatry service.    Patient has known coronary artery disease, patient is currently asymptomatic.  He is on dual antiplatelet therapy with aspirin and Plavix     patient has hyperlipidemia he is taking Lipitor 40 mg daily and TriCor 145 mg daily.  Blood pressure is controlled with losartan and Lopressor    Past Surgical History:   Procedure Laterality Date   • CARDIAC CATHETERIZATION  06/06/2015   • CARDIOVASCULAR STRESS TEST  06/06/2015   • ECHO - CONVERTED  06/06/2015   • EYE SURGERY       Family History   Problem Relation Age of Onset   • Heart attack Father    • Heart disease Father    • Heart failure Father    • Heart disease Sister    • Heart failure Sister    • Heart disease Brother    • Heart failure Brother    • Heart failure Sister    • Heart disease Sister      Past Medical History:   Diagnosis Date   • Anxiety disorder    • CAD (coronary artery disease)    • Chronic alcoholism (CMS/HCC)    • Hyperlipidemia    • Hypertension    • WPW (Zakiya-Parkinson-White syndrome)      Patient Active Problem List   Diagnosis   • CAD (coronary artery disease), 90% proximal RCA, total occlusion of posterior lateral branch of RCA, total occlusion of second marginal with left to left collaterals, 40% LAD, 40% left main   • Anxiety disorder   • Chronic alcoholism (CMS/HCC)   • Essential hypertension   • Hyperlipidemia type IV,uncontrolled   • Gastroesophageal reflux disease without esophagitis   • Encounter for screening colonoscopy   •  Sorethroat   • Hypokalemia   • Hypomagnesemia   • Hypocalcemia       Social History     Tobacco Use   • Smoking status: Former Smoker     Packs/day: 1.00     Years: 20.00     Pack years: 20.00     Types: Cigarettes     Last attempt to quit:      Years since quittin.0   • Smokeless tobacco: Current User     Types: Chew, Snuff   • Tobacco comment: chew 1 can per week   Substance Use Topics   • Alcohol use: Yes     Alcohol/week: 10.0 standard drinks     Types: 6 Cans of beer, 4 Shots of liquor per week     Comment: pint of alcohol every other day    • Drug use: Yes     Types: Hydrocodone     Comment: occ       Allergies   Allergen Reactions   • Niacin Rash     Whelps       Current Outpatient Medications on File Prior to Visit   Medication Sig   • aspirin 81 MG EC tablet Take 1 tablet by mouth Daily.   • [DISCONTINUED] atorvastatin (LIPITOR) 40 MG tablet Take 1 tablet by mouth Daily.   • [DISCONTINUED] clopidogrel (PLAVIX) 75 MG tablet Take 1 tablet by mouth Daily.   • [DISCONTINUED] fenofibrate (TRICOR) 145 MG tablet Take 1 tablet by mouth Daily.   • [DISCONTINUED] hydrOXYzine (ATARAX) 10 MG tablet Take 1 tablet by mouth 3 (Three) Times a Day As Needed for Itching or Anxiety.   • [DISCONTINUED] losartan (COZAAR) 100 MG tablet Take 1 tablet by mouth Daily. for blood pressure   • [DISCONTINUED] Magnesium Oxide 200 MG tablet Take 1 tablet by mouth 2 (Two) Times a Day.   • [DISCONTINUED] meloxicam (MOBIC) 7.5 MG tablet Take 1 tablet by mouth Daily.   • [DISCONTINUED] metoprolol tartrate (LOPRESSOR) 100 MG tablet Take 1 tablet by mouth Every 12 (Twelve) Hours.   • [DISCONTINUED] nitroglycerin (NITROSTAT) 0.4 MG SL tablet Place 0.4 mg under the tongue Every 5 (Five) Minutes As Needed for Chest Pain. Take no more than 3 doses in 15 minutes.   • [DISCONTINUED] ondansetron (ZOFRAN) 4 MG tablet Take 1 tablet by mouth Every 12 (Twelve) Hours As Needed for Nausea or Vomiting.   • [DISCONTINUED] pantoprazole (PROTONIX) 40  "MG EC tablet Take 1 tablet by mouth Daily.   • [DISCONTINUED] potassium chloride (K-DUR) 10 MEQ CR tablet Take 1 tablet by mouth Daily.     No current facility-administered medications on file prior to visit.          The following portions of the patient's history were reviewed and updated as appropriate: allergies, current medications, past family history, past medical history, past social history, past surgical history and problem list.    Review of Systems   Constitution: Positive for malaise/fatigue.   HENT: Negative.  Negative for congestion.    Eyes: Negative.    Cardiovascular: Negative.  Negative for chest pain, cyanosis, dyspnea on exertion, irregular heartbeat, leg swelling, near-syncope, orthopnea, palpitations, paroxysmal nocturnal dyspnea and syncope.   Respiratory: Negative.  Negative for shortness of breath.    Hematologic/Lymphatic: Negative.    Musculoskeletal: Negative.    Gastrointestinal: Negative.    Neurological: Positive for weakness. Negative for headaches.   Psychiatric/Behavioral: The patient is nervous/anxious.           Objective:     /82 (BP Location: Left arm, Patient Position: Sitting, Cuff Size: Adult)   Pulse 89   Ht 190.5 cm (75\")   Wt 94.3 kg (208 lb)   SpO2 97%   BMI 26.00 kg/m²   Physical Exam   Constitutional: He appears well-developed and well-nourished. No distress.   HENT:   Head: Normocephalic and atraumatic.   Mouth/Throat: Oropharynx is clear and moist. No oropharyngeal exudate.   Eyes: Pupils are equal, round, and reactive to light. Conjunctivae and EOM are normal. No scleral icterus.   Neck: Normal range of motion. Neck supple. No JVD present. No tracheal deviation present. No thyromegaly present.   Cardiovascular: Normal rate, regular rhythm, normal heart sounds and intact distal pulses. PMI is not displaced. Exam reveals no gallop, no friction rub and no decreased pulses.   No murmur heard.  Pulses:       Carotid pulses are 3+ on the right side, and 3+ on " the left side.       Radial pulses are 3+ on the right side, and 3+ on the left side.   Pulmonary/Chest: Effort normal and breath sounds normal. No respiratory distress. He has no wheezes. He has no rales. He exhibits no tenderness.   Abdominal: Soft. Bowel sounds are normal. He exhibits no distension, no abdominal bruit and no mass. There is no splenomegaly or hepatomegaly. There is no tenderness. There is no rebound and no guarding.   Musculoskeletal: Normal range of motion. He exhibits no edema, tenderness or deformity.   Lymphadenopathy:     He has no cervical adenopathy.   Neurological: He is alert. He has normal reflexes. No cranial nerve deficit. He exhibits normal muscle tone. Coordination normal.   Skin: Skin is warm and dry. No rash noted. He is not diaphoretic. No erythema.   Psychiatric: He has a normal mood and affect. His behavior is normal. Judgment and thought content normal.         Lab Review  Lab Results   Component Value Date     09/09/2019    K 3.8 09/09/2019    CL 96 (L) 09/09/2019    BUN 18 09/09/2019    CREATININE 1.35 (H) 09/09/2019    GLUCOSE 106 (H) 09/09/2019    CALCIUM 8.5 (L) 09/09/2019    ALT 12 09/09/2019    ALKPHOS 19 (L) 09/09/2019    LABIL2 1.7 05/04/2016     Lab Results   Component Value Date    CKTOTAL 66 09/09/2019     Lab Results   Component Value Date    WBC 6.44 09/09/2019    HGB 14.0 09/09/2019    HCT 45.2 09/09/2019     09/09/2019     Lab Results   Component Value Date    INR 1.24 (H) 05/30/2018    INR 0.99 06/05/2015     Lab Results   Component Value Date    MG 0.5 (C) 09/09/2019     Lab Results   Component Value Date    PSA 0.300 03/20/2018    TSH 0.664 08/01/2019     No results found for: BNP  Lab Results   Component Value Date    CHLPL 182 05/04/2016    CHOL 150 09/09/2019    TRIG 66 09/09/2019    HDL 43 09/09/2019    VLDL 13.2 09/09/2019    LDLHDL 2.18 09/09/2019     Lab Results   Component Value Date    LDL 94 09/09/2019    LDL 65 08/02/2019        Procedures       I personally viewed and interpreted the patient's LAB data         Assessment:     1. Hyperlipidemia type IV,uncontrolled    2. Essential hypertension    3. Coronary artery disease involving native coronary artery of native heart without angina pectoris    4. Hypomagnesemia    5. Hypokalemia    6. Chronic alcoholism (CMS/[facility])    7. Generalized anxiety disorder          Plan:     Patient has hyperlipidemia which is very well controlled, he was advised to continue Lipitor and TriCor.  Blood pressure is running low patient will adjust losartan.  Heart rate is 89 he will continue Lopressor.  Patient has heavy alcoholism and has recurrent hypomagnesemia.  Magnesium was continued lab work scheduled.  Cessation of alcohol use was again stressed.  Psychiatric consultation made.  Follow-up in 3 months        No follow-ups on file.

## 2020-01-27 ENCOUNTER — TELEPHONE (OUTPATIENT)
Dept: CARDIOLOGY | Facility: CLINIC | Age: 55
End: 2020-01-27

## 2020-01-27 RX ORDER — FAMOTIDINE 40 MG/1
40 TABLET, FILM COATED ORAL DAILY
Qty: 90 TABLET | Refills: 0 | Status: ON HOLD | OUTPATIENT
Start: 2020-01-27 | End: 2020-08-18

## 2020-02-12 RX ORDER — MELOXICAM 7.5 MG/1
TABLET ORAL
Qty: 30 TABLET | Refills: 0 | Status: SHIPPED | OUTPATIENT
Start: 2020-02-12 | End: 2020-03-09

## 2020-02-12 RX ORDER — NITROGLYCERIN 0.4 MG/1
TABLET SUBLINGUAL
Qty: 25 TABLET | Refills: 0 | Status: ON HOLD | OUTPATIENT
Start: 2020-02-12 | End: 2020-08-18

## 2020-02-12 RX ORDER — HYDROXYZINE HYDROCHLORIDE 10 MG/1
TABLET, FILM COATED ORAL
Qty: 60 TABLET | Refills: 0 | Status: SHIPPED | OUTPATIENT
Start: 2020-02-12 | End: 2020-03-09

## 2020-03-09 RX ORDER — HYDROXYZINE HYDROCHLORIDE 10 MG/1
TABLET, FILM COATED ORAL
Qty: 60 TABLET | Refills: 0 | Status: SHIPPED | OUTPATIENT
Start: 2020-03-09 | End: 2020-04-14

## 2020-03-09 RX ORDER — MELOXICAM 7.5 MG/1
TABLET ORAL
Qty: 30 TABLET | Refills: 0 | Status: SHIPPED | OUTPATIENT
Start: 2020-03-09 | End: 2020-04-14

## 2020-03-19 ENCOUNTER — HOSPITAL ENCOUNTER (EMERGENCY)
Facility: HOSPITAL | Age: 55
Discharge: HOME | End: 2020-03-20
Attending: EMERGENCY MEDICINE | Admitting: EMERGENCY MEDICINE

## 2020-03-19 ENCOUNTER — APPOINTMENT (OUTPATIENT)
Dept: GENERAL RADIOLOGY | Facility: HOSPITAL | Age: 55
End: 2020-03-19

## 2020-03-19 DIAGNOSIS — R53.1 WEAKNESS: Primary | ICD-10-CM

## 2020-03-19 DIAGNOSIS — E83.42 HYPOMAGNESEMIA: ICD-10-CM

## 2020-03-19 LAB
ALBUMIN SERPL-MCNC: 4.32 G/DL (ref 3.5–5.2)
ALBUMIN/GLOB SERPL: 1.7 G/DL
ALP SERPL-CCNC: 49 U/L (ref 39–117)
ALT SERPL W P-5'-P-CCNC: 17 U/L (ref 1–41)
ANION GAP SERPL CALCULATED.3IONS-SCNC: 14.1 MMOL/L (ref 5–15)
AST SERPL-CCNC: 18 U/L (ref 1–40)
BASOPHILS # BLD AUTO: 0.08 10*3/MM3 (ref 0–0.2)
BASOPHILS NFR BLD AUTO: 1 % (ref 0–1.5)
BILIRUB SERPL-MCNC: 0.5 MG/DL (ref 0.2–1.2)
BUN BLD-MCNC: 23 MG/DL (ref 6–20)
BUN/CREAT SERPL: 18.9 (ref 7–25)
CALCIUM SPEC-SCNC: 9.4 MG/DL (ref 8.6–10.5)
CHLORIDE SERPL-SCNC: 99 MMOL/L (ref 98–107)
CK SERPL-CCNC: 64 U/L (ref 20–200)
CO2 SERPL-SCNC: 23.9 MMOL/L (ref 22–29)
CREAT BLD-MCNC: 1.22 MG/DL (ref 0.76–1.27)
DEPRECATED RDW RBC AUTO: 42.2 FL (ref 37–54)
EOSINOPHIL # BLD AUTO: 0.21 10*3/MM3 (ref 0–0.4)
EOSINOPHIL NFR BLD AUTO: 2.6 % (ref 0.3–6.2)
ERYTHROCYTE [DISTWIDTH] IN BLOOD BY AUTOMATED COUNT: 13.1 % (ref 12.3–15.4)
GFR SERPL CREATININE-BSD FRML MDRD: 61 ML/MIN/1.73
GLOBULIN UR ELPH-MCNC: 2.6 GM/DL
GLUCOSE BLD-MCNC: 97 MG/DL (ref 65–99)
HCT VFR BLD AUTO: 43.4 % (ref 37.5–51)
HGB BLD-MCNC: 14 G/DL (ref 13–17.7)
HOLD SPECIMEN: NORMAL
HOLD SPECIMEN: NORMAL
IMM GRANULOCYTES # BLD AUTO: 0.02 10*3/MM3 (ref 0–0.05)
IMM GRANULOCYTES NFR BLD AUTO: 0.3 % (ref 0–0.5)
LYMPHOCYTES # BLD AUTO: 1.57 10*3/MM3 (ref 0.7–3.1)
LYMPHOCYTES NFR BLD AUTO: 19.7 % (ref 19.6–45.3)
MAGNESIUM SERPL-MCNC: 1.3 MG/DL (ref 1.6–2.6)
MCH RBC QN AUTO: 28.8 PG (ref 26.6–33)
MCHC RBC AUTO-ENTMCNC: 32.3 G/DL (ref 31.5–35.7)
MCV RBC AUTO: 89.3 FL (ref 79–97)
MONOCYTES # BLD AUTO: 0.86 10*3/MM3 (ref 0.1–0.9)
MONOCYTES NFR BLD AUTO: 10.8 % (ref 5–12)
NEUTROPHILS # BLD AUTO: 5.21 10*3/MM3 (ref 1.7–7)
NEUTROPHILS NFR BLD AUTO: 65.6 % (ref 42.7–76)
NRBC BLD AUTO-RTO: 0 /100 WBC (ref 0–0.2)
NT-PROBNP SERPL-MCNC: 270 PG/ML (ref 5–900)
PHOSPHATE SERPL-MCNC: 3.7 MG/DL (ref 2.5–4.5)
PLATELET # BLD AUTO: 221 10*3/MM3 (ref 140–450)
PMV BLD AUTO: 10 FL (ref 6–12)
POTASSIUM BLD-SCNC: 3.9 MMOL/L (ref 3.5–5.2)
PROT SERPL-MCNC: 6.9 G/DL (ref 6–8.5)
RBC # BLD AUTO: 4.86 10*6/MM3 (ref 4.14–5.8)
SODIUM BLD-SCNC: 137 MMOL/L (ref 136–145)
T4 FREE SERPL-MCNC: 1.07 NG/DL (ref 0.93–1.7)
TROPONIN T SERPL-MCNC: <0.01 NG/ML (ref 0–0.03)
TSH SERPL DL<=0.05 MIU/L-ACNC: 1 UIU/ML (ref 0.27–4.2)
WBC NRBC COR # BLD: 7.95 10*3/MM3 (ref 3.4–10.8)
WHOLE BLOOD HOLD SPECIMEN: NORMAL
WHOLE BLOOD HOLD SPECIMEN: NORMAL

## 2020-03-19 PROCEDURE — 36415 COLL VENOUS BLD VENIPUNCTURE: CPT

## 2020-03-19 PROCEDURE — 84443 ASSAY THYROID STIM HORMONE: CPT | Performed by: EMERGENCY MEDICINE

## 2020-03-19 PROCEDURE — 84439 ASSAY OF FREE THYROXINE: CPT | Performed by: EMERGENCY MEDICINE

## 2020-03-19 PROCEDURE — 80053 COMPREHEN METABOLIC PANEL: CPT | Performed by: EMERGENCY MEDICINE

## 2020-03-19 PROCEDURE — 84100 ASSAY OF PHOSPHORUS: CPT | Performed by: EMERGENCY MEDICINE

## 2020-03-19 PROCEDURE — 82550 ASSAY OF CK (CPK): CPT | Performed by: EMERGENCY MEDICINE

## 2020-03-19 PROCEDURE — 83735 ASSAY OF MAGNESIUM: CPT | Performed by: EMERGENCY MEDICINE

## 2020-03-19 PROCEDURE — 93005 ELECTROCARDIOGRAM TRACING: CPT | Performed by: EMERGENCY MEDICINE

## 2020-03-19 PROCEDURE — 84484 ASSAY OF TROPONIN QUANT: CPT | Performed by: EMERGENCY MEDICINE

## 2020-03-19 PROCEDURE — 85025 COMPLETE CBC W/AUTO DIFF WBC: CPT | Performed by: EMERGENCY MEDICINE

## 2020-03-19 PROCEDURE — 83880 ASSAY OF NATRIURETIC PEPTIDE: CPT | Performed by: EMERGENCY MEDICINE

## 2020-03-19 PROCEDURE — 71045 X-RAY EXAM CHEST 1 VIEW: CPT

## 2020-03-19 PROCEDURE — 93010 ELECTROCARDIOGRAM REPORT: CPT | Performed by: INTERNAL MEDICINE

## 2020-03-19 PROCEDURE — 96365 THER/PROPH/DIAG IV INF INIT: CPT

## 2020-03-19 PROCEDURE — 99284 EMERGENCY DEPT VISIT MOD MDM: CPT

## 2020-03-19 RX ORDER — SODIUM CHLORIDE 0.9 % (FLUSH) 0.9 %
10 SYRINGE (ML) INJECTION AS NEEDED
Status: DISCONTINUED | OUTPATIENT
Start: 2020-03-19 | End: 2020-03-20 | Stop reason: HOSPADM

## 2020-03-19 RX ADMIN — SODIUM CHLORIDE 1000 ML: 9 INJECTION, SOLUTION INTRAVENOUS at 23:13

## 2020-03-20 VITALS
HEART RATE: 60 BPM | TEMPERATURE: 98.1 F | OXYGEN SATURATION: 97 % | HEIGHT: 74 IN | SYSTOLIC BLOOD PRESSURE: 144 MMHG | DIASTOLIC BLOOD PRESSURE: 85 MMHG | WEIGHT: 215 LBS | RESPIRATION RATE: 18 BRPM | BODY MASS INDEX: 27.59 KG/M2

## 2020-03-20 LAB — TROPONIN T SERPL-MCNC: <0.01 NG/ML (ref 0–0.03)

## 2020-03-20 PROCEDURE — 93005 ELECTROCARDIOGRAM TRACING: CPT | Performed by: EMERGENCY MEDICINE

## 2020-03-20 PROCEDURE — 25010000002 MAGNESIUM SULFATE 2 GM/50ML SOLUTION: Performed by: EMERGENCY MEDICINE

## 2020-03-20 PROCEDURE — 93010 ELECTROCARDIOGRAM REPORT: CPT | Performed by: INTERNAL MEDICINE

## 2020-03-20 PROCEDURE — 84484 ASSAY OF TROPONIN QUANT: CPT | Performed by: EMERGENCY MEDICINE

## 2020-03-20 RX ORDER — MAGNESIUM SULFATE HEPTAHYDRATE 40 MG/ML
2 INJECTION, SOLUTION INTRAVENOUS ONCE
Status: COMPLETED | OUTPATIENT
Start: 2020-03-20 | End: 2020-03-20

## 2020-03-20 RX ADMIN — MAGNESIUM SULFATE IN WATER 2 G: 40 INJECTION, SOLUTION INTRAVENOUS at 00:19

## 2020-04-02 RX ORDER — ADHESIVE TAPE 3"X 2.3 YD
200 TAPE, NON-MEDICATED TOPICAL 2 TIMES DAILY
Qty: 60 EACH | Refills: 2 | Status: SHIPPED | OUTPATIENT
Start: 2020-04-02 | End: 2020-04-28 | Stop reason: SDUPTHER

## 2020-04-14 RX ORDER — LOSARTAN POTASSIUM 100 MG/1
TABLET ORAL
Qty: 30 TABLET | Refills: 2 | Status: ON HOLD | OUTPATIENT
Start: 2020-04-14 | End: 2020-08-18

## 2020-04-14 RX ORDER — POTASSIUM CHLORIDE 750 MG/1
TABLET, FILM COATED, EXTENDED RELEASE ORAL
Qty: 30 TABLET | Refills: 0 | Status: SHIPPED | OUTPATIENT
Start: 2020-04-14 | End: 2020-06-16

## 2020-04-14 RX ORDER — ATORVASTATIN CALCIUM 40 MG/1
TABLET, FILM COATED ORAL
Qty: 30 TABLET | Refills: 2 | Status: SHIPPED | OUTPATIENT
Start: 2020-04-14 | End: 2020-04-14

## 2020-04-14 RX ORDER — MELOXICAM 7.5 MG/1
TABLET ORAL
Qty: 30 TABLET | Refills: 0 | Status: SHIPPED | OUTPATIENT
Start: 2020-04-14 | End: 2020-04-14

## 2020-04-14 RX ORDER — HYDROXYZINE HYDROCHLORIDE 10 MG/1
TABLET, FILM COATED ORAL
Qty: 60 TABLET | Refills: 0 | Status: SHIPPED | OUTPATIENT
Start: 2020-04-14 | End: 2020-06-16

## 2020-04-14 RX ORDER — CLOPIDOGREL BISULFATE 75 MG/1
TABLET ORAL
Qty: 30 TABLET | Refills: 2 | Status: SHIPPED | OUTPATIENT
Start: 2020-04-14 | End: 2020-08-17

## 2020-04-14 RX ORDER — ATORVASTATIN CALCIUM 40 MG/1
TABLET, FILM COATED ORAL
Qty: 30 TABLET | Refills: 2 | Status: SHIPPED | OUTPATIENT
Start: 2020-04-14 | End: 2020-08-17

## 2020-04-14 RX ORDER — MELOXICAM 7.5 MG/1
TABLET ORAL
Qty: 30 TABLET | Refills: 0 | Status: SHIPPED | OUTPATIENT
Start: 2020-04-14 | End: 2020-06-16

## 2020-04-14 RX ORDER — METOPROLOL TARTRATE 100 MG/1
TABLET ORAL
Qty: 60 TABLET | Refills: 2 | Status: SHIPPED | OUTPATIENT
Start: 2020-04-14 | End: 2020-08-17

## 2020-04-28 ENCOUNTER — OFFICE VISIT (OUTPATIENT)
Dept: CARDIOLOGY | Facility: CLINIC | Age: 55
End: 2020-04-28

## 2020-04-28 DIAGNOSIS — F10.20 CHRONIC ALCOHOLISM (HCC): ICD-10-CM

## 2020-04-28 DIAGNOSIS — E83.42 HYPOMAGNESEMIA: ICD-10-CM

## 2020-04-28 DIAGNOSIS — E78.1 HYPERLIPIDEMIA TYPE IV: ICD-10-CM

## 2020-04-28 DIAGNOSIS — I25.10 CORONARY ARTERY DISEASE INVOLVING NATIVE CORONARY ARTERY OF NATIVE HEART WITHOUT ANGINA PECTORIS: Primary | ICD-10-CM

## 2020-04-28 DIAGNOSIS — I10 ESSENTIAL HYPERTENSION: ICD-10-CM

## 2020-04-28 DIAGNOSIS — F41.1 GENERALIZED ANXIETY DISORDER: ICD-10-CM

## 2020-04-28 PROCEDURE — 99442 PR PHYS/QHP TELEPHONE EVALUATION 11-20 MIN: CPT | Performed by: INTERNAL MEDICINE

## 2020-04-28 RX ORDER — MAGNESIUM OXIDE 400 MG/1
400 TABLET ORAL 2 TIMES DAILY
Qty: 180 TABLET | Refills: 0 | Status: SHIPPED | OUTPATIENT
Start: 2020-04-28 | End: 2020-07-21

## 2020-04-28 RX ORDER — ADHESIVE TAPE 3"X 2.3 YD
200 TAPE, NON-MEDICATED TOPICAL 2 TIMES DAILY
Qty: 60 EACH | Refills: 2 | Status: SHIPPED | OUTPATIENT
Start: 2020-04-28 | End: 2020-04-28

## 2020-04-28 RX ORDER — ADHESIVE TAPE 3"X 2.3 YD
200 TAPE, NON-MEDICATED TOPICAL 2 TIMES DAILY
Qty: 60 EACH | Refills: 2 | Status: CANCELLED | OUTPATIENT
Start: 2020-04-28

## 2020-04-28 NOTE — PROGRESS NOTES
subjective     Chief Complaint   Patient presents with   • Dizziness     along with stomach cramps, states he takes an extra magnesium and helps, asking to increase dosage   • Hyperlipidemia     Follow up   • Coronary Artery Disease   • Med Refill     pending     History of Present Illness    You have chosen to receive care through a telephone visit. Do you consent to use a telephone visit for your medical care today? Yes    Patient is 55 years old white male who has known coronary artery disease however he denies any chest pain palpitations or shortness of breath.  Patient is doing very well from cardiac standpoint  Hyperlipidemia also is controlled he is taking Lipitor and TriCor.  Patient has alcoholism ongoing.  He gets abdominal cramps nausea, he went to the emergency room where magnesium was quite low he required 2 g infusion.  His base dose has been 200 mg mag oxide 2 times a day, this dose was not increased.  Patient thinks that his dose should be increased and that is probably correct.  Patient has GERD and is taking Pepcid and alcohol is obviously making it worse.  Patient denies any cough fever shortness of breath.  He is staying home and following coronavirus precautions as much as he can.  Past Surgical History:   Procedure Laterality Date   • CARDIAC CATHETERIZATION  06/06/2015   • CARDIOVASCULAR STRESS TEST  06/06/2015   • ECHO - CONVERTED  06/06/2015   • EYE SURGERY       Family History   Problem Relation Age of Onset   • Heart attack Father    • Heart disease Father    • Heart failure Father    • Heart disease Sister    • Heart failure Sister    • Heart disease Brother    • Heart failure Brother    • Heart failure Sister    • Heart disease Sister      Past Medical History:   Diagnosis Date   • Anxiety disorder    • CAD (coronary artery disease)    • Chronic alcoholism (CMS/[facility])    • Hyperlipidemia    • Hypertension    • WPW (Zakiya-Parkinson-White syndrome)      Patient Active Problem List    Diagnosis   • CAD (coronary artery disease), 90% proximal RCA, total occlusion of posterior lateral branch of RCA, total occlusion of second marginal with left to left collaterals, 40% LAD, 40% left main   • Anxiety disorder   • Chronic alcoholism (CMS/[facility])   • Essential hypertension   • Hyperlipidemia type IV,uncontrolled   • Gastroesophageal reflux disease without esophagitis   • Encounter for screening colonoscopy   • Sorethroat   • Hypokalemia   • Hypomagnesemia   • Hypocalcemia       Social History     Tobacco Use   • Smoking status: Former Smoker     Packs/day: 1.00     Years: 20.00     Pack years: 20.00     Types: Cigarettes     Last attempt to quit:      Years since quittin.3   • Smokeless tobacco: Current User     Types: Chew, Snuff   • Tobacco comment: chew 1 can per week   Substance Use Topics   • Alcohol use: Yes     Alcohol/week: 10.0 standard drinks     Types: 6 Cans of beer, 4 Shots of liquor per week     Comment: pint of alcohol every other day    • Drug use: Yes     Types: Hydrocodone     Comment: occ       Allergies   Allergen Reactions   • Niacin Rash     Whelps       Current Outpatient Medications on File Prior to Visit   Medication Sig   • aspirin 81 MG EC tablet Take 1 tablet by mouth Daily.   • atorvastatin (LIPITOR) 40 MG tablet TAKE ONE TABLET BY MOUTH ONE TIME DAILY    • clopidogrel (PLAVIX) 75 MG tablet TAKE ONE TABLET BY MOUTH ONE TIME DAILY    • famotidine (PEPCID) 40 MG tablet Take 1 tablet by mouth Daily.   • fenofibrate (TRICOR) 145 MG tablet Take 1 tablet by mouth Daily.   • hydrOXYzine (ATARAX) 10 MG tablet TAKE ONE TABLET BY MOUTH THREE TIMES DAILY AS NEEDED FOR ITCHING    • losartan (COZAAR) 100 MG tablet TAKE ONE TABLET BY MOUTH EVERY DAY FOR BLOOD PRESSURE    • meloxicam (MOBIC) 7.5 MG tablet TAKE ONE TABLET BY MOUTH ONE TIME DAILY    • metoprolol tartrate (LOPRESSOR) 100 MG tablet TAKE ONE TABLET BY MOUTH EVERY TWELVE HOURS    • nitroglycerin (NITROSTAT) 0.4 MG SL  tablet DISSOLVE 1 TABLET UNDER TONGUE AS NEEDED FOR CHEST PAIN, MAY TAKE ONE EVERY 5 MINUTES FOR UP TO 3 DOSES.   • ondansetron (ZOFRAN) 4 MG tablet Take 1 tablet by mouth Every 12 (Twelve) Hours As Needed for Nausea or Vomiting.   • potassium chloride (K-DUR) 10 MEQ CR tablet TAKE ONE TABLET BY MOUTH ONE TIME DAILY    • [DISCONTINUED] Magnesium Oxide 200 MG tablet Take 1 tablet by mouth 2 (Two) Times a Day.     No current facility-administered medications on file prior to visit.          The following portions of the patient's history were reviewed and updated as appropriate: allergies, current medications, past family history, past medical history, past social history, past surgical history and problem list.    Review of Systems   Constitution: Negative.   HENT: Negative.  Negative for congestion.    Eyes: Negative.    Cardiovascular: Negative.  Negative for chest pain, cyanosis, dyspnea on exertion, irregular heartbeat, leg swelling, near-syncope, orthopnea, palpitations, paroxysmal nocturnal dyspnea and syncope.   Respiratory: Negative.  Negative for shortness of breath.    Hematologic/Lymphatic: Negative.    Musculoskeletal: Negative.    Gastrointestinal: Positive for heartburn and nausea.        Abdominal cramps   Neurological: Positive for dizziness. Negative for headaches.   Psychiatric/Behavioral: The patient is nervous/anxious.           Objective:     There were no vitals taken for this visit.  Physical Exam   Constitutional:   Not done         Lab Review  Lab Results   Component Value Date     03/19/2020    K 3.9 03/19/2020    CL 99 03/19/2020    BUN 23 (H) 03/19/2020    CREATININE 1.22 03/19/2020    GLUCOSE 97 03/19/2020    CALCIUM 9.4 03/19/2020    ALT 17 03/19/2020    ALKPHOS 49 03/19/2020    LABIL2 1.7 05/04/2016     Lab Results   Component Value Date    CKTOTAL 64 03/19/2020     Lab Results   Component Value Date    WBC 7.95 03/19/2020    HGB 14.0 03/19/2020    HCT 43.4 03/19/2020      03/19/2020     Lab Results   Component Value Date    INR 1.24 (H) 05/30/2018    INR 0.99 06/05/2015     Lab Results   Component Value Date    MG 1.3 (L) 03/19/2020     Lab Results   Component Value Date    PSA 0.300 03/20/2018    TSH 1.000 03/19/2020     No results found for: BNP  Lab Results   Component Value Date    CHLPL 182 05/04/2016    CHOL 150 09/09/2019    TRIG 66 09/09/2019    HDL 43 09/09/2019    VLDL 13.2 09/09/2019    LDLHDL 2.18 09/09/2019     Lab Results   Component Value Date    LDL 94 09/09/2019    LDL 65 08/02/2019       Procedures       I personally viewed and interpreted the patient's LAB data         Assessment:     1. Coronary artery disease involving native coronary artery of native heart without angina pectoris    2. Essential hypertension    3. Hyperlipidemia type IV,uncontrolled    4. Hypomagnesemia    5. Chronic alcoholism (CMS/[facility])    6. Generalized anxiety disorder          Plan:     Patient is doing very well from cardiac standpoint he denies any chest pain palpitations or shortness of breath.  He was advised to continue dual antiplatelet therapy along with statin therapy and beta-blocker therapy with the metoprolol.    Blood pressure is very well controlled with losartan which was continued.  Patient also has hyperlipidemia he was advised to continue Lipitor and TriCor.    GERD symptoms are better with Pepcid which was continued.  Unfortunately patient still is drinking alcohol quite a bit.  He went to the emergency room last month and magnesium was low patient required 2 g of magnesium intravenously however his base dose was not increased.  Today mag oxide was increased to 400 twice daily  Patient will continue rest of his medication  Follow-up scheduled with lab work.  Coronavirus precautions discussed.  This visit has been rescheduled as a phone visit to comply with patient safety concerns in accordance with CDC recommendations. Total time of discussion was 15 minutes.        No  follow-ups on file.

## 2020-05-22 RX ORDER — ONDANSETRON 4 MG/1
4 TABLET, FILM COATED ORAL EVERY 12 HOURS PRN
Qty: 60 TABLET | Refills: 0 | Status: SHIPPED | OUTPATIENT
Start: 2020-05-22 | End: 2020-06-16 | Stop reason: SDUPTHER

## 2020-06-08 ENCOUNTER — HOSPITAL ENCOUNTER (EMERGENCY)
Facility: HOSPITAL | Age: 55
Discharge: LEFT BEFORE DISCHARGE | End: 2020-06-08

## 2020-06-08 VITALS
HEIGHT: 75 IN | TEMPERATURE: 98.7 F | DIASTOLIC BLOOD PRESSURE: 118 MMHG | WEIGHT: 210 LBS | BODY MASS INDEX: 26.11 KG/M2 | RESPIRATION RATE: 18 BRPM | HEART RATE: 85 BPM | SYSTOLIC BLOOD PRESSURE: 189 MMHG | OXYGEN SATURATION: 95 %

## 2020-06-09 NOTE — ED NOTES
Called pt to be roomed at this time, pt unable to be located in ED or outside of ED waiting area.     Azalea Garcia RN  06/08/20 [id number]

## 2020-06-09 NOTE — ED NOTES
Pt seen walking out of ED without assistance at this time by triage tech.     Azalea Garcia RN  06/08/20 [id number]

## 2020-06-09 NOTE — ED NOTES
Called pt to be roomed at this time, pt unable to be located in ED or outside of ED waiting area.     Azalea Garcia RN  06/08/20 [id number]

## 2020-06-09 NOTE — ED NOTES
Called to room pt at this time, pt unable to be located in ED or outside of ED waiting room.     Azalea Garcia RN  06/08/20 [id number]

## 2020-06-16 RX ORDER — ONDANSETRON 4 MG/1
TABLET, ORALLY DISINTEGRATING ORAL
Qty: 60 TABLET | Refills: 1 | Status: ON HOLD | OUTPATIENT
Start: 2020-06-16 | End: 2020-08-18

## 2020-06-16 RX ORDER — POTASSIUM CHLORIDE 750 MG/1
TABLET, FILM COATED, EXTENDED RELEASE ORAL
Qty: 30 TABLET | Refills: 0 | Status: SHIPPED | OUTPATIENT
Start: 2020-06-16 | End: 2020-08-17

## 2020-06-16 RX ORDER — HYDROXYZINE HYDROCHLORIDE 10 MG/1
TABLET, FILM COATED ORAL
Qty: 60 TABLET | Refills: 0 | Status: SHIPPED | OUTPATIENT
Start: 2020-06-16 | End: 2020-08-17

## 2020-06-16 RX ORDER — MELOXICAM 7.5 MG/1
TABLET ORAL
Qty: 30 TABLET | Refills: 0 | Status: SHIPPED | OUTPATIENT
Start: 2020-06-16 | End: 2020-08-17

## 2020-07-17 NOTE — PROGRESS NOTES
[facility]    [facility]              [phone]                       Plan :         - Continue  amlodipine   - Avoid nephrotoxic agents  - urine sodium < 20  - Beverly placed due to retention  - Renal U/S with normal size kidneys  - creatinine is better 0.9    Assessment :     1. Urosepsis : on initial evaluation   - Likely 2/2 to UTI: WBC 27>>19.5>>21.3, AMS, Cr 1.75>>2.4>>1.9, febrile to 101, hyperglycemia (242>>>120>>100), Lactate 4.7>>3.6>>1.6, UA with +WBC, +RBC,  +Bacteria, +leucoyte esterase  - Continue fluid management with IV NS  - Strict I's & O's  - Monitor Cr (currently 1.9, baseline 0.77)  - Abx: Intermittent Vanc + IV Cefepime    2. SHIRA  - Likely 2/2 to urosepsis vs. Pre-renal vs post-renal causes  - Cr 1.75>>2.4>>1.9 (Baseline 0.77)  - Monitor Cr  - Continue hydration with IV NS  - Keep SBP>120  - Avoid nephrotoxic agents  - Keep Vanc level<20  - FeNa: pending Urine Na and Urine Cr  - Renal U/S Pending    3. AMS  - Likely 2/2 to UTI; febrile to 101, acute change in mentation, +UA   - UA positive for bacteria; procalcitonin 10.34  - Continue Abx  - Monitor clinically for improvement    [facility] would like to thank Ovidio Mendiola MD   for opportunity to serve this patient      Please call with questions at-   24 Hrs Answering service [phone] or  7 am- 5 pm via Perfect serve or cell phone  Liliya De La O          CC/reason for consult :     SHIRA     HPI :     Camryn Garcia is a 78 y.o. male with a PMH of HTN, HLD, CVA, Rectal CA  presented to the hospital on 7/12/2020 with fever, SOB and AMS. Per daughter, patient is a very independent man able to undertake all his activities of daily living. Patient experienced 2 falls in the past week and had complaints of fatigue and generalized weakness.  On the morning of admission, pt took a nap and woke up complaining of feeling cold though he was febrile to 101, endorsed SOB and claimed he had a productive cough subjective     Chief Complaint   Patient presents with   • Coronary Artery Disease   • Follow-up     S/P [facility] 08-01-19   • Nausea     History of Present Illness    Patient is 54 years old white male who has known coronary artery disease.  He is doing very well from cardiac standpoint denies any chest pain palpitations or shortness of breath.  Patient has chronic alcoholism and drinks quite heavy.  He has recurrent hypomagnesemia and hypokalemia.  He recently went to the emergency room and was admitted initial and potassium were corrected.  He is here for follow-up at this time.  He complains of nausea but no vomiting  He still drinking alcohol and states that he cannot cut it down.    Blood pressure is well controlled with current medications.  He states that he is taking his magnesium and potassium.    He also has hyperlipidemia and is taking Lipitor and TriCor.  Dangers of liver dysfunction were discussed particularly in view of heavy alcoholism.  He also has GERD and is taking Protonix..  He denies any vomiting or GI bleeding episodes  Past Surgical History:   Procedure Laterality Date   • CARDIAC CATHETERIZATION  06/06/2015   • CARDIOVASCULAR STRESS TEST  06/06/2015   • ECHO - CONVERTED  06/06/2015   • EYE SURGERY       Family History   Problem Relation Age of Onset   • Heart attack Father    • Heart disease Father    • Heart failure Father    • Heart disease Sister    • Heart failure Sister    • Heart disease Brother    • Heart failure Brother    • Heart failure Sister    • Heart disease Sister      Past Medical History:   Diagnosis Date   • Anxiety disorder    • CAD (coronary artery disease)    • Chronic alcoholism (CMS/[facility])    • Hyperlipidemia    • Hypertension    • WPW (Zakiya-Parkinson-White syndrome)      Patient Active Problem List   Diagnosis   • CAD (coronary artery disease), 90% proximal RCA, total occlusion of posterior lateral branch of RCA, total occlusion of second marginal with left to left  collaterals, 40% LAD, 40% left main   • Anxiety disorder   • Chronic alcoholism (CMS/HCC)   • Essential hypertension   • Hyperlipidemia type IV,uncontrolled   • Gastroesophageal reflux disease without esophagitis   • Encounter for screening colonoscopy   • Sorethroat   • Hypokalemia   • Hypomagnesemia   • Hypocalcemia       Social History     Tobacco Use   • Smoking status: Former Smoker     Packs/day: 1.00     Years: 20.00     Pack years: 20.00     Types: Cigarettes     Last attempt to quit:      Years since quittin.6   • Smokeless tobacco: Current User     Types: Chew   • Tobacco comment: chew 1 can per week   Substance Use Topics   • Alcohol use: Yes     Alcohol/week: 6.0 oz     Types: 6 Cans of beer, 4 Shots of liquor per week     Comment: pint of alcohol every other day    • Drug use: Yes     Types: Hydrocodone     Comment: occ       Allergies   Allergen Reactions   • Niacin Rash     Whelps       Current Outpatient Medications on File Prior to Visit   Medication Sig   • aspirin 81 MG EC tablet Take 1 tablet by mouth Daily.   • atorvastatin (LIPITOR) 40 MG tablet TAKE ONE TABLET BY MOUTH ONE TIME DAILY   • clopidogrel (PLAVIX) 75 MG tablet TAKE ONE TABLET BY MOUTH ONE TIME DAILY   • fenofibrate (TRICOR) 145 MG tablet TAKE ONE TABLET BY MOUTH ONE TIME DAILY   • hydrOXYzine (ATARAX) 10 MG tablet Take 1 tablet by mouth 3 (Three) Times a Day As Needed for Itching or Anxiety.   • losartan (COZAAR) 100 MG tablet TAKE ONE TABLET BY MOUTH EVERY DAY FOR BLOOD PRESSURE   • Magnesium Oxide 200 MG tablet Take 1 tablet by mouth Daily.   • meloxicam (MOBIC) 7.5 MG tablet Take 1 tablet by mouth Daily.   • metoprolol tartrate (LOPRESSOR) 100 MG tablet TAKE ONE TABLET BY MOUTH EVERY TWELVE HOURS   • nitroglycerin (NITROSTAT) 0.4 MG SL tablet Place 0.4 mg under the tongue Every 5 (Five) Minutes As Needed for Chest Pain. Take no more than 3 doses in 15 minutes.   • pantoprazole (PROTONIX) 40 MG EC tablet Take 1 tablet by  [employer] (mouth Daily.   • potassium chloride (K-DUR) 10 MEQ CR tablet TAKE ONE TABLET BY MOUTH ONE TIME DAILY   • [DISCONTINUED] hydrochlorothiazide (HYDRODIURIL) 12.5 MG tablet TAKE ONE TABLET BY MOUTH IN THE MORNING     No current facility-administered medications on file prior to visit.          The following portions of the patient's history were reviewed and updated as appropriate: allergies, current medications, past family history, past medical history, past social history, past surgical history and problem list.    Review of Systems   Constitution: Negative.   HENT: Negative.  Negative for congestion.    Eyes: Negative.    Cardiovascular: Negative.  Negative for chest pain, cyanosis, dyspnea on exertion, irregular heartbeat, leg swelling, near-syncope, orthopnea, palpitations, paroxysmal nocturnal dyspnea and syncope.   Respiratory: Negative.  Negative for shortness of breath.    Hematologic/Lymphatic: Negative.    Musculoskeletal: Negative.    Gastrointestinal: Positive for nausea.   Neurological: Negative.  Negative for headaches.          Objective:     /84   Pulse 70   Ht 190.5 cm (75\")   Wt 87.5 kg (193 lb)   SpO2 99%   BMI 24.12 kg/m²   Physical Exam   Constitutional: He appears well-developed and well-nourished. No distress.   HENT:   Head: Normocephalic and atraumatic.   Mouth/Throat: Oropharynx is clear and moist. No oropharyngeal exudate.   Eyes: Conjunctivae and EOM are normal. Pupils are equal, round, and reactive to light. No scleral icterus.   Neck: Normal range of motion. Neck supple. No JVD present. No tracheal deviation present. No thyromegaly present.   Cardiovascular: Normal rate, regular rhythm, normal heart sounds and intact distal pulses. PMI is not displaced. Exam reveals no gallop, no friction rub and no decreased pulses.   No murmur heard.  Pulses:       Carotid pulses are 3+ on the right side, and 3+ on the left side.       Radial pulses are 3+ on the right side, and 3+ on the " left side.   Pulmonary/Chest: Effort normal and breath sounds normal. No respiratory distress. He has no wheezes. He has no rales. He exhibits no tenderness.   Abdominal: Soft. Bowel sounds are normal. He exhibits no distension, no abdominal bruit and no mass. There is no splenomegaly or hepatomegaly. There is no tenderness. There is no rebound and no guarding.   Musculoskeletal: Normal range of motion. He exhibits no edema, tenderness or deformity.   Lymphadenopathy:     He has no cervical adenopathy.   Neurological: He is alert. He has normal reflexes. No cranial nerve deficit. He exhibits normal muscle tone. Coordination normal.   Skin: Skin is warm and dry. No rash noted. He is not diaphoretic. No erythema.   Psychiatric: He has a normal mood and affect. His behavior is normal. Judgment and thought content normal.         Lab Review  Lab Results   Component Value Date     08/02/2019    K 3.3 (L) 08/02/2019     08/02/2019    BUN 21 (H) 08/02/2019    CREATININE 1.31 (H) 08/02/2019    GLUCOSE 117 (H) 08/02/2019    CALCIUM 8.3 (L) 08/02/2019    ALT 11 08/02/2019    ALKPHOS 25 (L) 08/02/2019    LABIL2 1.7 05/04/2016     Lab Results   Component Value Date    CKTOTAL 72 08/01/2019     Lab Results   Component Value Date    WBC 9.47 08/02/2019    HGB 12.5 (L) 08/02/2019    HCT 38.6 08/02/2019     08/02/2019     Lab Results   Component Value Date    INR 1.24 (H) 05/30/2018    INR 0.99 06/05/2015     Lab Results   Component Value Date    MG 2.1 08/02/2019     Lab Results   Component Value Date    PSA 0.300 03/20/2018    TSH 0.664 08/01/2019     No results found for: BNP  Lab Results   Component Value Date    CHLPL 182 05/04/2016    CHOL 123 08/02/2019    TRIG 121 08/02/2019    HDL 34 (L) 08/02/2019    VLDL 24.2 08/02/2019    LDLHDL 1.91 08/02/2019     Lab Results   Component Value Date    LDL 65 08/02/2019    LDL 88 07/13/2018       Procedures       I personally viewed and interpreted the patient's LAB  [address]wen [address]  Office: [phone]  Cell: [phone]  Fax: [phone] data         Assessment:     1. Coronary artery disease involving native coronary artery of native heart without angina pectoris    2. Essential hypertension    3. Hyperlipidemia type IV,uncontrolled    4. Generalized anxiety disorder    5. Chronic alcoholism (CMS/HCC)    6. Hypokalemia    7. Hypomagnesemia          Plan:     Patient has known coronary artery disease.  He is doing very well is completely asymptomatic from cardiac standpoint.  He was advised to continue Lopressor, statin therapy and dual antiplatelet therapy.    Blood pressure is better controlled at this time.  He will continue Lopressor and losartan  Patient has recurrent hypomagnesemia.  He is taking Mag-Ox however lab work needs to be repeated which was arranged.  Hydrochlorothiazide was discontinued.  Lipitor was continued along with the TriCor.  Cessation of alcohol use was again stressed.  Follow-up scheduled refills were given        No Follow-up on file.

## 2020-07-21 RX ORDER — MAGNESIUM OXIDE 400 MG/1
TABLET ORAL
Qty: 180 TABLET | Refills: 0 | Status: ON HOLD | OUTPATIENT
Start: 2020-07-21 | End: 2020-08-18

## 2020-08-17 ENCOUNTER — HOSPITAL ENCOUNTER (INPATIENT)
Facility: HOSPITAL | Age: 55
LOS: 3 days | Discharge: ANOTHER HOSPITAL | End: 2020-08-21
Attending: EMERGENCY MEDICINE | Admitting: INTERNAL MEDICINE

## 2020-08-17 DIAGNOSIS — I10 ESSENTIAL HYPERTENSION: ICD-10-CM

## 2020-08-17 DIAGNOSIS — R94.39 ABNORMAL NUCLEAR STRESS TEST: ICD-10-CM

## 2020-08-17 DIAGNOSIS — N18.9 CHRONIC KIDNEY DISEASE, UNSPECIFIED CKD STAGE: ICD-10-CM

## 2020-08-17 DIAGNOSIS — R07.9 CHEST PAIN, UNSPECIFIED TYPE: Primary | ICD-10-CM

## 2020-08-17 PROCEDURE — 80307 DRUG TEST PRSMV CHEM ANLYZR: CPT | Performed by: EMERGENCY MEDICINE

## 2020-08-17 PROCEDURE — 80053 COMPREHEN METABOLIC PANEL: CPT | Performed by: EMERGENCY MEDICINE

## 2020-08-17 PROCEDURE — 99285 EMERGENCY DEPT VISIT HI MDM: CPT

## 2020-08-17 PROCEDURE — 84484 ASSAY OF TROPONIN QUANT: CPT | Performed by: EMERGENCY MEDICINE

## 2020-08-17 PROCEDURE — 85025 COMPLETE CBC W/AUTO DIFF WBC: CPT | Performed by: EMERGENCY MEDICINE

## 2020-08-17 PROCEDURE — 93010 ELECTROCARDIOGRAM REPORT: CPT | Performed by: INTERNAL MEDICINE

## 2020-08-17 PROCEDURE — 93005 ELECTROCARDIOGRAM TRACING: CPT | Performed by: EMERGENCY MEDICINE

## 2020-08-17 PROCEDURE — 36415 COLL VENOUS BLD VENIPUNCTURE: CPT

## 2020-08-17 RX ORDER — CLOPIDOGREL BISULFATE 75 MG/1
TABLET ORAL
Qty: 30 TABLET | Refills: 2 | Status: ON HOLD | OUTPATIENT
Start: 2020-08-17 | End: 2020-08-18

## 2020-08-17 RX ORDER — METOPROLOL TARTRATE 100 MG/1
TABLET ORAL
Qty: 60 TABLET | Refills: 2 | Status: ON HOLD | OUTPATIENT
Start: 2020-08-17 | End: 2020-08-18

## 2020-08-17 RX ORDER — HYDROXYZINE HYDROCHLORIDE 10 MG/1
TABLET, FILM COATED ORAL
Qty: 60 TABLET | Refills: 0 | Status: ON HOLD | OUTPATIENT
Start: 2020-08-17 | End: 2020-08-18

## 2020-08-17 RX ORDER — POTASSIUM CHLORIDE 750 MG/1
TABLET, FILM COATED, EXTENDED RELEASE ORAL
Qty: 30 TABLET | Refills: 0 | Status: ON HOLD | OUTPATIENT
Start: 2020-08-17 | End: 2020-08-18

## 2020-08-17 RX ORDER — MELOXICAM 7.5 MG/1
TABLET ORAL
Qty: 30 TABLET | Refills: 0 | Status: ON HOLD | OUTPATIENT
Start: 2020-08-17 | End: 2020-08-18

## 2020-08-17 RX ORDER — ATORVASTATIN CALCIUM 40 MG/1
TABLET, FILM COATED ORAL
Qty: 30 TABLET | Refills: 2 | Status: ON HOLD | OUTPATIENT
Start: 2020-08-17 | End: 2020-08-18

## 2020-08-18 ENCOUNTER — APPOINTMENT (OUTPATIENT)
Dept: CT IMAGING | Facility: HOSPITAL | Age: 55
End: 2020-08-18

## 2020-08-18 ENCOUNTER — APPOINTMENT (OUTPATIENT)
Dept: CARDIOLOGY | Facility: HOSPITAL | Age: 55
End: 2020-08-18

## 2020-08-18 ENCOUNTER — APPOINTMENT (OUTPATIENT)
Dept: ULTRASOUND IMAGING | Facility: HOSPITAL | Age: 55
End: 2020-08-18

## 2020-08-18 ENCOUNTER — APPOINTMENT (OUTPATIENT)
Dept: NUCLEAR MEDICINE | Facility: HOSPITAL | Age: 55
End: 2020-08-18

## 2020-08-18 ENCOUNTER — APPOINTMENT (OUTPATIENT)
Dept: GENERAL RADIOLOGY | Facility: HOSPITAL | Age: 55
End: 2020-08-18

## 2020-08-18 PROBLEM — R07.9 CHEST PAIN: Status: ACTIVE | Noted: 2020-08-18

## 2020-08-18 PROBLEM — N17.9 AKI (ACUTE KIDNEY INJURY): Status: ACTIVE | Noted: 2020-08-18

## 2020-08-18 PROBLEM — E78.5 HYPERLIPIDEMIA: Chronic | Status: ACTIVE | Noted: 2020-08-18

## 2020-08-18 PROBLEM — Z12.11 ENCOUNTER FOR SCREENING COLONOSCOPY: Status: RESOLVED | Noted: 2017-07-24 | Resolved: 2020-08-18

## 2020-08-18 PROBLEM — I51.89 GRADE I DIASTOLIC DYSFUNCTION: Chronic | Status: ACTIVE | Noted: 2020-08-18

## 2020-08-18 LAB
6-ACETYL MORPHINE: NEGATIVE
ALBUMIN SERPL-MCNC: 4.63 G/DL (ref 3.5–5.2)
ALBUMIN/GLOB SERPL: 1.7 G/DL
ALP SERPL-CCNC: 57 U/L (ref 39–117)
ALT SERPL W P-5'-P-CCNC: 34 U/L (ref 1–41)
AMPHET+METHAMPHET UR QL: POSITIVE
ANION GAP SERPL CALCULATED.3IONS-SCNC: 11 MMOL/L (ref 5–15)
ANION GAP SERPL CALCULATED.3IONS-SCNC: 12.4 MMOL/L (ref 5–15)
APTT PPP: 26.8 SECONDS (ref 25.6–35.3)
APTT PPP: 57.1 SECONDS (ref 25.6–35.3)
AST SERPL-CCNC: 35 U/L (ref 1–40)
BARBITURATES UR QL SCN: NEGATIVE
BASOPHILS # BLD AUTO: 0.09 10*3/MM3 (ref 0–0.2)
BASOPHILS # BLD AUTO: 0.14 10*3/MM3 (ref 0–0.2)
BASOPHILS NFR BLD AUTO: 1.2 % (ref 0–1.5)
BASOPHILS NFR BLD AUTO: 1.5 % (ref 0–1.5)
BENZODIAZ UR QL SCN: NEGATIVE
BH CV ECHO MEAS - AO ROOT AREA (BSA CORRECTED): 1.5
BH CV ECHO MEAS - AO ROOT AREA: 8.6 CM^2
BH CV ECHO MEAS - AO ROOT DIAM: 3.3 CM
BH CV ECHO MEAS - BSA(HAYCOCK): 2.3 M^2
BH CV ECHO MEAS - BSA: 2.2 M^2
BH CV ECHO MEAS - BZI_BMI: 26.2 KILOGRAMS/M^2
BH CV ECHO MEAS - BZI_METRIC_HEIGHT: 190.5 CM
BH CV ECHO MEAS - BZI_METRIC_WEIGHT: 95.3 KG
BH CV ECHO MEAS - EDV(CUBED): 46.7 ML
BH CV ECHO MEAS - EDV(MOD-SP4): 52.1 ML
BH CV ECHO MEAS - EDV(TEICH): 54.4 ML
BH CV ECHO MEAS - EF(CUBED): 52.9 %
BH CV ECHO MEAS - EF(MOD-SP4): 63.7 %
BH CV ECHO MEAS - EF(TEICH): 45.7 %
BH CV ECHO MEAS - ESV(CUBED): 22 ML
BH CV ECHO MEAS - ESV(MOD-SP4): 18.9 ML
BH CV ECHO MEAS - ESV(TEICH): 29.6 ML
BH CV ECHO MEAS - FS: 22.2 %
BH CV ECHO MEAS - IVS/LVPW: 1.8
BH CV ECHO MEAS - IVSD: 1.4 CM
BH CV ECHO MEAS - LA DIMENSION: 3.1 CM
BH CV ECHO MEAS - LA/AO: 0.94
BH CV ECHO MEAS - LV DIASTOLIC VOL/BSA (35-75): 23.3 ML/M^2
BH CV ECHO MEAS - LV MASS(C)D: 124.1 GRAMS
BH CV ECHO MEAS - LV MASS(C)DI: 55.4 GRAMS/M^2
BH CV ECHO MEAS - LV SYSTOLIC VOL/BSA (12-30): 8.4 ML/M^2
BH CV ECHO MEAS - LVIDD: 3.6 CM
BH CV ECHO MEAS - LVIDS: 2.8 CM
BH CV ECHO MEAS - LVLD AP4: 8.6 CM
BH CV ECHO MEAS - LVLS AP4: 8.5 CM
BH CV ECHO MEAS - LVOT AREA (M): 3.8 CM^2
BH CV ECHO MEAS - LVOT AREA: 3.8 CM^2
BH CV ECHO MEAS - LVOT DIAM: 2.2 CM
BH CV ECHO MEAS - LVPWD: 1.2 CM
BH CV ECHO MEAS - MV A MAX VEL: 84.4 CM/SEC
BH CV ECHO MEAS - MV E MAX VEL: 60 CM/SEC
BH CV ECHO MEAS - MV E/A: 0.71
BH CV ECHO MEAS - SI(CUBED): 11 ML/M^2
BH CV ECHO MEAS - SI(MOD-SP4): 14.8 ML/M^2
BH CV ECHO MEAS - SI(TEICH): 11.1 ML/M^2
BH CV ECHO MEAS - SV(CUBED): 24.7 ML
BH CV ECHO MEAS - SV(MOD-SP4): 33.2 ML
BH CV ECHO MEAS - SV(TEICH): 24.9 ML
BH CV NUCLEAR PRIOR STUDY: 3
BH CV STRESS BP STAGE 1: NORMAL
BH CV STRESS BP STAGE 2: NORMAL
BH CV STRESS COMMENTS STAGE 1: NORMAL
BH CV STRESS COMMENTS STAGE 2: NORMAL
BH CV STRESS DOSE REGADENOSON STAGE 1: 0.4
BH CV STRESS DURATION MIN STAGE 1: 0
BH CV STRESS DURATION MIN STAGE 2: 4
BH CV STRESS DURATION SEC STAGE 1: 10
BH CV STRESS DURATION SEC STAGE 2: 0
BH CV STRESS HR STAGE 1: 114
BH CV STRESS HR STAGE 2: 95
BH CV STRESS PROTOCOL 1: NORMAL
BH CV STRESS RECOVERY BP: NORMAL MMHG
BH CV STRESS RECOVERY HR: 95 BPM
BH CV STRESS STAGE 1: 1
BH CV STRESS STAGE 2: 2
BILIRUB SERPL-MCNC: 0.3 MG/DL (ref 0–1.2)
BUN SERPL-MCNC: 23 MG/DL (ref 6–20)
BUN SERPL-MCNC: 25 MG/DL (ref 6–20)
BUN/CREAT SERPL: 15.4 (ref 7–25)
BUN/CREAT SERPL: 18.7 (ref 7–25)
BUPRENORPHINE SERPL-MCNC: NEGATIVE NG/ML
CALCIUM SPEC-SCNC: 8.3 MG/DL (ref 8.6–10.5)
CALCIUM SPEC-SCNC: 8.6 MG/DL (ref 8.6–10.5)
CANNABINOIDS SERPL QL: NEGATIVE
CHLORIDE SERPL-SCNC: 103 MMOL/L (ref 98–107)
CHLORIDE SERPL-SCNC: 103 MMOL/L (ref 98–107)
CO2 SERPL-SCNC: 23.6 MMOL/L (ref 22–29)
CO2 SERPL-SCNC: 26 MMOL/L (ref 22–29)
COCAINE UR QL: NEGATIVE
CREAT SERPL-MCNC: 1.23 MG/DL (ref 0.76–1.27)
CREAT SERPL-MCNC: 1.62 MG/DL (ref 0.76–1.27)
DEPRECATED RDW RBC AUTO: 42.9 FL (ref 37–54)
DEPRECATED RDW RBC AUTO: 43.3 FL (ref 37–54)
EOSINOPHIL # BLD AUTO: 0.13 10*3/MM3 (ref 0–0.4)
EOSINOPHIL # BLD AUTO: 0.21 10*3/MM3 (ref 0–0.4)
EOSINOPHIL NFR BLD AUTO: 1.7 % (ref 0.3–6.2)
EOSINOPHIL NFR BLD AUTO: 2.3 % (ref 0.3–6.2)
ERYTHROCYTE [DISTWIDTH] IN BLOOD BY AUTOMATED COUNT: 12.8 % (ref 12.3–15.4)
ERYTHROCYTE [DISTWIDTH] IN BLOOD BY AUTOMATED COUNT: 13 % (ref 12.3–15.4)
ETHANOL BLD-MCNC: <10 MG/DL (ref 0–10)
ETHANOL UR QL: <0.01 %
GFR SERPL CREATININE-BSD FRML MDRD: 44 ML/MIN/1.73
GFR SERPL CREATININE-BSD FRML MDRD: 61 ML/MIN/1.73
GLOBULIN UR ELPH-MCNC: 2.7 GM/DL
GLUCOSE SERPL-MCNC: 96 MG/DL (ref 65–99)
GLUCOSE SERPL-MCNC: 99 MG/DL (ref 65–99)
HCT VFR BLD AUTO: 42.4 % (ref 37.5–51)
HCT VFR BLD AUTO: 43.4 % (ref 37.5–51)
HGB BLD-MCNC: 13.6 G/DL (ref 13–17.7)
HGB BLD-MCNC: 14.4 G/DL (ref 13–17.7)
HOLD SPECIMEN: NORMAL
HOLD SPECIMEN: NORMAL
IMM GRANULOCYTES # BLD AUTO: 0.03 10*3/MM3 (ref 0–0.05)
IMM GRANULOCYTES # BLD AUTO: 0.04 10*3/MM3 (ref 0–0.05)
IMM GRANULOCYTES NFR BLD AUTO: 0.4 % (ref 0–0.5)
IMM GRANULOCYTES NFR BLD AUTO: 0.4 % (ref 0–0.5)
INR PPP: 1.1 (ref 0.9–1.1)
LV EF NUC BP: 62 %
LYMPHOCYTES # BLD AUTO: 2.18 10*3/MM3 (ref 0.7–3.1)
LYMPHOCYTES # BLD AUTO: 2.35 10*3/MM3 (ref 0.7–3.1)
LYMPHOCYTES NFR BLD AUTO: 25.5 % (ref 19.6–45.3)
LYMPHOCYTES NFR BLD AUTO: 28.2 % (ref 19.6–45.3)
MAGNESIUM SERPL-MCNC: 0.8 MG/DL (ref 1.6–2.6)
MAXIMAL PREDICTED HEART RATE: 163 BPM
MAXIMAL PREDICTED HEART RATE: 163 BPM
MCH RBC QN AUTO: 29.5 PG (ref 26.6–33)
MCH RBC QN AUTO: 30.1 PG (ref 26.6–33)
MCHC RBC AUTO-ENTMCNC: 32.1 G/DL (ref 31.5–35.7)
MCHC RBC AUTO-ENTMCNC: 33.2 G/DL (ref 31.5–35.7)
MCV RBC AUTO: 90.8 FL (ref 79–97)
MCV RBC AUTO: 92 FL (ref 79–97)
METHADONE UR QL SCN: NEGATIVE
MONOCYTES # BLD AUTO: 0.79 10*3/MM3 (ref 0.1–0.9)
MONOCYTES # BLD AUTO: 1.06 10*3/MM3 (ref 0.1–0.9)
MONOCYTES NFR BLD AUTO: 10.2 % (ref 5–12)
MONOCYTES NFR BLD AUTO: 11.5 % (ref 5–12)
NEUTROPHILS NFR BLD AUTO: 4.5 10*3/MM3 (ref 1.7–7)
NEUTROPHILS NFR BLD AUTO: 5.41 10*3/MM3 (ref 1.7–7)
NEUTROPHILS NFR BLD AUTO: 58.3 % (ref 42.7–76)
NEUTROPHILS NFR BLD AUTO: 58.8 % (ref 42.7–76)
NRBC BLD AUTO-RTO: 0 /100 WBC (ref 0–0.2)
NRBC BLD AUTO-RTO: 0 /100 WBC (ref 0–0.2)
OPIATES UR QL: NEGATIVE
OXYCODONE UR QL SCN: NEGATIVE
PCP UR QL SCN: NEGATIVE
PERCENT MAX PREDICTED HR: 69.94 %
PLATELET # BLD AUTO: 202 10*3/MM3 (ref 140–450)
PLATELET # BLD AUTO: 241 10*3/MM3 (ref 140–450)
PMV BLD AUTO: 10 FL (ref 6–12)
PMV BLD AUTO: 9.6 FL (ref 6–12)
POTASSIUM SERPL-SCNC: 4.2 MMOL/L (ref 3.5–5.2)
POTASSIUM SERPL-SCNC: 4.3 MMOL/L (ref 3.5–5.2)
PROT SERPL-MCNC: 7.3 G/DL (ref 6–8.5)
PROTHROMBIN TIME: 14 SECONDS (ref 11.9–14.1)
RBC # BLD AUTO: 4.61 10*6/MM3 (ref 4.14–5.8)
RBC # BLD AUTO: 4.78 10*6/MM3 (ref 4.14–5.8)
SODIUM SERPL-SCNC: 139 MMOL/L (ref 136–145)
SODIUM SERPL-SCNC: 140 MMOL/L (ref 136–145)
STRESS BASELINE BP: NORMAL MMHG
STRESS BASELINE HR: 91 BPM
STRESS PERCENT HR: 82 %
STRESS POST PEAK BP: NORMAL MMHG
STRESS POST PEAK HR: 114 BPM
STRESS TARGET HR: 139 BPM
STRESS TARGET HR: 139 BPM
TROPONIN T SERPL-MCNC: <0.01 NG/ML (ref 0–0.03)
TSH SERPL DL<=0.05 MIU/L-ACNC: 1.62 UIU/ML (ref 0.27–4.2)
WBC # BLD AUTO: 7.72 10*3/MM3 (ref 3.4–10.8)
WBC # BLD AUTO: 9.21 10*3/MM3 (ref 3.4–10.8)
WHOLE BLOOD HOLD SPECIMEN: NORMAL
WHOLE BLOOD HOLD SPECIMEN: NORMAL

## 2020-08-18 PROCEDURE — 78452 HT MUSCLE IMAGE SPECT MULT: CPT | Performed by: SPECIALIST

## 2020-08-18 PROCEDURE — 94799 UNLISTED PULMONARY SVC/PX: CPT

## 2020-08-18 PROCEDURE — 80307 DRUG TEST PRSMV CHEM ANLYZR: CPT | Performed by: INTERNAL MEDICINE

## 2020-08-18 PROCEDURE — 25010000002 HEPARIN (PORCINE) PER 1000 UNITS: Performed by: EMERGENCY MEDICINE

## 2020-08-18 PROCEDURE — 80048 BASIC METABOLIC PNL TOTAL CA: CPT | Performed by: INTERNAL MEDICINE

## 2020-08-18 PROCEDURE — 84443 ASSAY THYROID STIM HORMONE: CPT | Performed by: INTERNAL MEDICINE

## 2020-08-18 PROCEDURE — 93306 TTE W/DOPPLER COMPLETE: CPT

## 2020-08-18 PROCEDURE — 93018 CV STRESS TEST I&R ONLY: CPT | Performed by: SPECIALIST

## 2020-08-18 PROCEDURE — A9500 TC99M SESTAMIBI: HCPCS | Performed by: INTERNAL MEDICINE

## 2020-08-18 PROCEDURE — 84484 ASSAY OF TROPONIN QUANT: CPT | Performed by: EMERGENCY MEDICINE

## 2020-08-18 PROCEDURE — 25010000002 REGADENOSON 0.4 MG/5ML SOLUTION: Performed by: INTERNAL MEDICINE

## 2020-08-18 PROCEDURE — 25010000003 MAGNESIUM SULFATE 4 GM/100ML SOLUTION: Performed by: EMERGENCY MEDICINE

## 2020-08-18 PROCEDURE — 85730 THROMBOPLASTIN TIME PARTIAL: CPT | Performed by: EMERGENCY MEDICINE

## 2020-08-18 PROCEDURE — 85025 COMPLETE CBC W/AUTO DIFF WBC: CPT | Performed by: EMERGENCY MEDICINE

## 2020-08-18 PROCEDURE — 85730 THROMBOPLASTIN TIME PARTIAL: CPT | Performed by: INTERNAL MEDICINE

## 2020-08-18 PROCEDURE — 93306 TTE W/DOPPLER COMPLETE: CPT | Performed by: SPECIALIST

## 2020-08-18 PROCEDURE — 74176 CT ABD & PELVIS W/O CONTRAST: CPT

## 2020-08-18 PROCEDURE — 99222 1ST HOSP IP/OBS MODERATE 55: CPT | Performed by: NURSE PRACTITIONER

## 2020-08-18 PROCEDURE — 0 TECHNETIUM SESTAMIBI: Performed by: INTERNAL MEDICINE

## 2020-08-18 PROCEDURE — 71045 X-RAY EXAM CHEST 1 VIEW: CPT

## 2020-08-18 PROCEDURE — 99254 IP/OBS CNSLTJ NEW/EST MOD 60: CPT | Performed by: PHYSICIAN ASSISTANT

## 2020-08-18 PROCEDURE — 93017 CV STRESS TEST TRACING ONLY: CPT

## 2020-08-18 PROCEDURE — 85610 PROTHROMBIN TIME: CPT | Performed by: EMERGENCY MEDICINE

## 2020-08-18 PROCEDURE — 78452 HT MUSCLE IMAGE SPECT MULT: CPT

## 2020-08-18 PROCEDURE — 76705 ECHO EXAM OF ABDOMEN: CPT

## 2020-08-18 PROCEDURE — 83735 ASSAY OF MAGNESIUM: CPT | Performed by: INTERNAL MEDICINE

## 2020-08-18 RX ORDER — MAGNESIUM SULFATE HEPTAHYDRATE 40 MG/ML
4 INJECTION, SOLUTION INTRAVENOUS ONCE
Status: COMPLETED | OUTPATIENT
Start: 2020-08-18 | End: 2020-08-18

## 2020-08-18 RX ORDER — POTASSIUM CHLORIDE 750 MG/1
10 TABLET, FILM COATED, EXTENDED RELEASE ORAL DAILY
Status: CANCELLED | OUTPATIENT
Start: 2020-08-18

## 2020-08-18 RX ORDER — ATORVASTATIN CALCIUM 40 MG/1
40 TABLET, FILM COATED ORAL DAILY
COMMUNITY
End: 2021-02-11

## 2020-08-18 RX ORDER — ONDANSETRON 4 MG/1
4 TABLET, ORALLY DISINTEGRATING ORAL EVERY 12 HOURS PRN
COMMUNITY
End: 2021-10-11

## 2020-08-18 RX ORDER — SODIUM CHLORIDE 9 MG/ML
100 INJECTION, SOLUTION INTRAVENOUS CONTINUOUS
Status: DISCONTINUED | OUTPATIENT
Start: 2020-08-18 | End: 2020-08-21 | Stop reason: HOSPADM

## 2020-08-18 RX ORDER — FAMOTIDINE 20 MG/1
40 TABLET, FILM COATED ORAL DAILY
Status: DISCONTINUED | OUTPATIENT
Start: 2020-08-18 | End: 2020-08-18

## 2020-08-18 RX ORDER — PANTOPRAZOLE SODIUM 40 MG/1
40 TABLET, DELAYED RELEASE ORAL DAILY
Status: DISCONTINUED | OUTPATIENT
Start: 2020-08-18 | End: 2020-08-21 | Stop reason: HOSPADM

## 2020-08-18 RX ORDER — CLONIDINE HYDROCHLORIDE 0.1 MG/1
0.1 TABLET ORAL ONCE
Status: COMPLETED | OUTPATIENT
Start: 2020-08-18 | End: 2020-08-18

## 2020-08-18 RX ORDER — LORAZEPAM 2 MG/ML
1 INJECTION INTRAMUSCULAR
Status: DISCONTINUED | OUTPATIENT
Start: 2020-08-18 | End: 2020-08-21 | Stop reason: HOSPADM

## 2020-08-18 RX ORDER — FOLIC ACID 1 MG/1
1 TABLET ORAL DAILY
Status: DISCONTINUED | OUTPATIENT
Start: 2020-08-18 | End: 2020-08-21 | Stop reason: HOSPADM

## 2020-08-18 RX ORDER — PANTOPRAZOLE SODIUM 40 MG/1
40 TABLET, DELAYED RELEASE ORAL DAILY
COMMUNITY
End: 2021-08-31

## 2020-08-18 RX ORDER — MELOXICAM 7.5 MG/1
7.5 TABLET ORAL DAILY
COMMUNITY
End: 2020-09-02 | Stop reason: HOSPADM

## 2020-08-18 RX ORDER — CLOPIDOGREL BISULFATE 75 MG/1
75 TABLET ORAL DAILY
COMMUNITY
End: 2020-08-21 | Stop reason: HOSPADM

## 2020-08-18 RX ORDER — ACETAMINOPHEN 325 MG/1
650 TABLET ORAL EVERY 6 HOURS PRN
Status: DISCONTINUED | OUTPATIENT
Start: 2020-08-18 | End: 2020-08-19 | Stop reason: SDUPTHER

## 2020-08-18 RX ORDER — PHENOBARBITAL, HYOSCYAMINE SULFATE, ATROPINE SULFATE AND SCOPOLAMINE HYDROBROMIDE .0194; .1037; 16.2; .0065 MG/1; MG/1; MG/1; MG/1
2 TABLET ORAL ONCE
Status: COMPLETED | OUTPATIENT
Start: 2020-08-18 | End: 2020-08-18

## 2020-08-18 RX ORDER — LORAZEPAM 2 MG/ML
2 INJECTION INTRAMUSCULAR
Status: DISCONTINUED | OUTPATIENT
Start: 2020-08-18 | End: 2020-08-21 | Stop reason: HOSPADM

## 2020-08-18 RX ORDER — HEPARIN SODIUM 5000 [USP'U]/ML
5000 INJECTION, SOLUTION INTRAVENOUS; SUBCUTANEOUS ONCE
Status: COMPLETED | OUTPATIENT
Start: 2020-08-18 | End: 2020-08-18

## 2020-08-18 RX ORDER — LOSARTAN POTASSIUM 50 MG/1
100 TABLET ORAL DAILY
Status: CANCELLED | OUTPATIENT
Start: 2020-08-18

## 2020-08-18 RX ORDER — POTASSIUM CHLORIDE 750 MG/1
10 TABLET, FILM COATED, EXTENDED RELEASE ORAL DAILY
COMMUNITY
End: 2021-10-11

## 2020-08-18 RX ORDER — CLOPIDOGREL BISULFATE 75 MG/1
75 TABLET ORAL DAILY
Status: CANCELLED | OUTPATIENT
Start: 2020-08-18

## 2020-08-18 RX ORDER — POTASSIUM CHLORIDE 750 MG/1
10 TABLET, FILM COATED, EXTENDED RELEASE ORAL DAILY
Status: DISCONTINUED | OUTPATIENT
Start: 2020-08-18 | End: 2020-08-21 | Stop reason: HOSPADM

## 2020-08-18 RX ORDER — LOSARTAN POTASSIUM 100 MG/1
100 TABLET ORAL DAILY
COMMUNITY
End: 2020-08-21 | Stop reason: HOSPADM

## 2020-08-18 RX ORDER — METOPROLOL TARTRATE 100 MG/1
100 TABLET ORAL EVERY 12 HOURS
Status: DISCONTINUED | OUTPATIENT
Start: 2020-08-18 | End: 2020-08-21 | Stop reason: HOSPADM

## 2020-08-18 RX ORDER — ASPIRIN 81 MG/1
324 TABLET, CHEWABLE ORAL ONCE
Status: COMPLETED | OUTPATIENT
Start: 2020-08-18 | End: 2020-08-18

## 2020-08-18 RX ORDER — NITROGLYCERIN 0.4 MG/1
0.4 TABLET SUBLINGUAL
COMMUNITY

## 2020-08-18 RX ORDER — HEPARIN SODIUM 5000 [USP'U]/ML
5000 INJECTION, SOLUTION INTRAVENOUS; SUBCUTANEOUS EVERY 12 HOURS SCHEDULED
Status: DISCONTINUED | OUTPATIENT
Start: 2020-08-18 | End: 2020-08-18 | Stop reason: ALTCHOICE

## 2020-08-18 RX ORDER — ONDANSETRON 4 MG/1
4 TABLET, ORALLY DISINTEGRATING ORAL EVERY 12 HOURS PRN
Status: CANCELLED | OUTPATIENT
Start: 2020-08-18

## 2020-08-18 RX ORDER — CLOPIDOGREL BISULFATE 75 MG/1
75 TABLET ORAL DAILY
Status: DISCONTINUED | OUTPATIENT
Start: 2020-08-18 | End: 2020-08-20

## 2020-08-18 RX ORDER — LORAZEPAM 2 MG/1
2 TABLET ORAL
Status: DISCONTINUED | OUTPATIENT
Start: 2020-08-18 | End: 2020-08-21 | Stop reason: HOSPADM

## 2020-08-18 RX ORDER — NITROGLYCERIN 0.4 MG/1
0.4 TABLET SUBLINGUAL
Status: DISCONTINUED | OUTPATIENT
Start: 2020-08-18 | End: 2020-08-21 | Stop reason: HOSPADM

## 2020-08-18 RX ORDER — SODIUM CHLORIDE 0.9 % (FLUSH) 0.9 %
10 SYRINGE (ML) INJECTION EVERY 12 HOURS SCHEDULED
Status: DISCONTINUED | OUTPATIENT
Start: 2020-08-18 | End: 2020-08-21 | Stop reason: HOSPADM

## 2020-08-18 RX ORDER — HEPARIN SODIUM 5000 [USP'U]/ML
5000 INJECTION, SOLUTION INTRAVENOUS; SUBCUTANEOUS AS NEEDED
Status: DISCONTINUED | OUTPATIENT
Start: 2020-08-18 | End: 2020-08-19

## 2020-08-18 RX ORDER — ATORVASTATIN CALCIUM 40 MG/1
40 TABLET, FILM COATED ORAL DAILY
Status: DISCONTINUED | OUTPATIENT
Start: 2020-08-18 | End: 2020-08-21 | Stop reason: HOSPADM

## 2020-08-18 RX ORDER — FENOFIBRATE 145 MG/1
145 TABLET, COATED ORAL DAILY
Status: DISCONTINUED | OUTPATIENT
Start: 2020-08-18 | End: 2020-08-18

## 2020-08-18 RX ORDER — LIDOCAINE HYDROCHLORIDE 20 MG/ML
15 SOLUTION OROPHARYNGEAL ONCE
Status: COMPLETED | OUTPATIENT
Start: 2020-08-18 | End: 2020-08-18

## 2020-08-18 RX ORDER — HEPARIN SODIUM 10000 [USP'U]/100ML
10.5 INJECTION, SOLUTION INTRAVENOUS
Status: DISCONTINUED | OUTPATIENT
Start: 2020-08-18 | End: 2020-08-19

## 2020-08-18 RX ORDER — LORAZEPAM 1 MG/1
1 TABLET ORAL
Status: DISCONTINUED | OUTPATIENT
Start: 2020-08-18 | End: 2020-08-21 | Stop reason: HOSPADM

## 2020-08-18 RX ORDER — HEPARIN SODIUM 5000 [USP'U]/ML
2500 INJECTION, SOLUTION INTRAVENOUS; SUBCUTANEOUS AS NEEDED
Status: DISCONTINUED | OUTPATIENT
Start: 2020-08-18 | End: 2020-08-19

## 2020-08-18 RX ORDER — PANTOPRAZOLE SODIUM 40 MG/1
40 TABLET, DELAYED RELEASE ORAL DAILY
Status: CANCELLED | OUTPATIENT
Start: 2020-08-18

## 2020-08-18 RX ORDER — ASPIRIN 81 MG/1
81 TABLET ORAL DAILY
Status: DISCONTINUED | OUTPATIENT
Start: 2020-08-19 | End: 2020-08-21 | Stop reason: HOSPADM

## 2020-08-18 RX ORDER — NITROGLYCERIN 0.4 MG/1
0.4 TABLET SUBLINGUAL
Status: CANCELLED | OUTPATIENT
Start: 2020-08-18

## 2020-08-18 RX ORDER — ALUMINA, MAGNESIA, AND SIMETHICONE 2400; 2400; 240 MG/30ML; MG/30ML; MG/30ML
15 SUSPENSION ORAL ONCE
Status: COMPLETED | OUTPATIENT
Start: 2020-08-18 | End: 2020-08-18

## 2020-08-18 RX ORDER — SODIUM CHLORIDE 0.9 % (FLUSH) 0.9 %
10 SYRINGE (ML) INJECTION AS NEEDED
Status: DISCONTINUED | OUTPATIENT
Start: 2020-08-18 | End: 2020-08-21 | Stop reason: HOSPADM

## 2020-08-18 RX ORDER — MAGNESIUM SULFATE HEPTAHYDRATE 40 MG/ML
4 INJECTION, SOLUTION INTRAVENOUS ONCE
Status: DISCONTINUED | OUTPATIENT
Start: 2020-08-18 | End: 2020-08-18

## 2020-08-18 RX ORDER — MELOXICAM 7.5 MG/1
7.5 TABLET ORAL DAILY
Status: CANCELLED | OUTPATIENT
Start: 2020-08-18

## 2020-08-18 RX ORDER — ATORVASTATIN CALCIUM 40 MG/1
40 TABLET, FILM COATED ORAL DAILY
Status: CANCELLED | OUTPATIENT
Start: 2020-08-18

## 2020-08-18 RX ORDER — THIAMINE MONONITRATE (VIT B1) 100 MG
100 TABLET ORAL DAILY
Status: DISCONTINUED | OUTPATIENT
Start: 2020-08-18 | End: 2020-08-21 | Stop reason: HOSPADM

## 2020-08-18 RX ADMIN — TECHNETIUM TC 99M SESTAMIBI 1 DOSE: 1 INJECTION INTRAVENOUS at 11:10

## 2020-08-18 RX ADMIN — LIDOCAINE HYDROCHLORIDE 15 ML: 20 SOLUTION ORAL; TOPICAL at 01:27

## 2020-08-18 RX ADMIN — PANTOPRAZOLE SODIUM 40 MG: 40 TABLET, DELAYED RELEASE ORAL at 17:57

## 2020-08-18 RX ADMIN — TECHNETIUM TC 99M SESTAMIBI 1 DOSE: 1 INJECTION INTRAVENOUS at 13:01

## 2020-08-18 RX ADMIN — ASPIRIN 324 MG: 81 TABLET, CHEWABLE ORAL at 06:24

## 2020-08-18 RX ADMIN — PHENOBARBITAL, HYOSCYAMINE SULFATE, ATROPINE SULFATE, SCOPOLAMINE HYDROBROMIDE 32.4 MG: 16.2; .1037; .0194; .0065 TABLET ORAL at 01:27

## 2020-08-18 RX ADMIN — CLONIDINE HYDROCHLORIDE 0.1 MG: 0.1 TABLET ORAL at 06:24

## 2020-08-18 RX ADMIN — POTASSIUM CHLORIDE 10 MEQ: 750 TABLET, FILM COATED, EXTENDED RELEASE ORAL at 17:57

## 2020-08-18 RX ADMIN — MUPIROCIN: 20 OINTMENT TOPICAL at 16:04

## 2020-08-18 RX ADMIN — HEPARIN SODIUM 5000 UNITS: 5000 INJECTION INTRAVENOUS; SUBCUTANEOUS at 11:35

## 2020-08-18 RX ADMIN — SODIUM CHLORIDE 100 ML/HR: 9 INJECTION, SOLUTION INTRAVENOUS at 09:14

## 2020-08-18 RX ADMIN — NITROGLYCERIN 1 INCH: 20 OINTMENT TOPICAL at 06:25

## 2020-08-18 RX ADMIN — Medication 100 MG: at 16:03

## 2020-08-18 RX ADMIN — ALUMINUM HYDROXIDE, MAGNESIUM HYDROXIDE, AND DIMETHICONE 15 ML: 400; 400; 40 SUSPENSION ORAL at 01:27

## 2020-08-18 RX ADMIN — HEPARIN SODIUM 10.5 UNITS/KG/HR: 10000 INJECTION, SOLUTION INTRAVENOUS at 11:36

## 2020-08-18 RX ADMIN — FENOFIBRATE 145 MG: 145 TABLET, FILM COATED ORAL at 16:03

## 2020-08-18 RX ADMIN — FOLIC ACID 1 MG: 1 TABLET ORAL at 16:03

## 2020-08-18 RX ADMIN — REGADENOSON 0.4 MG: 0.08 INJECTION, SOLUTION INTRAVENOUS at 13:01

## 2020-08-18 RX ADMIN — MAGNESIUM GLUCONATE 500 MG ORAL TABLET 400 MG: 500 TABLET ORAL at 19:41

## 2020-08-18 RX ADMIN — MAGNESIUM SULFATE HEPTAHYDRATE 4 G: 40 INJECTION, SOLUTION INTRAVENOUS at 09:12

## 2020-08-18 RX ADMIN — CLOPIDOGREL 75 MG: 75 TABLET, FILM COATED ORAL at 16:03

## 2020-08-18 RX ADMIN — ATORVASTATIN CALCIUM 40 MG: 40 TABLET, FILM COATED ORAL at 16:03

## 2020-08-18 RX ADMIN — FAMOTIDINE 40 MG: 20 TABLET, FILM COATED ORAL at 16:03

## 2020-08-18 RX ADMIN — METOPROLOL TARTRATE 100 MG: 100 TABLET, FILM COATED ORAL at 19:41

## 2020-08-18 NOTE — PLAN OF CARE
Pt resting in bed watching tv. Pt voices no concerns or complaints at this time. Pt denies any chest pain at this time. No s/s of acute distress. Will continue to monitor and follow care of plan.

## 2020-08-18 NOTE — ED NOTES
Pt resting quietly on stretcher with improving complaints of chest pain. Pt AAOx4 with no resp distress noted, respirations even and unlabored.  Pt denies any needs at this time.  Skin PWD.  Will continue to monitor and follow plan of care.  Bed rails up x2, bed in lowest position, call light in reach.           Kristin Moulton, RN  08/18/20 [id number]

## 2020-08-18 NOTE — ED NOTES
EKG completed by [employer] @ 7489 and given to Dr. Dupree @ 2181.     Peyton Tena  08/17/20 2228

## 2020-08-18 NOTE — CONSULTS
"                           [facility] Medicine Services  CONSULT NOTE     Inpatient Hospitalist Consult  Consult performed by: Tania Mathew PA  Consult ordered by: Hi Dupree MD  Reason for consult: medical management      Patient Identification:  Name:  Onel Jimenez  Age:  55 y.o.  Sex:  male  :  1965  MRN:  [id number]  Visit Number:  [id number]  Primary care provider:  Alex Canales MD    Length of stay:  0    Subjective     Chief Complaint:  Chest pain   Consult for medical management    History of presenting illness:     Onel Jimenez is a 55 y.o. male with past medical history significant for multivessel coronary artery disease, essential hypertension, hyperlipidemia, Grade I diastolic dysfunction, reported history of WPW, chronic alcoholism, and GERD that was admitted today by cardiology.  Hospitalist services were consulted for medical management.     Patient denies any complaints during my exam. He previously reported intermittent epigastric pain and chest pain x 1 week. He reported radiation into his chest, pain described as sharp and stabbing with substernal pressure. Please see H&P for details. Patient states that since treatment in ED, he is chest pain free and currently has no complaints.  Associated nausea and dyspnea.  On further questioning, patient states that he does drink \"too much\" alcohol.  Patient states he drinks approximately 4 days a week, and when he drinks he drinks 1/5 of whiskey a day.  Patient states that until the past week, he is at his baseline health with no issues.  He follows with Dr. Kelly with cardiology in the outpatient setting.  Currently, he denies any chest pain.  No chest pressure palpitations.  Denies any dyspnea.  No fevers or chills, no cough.  Denies any abdominal pain, no further nausea or emesis.  Denies any change in bowel movements.  Denies any urinary symptoms.  No headache or dizziness, no syncope.  Denies any " blood in stool, no hematemesis or hemoptysis.  Patient denies history of diabetes.  Reports eating and drinking well at home.  No contact with COVID-19 to his knowledge.    Patient underwent stress test and echocardiogram in ED, pending cardiology read.    Present during exam: Rebekah SIERRA RN  ---------------------------------------------------------------------------------------------------------------------  Review of Systems   Constitutional: Negative for activity change, appetite change, chills, diaphoresis, fatigue and fever.   HENT: Negative for congestion, postnasal drip, rhinorrhea, sinus pain, sore throat and trouble swallowing.    Eyes: Negative for discharge and visual disturbance.   Respiratory: Positive for chest tightness and shortness of breath. Negative for cough and wheezing.    Cardiovascular: Positive for chest pain. Negative for palpitations and leg swelling.   Gastrointestinal: Positive for abdominal pain (epigastric ) and nausea. Negative for constipation, diarrhea and vomiting.   Endocrine: Negative for cold intolerance and heat intolerance.   Genitourinary: Negative for decreased urine volume, dysuria, frequency and urgency.   Musculoskeletal: Negative for arthralgias, gait problem and myalgias.   Skin: Negative for color change, rash and wound.   Allergic/Immunologic: Negative for environmental allergies and immunocompromised state.   Neurological: Negative for dizziness, syncope, weakness, light-headedness and headaches.   Hematological: Negative for adenopathy. Does not bruise/bleed easily.   Psychiatric/Behavioral: Negative for confusion and decreased concentration. The patient is not nervous/anxious.       Otherwise 10-system ROS reviewed and is negative except as mentioned in the HPI.  ---------------------------------------------------------------------------------------------------------------------   Past History:  Past Medical History:   Diagnosis Date   • Anxiety disorder    • CAD  (coronary artery disease)    • Chronic alcoholism (CMS/HCC)    • Hyperlipidemia    • Hypertension    • WPW (Zakiya-Parkinson-White syndrome)      Past Surgical History:   Procedure Laterality Date   • CARDIAC CATHETERIZATION  2015   • CARDIOVASCULAR STRESS TEST  2015   • ECHO - CONVERTED  2015   • EYE SURGERY       Family History   Problem Relation Age of Onset   • Heart attack Father    • Heart disease Father    • Heart failure Father    • Heart disease Sister    • Heart failure Sister    • Heart disease Brother    • Heart failure Brother    • Heart failure Sister    • Heart disease Sister      Social History     Socioeconomic History   • Marital status:      Spouse name: Not on file   • Number of children: Not on file   • Years of education: Not on file   • Highest education level: Not on file   Tobacco Use   • Smoking status: Former Smoker     Packs/day: 1.00     Years: 20.00     Pack years: 20.00     Types: Cigarettes     Last attempt to quit:      Years since quittin.6   • Smokeless tobacco: Current User     Types: Chew, Snuff   • Tobacco comment: chew 1 can per week   Substance and Sexual Activity   • Alcohol use: Yes     Alcohol/week: 10.0 standard drinks     Types: 6 Cans of beer, 4 Shots of liquor per week     Comment: pint of alcohol every other day    • Drug use: Yes     Types: Hydrocodone     Comment: occ   • Sexual activity: Defer     ---------------------------------------------------------------------------------------------------------------------   Allergies:  Niacin  ---------------------------------------------------------------------------------------------------------------------   Prior to Admission Medications     Prescriptions Last Dose Informant Patient Reported? Taking?    aspirin 81 MG EC tablet  Medication Bottle No No    Take 1 tablet by mouth Daily.    atorvastatin (LIPITOR) 40 MG tablet   No No    TAKE ONE TABLET BY MOUTH ONE TIME DAILY     clopidogrel  (PLAVIX) 75 MG tablet   No No    TAKE ONE TABLET BY MOUTH ONE TIME DAILY     famotidine (PEPCID) 40 MG tablet   No No    Take 1 tablet by mouth Daily.    fenofibrate (TRICOR) 145 MG tablet   No No    Take 1 tablet by mouth Daily.    hydrOXYzine (ATARAX) 10 MG tablet   No No    TAKE ONE TABLET BY MOUTH THREE TIMES DAILY AS NEEDED FOR ITCHING     losartan (COZAAR) 100 MG tablet   No No    TAKE ONE TABLET BY MOUTH EVERY DAY FOR BLOOD PRESSURE     magnesium oxide (MAG-OX) 400 MG tablet   No No    TAKE ONE TABLET BY MOUTH TWICE DAILY     meloxicam (MOBIC) 7.5 MG tablet   No No    TAKE ONE TABLET BY MOUTH ONE TIME DAILY     metoprolol tartrate (LOPRESSOR) 100 MG tablet   No No    TAKE ONE TABLET BY MOUTH EVERY TWELVE HOURS     nitroglycerin (NITROSTAT) 0.4 MG SL tablet   No No    DISSOLVE 1 TABLET UNDER TONGUE AS NEEDED FOR CHEST PAIN, MAY TAKE ONE EVERY 5 MINUTES FOR UP TO 3 DOSES.    ondansetron ODT (ZOFRAN-ODT) 4 MG disintegrating tablet   No No    DISSOLVE ONE TABLET IN MOUTH EVERY TWELVE HOURS AS NEEDED FOR NAUSEA/VOMITING.     potassium chloride (K-DUR) 10 MEQ CR tablet   No No    TAKE ONE TABLET BY MOUTH ONE TIME DAILY         ---------------------------------------------------------------------------------------------------------------------     Objective      Procedures:  • 8/18/2020: Stress test with myocardial perfusion imaging  o Pending cardiology read  • 8/18/2020: Transthoracic echocardiogram   o Pending cardiology [facility]:    [START ON 8/19/2020] aspirin 81 mg Oral Daily   atorvastatin 40 mg Oral Daily   clopidogrel 75 mg Oral Daily   famotidine 40 mg Oral Daily   fenofibrate 145 mg Oral Daily   magnesium sulfate 4 g Intravenous Once   metoprolol tartrate 100 mg Oral Q12H       sodium chloride 100 mL/hr Last Rate: 100 mL/hr (08/18/20 0914)     ---------------------------------------------------------------------------------------------------------------------   Vital Signs:  Temp:  [98.1 °F  (36.7 °C)] 98.1 °F (36.7 °C)  Heart Rate:  [64-81] 81  Resp:  [18] 18  BP: (124-179)/() 125/92  Mean Arterial Pressure (Non-Invasive) for the past 24 hrs (Last 3 readings):   Noninvasive MAP (mmHg)   08/18/20 0847 101   08/18/20 0832 97   08/18/20 0818 108     SpO2 Percentage    08/18/20 0832 08/18/20 0847 08/18/20 0855   SpO2: 97% 96% 95%     SpO2:  [93 %-100 %] 95 %  on   ;        Body mass index is 26.25 kg/m².  Wt Readings from Last 3 Encounters:   08/17/20 95.3 kg (210 lb)   03/19/20 97.5 kg (215 lb)   01/13/20 94.3 kg (208 lb)      No intake or output data in the 24 hours ending 08/18/20 1000  NPO Diet  ---------------------------------------------------------------------------------------------------------------------   Physical exam:  Physical Exam   Constitutional: He is oriented to person, place, and time. He appears well-developed and well-nourished.  Non-toxic appearance. No distress.   Sitting up in bed, no acute distress noted.  On room air.  Awake and alert.  No family present at bedside.   HENT:   Head: Normocephalic and atraumatic.   Right Ear: External ear normal.   Left Ear: External ear normal.   Nose: Nose normal.   Mouth/Throat: Oropharynx is clear and moist and mucous membranes are normal. No oropharyngeal exudate.   Eyes: Pupils are equal, round, and reactive to light. Conjunctivae and EOM are normal. No scleral icterus.   Neck: Neck supple. No JVD present. No thyromegaly present.   Cardiovascular: Normal rate, regular rhythm, normal heart sounds and intact distal pulses. Exam reveals no gallop and no friction rub.   No murmur heard.  Pulses:       Dorsalis pedis pulses are 2+ on the right side, and 2+ on the left side.        Posterior tibial pulses are 2+ on the right side, and 2+ on the left side.   Pulmonary/Chest: Effort normal and breath sounds normal. No accessory muscle usage. No tachypnea. No respiratory distress. He has no wheezes. He has no rhonchi. He has no rales. He  exhibits no tenderness.   On room air.  Speaks in full sentences without dyspnea.   Abdominal: Soft. Bowel sounds are normal. He exhibits no distension and no mass. There is no hepatosplenomegaly. There is no tenderness. There is no rebound and no guarding. No hernia.   Genitourinary:   Genitourinary Comments: No Naik catheter in place   Musculoskeletal: He exhibits no edema, tenderness or deformity.        Right lower leg: He exhibits no edema.        Left lower leg: He exhibits no edema.     Vascular Status -  His right foot exhibits normal foot vasculature  and no edema. His left foot exhibits normal foot vasculature  and no edema.  Neurological: He is alert and oriented to person, place, and time. He has normal strength. He displays no tremor. No cranial nerve deficit or sensory deficit.   Awake and alert.  Follows commands.  Answers questions verbally.  Moves all extremities equally.  Strength and sensation intact.  No tremor.  No focal neurological deficit on exam.   Skin: Skin is warm and dry. Capillary refill takes less than 2 seconds. No rash noted. No erythema. No pallor.   Patient with small lesion on left cheek, Band-Aid in place.  No surrounding erythema or edema.  Nontender.   Psychiatric: He has a normal mood and affect. His speech is normal and behavior is normal. Judgment and thought content normal. Cognition and memory are normal.   Nursing note and vitals reviewed.    ---------------------------------------------------------------------------------------------------------------------   EKG: Pending cardiology read. Per my view, NSR with HR 76 BPM, QTc 441 ms, T wave inversion V1-V4, no ST elevation appreciated.    Telemetry:  Review once monitoring is available.    I have personally reviewed the EKG/Telemetry strips.   ---------------------------------------------------------------------------------------------------------------------   Results from last 7 days   Lab Units 08/18/20  0839  08/18/20  0414 08/17/20  2333   TROPONIN T ng/mL <0.010 <0.010 <0.010           Results from last 7 days   Lab Units 08/17/20  2333   WBC 10*3/mm3 9.21   HEMOGLOBIN g/dL 14.4   HEMATOCRIT % 43.4   MCV fL 90.8   MCHC g/dL 33.2   PLATELETS 10*3/mm3 241     Results from last 7 days   Lab Units 08/18/20  0749 08/17/20  2333   SODIUM mmol/L  --  140   POTASSIUM mmol/L  --  4.3   MAGNESIUM mg/dL 0.8*  --    CHLORIDE mmol/L  --  103   CO2 mmol/L  --  26.0   BUN mg/dL  --  25*   CREATININE mg/dL  --  1.62*   EGFR IF NONAFRICN AM mL/min/1.73  --  44*   CALCIUM mg/dL  --  8.6   GLUCOSE mg/dL  --  99   ALBUMIN g/dL  --  4.63   BILIRUBIN mg/dL  --  0.3   ALK PHOS U/L  --  57   AST (SGOT) U/L  --  35   ALT (SGPT) U/L  --  34   Estimated Creatinine Clearance: 67.8 mL/min (A) (by C-G formula based on SCr of 1.62 mg/dL (H)).  No results found for: HGBA1C, POCGLU  Lab Results   Component Value Date    HGBA1C 5.5 06/05/2015     Lab Results   Component Value Date    TSH 1.000 03/19/2020    FREET4 1.07 03/19/2020      Pain Management Panel     Pain Management Panel Latest Ref Rng & Units 8/1/2019 3/31/2019    AMPHETAMINES SCREEN, URINE Negative Negative Negative    BARBITURATES SCREEN Negative Negative Negative    BENZODIAZEPINE SCREEN, URINE Negative Negative Negative    BUPRENORPHINEUR Negative Negative Negative    COCAINE SCREEN, URINE Negative Negative Negative    METHADONE SCREEN, URINE Negative Negative Negative        I have personally reviewed the above laboratory results.   ---------------------------------------------------------------------------------------------------------------------  Imaging Results (Last 7 Days)     Procedure Component Value Units Date/Time    CT Abdomen Pelvis Without Contrast [[id number]] Collected:  08/18/20 0238     Updated:  08/18/20 0240    Narrative:       FINDINGS:  Abdomen: Lung bases are clear. Liver, gallbladder, spleen, pancreas, adrenal glands and kidneys are normal in appearance. Aorta is  normal in size. There is no adenopathy. Bowel including the appendix is normal except for sigmoid diverticuli.  Pelvis: Bladder and prostate gland are normal. Bones are unremarkable.    Impression:       Normal appendix  Sigmoid diverticulosis.  Otherwise normal    Signer Name: Cas Newsome MD   Signed: 8/18/2020 2:38 AM   Workstation Name: [facility]    [facility]    XR Chest 1 View [[id number]] Collected:  08/18/20 0237     Updated:  08/18/20 0239    Narrative:       FINDINGS:  Single portable AP view(s) of the chest.  The heart and mediastinal contours are normal. The lungs are clear. No pneumothorax or pleural effusion    Impression:       No acute cardiopulmonary findings.    Signer Name: Cas Newsome MD   Signed: 8/18/2020 2:37 AM   Workstation [facility]: [facility]    Radiology [facility] Gallbladder [[id number]] Collected:  08/18/20 0237     Updated:  08/18/20 0239    Narrative:       FINDINGS:  LIVER: The echogenicity and echotexture of the hepatic parenchyma is within normal limits.. No hepatic mass. The intrahepatic bile ducts are normal in caliber. The common duct is normal in size at the piyush hepatis measuring 7 mm.  GALLBLADDER: The gallbladder is normal. No gallstones.    Impression:       Negative right upper quadrant ultrasound.    Signer Name: Cas Newsome MD   Signed: 8/18/2020 2:37 AM   Workstation [facility]: [facility]    Radiology [facility]      I have personally reviewed the above radiology results.     Stress test with myocardial perfusion imaging 6/5/2020:  Nuclear Cardiology Conclusion:  Abnormal LV perfusion.  Abnormal regadenoson myocardial perfusion   with Tc-99m sestamibi  imaging.  Normal LV size. Global left ventricular systolic function was  normal, with an EF of 75%.       Stress Test Conclusion:  NormalNormal stress test. . There was a normal heart rate response.   Conclusions  *Mildly abnormal regadenoson myocardial perfusion   with  Tc-99m  sestamibi imaging.  *There was a small, reversible defect in the mid inferior  segment(s).   The extent of this perfusion defect was mild.  *Gated imaging under post-stress conditions demonstrated  mild  hypokinesis of the  basal inferoseptal segment(s).  *Global left ventricular systolic function was normal, with an EF of  75%.     Electronically signed by: Louis Howard MD on 06/06/2015 16:07:09    Left Heart Cath 6/5/2015:  FINDINGS:  HEMODYNAMICS: Left ventricular pressure was 148/11 with an ascending aortic  pressure of 140/86 and a mean of 107. No significant gradient was appreciated  across the aortic valve.      LEFT VENTRICULOGRAM: Not performed.      CORONARY ANGIOGRAPHY:   Left Main: The left main is a large vessel, which is oval in shape and in the  KASPER projection suggests a 40% ostial narrowing. It gives rise to a moderate to  large-size left anterior descending artery and moderate-sized ramus intermedius  and a moderate-size left circumflex artery.   Left Anterior Descending Artery: The left anterior descending artery is a  moderate-size vessel, which gives rise to 2 diagonals.  After the takeoff of  the first diagonal, there is a less than 40% narrowing noted in the LAD itself.  Left Circumflex Artery: The left circumflex artery is a moderate-size vessel,  which gives rise to 2 obtuse marginals and becomes a posterolateral artery. The  second obtuse marginal is totally occluded proximally and receives left-to-left  collateral flow.   Right Coronary Artery: The right coronary artery is a dominant vessel and gives  rise to the posterior descending artery. There is a 6 cm long 80% to 99% lesion  noted in the proximal portion of this vessel. In addition to giving off the  posterior descending artery, it also gives off a posterolateral artery, which  is totally occluded proximally.  The distal posterolateral and posterior  descending arteries receive left-to-right collateral flow.       COMPLICATIONS: None.      INTERVENTIONS PERFORMED: None.      SUMMARY:  Severe multivessel coronary artery disease involving the proximal  right coronary artery, the right posterolateral artery, the right posterior  descending artery, and the second obtuse marginal arteries.      RECOMMENDATIONS:  At this time, the patient has been pain-free since his  admission.  We will maximize his medical antianginal therapy.  If he continues  to have chest discomfort, I recommend that he be transferred to [place] to  undergo intervention where he can have surgical back-up.        D:   PRIETO 06/08/2015 09:41:01  T:   sindy 06/09/2015 11:47:36  JOB ID:[id number]  [id number]  Revision Count:     0  cc:  Louis Howard MD, [facility]                              Signature:__________________________                                     Norma Almanzar D.O.*  DO NOT TEXT EDIT THIS LINE :RCC:[id number]:  Authenticated by NORMA ALMANZAR MD On 06/18/2015 08:13:27 AM     Transthoracic echocardiogram 8/1/2019:  · Left ventricular systolic function is normal. Estimated EF appears to be in the range of 61 - 65%.  · Left ventricular diastolic dysfunction (grade I) consistent with impaired relaxation.  · There is no evidence of pericardial effusion  ----------------------------------------------------------------------------------------------------------------------    Assessment/Plan     · Chest pain in setting of known coronary artery disease with multivessel disease: Trop negative. EKG with T wave inversion in anterior leads, consistent with RCA disease. Previous left heart cath report reviewed from 2015 with 40% ostial narrowing, 40% narrowing in LAD, and 80 to 99% lesion noted in the proximal portion of the right coronary artery.  At that time, patient was treated medically, with recommendation that he if he become with recurrent chest pain he would need transfer to [place] to undergo intervention where he could have CT surgery backup.   Cardiology has admitted the patient, continue care per their orders.  Telemetry monitoring, serial troponin.  Medical therapy to include aspirin, statin, Plavix, TriCor, and Lopressor. Heparin gtt in place, titrate per protocol.  It appears the patient has undergone stress test, pending results.  Transthoracic echocardiogram also ordered and pending.  Monitor closely, supportive care.  · Acute kidney injury: Baseline creatinine 1.2, creatinine on admission of 1.6. IV fluids to be continued. Avoid nephrotoxins as much as possible. Monitor urine output. Repeat chemistry panel in AM.   · Hypomagnesemia: Likely due to underlying alcoholism.  Replace per protocol.  Telemetry monitoring.  Repeat magnesium level in a.m.  · Essential hypertension: Uncontrolled on admit. Cardiology is primary. Monitor BP per hospital protocol.   · Hyperlipidemia: Obtain lipid panel. Statin to be resumed.   · Grade I diastolic dysfunction: ECHO from 2019 reviewed, preserved EF. Repeat echo pending.  · Chronic alcoholism and ETOH abuse: Encourage cessation. CIWA protocol with PRN ativan initiated. Monitor closely for signs of withdrawal. Replace electrolytes per protocol as necessary. Tele monitoring. Vitamin replacement.   · Former smoker  · F/E/N: Gentle IV fluids. Replace electrolytes per protocol as necessary. NPO diet.    -----------------------  Thank you for the consult and allowing us to participate in the care of your patient, Hospitalist Services will continue to follow. Please do not hesitate to call with any questions or concerns.      Tania Mathew PA-C  Hospitalist Service -- [facility]   Pager: [phone]    08/18/20  10:00      ---------------------------------------------------------------------------------------------------------------------

## 2020-08-18 NOTE — H&P
History and Physical:  Date of Admit: 8/17/2020  Onel Jimenez  1965    Patient Active Problem List   Diagnosis   • CAD (coronary artery disease), 90% proximal RCA, total occlusion of posterior lateral branch of RCA, total occlusion of second marginal with left to left collaterals, 40% LAD, 40% left main   • Anxiety disorder   • Chronic alcoholism (CMS/[facility])   • Essential hypertension   • Hyperlipidemia type IV,uncontrolled   • Gastroesophageal reflux disease without esophagitis   • Sorethroat   • Hypokalemia   • Hypomagnesemia   • Hypocalcemia   • Chest pain   • Hyperlipidemia   • Grade I diastolic dysfunction   • CARLITOS (acute kidney injury) (CMS/[facility])     Chief Complaint   Patient presents with   • Chest Pain   • Hypertension       Subjective     History of Present Illness    Onel Jimenez is a 55 y.o. male with a past medical history significant for coronary artery disease, hyperlipidemia, GERD, essential hypertension, hypomagnesemia and alcoholism.  Patient presented to the ED with complaints of epigastric abdominal pain and chest pain.  He states that over the last week he has had intermittent epigastric pain that radiates into his chest. He describes as a sharp stabbing pain that radiates up into the substernal area of his chest.  He denies radiation to his arm and neck.  He states that over the last few nights he has been awakened from sleep due to the pain. The pain is aggravated by eating.  He had associated shortness of breath, diaphoresis and nausea.  He denies any alleviating factors.  He states this pain is different from the pain he had when he had his heart catheterization.  He has a history of coronary artery disease and left heart catheterization in 2015 showed 40% ostial narrowing of the left main, 40% narrowing in the LAD and 80 to 90% lesion in the proximal portion of the RCA with total occlusion of the second marginal with left-to-right collateral flow which was medically managed at that time.  It  was also recommended at that time that in the future if he were to need further intervention that he be transferred to a tertiary care center for surgical backup.  He reports taking his medications.  He follows with Dr. Canales.  He is not a smoker.     Past Medical History:   Diagnosis Date   • Anxiety disorder    • CAD (coronary artery disease)    • Chronic alcoholism (CMS/HCC)    • Hyperlipidemia    • Hypertension    • WPW (Zakiya-Parkinson-White syndrome)      Past Surgical History:   Procedure Laterality Date   • CARDIAC CATHETERIZATION  2015   • CARDIOVASCULAR STRESS TEST  2015   • ECHO - CONVERTED  2015   • EYE SURGERY       Family History   Problem Relation Age of Onset   • Heart attack Father    • Heart disease Father    • Heart failure Father    • Heart disease Sister    • Heart failure Sister    • Heart disease Brother    • Heart failure Brother    • Heart failure Sister    • Heart disease Sister      Social History     Tobacco Use   • Smoking status: Former Smoker     Packs/day: 1.00     Years: 20.00     Pack years: 20.00     Types: Cigarettes     Last attempt to quit:      Years since quittin.6   • Smokeless tobacco: Current User     Types: Chew, Snuff   • Tobacco comment: chew 1 can per week   Substance Use Topics   • Alcohol use: Yes     Alcohol/week: 10.0 standard drinks     Types: 6 Cans of beer, 4 Shots of liquor per week     Comment: pint of alcohol every other day    • Drug use: Yes     Types: Hydrocodone     Comment: occ         (Not in a hospital admission)    Allergies:  Niacin    Review of Systems   Constitutional: Positive for diaphoresis. Negative for chills, fatigue and fever.   HENT: Negative for trouble swallowing.    Eyes: Negative for photophobia and visual disturbance.   Respiratory: Positive for chest tightness and shortness of breath. Negative for wheezing.    Cardiovascular: Positive for chest pain. Negative for palpitations and leg swelling.    Gastrointestinal: Positive for abdominal pain and nausea. Negative for abdominal distention and vomiting.   Endocrine: Negative for polyphagia and polyuria.   Genitourinary: Negative for dysuria and hematuria.   Musculoskeletal: Negative for neck pain and neck stiffness.   Skin: Negative for rash and wound.   Allergic/Immunologic: Negative for food allergies and immunocompromised state.   Neurological: Negative for dizziness, syncope, weakness and light-headedness.   Hematological: Does not bruise/bleed easily.   Psychiatric/Behavioral: Negative for confusion and suicidal ideas.     Objective      Vital Signs  Temp:  [98.1 °F (36.7 °C)] 98.1 °F (36.7 °C)  Heart Rate:  [64-81] 81  Resp:  [18] 18  BP: (124-179)/() 125/92  Body mass index is 26.25 kg/m².  No intake or output data in the 24 hours ending 08/18/20 0942    Physical Exam   Constitutional: He is oriented to person, place, and time. He appears well-developed and well-nourished.   HENT:   Head: Normocephalic and atraumatic.   Neck: Normal range of motion. No JVD present.   Cardiovascular: Normal rate and regular rhythm. Exam reveals no S3 and no S4.   No murmur heard.  Pulses:       Dorsalis pedis pulses are 2+ on the right side, and 2+ on the left side.        Posterior tibial pulses are 2+ on the right side, and 2+ on the left side.   Pulmonary/Chest: Effort normal and breath sounds normal. No respiratory distress. He has no wheezes.   Abdominal: Soft. Bowel sounds are normal.   Musculoskeletal: Normal range of motion. He exhibits no edema.   Lymphadenopathy:     He has no cervical adenopathy.   Neurological: He is alert and oriented to person, place, and time.   Skin: Skin is warm and dry.   Psychiatric: He has a normal mood and affect. His behavior is normal.        Results Review:    I reviewed the patient's new clinical results.  Results from last 7 days   Lab Units 08/18/20  0839 08/18/20  0414 08/17/20  4623   TROPONIN T ng/mL <0.010 <0.010  <0.010     Results from last 7 days   Lab Units 20  2333   WBC 10*3/mm3 9.21   HEMOGLOBIN g/dL 14.4   PLATELETS 10*3/mm3 241     Results from last 7 days   Lab Units 20  2333   SODIUM mmol/L 140   POTASSIUM mmol/L 4.3   CHLORIDE mmol/L 103   CO2 mmol/L 26.0   BUN mg/dL 25*   CREATININE mg/dL 1.62*   CALCIUM mg/dL 8.6   GLUCOSE mg/dL 99   ALT (SGPT) U/L 34   AST (SGOT) U/L 35     Lab Results   Component Value Date    INR 1.24 (H) 2018    INR 0.99 2015     Lab Results   Component Value Date    MG 0.8 (C) 2020    MG 1.3 (L) 2020    MG 0.5 (C) 2019     Lab Results   Component Value Date    TSH 1.000 2020    PSA 0.300 2018    CHLPL 182 2016    TRIG 66 2019    HDL 43 2019    LDL 94 2019      [id number]      Assessment/Plan    Assessment:   1. Some typical and atypical chest pain, troponin negative, EKG tracing reviewed and shows T wave inversion in V1-V4  2. ASCVD, left heart catheterization in  showed 40% ostial narrowing of the left main, 40% narrowing in the LAD and 80 to 90% lesion in the proximal portion of the RCA with total occlusion of the second marginal with left-to-right collateral flow which was medically managed at that time  3. Essential hypertension, controlled  4. Hyperlipidemia, on statin  5. Acute kidney injury, creatinine 1.62 on admission  6. Hypomagnesemia  7. History of alcoholism     Plan:   1. Will continue to trend troponins.  Scheduled to have nuclear stress test today to evaluate for ischemia.  Obtain echocardiogram.  2. Continue with gentle hydration for acute kidney injury.  3. Replace magnesium per protocol.  4. Continue with aspirin, Plavix, TriCor, Lipitor and Lopressor. Will hold home ARB for now due to acute kidney injury.  5. Discussed with hospitalist group who will be following in consult.  Appreciate their help.  6. Advised to cut down alcohol intake.    SHAWN Allen  2020 9:42 AM  I,  Cherelle Pollard MD, FACC, performed the services described in this documentation as documented by the above-named individual under my supervision and made necessary changes in the note is both accurate and complete

## 2020-08-18 NOTE — ED PROVIDER NOTES
Subjective   55-year-old white male complains of chest pain.  Patient describes epigastric pain which radiates to his anterior chest bilaterally.  This is been going on intermittently for the last week.  He has a previous history of coronary artery disease.  He denies any shortness of breath, nausea, radiation of the pain, or diaphoresis.  No exacerbating or alleviating factors noted.  He has not taken any medicine for this pain.          Review of Systems   All other systems reviewed and are negative.      Past Medical History:   Diagnosis Date   • Anxiety disorder    • CAD (coronary artery disease)    • Chronic alcoholism (CMS/HCC)    • Hyperlipidemia    • Hypertension    • WPW (Zakiya-Parkinson-White syndrome)        Allergies   Allergen Reactions   • Niacin Rash     Whelps       Past Surgical History:   Procedure Laterality Date   • CARDIAC CATHETERIZATION  2015   • CARDIOVASCULAR STRESS TEST  2015   • ECHO - CONVERTED  2015   • EYE SURGERY         Family History   Problem Relation Age of Onset   • Heart attack Father    • Heart disease Father    • Heart failure Father    • Heart disease Sister    • Heart failure Sister    • Heart disease Brother    • Heart failure Brother    • Heart failure Sister    • Heart disease Sister        Social History     Socioeconomic History   • Marital status:      Spouse name: Not on file   • Number of children: Not on file   • Years of education: Not on file   • Highest education level: Not on file   Tobacco Use   • Smoking status: Former Smoker     Packs/day: 1.00     Years: 20.00     Pack years: 20.00     Types: Cigarettes     Last attempt to quit:      Years since quittin.6   • Smokeless tobacco: Current User     Types: Chew, Snuff   • Tobacco comment: chew 1 can per week   Substance and Sexual Activity   • Alcohol use: Yes     Alcohol/week: 10.0 standard drinks     Types: 6 Cans of beer, 4 Shots of liquor per week     Comment: pint of alcohol  every other day    • Drug use: Yes     Types: Hydrocodone     Comment: occ   • Sexual activity: Defer           Objective   Physical Exam   Constitutional: He is oriented to person, place, and time. He appears well-developed and well-nourished.   HENT:   Head: Normocephalic and atraumatic.   Cardiovascular: Normal rate, regular rhythm and normal heart sounds. Exam reveals no gallop and no friction rub.   No murmur heard.  Pulmonary/Chest: Effort normal and breath sounds normal. No respiratory distress. He has no wheezes. He has no rales. He exhibits no tenderness.   Abdominal: Soft. Bowel sounds are normal. There is tenderness (Mild epigastric tenderness). There is no rebound and no guarding.   Musculoskeletal: Normal range of motion. He exhibits no edema.   Neurological: He is alert and oriented to person, place, and time.   Skin: Skin is warm and dry.   Psychiatric: He has a normal mood and affect.   Nursing note and vitals reviewed.      Procedures  Results for orders placed or performed during the hospital encounter of 08/17/20   Comprehensive Metabolic Panel   Result Value Ref Range    Glucose 99 65 - 99 mg/dL    BUN 25 (H) 6 - 20 mg/dL    Creatinine 1.62 (H) 0.76 - 1.27 mg/dL    Sodium 140 136 - 145 mmol/L    Potassium 4.3 3.5 - 5.2 mmol/L    Chloride 103 98 - 107 mmol/L    CO2 26.0 22.0 - 29.0 mmol/L    Calcium 8.6 8.6 - 10.5 mg/dL    Total Protein 7.3 6.0 - 8.5 g/dL    Albumin 4.63 3.50 - 5.20 g/dL    ALT (SGPT) 34 1 - 41 U/L    AST (SGOT) 35 1 - 40 U/L    Alkaline Phosphatase 57 39 - 117 U/L    Total Bilirubin 0.3 0.0 - 1.2 mg/dL    eGFR Non African Amer 44 (L) >60 mL/min/1.73    Globulin 2.7 gm/dL    A/G Ratio 1.7 g/dL    BUN/Creatinine Ratio 15.4 7.0 - 25.0    Anion Gap 11.0 5.0 - 15.0 mmol/L   Troponin   Result Value Ref Range    Troponin T <0.010 0.000 - 0.030 ng/mL   Troponin   Result Value Ref Range    Troponin T <0.010 0.000 - 0.030 ng/mL   CBC Auto Differential   Result Value Ref Range    WBC 9.21  3.40 - 10.80 10*3/mm3    RBC 4.78 4.14 - 5.80 10*6/mm3    Hemoglobin 14.4 13.0 - 17.7 g/dL    Hematocrit 43.4 37.5 - 51.0 %    MCV 90.8 79.0 - 97.0 fL    MCH 30.1 26.6 - 33.0 pg    MCHC 33.2 31.5 - 35.7 g/dL    RDW 12.8 12.3 - 15.4 %    RDW-SD 42.9 37.0 - 54.0 fl    MPV 10.0 6.0 - 12.0 fL    Platelets 241 140 - 450 10*3/mm3    Neutrophil % 58.8 42.7 - 76.0 %    Lymphocyte % 25.5 19.6 - 45.3 %    Monocyte % 11.5 5.0 - 12.0 %    Eosinophil % 2.3 0.3 - 6.2 %    Basophil % 1.5 0.0 - 1.5 %    Immature Grans % 0.4 0.0 - 0.5 %    Neutrophils, Absolute 5.41 1.70 - 7.00 10*3/mm3    Lymphocytes, Absolute 2.35 0.70 - 3.10 10*3/mm3    Monocytes, Absolute 1.06 (H) 0.10 - 0.90 10*3/mm3    Eosinophils, Absolute 0.21 0.00 - 0.40 10*3/mm3    Basophils, Absolute 0.14 0.00 - 0.20 10*3/mm3    Immature Grans, Absolute 0.04 0.00 - 0.05 10*3/mm3    nRBC 0.0 0.0 - 0.2 /100 WBC   Ethanol   Result Value Ref Range    Ethanol <10 0 - 10 mg/dL    Ethanol % <0.010 %   Magnesium   Result Value Ref Range    Magnesium 0.8 (C) 1.6 - 2.6 mg/dL   Troponin   Result Value Ref Range    Troponin T <0.010 0.000 - 0.030 ng/mL   Basic Metabolic Panel   Result Value Ref Range    Glucose 96 65 - 99 mg/dL    BUN 23 (H) 6 - 20 mg/dL    Creatinine 1.23 0.76 - 1.27 mg/dL    Sodium 139 136 - 145 mmol/L    Potassium 4.2 3.5 - 5.2 mmol/L    Chloride 103 98 - 107 mmol/L    CO2 23.6 22.0 - 29.0 mmol/L    Calcium 8.3 (L) 8.6 - 10.5 mg/dL    eGFR Non African Amer 61 >60 mL/min/1.73    BUN/Creatinine Ratio 18.7 7.0 - 25.0    Anion Gap 12.4 5.0 - 15.0 mmol/L   Protime-INR   Result Value Ref Range    Protime 14.0 11.9 - 14.1 Seconds    INR 1.10 0.90 - 1.10   aPTT   Result Value Ref Range    PTT 26.8 25.6 - 35.3 seconds   CBC Auto Differential   Result Value Ref Range    WBC 7.72 3.40 - 10.80 10*3/mm3    RBC 4.61 4.14 - 5.80 10*6/mm3    Hemoglobin 13.6 13.0 - 17.7 g/dL    Hematocrit 42.4 37.5 - 51.0 %    MCV 92.0 79.0 - 97.0 fL    MCH 29.5 26.6 - 33.0 pg    MCHC 32.1 31.5 -  35.7 g/dL    RDW 13.0 12.3 - 15.4 %    RDW-SD 43.3 37.0 - 54.0 fl    MPV 9.6 6.0 - 12.0 fL    Platelets 202 140 - 450 10*3/mm3    Neutrophil % 58.3 42.7 - 76.0 %    Lymphocyte % 28.2 19.6 - 45.3 %    Monocyte % 10.2 5.0 - 12.0 %    Eosinophil % 1.7 0.3 - 6.2 %    Basophil % 1.2 0.0 - 1.5 %    Immature Grans % 0.4 0.0 - 0.5 %    Neutrophils, Absolute 4.50 1.70 - 7.00 10*3/mm3    Lymphocytes, Absolute 2.18 0.70 - 3.10 10*3/mm3    Monocytes, Absolute 0.79 0.10 - 0.90 10*3/mm3    Eosinophils, Absolute 0.13 0.00 - 0.40 10*3/mm3    Basophils, Absolute 0.09 0.00 - 0.20 10*3/mm3    Immature Grans, Absolute 0.03 0.00 - 0.05 10*3/mm3    nRBC 0.0 0.0 - 0.2 /100 WBC   Urine Drug Screen - Urine, Clean Catch   Result Value Ref Range    Amphetamine Screen, Urine Positive (A) Negative    Barbiturates Screen, Urine Negative Negative    Benzodiazepine Screen, Urine Negative Negative    Cocaine Screen, Urine Negative Negative    Methadone Screen, Urine Negative Negative    Opiate Screen Negative Negative    Phencyclidine (PCP), Urine Negative Negative    THC, Screen, Urine Negative Negative    6-ACETYL MORPHINE Negative Negative    Buprenorphine, Screen, Urine Negative Negative    Oxycodone Screen, Urine Negative Negative   TSH   Result Value Ref Range    TSH 1.620 0.270 - 4.200 uIU/mL   aPTT   Result Value Ref Range    PTT 57.1 (H) 25.6 - 35.3 seconds   Stress Test With Myocardial Perfusion One Day   Result Value Ref Range    Nuclear Prior Study 3     BH CV STRESS PROTOCOL 1 Pharmacologic     Stage 1 1     HR Stage 1 114     BP Stage 1 147/82     Duration Min Stage 1 0     Duration Sec Stage 1 10     Stress Dose Regadenoson Stage 1 0.4     Stress Comments Stage 1 10 sec bolus injection     Stage 2 2     HR Stage 2 95     BP Stage 2 149/107     Duration Min Stage 2 4     Duration Sec Stage 2 0     Stress Comments Stage 2 recovery     Baseline HR 91 bpm    Baseline /66 mmHg    Peak  bpm    Percent Max Pred HR 69.94 %     Percent Target HR 82 %    Peak /82 mmHg    Recovery HR 95 bpm    Recovery /107 mmHg    Target HR (85%) 139 bpm    Max. Pred. HR (100%) 163 bpm    Nuc Stress EF 62 %   Adult Transthoracic Echo Complete W/ Cont if Necessary Per Protocol   Result Value Ref Range    BSA 2.2 m^2    IVSd 1.4 cm    LVIDd 3.6 cm    LVIDs 2.8 cm    LVPWd 1.2 cm    IVS/LVPW 1.8     FS 22.2 %    EDV(Teich) 54.4 ml    ESV(Teich) 29.6 ml    EF(Teich) 45.7 %    EDV(cubed) 46.7 ml    ESV(cubed) 22.0 ml    EF(cubed) 52.9 %    LV mass(C)d 124.1 grams    LV mass(C)dI 55.4 grams/m^2    SV(Teich) 24.9 ml    SI(Teich) 11.1 ml/m^2    SV(cubed) 24.7 ml    SI(cubed) 11.0 ml/m^2    Ao root diam 3.3 cm    Ao root area 8.6 cm^2    LA dimension 3.1 cm    LA/Ao 0.94     LVOT diam 2.2 cm    LVOT area 3.8 cm^2    LVOT area(traced) 3.8 cm^2    LVLd ap4 8.6 cm    EDV(MOD-sp4) 52.1 ml    LVLs ap4 8.5 cm    ESV(MOD-sp4) 18.9 ml    EF(MOD-sp4) 63.7 %    SV(MOD-sp4) 33.2 ml    SI(MOD-sp4) 14.8 ml/m^2    Ao root area (BSA corrected) 1.5     LV Corbett Vol (BSA corrected) 23.3 ml/m^2    LV Sys Vol (BSA corrected) 8.4 ml/m^2    MV E max niki 60.0 cm/sec    MV A max niki 84.4 cm/sec    MV E/A 0.71      CV ECHO JORDAN - BZI_BMI 26.2 kilograms/m^2     CV ECHO JORDAN - BSA([facility]) 2.3 m^2     CV ECHO JORDAN - BZI_METRIC_WEIGHT 95.3 kg     CV ECHO JORDAN - BZI_METRIC_HEIGHT 190.5 cm    Target HR (85%) 139 bpm    Max. Pred. HR (100%) 163 bpm   Light Blue Top   Result Value Ref Range    Extra Tube hold for add-on    Green Top (Gel)   Result Value Ref Range    Extra Tube Hold for add-ons.    Lavender Top   Result Value Ref Range    Extra Tube hold for add-on    Gold Top - SST   Result Value Ref Range    Extra Tube Hold for add-ons.      Ct Abdomen Pelvis Without Contrast    Result Date: 8/18/2020  Narrative: CT Abdomen Pelvis WO INDICATION: Right upper quadrant pain this morning TECHNIQUE: CT of the abdomen and pelvis without IV contrast. Coronal and sagittal  reconstructions were obtained.  Radiation dose reduction techniques included automated exposure control or exposure modulation based on body size. Count of known CT and cardiac nuc med studies performed in previous 12 months: 0. COMPARISON: None available. FINDINGS: Abdomen: Lung bases are clear. Liver, gallbladder, spleen, pancreas, adrenal glands and kidneys are normal in appearance. Aorta is normal in size. There is no adenopathy. Bowel including the appendix is normal except for sigmoid diverticuli. Pelvis: Bladder and prostate gland are normal. Bones are unremarkable.     Impression: Normal appendix Sigmoid diverticulosis. Otherwise normal Signer Name: Cas Newsome MD  Signed: 8/18/2020 2:38 AM  Workstation Name: [email or web address]  [facility]    Us Gallbladder    Result Date: 8/18/2020  Narrative: US Abdomen Ltd INDICATION: Right upper quadrant pain this morning COMPARISON: None available. FINDINGS: LIVER: The echogenicity and echotexture of the hepatic parenchyma is within normal limits.. No hepatic mass. The intrahepatic bile ducts are normal in caliber. The common duct is normal in size at the piyush hepatis measuring 7 mm. GALLBLADDER: The gallbladder is normal. No gallstones. .     Impression: Negative right upper quadrant ultrasound. Signer Name: Cas Newsome MD  Signed: 8/18/2020 2:37 AM  Workstation Name: [employer]  [facility]    Xr Chest 1 View    Result Date: 8/18/2020  Narrative: CR Chest 1 Vw INDICATION: Chest pain this morning COMPARISON:  None available. FINDINGS: Single portable AP view(s) of the chest.  The heart and mediastinal contours are normal. The lungs are clear. No pneumothorax or pleural effusion.     Impression: No acute cardiopulmonary findings. Signer Name: Cas Newsome MD  Signed: 8/18/2020 2:37 AM  Workstation Name: [email or web address]  [facility]             ED Course  ED Course as of Aug 18 1943   Tue Aug 18, 2020   0610  Discussed with Dr. Stafford.  Patient admitted, see orders.    [BC]   1037 Dr. Dupree had discussed the case with the hospitalist, and patient was initially admitted to the hospitalist service.  Dr. Mcbride called me this morning, we reviewed the case.  I also discussed the case with Dr. Beebe.  He advises admit patient to the hospital, start IV heparin, he will likely perform cardiac catheterization tomorrow.  Ultimately the patient was admitted to the cardiology service.    [CM]      ED Course User Index  [BC] Hi Dupree MD  [CM] Grant Mar MD                                           MDM  Number of Diagnoses or Management Options  Chest pain, unspecified type:   Chronic kidney disease, unspecified CKD stage:   Essential hypertension:      Amount and/or Complexity of Data Reviewed  Clinical lab tests: reviewed  Tests in the radiology section of CPT®: reviewed  Tests in the medicine section of CPT®: reviewed  Decide to obtain previous medical records or to obtain history from someone other than the patient: yes    Risk of Complications, Morbidity, and/or Mortality  Presenting problems: high  Diagnostic procedures: moderate  Management options: high        Final diagnoses:   Chest pain, unspecified type   Essential hypertension   Chronic kidney disease, unspecified CKD stage            Hi Dupree MD  08/18/20 1944

## 2020-08-19 PROBLEM — R94.39 ABNORMAL NUCLEAR STRESS TEST: Status: ACTIVE | Noted: 2020-08-17

## 2020-08-19 LAB
ANION GAP SERPL CALCULATED.3IONS-SCNC: 13.4 MMOL/L (ref 5–15)
APTT PPP: 59 SECONDS (ref 25.6–35.3)
BUN SERPL-MCNC: 24 MG/DL (ref 6–20)
BUN/CREAT SERPL: 18.2 (ref 7–25)
CALCIUM SPEC-SCNC: 8.2 MG/DL (ref 8.6–10.5)
CHLORIDE SERPL-SCNC: 106 MMOL/L (ref 98–107)
CO2 SERPL-SCNC: 19.6 MMOL/L (ref 22–29)
CREAT SERPL-MCNC: 1.32 MG/DL (ref 0.76–1.27)
GFR SERPL CREATININE-BSD FRML MDRD: 56 ML/MIN/1.73
GLUCOSE SERPL-MCNC: 122 MG/DL (ref 65–99)
MAGNESIUM SERPL-MCNC: 1.9 MG/DL (ref 1.6–2.6)
PHOSPHATE SERPL-MCNC: 3.3 MG/DL (ref 2.5–4.5)
POTASSIUM SERPL-SCNC: 4.1 MMOL/L (ref 3.5–5.2)
SARS-COV-2 RDRP RESP QL NAA+PROBE: NOT DETECTED
SODIUM SERPL-SCNC: 139 MMOL/L (ref 136–145)
TROPONIN T SERPL-MCNC: <0.01 NG/ML (ref 0–0.03)

## 2020-08-19 PROCEDURE — 80048 BASIC METABOLIC PNL TOTAL CA: CPT | Performed by: INTERNAL MEDICINE

## 2020-08-19 PROCEDURE — 25010000002 FENTANYL CITRATE (PF) 100 MCG/2ML SOLUTION: Performed by: INTERNAL MEDICINE

## 2020-08-19 PROCEDURE — 0 IOPAMIDOL PER 1 ML: Performed by: INTERNAL MEDICINE

## 2020-08-19 PROCEDURE — 25010000002 HEPARIN (PORCINE) PER 1000 UNITS: Performed by: EMERGENCY MEDICINE

## 2020-08-19 PROCEDURE — 84100 ASSAY OF PHOSPHORUS: CPT | Performed by: PHYSICIAN ASSISTANT

## 2020-08-19 PROCEDURE — 25010000002 MIDAZOLAM PER 1 MG: Performed by: INTERNAL MEDICINE

## 2020-08-19 PROCEDURE — 87635 SARS-COV-2 COVID-19 AMP PRB: CPT | Performed by: NURSE PRACTITIONER

## 2020-08-19 PROCEDURE — 99232 SBSQ HOSP IP/OBS MODERATE 35: CPT | Performed by: SPECIALIST

## 2020-08-19 PROCEDURE — 93005 ELECTROCARDIOGRAM TRACING: CPT | Performed by: INTERNAL MEDICINE

## 2020-08-19 PROCEDURE — 4A023N7 MEASUREMENT OF CARDIAC SAMPLING AND PRESSURE, LEFT HEART, PERCUTANEOUS APPROACH: ICD-10-PCS | Performed by: INTERNAL MEDICINE

## 2020-08-19 PROCEDURE — 93454 CORONARY ARTERY ANGIO S&I: CPT | Performed by: INTERNAL MEDICINE

## 2020-08-19 PROCEDURE — B2151ZZ FLUOROSCOPY OF LEFT HEART USING LOW OSMOLAR CONTRAST: ICD-10-PCS | Performed by: INTERNAL MEDICINE

## 2020-08-19 PROCEDURE — 84484 ASSAY OF TROPONIN QUANT: CPT | Performed by: INTERNAL MEDICINE

## 2020-08-19 PROCEDURE — B2111ZZ FLUOROSCOPY OF MULTIPLE CORONARY ARTERIES USING LOW OSMOLAR CONTRAST: ICD-10-PCS | Performed by: INTERNAL MEDICINE

## 2020-08-19 PROCEDURE — 85730 THROMBOPLASTIN TIME PARTIAL: CPT | Performed by: NURSE PRACTITIONER

## 2020-08-19 PROCEDURE — 83735 ASSAY OF MAGNESIUM: CPT | Performed by: INTERNAL MEDICINE

## 2020-08-19 PROCEDURE — C1894 INTRO/SHEATH, NON-LASER: HCPCS | Performed by: INTERNAL MEDICINE

## 2020-08-19 PROCEDURE — C1769 GUIDE WIRE: HCPCS | Performed by: INTERNAL MEDICINE

## 2020-08-19 RX ORDER — FENTANYL CITRATE 50 UG/ML
INJECTION, SOLUTION INTRAMUSCULAR; INTRAVENOUS AS NEEDED
Status: DISCONTINUED | OUTPATIENT
Start: 2020-08-19 | End: 2020-08-19 | Stop reason: HOSPADM

## 2020-08-19 RX ORDER — SODIUM CHLORIDE 9 MG/ML
100 INJECTION, SOLUTION INTRAVENOUS ONCE
Status: DISCONTINUED | OUTPATIENT
Start: 2020-08-19 | End: 2020-08-21 | Stop reason: HOSPADM

## 2020-08-19 RX ORDER — MIDAZOLAM HYDROCHLORIDE 1 MG/ML
INJECTION INTRAMUSCULAR; INTRAVENOUS AS NEEDED
Status: DISCONTINUED | OUTPATIENT
Start: 2020-08-19 | End: 2020-08-19 | Stop reason: HOSPADM

## 2020-08-19 RX ORDER — SODIUM CHLORIDE 9 MG/ML
INJECTION, SOLUTION INTRAVENOUS CONTINUOUS PRN
Status: DISCONTINUED | OUTPATIENT
Start: 2020-08-19 | End: 2020-08-19 | Stop reason: HOSPADM

## 2020-08-19 RX ORDER — AMLODIPINE BESYLATE 10 MG/1
10 TABLET ORAL
Status: DISCONTINUED | OUTPATIENT
Start: 2020-08-19 | End: 2020-08-21 | Stop reason: HOSPADM

## 2020-08-19 RX ORDER — LIDOCAINE HYDROCHLORIDE 20 MG/ML
INJECTION, SOLUTION INFILTRATION; PERINEURAL AS NEEDED
Status: DISCONTINUED | OUTPATIENT
Start: 2020-08-19 | End: 2020-08-19 | Stop reason: HOSPADM

## 2020-08-19 RX ORDER — ACETAMINOPHEN 325 MG/1
650 TABLET ORAL EVERY 4 HOURS PRN
Status: DISCONTINUED | OUTPATIENT
Start: 2020-08-19 | End: 2020-08-21 | Stop reason: HOSPADM

## 2020-08-19 RX ADMIN — MAGNESIUM GLUCONATE 500 MG ORAL TABLET 400 MG: 500 TABLET ORAL at 20:02

## 2020-08-19 RX ADMIN — ATORVASTATIN CALCIUM 40 MG: 40 TABLET, FILM COATED ORAL at 09:03

## 2020-08-19 RX ADMIN — METOPROLOL TARTRATE 100 MG: 100 TABLET, FILM COATED ORAL at 09:02

## 2020-08-19 RX ADMIN — METOPROLOL TARTRATE 100 MG: 100 TABLET, FILM COATED ORAL at 20:02

## 2020-08-19 RX ADMIN — PANTOPRAZOLE SODIUM 40 MG: 40 TABLET, DELAYED RELEASE ORAL at 09:02

## 2020-08-19 RX ADMIN — ASPIRIN 81 MG: 81 TABLET, COATED ORAL at 09:02

## 2020-08-19 RX ADMIN — ACETAMINOPHEN 650 MG: 325 TABLET ORAL at 20:02

## 2020-08-19 RX ADMIN — HEPARIN SODIUM 10.5 UNITS/KG/HR: 10000 INJECTION, SOLUTION INTRAVENOUS at 09:04

## 2020-08-19 RX ADMIN — SODIUM CHLORIDE 100 ML/HR: 9 INJECTION, SOLUTION INTRAVENOUS at 01:45

## 2020-08-19 RX ADMIN — CLOPIDOGREL 75 MG: 75 TABLET, FILM COATED ORAL at 09:03

## 2020-08-19 RX ADMIN — MUPIROCIN: 20 OINTMENT TOPICAL at 09:05

## 2020-08-19 RX ADMIN — POTASSIUM CHLORIDE 10 MEQ: 750 TABLET, FILM COATED, EXTENDED RELEASE ORAL at 09:02

## 2020-08-19 RX ADMIN — Medication 100 MG: at 09:02

## 2020-08-19 RX ADMIN — SODIUM CHLORIDE, PRESERVATIVE FREE 10 ML: 5 INJECTION INTRAVENOUS at 09:03

## 2020-08-19 RX ADMIN — FOLIC ACID 1 MG: 1 TABLET ORAL at 09:02

## 2020-08-19 RX ADMIN — AMLODIPINE BESYLATE 10 MG: 10 TABLET ORAL at 05:27

## 2020-08-19 RX ADMIN — MUPIROCIN: 20 OINTMENT TOPICAL at 20:06

## 2020-08-19 RX ADMIN — MAGNESIUM GLUCONATE 500 MG ORAL TABLET 400 MG: 500 TABLET ORAL at 09:02

## 2020-08-19 NOTE — PAYOR COMM NOTE
"[facility]  NPI:[id number]    Utilization Review  Contact: Yuly Arnold RN  Phone: [phone]  Fax:[phone]    INITIATE INPATIENT AUTHORIZATION   ICD: E83.42   R07.9    Onel Cunha (55 y.o. Male)     Date of Birth Social Security Number Address Home Phone MRN    1965  [id number] [facility] [address] [phone] [id number]    Cheondoism Marital Status          None        Admission Date Admission Type Admitting Provider Attending Provider Department, Room/Bed    8/18/20 Emergency Louis Howard MD Reddy, Pramod A, MD [address], 3318/1P    Discharge Date Discharge Disposition Discharge Destination                       Attending Provider:  Louis Howard MD    Allergies:  Niacin    Isolation:  None   Infection:  None   Code Status:  CPR    Ht:  190.5 cm (75\")   Wt:  94.4 kg (208 lb 3.2 oz)    Admission Cmt:  None   Principal Problem:  Chest pain [R07.9]                 Active Insurance as of 8/17/2020     Primary Coverage     Payor Plan Insurance Group Employer/Plan Group    ANTHEM MEDICAID ANTHEM MEDICAID [id number]     Payor Plan Address Payor Plan Phone Number Payor Plan Fax Number Effective Dates    [address] [address] [phone]  2/1/2014 - None Entered    [facility] [id number]       Subscriber Name Subscriber Birth Date Member ID       ONEL CUNHA 1965 [id number]                 Emergency Contacts      (Rel.) Home Phone Work Phone Mobile Phone    Yasir Cunha (Son) [phone] -- --    [place] Cunha [phone] -- --               History & Physical      Cherelle Pollard MD at 08/18/20 0942          History and Physical:  Date of Admit: 8/17/2020  Onel Cunha  1965    Patient Active Problem List   Diagnosis   • CAD (coronary artery disease), 90% proximal RCA, total occlusion of posterior lateral branch of RCA, total occlusion of second marginal with left to left collaterals, 40% LAD, 40% left main   • Anxiety " disorder   • Chronic alcoholism (CMS/[facility])   • Essential hypertension   • Hyperlipidemia type IV,uncontrolled   • Gastroesophageal reflux disease without esophagitis   • Sorethroat   • Hypokalemia   • Hypomagnesemia   • Hypocalcemia   • Chest pain   • Hyperlipidemia   • Grade I diastolic dysfunction   • CARLITOS (acute kidney injury) (CMS/[facility])     Chief Complaint   Patient presents with   • Chest Pain   • Hypertension       Subjective     History of Present Illness    Onel Jimenez is a 55 y.o. male with a past medical history significant for coronary artery disease, hyperlipidemia, GERD, essential hypertension, hypomagnesemia and alcoholism.  Patient presented to the ED with complaints of epigastric abdominal pain and chest pain.  He states that over the last week he has had intermittent epigastric pain that radiates into his chest. He describes as a sharp stabbing pain that radiates up into the substernal area of his chest.  He denies radiation to his arm and neck.  He states that over the last few nights he has been awakened from sleep due to the pain. The pain is aggravated by eating.  He had associated shortness of breath, diaphoresis and nausea.  He denies any alleviating factors.  He states this pain is different from the pain he had when he had his heart catheterization.  He has a history of coronary artery disease and left heart catheterization in 2015 showed 40% ostial narrowing of the left main, 40% narrowing in the LAD and 80 to 90% lesion in the proximal portion of the RCA with total occlusion of the second marginal with left-to-right collateral flow which was medically managed at that time.  It was also recommended at that time that in the future if he were to need further intervention that he be transferred to a tertiary care center for surgical backup.  He reports taking his medications.  He follows with Dr. Canales.  He is not a smoker.     Past Medical History:   Diagnosis Date   • Anxiety disorder    •  CAD (coronary artery disease)    • Chronic alcoholism (CMS/HCC)    • Hyperlipidemia    • Hypertension    • WPW (Zakiya-Parkinson-White syndrome)      Past Surgical History:   Procedure Laterality Date   • CARDIAC CATHETERIZATION  2015   • CARDIOVASCULAR STRESS TEST  2015   • ECHO - CONVERTED  2015   • EYE SURGERY       Family History   Problem Relation Age of Onset   • Heart attack Father    • Heart disease Father    • Heart failure Father    • Heart disease Sister    • Heart failure Sister    • Heart disease Brother    • Heart failure Brother    • Heart failure Sister    • Heart disease Sister      Social History     Tobacco Use   • Smoking status: Former Smoker     Packs/day: 1.00     Years: 20.00     Pack years: 20.00     Types: Cigarettes     Last attempt to quit:      Years since quittin.6   • Smokeless tobacco: Current User     Types: Chew, Snuff   • Tobacco comment: chew 1 can per week   Substance Use Topics   • Alcohol use: Yes     Alcohol/week: 10.0 standard drinks     Types: 6 Cans of beer, 4 Shots of liquor per week     Comment: pint of alcohol every other day    • Drug use: Yes     Types: Hydrocodone     Comment: occ         (Not in a hospital admission)    Allergies:  Niacin    Review of Systems   Constitutional: Positive for diaphoresis. Negative for chills, fatigue and fever.   HENT: Negative for trouble swallowing.    Eyes: Negative for photophobia and visual disturbance.   Respiratory: Positive for chest tightness and shortness of breath. Negative for wheezing.    Cardiovascular: Positive for chest pain. Negative for palpitations and leg swelling.   Gastrointestinal: Positive for abdominal pain and nausea. Negative for abdominal distention and vomiting.   Endocrine: Negative for polyphagia and polyuria.   Genitourinary: Negative for dysuria and hematuria.   Musculoskeletal: Negative for neck pain and neck stiffness.   Skin: Negative for rash and wound.    Allergic/Immunologic: Negative for food allergies and immunocompromised state.   Neurological: Negative for dizziness, syncope, weakness and light-headedness.   Hematological: Does not bruise/bleed easily.   Psychiatric/Behavioral: Negative for confusion and suicidal ideas.     Objective      Vital Signs  Temp:  [98.1 °F (36.7 °C)] 98.1 °F (36.7 °C)  Heart Rate:  [64-81] 81  Resp:  [18] 18  BP: (124-179)/() 125/92  Body mass index is 26.25 kg/m².  No intake or output data in the 24 hours ending 08/18/20 0942    Physical Exam   Constitutional: He is oriented to person, place, and time. He appears well-developed and well-nourished.   HENT:   Head: Normocephalic and atraumatic.   Neck: Normal range of motion. No JVD present.   Cardiovascular: Normal rate and regular rhythm. Exam reveals no S3 and no S4.   No murmur heard.  Pulses:       Dorsalis pedis pulses are 2+ on the right side, and 2+ on the left side.        Posterior tibial pulses are 2+ on the right side, and 2+ on the left side.   Pulmonary/Chest: Effort normal and breath sounds normal. No respiratory distress. He has no wheezes.   Abdominal: Soft. Bowel sounds are normal.   Musculoskeletal: Normal range of motion. He exhibits no edema.   Lymphadenopathy:     He has no cervical adenopathy.   Neurological: He is alert and oriented to person, place, and time.   Skin: Skin is warm and dry.   Psychiatric: He has a normal mood and affect. His behavior is normal.        Results Review:    I reviewed the patient's new clinical results.  Results from last 7 days   Lab Units 08/18/20  0839 08/18/20  0414 08/17/20  2333   TROPONIN T ng/mL <0.010 <0.010 <0.010     Results from last 7 days   Lab Units 08/17/20  2333   WBC 10*3/mm3 9.21   HEMOGLOBIN g/dL 14.4   PLATELETS 10*3/mm3 241     Results from last 7 days   Lab Units 08/17/20  2333   SODIUM mmol/L 140   POTASSIUM mmol/L 4.3   CHLORIDE mmol/L 103   CO2 mmol/L 26.0   BUN mg/dL 25*   CREATININE mg/dL 1.62*    CALCIUM mg/dL 8.6   GLUCOSE mg/dL 99   ALT (SGPT) U/L 34   AST (SGOT) U/L 35     Lab Results   Component Value Date    INR 1.24 (H) 2018    INR 0.99 2015     Lab Results   Component Value Date    MG 0.8 (C) 2020    MG 1.3 (L) 2020    MG 0.5 (C) 2019     Lab Results   Component Value Date    TSH 1.000 2020    PSA 0.300 2018    CHLPL 182 2016    TRIG 66 2019    HDL 43 2019    LDL 94 2019      EK2020      Assessment/Plan    Assessment:   1. Some typical and atypical chest pain, troponin negative, EKG tracing reviewed and shows T wave inversion in V1-V4  2. ASCVD, left heart catheterization in  showed 40% ostial narrowing of the left main, 40% narrowing in the LAD and 80 to 90% lesion in the proximal portion of the RCA with total occlusion of the second marginal with left-to-right collateral flow which was medically managed at that time  3. Essential hypertension, controlled  4. Hyperlipidemia, on statin  5. Acute kidney injury, creatinine 1.62 on admission  6. Hypomagnesemia  7. History of alcoholism     Plan:   1. Will continue to trend troponins.  Scheduled to have nuclear stress test today to evaluate for ischemia.  Obtain echocardiogram.  2. Continue with gentle hydration for acute kidney injury.  3. Replace magnesium per protocol.  4. Continue with aspirin, Plavix, TriCor, Lipitor and Lopressor. Will hold home ARB for now due to acute kidney injury.  5. Discussed with hospitalist group who will be following in consult.  Appreciate their help.  6. Advised to cut down alcohol intake.    SHAWN Allen  2020 9:42 AM  I, Cherelle Pollard MD, [facility], performed the services described in this documentation as documented by the above-named individual under my supervision and made necessary changes in the note is both accurate and complete          Electronically signed by Cherelle Pollard MD at 20 1621          Emergency Department  Notes      Peyton Tena at 08/17/20 2218        EKG completed by tech @ 2216 and given to Dr. Dupree @ 1032.     Peyton Tena  08/17/20 2220      Electronically signed by Peyton Tena at 08/17/20 2220     Kristin Moulton, RN at 08/18/20 0020        Pt resting quietly on stretcher with improving complaints of chest pain. Pt AAOx4 with no resp distress noted, respirations even and unlabored.  Pt denies any needs at this time.  Skin PWD.  Will continue to monitor and follow plan of care.  Bed rails up x2, bed in lowest position, call light in reach.           Kristin Moulton, RN  08/18/20 0689      Electronically signed by Kristin Moulton, RN at 08/18/20 0625     Hi Dupree MD at 08/18/20 0205          Subjective   55-year-old white male complains of chest pain.  Patient describes epigastric pain which radiates to his anterior chest bilaterally.  This is been going on intermittently for the last week.  He has a previous history of coronary artery disease.  He denies any shortness of breath, nausea, radiation of the pain, or diaphoresis.  No exacerbating or alleviating factors noted.  He has not taken any medicine for this pain.          Review of Systems   All other systems reviewed and are negative.      Past Medical History:   Diagnosis Date   • Anxiety disorder    • CAD (coronary artery disease)    • Chronic alcoholism (CMS/HCC)    • Hyperlipidemia    • Hypertension    • WPW (Zakiya-Parkinson-White syndrome)        Allergies   Allergen Reactions   • Niacin Rash     Whelps       Past Surgical History:   Procedure Laterality Date   • CARDIAC CATHETERIZATION  06/06/2015   • CARDIOVASCULAR STRESS TEST  06/06/2015   • ECHO - CONVERTED  06/06/2015   • EYE SURGERY         Family History   Problem Relation Age of Onset   • Heart attack Father    • Heart disease Father    • Heart failure Father    • Heart disease Sister    • Heart failure Sister    • Heart disease Brother    • Heart failure  Brother    • Heart failure Sister    • Heart disease Sister        Social History     Socioeconomic History   • Marital status:      Spouse name: Not on file   • Number of children: Not on file   • Years of education: Not on file   • Highest education level: Not on file   Tobacco Use   • Smoking status: Former Smoker     Packs/day: 1.00     Years: 20.00     Pack years: 20.00     Types: Cigarettes     Last attempt to quit:      Years since quittin.6   • Smokeless tobacco: Current User     Types: Chew, Snuff   • Tobacco comment: chew 1 can per week   Substance and Sexual Activity   • Alcohol use: Yes     Alcohol/week: 10.0 standard drinks     Types: 6 Cans of beer, 4 Shots of liquor per week     Comment: pint of alcohol every other day    • Drug use: Yes     Types: Hydrocodone     Comment: occ   • Sexual activity: Defer           Objective   Physical Exam   Constitutional: He is oriented to person, place, and time. He appears well-developed and well-nourished.   HENT:   Head: Normocephalic and atraumatic.   Cardiovascular: Normal rate, regular rhythm and normal heart sounds. Exam reveals no gallop and no friction rub.   No murmur heard.  Pulmonary/Chest: Effort normal and breath sounds normal. No respiratory distress. He has no wheezes. He has no rales. He exhibits no tenderness.   Abdominal: Soft. Bowel sounds are normal. There is tenderness (Mild epigastric tenderness). There is no rebound and no guarding.   Musculoskeletal: Normal range of motion. He exhibits no edema.   Neurological: He is alert and oriented to person, place, and time.   Skin: Skin is warm and dry.   Psychiatric: He has a normal mood and affect.   Nursing note and vitals reviewed.      Procedures  Results for orders placed or performed during the hospital encounter of 20   Comprehensive Metabolic Panel   Result Value Ref Range    Glucose 99 65 - 99 mg/dL    BUN 25 (H) 6 - 20 mg/dL    Creatinine 1.62 (H) 0.76 - 1.27 mg/dL     Sodium 140 136 - 145 mmol/L    Potassium 4.3 3.5 - 5.2 mmol/L    Chloride 103 98 - 107 mmol/L    CO2 26.0 22.0 - 29.0 mmol/L    Calcium 8.6 8.6 - 10.5 mg/dL    Total Protein 7.3 6.0 - 8.5 g/dL    Albumin 4.63 3.50 - 5.20 g/dL    ALT (SGPT) 34 1 - 41 U/L    AST (SGOT) 35 1 - 40 U/L    Alkaline Phosphatase 57 39 - 117 U/L    Total Bilirubin 0.3 0.0 - 1.2 mg/dL    eGFR Non African Amer 44 (L) >60 mL/min/1.73    Globulin 2.7 gm/dL    A/G Ratio 1.7 g/dL    BUN/Creatinine Ratio 15.4 7.0 - 25.0    Anion Gap 11.0 5.0 - 15.0 mmol/L   Troponin   Result Value Ref Range    Troponin T <0.010 0.000 - 0.030 ng/mL   Troponin   Result Value Ref Range    Troponin T <0.010 0.000 - 0.030 ng/mL   CBC Auto Differential   Result Value Ref Range    WBC 9.21 3.40 - 10.80 10*3/mm3    RBC 4.78 4.14 - 5.80 10*6/mm3    Hemoglobin 14.4 13.0 - 17.7 g/dL    Hematocrit 43.4 37.5 - 51.0 %    MCV 90.8 79.0 - 97.0 fL    MCH 30.1 26.6 - 33.0 pg    MCHC 33.2 31.5 - 35.7 g/dL    RDW 12.8 12.3 - 15.4 %    RDW-SD 42.9 37.0 - 54.0 fl    MPV 10.0 6.0 - 12.0 fL    Platelets 241 140 - 450 10*3/mm3    Neutrophil % 58.8 42.7 - 76.0 %    Lymphocyte % 25.5 19.6 - 45.3 %    Monocyte % 11.5 5.0 - 12.0 %    Eosinophil % 2.3 0.3 - 6.2 %    Basophil % 1.5 0.0 - 1.5 %    Immature Grans % 0.4 0.0 - 0.5 %    Neutrophils, Absolute 5.41 1.70 - 7.00 10*3/mm3    Lymphocytes, Absolute 2.35 0.70 - 3.10 10*3/mm3    Monocytes, Absolute 1.06 (H) 0.10 - 0.90 10*3/mm3    Eosinophils, Absolute 0.21 0.00 - 0.40 10*3/mm3    Basophils, Absolute 0.14 0.00 - 0.20 10*3/mm3    Immature Grans, Absolute 0.04 0.00 - 0.05 10*3/mm3    nRBC 0.0 0.0 - 0.2 /100 WBC   Ethanol   Result Value Ref Range    Ethanol <10 0 - 10 mg/dL    Ethanol % <0.010 %   Magnesium   Result Value Ref Range    Magnesium 0.8 (C) 1.6 - 2.6 mg/dL   Troponin   Result Value Ref Range    Troponin T <0.010 0.000 - 0.030 ng/mL   Basic Metabolic Panel   Result Value Ref Range    Glucose 96 65 - 99 mg/dL    BUN 23 (H) 6 - 20 mg/dL     Creatinine 1.23 0.76 - 1.27 mg/dL    Sodium 139 136 - 145 mmol/L    Potassium 4.2 3.5 - 5.2 mmol/L    Chloride 103 98 - 107 mmol/L    CO2 23.6 22.0 - 29.0 mmol/L    Calcium 8.3 (L) 8.6 - 10.5 mg/dL    eGFR Non African Amer 61 >60 mL/min/1.73    BUN/Creatinine Ratio 18.7 7.0 - 25.0    Anion Gap 12.4 5.0 - 15.0 mmol/L   Protime-INR   Result Value Ref Range    Protime 14.0 11.9 - 14.1 Seconds    INR 1.10 0.90 - 1.10   aPTT   Result Value Ref Range    PTT 26.8 25.6 - 35.3 seconds   CBC Auto Differential   Result Value Ref Range    WBC 7.72 3.40 - 10.80 10*3/mm3    RBC 4.61 4.14 - 5.80 10*6/mm3    Hemoglobin 13.6 13.0 - 17.7 g/dL    Hematocrit 42.4 37.5 - 51.0 %    MCV 92.0 79.0 - 97.0 fL    MCH 29.5 26.6 - 33.0 pg    MCHC 32.1 31.5 - 35.7 g/dL    RDW 13.0 12.3 - 15.4 %    RDW-SD 43.3 37.0 - 54.0 fl    MPV 9.6 6.0 - 12.0 fL    Platelets 202 140 - 450 10*3/mm3    Neutrophil % 58.3 42.7 - 76.0 %    Lymphocyte % 28.2 19.6 - 45.3 %    Monocyte % 10.2 5.0 - 12.0 %    Eosinophil % 1.7 0.3 - 6.2 %    Basophil % 1.2 0.0 - 1.5 %    Immature Grans % 0.4 0.0 - 0.5 %    Neutrophils, Absolute 4.50 1.70 - 7.00 10*3/mm3    Lymphocytes, Absolute 2.18 0.70 - 3.10 10*3/mm3    Monocytes, Absolute 0.79 0.10 - 0.90 10*3/mm3    Eosinophils, Absolute 0.13 0.00 - 0.40 10*3/mm3    Basophils, Absolute 0.09 0.00 - 0.20 10*3/mm3    Immature Grans, Absolute 0.03 0.00 - 0.05 10*3/mm3    nRBC 0.0 0.0 - 0.2 /100 WBC   Urine Drug Screen - Urine, Clean Catch   Result Value Ref Range    Amphetamine Screen, Urine Positive (A) Negative    Barbiturates Screen, Urine Negative Negative    Benzodiazepine Screen, Urine Negative Negative    Cocaine Screen, Urine Negative Negative    Methadone Screen, Urine Negative Negative    Opiate Screen Negative Negative    Phencyclidine (PCP), Urine Negative Negative    THC, Screen, Urine Negative Negative    6-ACETYL MORPHINE Negative Negative    Buprenorphine, Screen, Urine Negative Negative    Oxycodone Screen, Urine  Negative Negative   TSH   Result Value Ref Range    TSH 1.620 0.270 - 4.200 uIU/mL   aPTT   Result Value Ref Range    PTT 57.1 (H) 25.6 - 35.3 seconds   Stress Test With Myocardial Perfusion One Day   Result Value Ref Range    Nuclear Prior Study 3     BH CV STRESS PROTOCOL 1 Pharmacologic     Stage 1 1     HR Stage 1 114     BP Stage 1 147/82     Duration Min Stage 1 0     Duration Sec Stage 1 10     Stress Dose Regadenoson Stage 1 0.4     Stress Comments Stage 1 10 sec bolus injection     Stage 2 2     HR Stage 2 95     BP Stage 2 149/107     Duration Min Stage 2 4     Duration Sec Stage 2 0     Stress Comments Stage 2 recovery     Baseline HR 91 bpm    Baseline /66 mmHg    Peak  bpm    Percent Max Pred HR 69.94 %    Percent Target HR 82 %    Peak /82 mmHg    Recovery HR 95 bpm    Recovery /107 mmHg    Target HR (85%) 139 bpm    Max. Pred. HR (100%) 163 bpm    Nuc Stress EF 62 %   Adult Transthoracic Echo Complete W/ Cont if Necessary Per Protocol   Result Value Ref Range    BSA 2.2 m^2    IVSd 1.4 cm    LVIDd 3.6 cm    LVIDs 2.8 cm    LVPWd 1.2 cm    IVS/LVPW 1.8     FS 22.2 %    EDV(Teich) 54.4 ml    ESV(Teich) 29.6 ml    EF(Teich) 45.7 %    EDV(cubed) 46.7 ml    ESV(cubed) 22.0 ml    EF(cubed) 52.9 %    LV mass(C)d 124.1 grams    LV mass(C)dI 55.4 grams/m^2    SV(Teich) 24.9 ml    SI(Teich) 11.1 ml/m^2    SV(cubed) 24.7 ml    SI(cubed) 11.0 ml/m^2    Ao root diam 3.3 cm    Ao root area 8.6 cm^2    LA dimension 3.1 cm    LA/Ao 0.94     LVOT diam 2.2 cm    LVOT area 3.8 cm^2    LVOT area(traced) 3.8 cm^2    LVLd ap4 8.6 cm    EDV(MOD-sp4) 52.1 ml    LVLs ap4 8.5 cm    ESV(MOD-sp4) 18.9 ml    EF(MOD-sp4) 63.7 %    SV(MOD-sp4) 33.2 ml    SI(MOD-sp4) 14.8 ml/m^2    Ao root area (BSA corrected) 1.5     LV Corbett Vol (BSA corrected) 23.3 ml/m^2    LV Sys Vol (BSA corrected) 8.4 ml/m^2    MV E max niki 60.0 cm/sec    MV A max niki 84.4 cm/sec    MV E/A 0.71     BH CV ECHO JORDAN - BZI_BMI 26.2  kilograms/m^2     CV ECHO JORDAN - BSA(HAYCOCK) 2.3 m^2     CV ECHO JORDAN - BZI_METRIC_WEIGHT 95.3 kg     CV ECHO JORDAN - BZI_METRIC_HEIGHT 190.5 cm    Target HR (85%) 139 bpm    Max. Pred. HR (100%) 163 bpm   Light Blue Top   Result Value Ref Range    Extra Tube hold for add-on    Green Top (Gel)   Result Value Ref Range    Extra Tube Hold for add-ons.    Lavender Top   Result Value Ref Range    Extra Tube hold for add-on    Gold Top - SST   Result Value Ref Range    Extra Tube Hold for add-ons.      Ct Abdomen Pelvis Without Contrast    Result Date: 8/18/2020  Narrative: CT Abdomen Pelvis WO INDICATION: Right upper quadrant pain this morning TECHNIQUE: CT of the abdomen and pelvis without IV contrast. Coronal and sagittal reconstructions were obtained.  Radiation dose reduction techniques included automated exposure control or exposure modulation based on body size. Count of known CT and cardiac nuc med studies performed in previous 12 months: 0. COMPARISON: None available. FINDINGS: Abdomen: Lung bases are clear. Liver, gallbladder, spleen, pancreas, adrenal glands and kidneys are normal in appearance. Aorta is normal in size. There is no adenopathy. Bowel including the appendix is normal except for sigmoid diverticuli. Pelvis: Bladder and prostate gland are normal. Bones are unremarkable.     Impression: Normal appendix Sigmoid diverticulosis. Otherwise normal Signer Name: Cas Newsome MD  Signed: 8/18/2020 2:38 AM  Workstation Name: [email or web address]-  [facility] Gallbladder    Result Date: 8/18/2020  Narrative: US Abdomen Ltd INDICATION: Right upper quadrant pain this morning COMPARISON: None available. FINDINGS: LIVER: The echogenicity and echotexture of the hepatic parenchyma is within normal limits.. No hepatic mass. The intrahepatic bile ducts are normal in caliber. The common duct is normal in size at the piyush hepatis measuring 7 mm. GALLBLADDER: The gallbladder is normal. No  gallstones. .     Impression: Negative right upper quadrant ultrasound. Signer Name: Cas Newsome MD  Signed: 8/18/2020 2:37 AM  Workstation Name: [facility]  [facility]    Xr Chest 1 View    Result Date: 8/18/2020  Narrative: CR Chest 1 Vw INDICATION: Chest pain this morning COMPARISON:  None available. FINDINGS: Single portable AP view(s) of the chest.  The heart and mediastinal contours are normal. The lungs are clear. No pneumothorax or pleural effusion.     Impression: No acute cardiopulmonary findings. Signer Name: Cas Newsome MD  Signed: 8/18/2020 2:37 AM  Workstation Name: [email or web address]  [facility]            ED Course  ED Course as of Aug 18 1943   Tue Aug 18, 2020   0610 Discussed with Dr. Stafford.  Patient admitted, see orders.    [BC]   1037 Dr. Dupree had discussed the case with the hospitalist, and patient was initially admitted to the hospitalist service.  Dr. Mcbride called me this morning, we reviewed the case.  I also discussed the case with Dr. Beebe.  He advises admit patient to the hospital, start IV heparin, he will likely perform cardiac catheterization tomorrow.  Ultimately the patient was admitted to the cardiology service.    [CM]      ED Course User Index  [BC] Hi Dupree MD  [CM] Grant Mar MD                                           MDM  Number of Diagnoses or Management Options  Chest pain, unspecified type:   Chronic kidney disease, unspecified CKD stage:   Essential hypertension:      Amount and/or Complexity of Data Reviewed  Clinical lab tests: reviewed  Tests in the radiology section of CPT®:  reviewed  Tests in the medicine section of CPT®:  reviewed  Decide to obtain previous medical records or to obtain history from someone other than the patient: yes    Risk of Complications, Morbidity, and/or Mortality  Presenting problems: high  Diagnostic procedures: moderate  Management options: high        Final diagnoses:   Chest  pain, unspecified type   Essential hypertension   Chronic kidney disease, unspecified CKD stage            Hi Dupree MD  08/18/20 1944      Electronically signed by Hi Dupree MD at 08/18/20 1944     Symes, Heather at 08/18/20 0551        Paged Dr. Stafford for Dr. Dupree.      Symes, Heather  08/18/20 0551      Electronically signed by Symes, Heather at 08/18/20 0551       Vital Signs (last day)     Date/Time   Temp   Temp src   Pulse   Resp   BP   Patient Position   SpO2    08/19/20 0832   97.9 (36.6)   Oral   78   18   141/93   Lying   97    08/19/20 0527   --   --   71   --   145/91   --   --    08/19/20 0309   97.4 (36.3)   Oral   80   20   170/100   Lying   98    08/18/20 1909   98.2 (36.8)   Oral   97   20   132/93   Lying   98    08/18/20 1448   98.4 (36.9)   Oral   85   18   160/94   Lying   97    08/18/20 1203   --   --   81   --   145/91   --   96    08/18/20 1147   --   --   81   --   135/94   --   97    08/18/20 1118   --   --   79   --   135/97   --   96    08/18/20 1103   --   --   72   --   134/87   --   94    08/18/20 1047   --   --   70   --   127/87   --   94    08/18/20 1033   --   --   73   --   133/92   --   96    08/18/20 1018   --   --   74   --   126/94   --   95    08/18/20 1003   --   --   72   --   130/98   --   97    08/18/20 0947   --   --   71   --   130/88   --   96    08/18/20 0932   --   --   73   --   135/87   --   94    08/18/20 0919   --   --   72   --   127/92   --   94    08/18/20 0903   --   --   70   --   135/95   --   95    08/18/20 0855   --   --   81   --   --   --   95    08/18/20 0847   --   --   66   --   125/92   --   96    08/18/20 0832   --   --   68   --   124/87   --   97    08/18/20 0818   --   --   66   --   125/94   --   93    08/18/20 0815   --   --   64   --   --   --   95    08/18/20 0804   --   --   --   --   124/96   --   95    08/18/20 0751   --   --   --   --   124/92   --   96    08/18/20 0732   --   --   --   --   129/94   --   98    08/18/20 0718    --   --   --   --   143/98   --   95    08/18/20 0713   --   --   --   --   --   --   100    08/18/20 0703   --   --   --   --   134/83   --   96    08/18/20 0648   --   --   --   --   134/88   --   98    08/18/20 0631   --   --   --   --   --   --   100    08/18/20 0624   --   --   76   --   (!) 156/111   --   --    08/18/20 0532   --   --   67   --   (!) 159/107   --   94    08/18/20 0503   --   --   --   --   (!) 167/106   --   98    08/18/20 0448   --   --   73   --   140/94   --   97    08/18/20 0432   --   --   78   --   131/94   --   99    08/18/20 0403   --   --   --   --   (!) 159/111   --   97    08/18/20 0248   --   --   65   --   (!) 163/108   --   98    08/18/20 0232   --   --   65   --   (!) 153/110   --   97    08/18/20 0203   --   --   68   --   (!) 159/105   --   97    08/18/20 0148   --   --   67   --   (!) 159/102   --   95    08/18/20 0132   --   --   --   --   (!) 164/105   --   100    08/18/20 0117   --   --   --   --   (!) 156/104   --   100    08/18/20 0103   --   --   --   --   (!) 163/106   --   97    08/18/20 0048   --   --   --   --   (!) 164/106   --   95    08/18/20 0032   --   --   --   --   (!) 167/106   --   96    08/18/20 0017   --   --   --   --   (!) 161/110   --   97    08/18/20 0002   --   --   65   --   (!) 172/106   --   99                Facility-Administered Medications as of 8/19/2020   Medication Dose Route Frequency Provider Last Rate Last Dose   • acetaminophen (TYLENOL) tablet 650 mg  650 mg Oral Q6H PRN Tania Mathew, PA       • [COMPLETED] aluminum-magnesium hydroxide-simethicone (MAALOX MAX) 400-400-40 MG/5ML suspension 15 mL  15 mL Oral Once Hi Dupree MD   15 mL at 08/18/20 0127   • amLODIPine (NORVASC) tablet 10 mg  10 mg Oral Q24H Cherelle Pollard MD   10 mg at 08/19/20 0527   • [COMPLETED] aspirin chewable tablet 324 mg  324 mg Oral Once Hi Dupree MD   324 mg at 08/18/20 0624   • aspirin EC tablet 81 mg  81 mg Oral Daily Pam Man,  APRN       • atorvastatin (LIPITOR) tablet 40 mg  40 mg Oral Daily DonovanPam tee, APRN   40 mg at 08/18/20 1603   • [COMPLETED] cloNIDine (CATAPRES) tablet 0.1 mg  0.1 mg Oral Once Hi Dupree MD   0.1 mg at 08/18/20 0624   • clopidogrel (PLAVIX) tablet 75 mg  75 mg Oral Daily Pam Man APRN   75 mg at 08/18/20 1603   • folic acid (FOLVITE) tablet 1 mg  1 mg Oral Daily Yoav Mcbride, DO   1 mg at 08/18/20 1603   • heparin (porcine) 5000 UNIT/ML injection 2,500 Units  2,500 Units Intravenous PRN Grant Mar MD       • [COMPLETED] heparin (porcine) 5000 UNIT/ML injection 5,000 Units  5,000 Units Intravenous Once Grant Mar MD   5,000 Units at 08/18/20 1135   • heparin (porcine) 5000 UNIT/ML injection 5,000 Units  5,000 Units Intravenous PRN Grant Mar MD       • heparin 90907 units/250 mL (100 units/mL) in 0.45 % NaCl infusion  10.5 Units/kg/hr Intravenous Titrated Grant Mar MD 10 mL/hr at 08/18/20 1757 10.5 Units/kg/hr at 08/18/20 1757   • [COMPLETED] Lidocaine Viscous HCl (XYLOCAINE) 2 % mouth solution 15 mL  15 mL Mouth/Throat Once Hi Dupree MD   15 mL at 08/18/20 0127   • LORazepam (ATIVAN) tablet 1 mg  1 mg Oral Q2H PRN Yoav Mcbride DO        Or   • LORazepam (ATIVAN) injection 1 mg  1 mg Intravenous Q2H PRN Yoav Mcbride, DO        Or   • LORazepam (ATIVAN) tablet 2 mg  2 mg Oral Q1H PRN Yoav Mcbride DO        Or   • LORazepam (ATIVAN) injection 2 mg  2 mg Intravenous Q1H PRN Yoav Mcbride DO        Or   • LORazepam (ATIVAN) injection 2 mg  2 mg Intravenous Q15 Min PRN Yoav Mcbride DO        Or   • LORazepam (ATIVAN) injection 2 mg  2 mg Intramuscular Q15 Min PRN Yoav Mcbride        • magnesium oxide (MAG-OX) tablet 400 mg  400 mg Oral BID Pam Man APRN   400 mg at 08/18/20 1941   • [COMPLETED] magnesium sulfate 4g/100mL (PREMIX) infusion  4 g  Intravenous Once Grant Mar MD   Stopped at 08/18/20 1112   • metoprolol tartrate (LOPRESSOR) tablet 100 mg  100 mg Oral Q12H Pam Man APRN   100 mg at 08/18/20 1941   • mupirocin (BACTROBAN) 2 % ointment   Topical Q12H CURRY'Tania Jeffries PA       • [COMPLETED] nitroglycerin (NITROSTAT) ointment 1 inch  1 inch Topical Once Hi Dupree MD   1 inch at 08/18/20 0625   • nitroglycerin (NITROSTAT) SL tablet 0.4 mg  0.4 mg Sublingual Q5 Min PRN Pam Man APRN       • pantoprazole (PROTONIX) EC tablet 40 mg  40 mg Oral Daily Pam Man APRN   40 mg at 08/18/20 1757   • [COMPLETED] PB-Hyoscy-Atropine-Scopolamine (DONNATAL) per tablet 32.4 mg  2 tablet Oral Once Hi Dupree MD   32.4 mg at 08/18/20 0127   • potassium chloride (K-DUR,KLOR-CON) ER tablet 10 mEq  10 mEq Oral Daily Pam Man APRN   10 mEq at 08/18/20 1757   • promethazine (PHENERGAN) 12.5 mg in sodium chloride 0.9 % 50 mL  12.5 mg Intravenous Q6H PRN Tania Mathew PA       • [COMPLETED] regadenoson (LEXISCAN) injection 0.4 mg  0.4 mg Intravenous Once in imaging Yoav Mcbride DO   0.4 mg at 08/18/20 1301   • sodium chloride 0.9 % flush 10 mL  10 mL Intravenous PRN Hi Dupree MD       • sodium chloride 0.9 % flush 10 mL  10 mL Intravenous Q12H Yoav Mcbride DO       • sodium chloride 0.9 % flush 10 mL  10 mL Intravenous PRN Yoav Mcbride DO       • sodium chloride 0.9 % infusion  100 mL/hr Intravenous Continuous Yoav Mcbride  mL/hr at 08/19/20 0145 100 mL/hr at 08/19/20 0145   • [COMPLETED] technetium sestamibi (CARDIOLITE) injection 1 dose  1 dose Intravenous Once in imaging Yoav Mcbride DO   1 dose at 08/18/20 1110   • [COMPLETED] technetium sestamibi (CARDIOLITE) injection 1 dose  1 dose Intravenous Once in imaging Yoav Mcbride DO   1 dose at 08/18/20 1301   • thiamine (VITAMIN B-1) tablet 100 mg  100 mg  Oral Daily Yoav Mcbride DO   100 mg at 20 1603     Physician Progress Notes (all)    No notes of this type exist for this encounter.            Consult Notes (all)      Tania Mathew PA at 20 0742      Consult Orders    1. Inpatient Hospitalist Consult [[id number]] ordered by Hi Dupree MD at 20 0613           Attestation signed by Yoav Mcbride DO at 20 1737    Patient seen and examined independently of NAINA Romano.  I agree with her HPI, ROS, Physical Exam, Assessment and Plan.  Patient to have nuclear stress test as well as transthoracic echocardiogram performed today.  Review of past medical records demonstrates left heart cath from 5 years ago demonstrating 80 to 99% occluded RCA.  However, patient now with the EKG findings consistent with T wave inversion in the anterior leads.  Patient may have worsening coronary artery disease of LAD which was seen to be 40% stenosed in 2015.  Cardiology serving as primary, will continue to follow along.                                               [facility]  CONSULT NOTE     Inpatient Hospitalist Consult  Consult performed by: Tania Mathew PA  Consult ordered by: Hi Dupree MD  Reason for consult: medical management      Patient Identification:  Name:  Onel Jimenez  Age:  55 y.o.  Sex:  male  :  1965  MRN:  [id number]  Visit Number:  [id number]  Primary care provider:  Alex Canales MD    Length of stay:  0    Subjective     Chief Complaint:  Chest pain   Consult for medical management    History of presenting illness:     Onel Jimenez is a 55 y.o. male with past medical history significant for multivessel coronary artery disease, essential hypertension, hyperlipidemia, Grade I diastolic dysfunction, reported history of WPW, chronic alcoholism, and GERD that was admitted today by cardiology.  Hospitalist services were consulted for medical  "management.     Patient denies any complaints during my exam. He previously reported intermittent epigastric pain and chest pain x 1 week. He reported radiation into his chest, pain described as sharp and stabbing with substernal pressure. Please see H&P for details. Patient states that since treatment in ED, he is chest pain free and currently has no complaints.  Associated nausea and dyspnea.  On further questioning, patient states that he does drink \"too much\" alcohol.  Patient states he drinks approximately 4 days a week, and when he drinks he drinks 1/5 of whiskey a day.  Patient states that until the past week, he is at his baseline health with no issues.  He follows with Dr. Kelly with cardiology in the outpatient setting.  Currently, he denies any chest pain.  No chest pressure palpitations.  Denies any dyspnea.  No fevers or chills, no cough.  Denies any abdominal pain, no further nausea or emesis.  Denies any change in bowel movements.  Denies any urinary symptoms.  No headache or dizziness, no syncope.  Denies any blood in stool, no hematemesis or hemoptysis.  Patient denies history of diabetes.  Reports eating and drinking well at home.  No contact with COVID-19 to his knowledge.    Patient underwent stress test and echocardiogram in ED, pending cardiology read.    Present during exam: Rebekah SIERRA RN  ---------------------------------------------------------------------------------------------------------------------  Review of Systems   Constitutional: Negative for activity change, appetite change, chills, diaphoresis, fatigue and fever.   HENT: Negative for congestion, postnasal drip, rhinorrhea, sinus pain, sore throat and trouble swallowing.    Eyes: Negative for discharge and visual disturbance.   Respiratory: Positive for chest tightness and shortness of breath. Negative for cough and wheezing.    Cardiovascular: Positive for chest pain. Negative for palpitations and leg swelling. "   Gastrointestinal: Positive for abdominal pain (epigastric ) and nausea. Negative for constipation, diarrhea and vomiting.   Endocrine: Negative for cold intolerance and heat intolerance.   Genitourinary: Negative for decreased urine volume, dysuria, frequency and urgency.   Musculoskeletal: Negative for arthralgias, gait problem and myalgias.   Skin: Negative for color change, rash and wound.   Allergic/Immunologic: Negative for environmental allergies and immunocompromised state.   Neurological: Negative for dizziness, syncope, weakness, light-headedness and headaches.   Hematological: Negative for adenopathy. Does not bruise/bleed easily.   Psychiatric/Behavioral: Negative for confusion and decreased concentration. The patient is not nervous/anxious.       Otherwise 10-system ROS reviewed and is negative except as mentioned in the HPI.  ---------------------------------------------------------------------------------------------------------------------   Past History:  Past Medical History:   Diagnosis Date   • Anxiety disorder    • CAD (coronary artery disease)    • Chronic alcoholism (CMS/[facility])    • Hyperlipidemia    • Hypertension    • WPW (Zakiya-Parkinson-White syndrome)      Past Surgical History:   Procedure Laterality Date   • CARDIAC CATHETERIZATION  06/06/2015   • CARDIOVASCULAR STRESS TEST  06/06/2015   • ECHO - CONVERTED  06/06/2015   • EYE SURGERY       Family History   Problem Relation Age of Onset   • Heart attack Father    • Heart disease Father    • Heart failure Father    • Heart disease Sister    • Heart failure Sister    • Heart disease Brother    • Heart failure Brother    • Heart failure Sister    • Heart disease Sister      Social History     Socioeconomic History   • Marital status:      Spouse name: Not on file   • Number of children: Not on file   • Years of education: Not on file   • Highest education level: Not on file   Tobacco Use   • Smoking status: Former Smoker      Packs/day: 1.00     Years: 20.00     Pack years: 20.00     Types: Cigarettes     Last attempt to quit:      Years since quittin.6   • Smokeless tobacco: Current User     Types: Chew, Snuff   • Tobacco comment: chew 1 can per week   Substance and Sexual Activity   • Alcohol use: Yes     Alcohol/week: 10.0 standard drinks     Types: 6 Cans of beer, 4 Shots of liquor per week     Comment: pint of alcohol every other day    • Drug use: Yes     Types: Hydrocodone     Comment: occ   • Sexual activity: Defer     ---------------------------------------------------------------------------------------------------------------------   Allergies:  Niacin  ---------------------------------------------------------------------------------------------------------------------   Prior to Admission Medications     Prescriptions Last Dose Informant Patient Reported? Taking?    aspirin 81 MG EC tablet  Medication Bottle No No    Take 1 tablet by mouth Daily.    atorvastatin (LIPITOR) 40 MG tablet   No No    TAKE ONE TABLET BY MOUTH ONE TIME DAILY     clopidogrel (PLAVIX) 75 MG tablet   No No    TAKE ONE TABLET BY MOUTH ONE TIME DAILY     famotidine (PEPCID) 40 MG tablet   No No    Take 1 tablet by mouth Daily.    fenofibrate (TRICOR) 145 MG tablet   No No    Take 1 tablet by mouth Daily.    hydrOXYzine (ATARAX) 10 MG tablet   No No    TAKE ONE TABLET BY MOUTH THREE TIMES DAILY AS NEEDED FOR ITCHING     losartan (COZAAR) 100 MG tablet   No No    TAKE ONE TABLET BY MOUTH EVERY DAY FOR BLOOD PRESSURE     magnesium oxide (MAG-OX) 400 MG tablet   No No    TAKE ONE TABLET BY MOUTH TWICE DAILY     meloxicam (MOBIC) 7.5 MG tablet   No No    TAKE ONE TABLET BY MOUTH ONE TIME DAILY     metoprolol tartrate (LOPRESSOR) 100 MG tablet   No No    TAKE ONE TABLET BY MOUTH EVERY TWELVE HOURS     nitroglycerin (NITROSTAT) 0.4 MG SL tablet   No No    DISSOLVE 1 TABLET UNDER TONGUE AS NEEDED FOR CHEST PAIN, MAY TAKE ONE EVERY 5 MINUTES FOR UP TO 3  DOSES.    ondansetron ODT (ZOFRAN-ODT) 4 MG disintegrating tablet   No No    DISSOLVE ONE TABLET IN MOUTH EVERY TWELVE HOURS AS NEEDED FOR NAUSEA/VOMITING.     potassium chloride (K-DUR) 10 MEQ CR tablet   No No    TAKE ONE TABLET BY MOUTH ONE TIME DAILY         ---------------------------------------------------------------------------------------------------------------------     Objective      Procedures:  • 8/18/2020: Stress test with myocardial perfusion imaging  o Pending cardiology read  • 8/18/2020: Transthoracic echocardiogram   o Pending cardiology [facility]:    [START ON 8/19/2020] aspirin 81 mg Oral Daily   atorvastatin 40 mg Oral Daily   clopidogrel 75 mg Oral Daily   famotidine 40 mg Oral Daily   fenofibrate 145 mg Oral Daily   magnesium sulfate 4 g Intravenous Once   metoprolol tartrate 100 mg Oral Q12H       sodium chloride 100 mL/hr Last Rate: 100 mL/hr (08/18/20 0914)     ---------------------------------------------------------------------------------------------------------------------   Vital Signs:  Temp:  [98.1 °F (36.7 °C)] 98.1 °F (36.7 °C)  Heart Rate:  [64-81] 81  Resp:  [18] 18  BP: (124-179)/() 125/92  Mean Arterial Pressure (Non-Invasive) for the past 24 hrs (Last 3 readings):   Noninvasive MAP (mmHg)   08/18/20 0847 101   08/18/20 0832 97   08/18/20 0818 108     SpO2 Percentage    08/18/20 0832 08/18/20 0847 08/18/20 0855   SpO2: 97% 96% 95%     SpO2:  [93 %-100 %] 95 %  on   ;        Body mass index is 26.25 kg/m².  Wt Readings from Last 3 Encounters:   08/17/20 95.3 kg (210 lb)   03/19/20 97.5 kg (215 lb)   01/13/20 94.3 kg (208 lb)      No intake or output data in the 24 hours ending 08/18/20 1000  NPO Diet  ---------------------------------------------------------------------------------------------------------------------   Physical exam:  Physical Exam   Constitutional: He is oriented to person, place, and time. He appears well-developed and well-nourished.   Non-toxic appearance. No distress.   Sitting up in bed, no acute distress noted.  On room air.  Awake and alert.  No family present at bedside.   HENT:   Head: Normocephalic and atraumatic.   Right Ear: External ear normal.   Left Ear: External ear normal.   Nose: Nose normal.   Mouth/Throat: Oropharynx is clear and moist and mucous membranes are normal. No oropharyngeal exudate.   Eyes: Pupils are equal, round, and reactive to light. Conjunctivae and EOM are normal. No scleral icterus.   Neck: Neck supple. No JVD present. No thyromegaly present.   Cardiovascular: Normal rate, regular rhythm, normal heart sounds and intact distal pulses. Exam reveals no gallop and no friction rub.   No murmur heard.  Pulses:       Dorsalis pedis pulses are 2+ on the right side, and 2+ on the left side.        Posterior tibial pulses are 2+ on the right side, and 2+ on the left side.   Pulmonary/Chest: Effort normal and breath sounds normal. No accessory muscle usage. No tachypnea. No respiratory distress. He has no wheezes. He has no rhonchi. He has no rales. He exhibits no tenderness.   On room air.  Speaks in full sentences without dyspnea.   Abdominal: Soft. Bowel sounds are normal. He exhibits no distension and no mass. There is no hepatosplenomegaly. There is no tenderness. There is no rebound and no guarding. No hernia.   Genitourinary:   Genitourinary Comments: No Naik catheter in place   Musculoskeletal: He exhibits no edema, tenderness or deformity.        Right lower leg: He exhibits no edema.        Left lower leg: He exhibits no edema.     Vascular Status -  His right foot exhibits normal foot vasculature  and no edema. His left foot exhibits normal foot vasculature  and no edema.  Neurological: He is alert and oriented to person, place, and time. He has normal strength. He displays no tremor. No cranial nerve deficit or sensory deficit.   Awake and alert.  Follows commands.  Answers questions verbally.  Moves all  extremities equally.  Strength and sensation intact.  No tremor.  No focal neurological deficit on exam.   Skin: Skin is warm and dry. Capillary refill takes less than 2 seconds. No rash noted. No erythema. No pallor.   Patient with small lesion on left cheek, Band-Aid in place.  No surrounding erythema or edema.  Nontender.   Psychiatric: He has a normal mood and affect. His speech is normal and behavior is normal. Judgment and thought content normal. Cognition and memory are normal.   Nursing note and vitals reviewed.    ---------------------------------------------------------------------------------------------------------------------   EKG: Pending cardiology read. Per my view, NSR with HR 76 BPM, QTc 441 ms, T wave inversion V1-V4, no ST elevation appreciated.    Telemetry:  Review once monitoring is available.    I have personally reviewed the EKG/Telemetry strips.   ---------------------------------------------------------------------------------------------------------------------   Results from last 7 days   Lab Units 08/18/20  0839 08/18/20  0414 08/17/20  2333   TROPONIN T ng/mL <0.010 <0.010 <0.010           Results from last 7 days   Lab Units 08/17/20  2333   WBC 10*3/mm3 9.21   HEMOGLOBIN g/dL 14.4   HEMATOCRIT % 43.4   MCV fL 90.8   MCHC g/dL 33.2   PLATELETS 10*3/mm3 241     Results from last 7 days   Lab Units 08/18/20  0749 08/17/20  2333   SODIUM mmol/L  --  140   POTASSIUM mmol/L  --  4.3   MAGNESIUM mg/dL 0.8*  --    CHLORIDE mmol/L  --  103   CO2 mmol/L  --  26.0   BUN mg/dL  --  25*   CREATININE mg/dL  --  1.62*   EGFR IF NONAFRICN AM mL/min/1.73  --  44*   CALCIUM mg/dL  --  8.6   GLUCOSE mg/dL  --  99   ALBUMIN g/dL  --  4.63   BILIRUBIN mg/dL  --  0.3   ALK PHOS U/L  --  57   AST (SGOT) U/L  --  35   ALT (SGPT) U/L  --  34   Estimated Creatinine Clearance: 67.8 mL/min (A) (by C-G formula based on SCr of 1.62 mg/dL (H)).  No results found for: HGBA1C, POCGLU  Lab Results   Component Value  Date    HGBA1C 5.5 06/05/2015     Lab Results   Component Value Date    TSH 1.000 03/19/2020    FREET4 1.07 03/19/2020      Pain Management Panel     Pain Management Panel Latest Ref Rng & Units 8/1/2019 3/31/2019    AMPHETAMINES SCREEN, URINE Negative Negative Negative    BARBITURATES SCREEN Negative Negative Negative    BENZODIAZEPINE SCREEN, URINE Negative Negative Negative    BUPRENORPHINEUR Negative Negative Negative    COCAINE SCREEN, URINE Negative Negative Negative    METHADONE SCREEN, URINE Negative Negative Negative        I have personally reviewed the above laboratory results.   ---------------------------------------------------------------------------------------------------------------------  Imaging Results (Last 7 Days)     Procedure Component Value Units Date/Time    CT Abdomen Pelvis Without Contrast [[id number]] Collected:  08/18/20 0238     Updated:  08/18/20 0240    Narrative:       FINDINGS:  Abdomen: Lung bases are clear. Liver, gallbladder, spleen, pancreas, adrenal glands and kidneys are normal in appearance. Aorta is normal in size. There is no adenopathy. Bowel including the appendix is normal except for sigmoid diverticuli.  Pelvis: Bladder and prostate gland are normal. Bones are unremarkable.    Impression:       Normal appendix  Sigmoid diverticulosis.  Otherwise normal    Signer Name: Cas Newsome MD   Signed: 8/18/2020 2:38 AM   Workstation Name: [email or web address]    [facility]    XR Chest 1 View [[id number]] Collected:  08/18/20 0237     Updated:  08/18/20 0239    Narrative:       FINDINGS:  Single portable AP view(s) of the chest.  The heart and mediastinal contours are normal. The lungs are clear. No pneumothorax or pleural effusion    Impression:       No acute cardiopulmonary findings.    Signer Name: Cas Newsome MD   Signed: 8/18/2020 2:37 AM   Workstation Name: [email or web address]    [facility]    US Gallbladder [[id number]] Collected:  08/18/20  0237     Updated:  08/18/20 0239    Narrative:       FINDINGS:  LIVER: The echogenicity and echotexture of the hepatic parenchyma is within normal limits.. No hepatic mass. The intrahepatic bile ducts are normal in caliber. The common duct is normal in size at the piyush hepatis measuring 7 mm.  GALLBLADDER: The gallbladder is normal. No gallstones.    Impression:       Negative right upper quadrant ultrasound.    Signer Name: Cas Newsome MD   Signed: 8/18/2020 2:37 AM   Workstation Name: [facility]    [facility] [place]      I have personally reviewed the above radiology results.     Stress test with myocardial perfusion imaging 6/5/2020:  Nuclear Cardiology Conclusion:  Abnormal LV perfusion.  Abnormal regadenoson myocardial perfusion   with Tc-99m sestamibi  imaging.  Normal LV size. Global left ventricular systolic function was  normal, with an EF of 75%.       Stress Test Conclusion:  NormalNormal stress test. . There was a normal heart rate response.   Conclusions  *Mildly abnormal regadenoson myocardial perfusion   with Tc-99m  sestamibi imaging.  *There was a small, reversible defect in the mid inferior  segment(s).   The extent of this perfusion defect was mild.  *Gated imaging under post-stress conditions demonstrated  mild  hypokinesis of the  basal inferoseptal segment(s).  *Global left ventricular systolic function was normal, with an EF of  75%.     Electronically signed by: Louis Howard MD on 06/06/2015 16:07:09    Left Heart Cath 6/5/2015:  FINDINGS:  HEMODYNAMICS: Left ventricular pressure was 148/11 with an ascending aortic  pressure of 140/86 and a mean of 107. No significant gradient was appreciated  across the aortic valve.      LEFT VENTRICULOGRAM: Not performed.      CORONARY ANGIOGRAPHY:   Left Main: The left main is a large vessel, which is oval in shape and in the  KASPER projection suggests a 40% ostial narrowing. It gives rise to a moderate to  large-size left anterior  descending artery and moderate-sized ramus intermedius  and a moderate-size left circumflex artery.   Left Anterior Descending Artery: The left anterior descending artery is a  moderate-size vessel, which gives rise to 2 diagonals.  After the takeoff of  the first diagonal, there is a less than 40% narrowing noted in the LAD itself.  Left Circumflex Artery: The left circumflex artery is a moderate-size vessel,  which gives rise to 2 obtuse marginals and becomes a posterolateral artery. The  second obtuse marginal is totally occluded proximally and receives left-to-left  collateral flow.   Right Coronary Artery: The right coronary artery is a dominant vessel and gives  rise to the posterior descending artery. There is a 6 cm long 80% to 99% lesion  noted in the proximal portion of this vessel. In addition to giving off the  posterior descending artery, it also gives off a posterolateral artery, which  is totally occluded proximally.  The distal posterolateral and posterior  descending arteries receive left-to-right collateral flow.      COMPLICATIONS: None.      INTERVENTIONS PERFORMED: None.      SUMMARY:  Severe multivessel coronary artery disease involving the proximal  right coronary artery, the right posterolateral artery, the right posterior  descending artery, and the second obtuse marginal arteries.      RECOMMENDATIONS:  At this time, the patient has been pain-free since his  admission.  We will maximize his medical antianginal therapy.  If he continues  to have chest discomfort, I recommend that he be transferred to [place] to  undergo intervention where he can have surgical back-up.        D:   PRIETO 06/08/2015 09:41:01  T:   sindy 06/09/2015 11:47:36  JOB ID:[id number]  [id number]  Revision Count:     0  cc:  Louis Howard MD, [facility]                              Signature:__________________________                                     Sam Almanzar D.O.*  DO NOT TEXT EDIT THIS LINE :RCC:[id number]:  Authenticated  by NORMA LEE MD On 06/18/2015 08:13:27 AM     Transthoracic echocardiogram 8/1/2019:  · Left ventricular systolic function is normal. Estimated EF appears to be in the range of 61 - 65%.  · Left ventricular diastolic dysfunction (grade I) consistent with impaired relaxation.  · There is no evidence of pericardial effusion  ----------------------------------------------------------------------------------------------------------------------    Assessment/Plan     · Chest pain in setting of known coronary artery disease with multivessel disease: Trop negative. EKG with T wave inversion in anterior leads, consistent with RCA disease. Previous left heart cath report reviewed from 2015 with 40% ostial narrowing, 40% narrowing in LAD, and 80 to 99% lesion noted in the proximal portion of the right coronary artery.  At that time, patient was treated medically, with recommendation that he if he become with recurrent chest pain he would need transfer to [place] to undergo intervention where he could have CT surgery backup.  Cardiology has admitted the patient, continue care per their orders.  Telemetry monitoring, serial troponin.  Medical therapy to include aspirin, statin, Plavix, TriCor, and Lopressor. Heparin gtt in place, titrate per protocol.  It appears the patient has undergone stress test, pending results.  Transthoracic echocardiogram also ordered and pending.  Monitor closely, supportive care.  · Acute kidney injury: Baseline creatinine 1.2, creatinine on admission of 1.6. IV fluids to be continued. Avoid nephrotoxins as much as possible. Monitor urine output. Repeat chemistry panel in AM.   · Hypomagnesemia: Likely due to underlying alcoholism.  Replace per protocol.  Telemetry monitoring.  Repeat magnesium level in a.m.  · Essential hypertension: Uncontrolled on admit. Cardiology is primary. Monitor BP per hospital protocol.   · Hyperlipidemia: Obtain lipid panel. Statin to be resumed.   · Grade I  diastolic dysfunction: ECHO from 2019 reviewed, preserved EF. Repeat echo pending.  · Chronic alcoholism and ETOH abuse: Encourage cessation. CIWA protocol with PRN ativan initiated. Monitor closely for signs of withdrawal. Replace electrolytes per protocol as necessary. Tele monitoring. Vitamin replacement.   · Former smoker  · F/E/N: Gentle IV fluids. Replace electrolytes per protocol as necessary. NPO diet.    -----------------------  Thank you for the consult and allowing us to participate in the care of your patient, Hospitalist Services will continue to follow. Please do not hesitate to call with any questions or concerns.      Tania Mathew PA-C  Hospitalist Service -- [facility]   Pager: [phone]    08/18/20  10:00      ---------------------------------------------------------------------------------------------------------------------          Electronically signed by Yoav Mcbride DO at 08/18/20 5070

## 2020-08-19 NOTE — PLAN OF CARE
Pt resting in bed comfortably. Pt to have possible heart cath this shift. Will continue to monitor.

## 2020-08-19 NOTE — PROGRESS NOTES
LOS: 1 day     Name: Onel Jimenez  Age/Sex: 55 y.o. male  :  1965        PCP: Alex Canales MD  REF: No ref. provider found    Principal Problem:    Chest pain  Active Problems:    CAD (coronary artery disease), 90% proximal RCA, total occlusion of posterior lateral branch of RCA, total occlusion of second marginal with left to left collaterals, 40% LAD, 40% left main    Anxiety disorder    Chronic alcoholism (CMS/[facility])    Essential hypertension    Gastroesophageal reflux disease without esophagitis    Hyperlipidemia    Grade I diastolic dysfunction    CARLITOS (acute kidney injury) (CMS/[facility])      Reason for follow-up: Chest pain and Coronary artery disease    Subjective       Subjective     Onel Jimenez is a 55 y.o. male with a past medical history significant for coronary artery disease, hyperlipidemia, GERD, essential hypertension, hypomagnesemia and alcoholism.  Patient presented to the ED with complaints of epigastric abdominal pain and chest pain.  He states that over the last week he has had intermittent epigastric pain that radiates into his chest. He describes as a sharp stabbing pain that radiates up into the substernal area of his chest.  He denies radiation to his arm and neck.  He states that over the last few nights he has been awakened from sleep due to the pain. The pain is aggravated by eating.  He had associated shortness of breath, diaphoresis and nausea.  He denies any alleviating factors.  He states this pain is different from the pain he had when he had his heart catheterization.  He has a history of coronary artery disease and left heart catheterization in  showed 40% ostial narrowing of the left main, 40% narrowing in the LAD and 80 to 90% lesion in the proximal portion of the RCA with total occlusion of the second marginal with left-to-right collateral flow which was medically managed at that time.  It was also recommended at that time that in the future if he were to need  further intervention that he be transferred to a tertiary care center for surgical backup.  He reports taking his medications.  He follows with Dr. Canales.  He is not a smoker.     Interval History: Patient reports this morning after taking a vitamin he got epigastric pain with slight radiation to the middle of his chest.  He notes it is worse when he drinks something acidic.  He denies any substernal chest pain.  Stress test came back with an intermediate risk.  Creatinine 1.32 this morning.      Vital Signs  Temp:  [97.4 °F (36.3 °C)-98.4 °F (36.9 °C)] 97.9 °F (36.6 °C)  Heart Rate:  [71-97] 78  Resp:  [18-20] 18  BP: (132-170)/() 141/93     Vital Signs (last 72 hrs)       08/16 0700  -  08/17 0659 08/17 0700  -  08/18 0659 08/18 0700  -  08/19 0659 08/19 0700  -  08/19 1055   Most Recent    Temp (°F)     98.1    97.4 -  98.4      97.9     97.9 (36.6)    Heart Rate   65 -  78    64 -  97      78     78    Resp     18    18 -  20      18     18    BP   131/94 -  179/103    124/87 -  170/100      141/93     141/93    SpO2 (%)   94 -  100    93 -  100      97     97        Body mass index is 26.02 kg/m².    Intake/Output Summary (Last 24 hours) at 8/19/2020 1055  Last data filed at 8/19/2020 0400  Gross per 24 hour   Intake 637.3 ml   Output 1150 ml   Net -512.7 ml     Objective    Objective       Physical Exam:     General Appearance:    Alert, cooperative, in no acute distress   Head:    Normocephalic, without obvious abnormality, atraumatic   Eyes:            Conjunctivae and sclerae normal, no   icterus, no pallor, corneas clear.   Neck:   No adenopathy, supple, trachea midline, no thyromegaly, no   carotid bruit, no JVD   Lungs:     Clear to auscultation,respirations regular, even and                  unlabored    Heart:    Regular rhythm and normal rate, normal S1 and S2, no            murmur, no gallop, no rub, no click   Chest Wall:    No abnormalities observed   Abdomen:     Normal bowel sounds, no  masses, no organomegaly, soft        non-tender, non-distended, no guarding, no rebound                tenderness   Extremities:   Moves all extremities well, no edema, no cyanosis, no             redness   Pulses:   Pulses palpable and equal bilaterally   Skin:   No bleeding, bruising or rash       Neurologic:   Alert and oriented    I have seen and performed a physical examination today.     Results review       Results Review:   Results from last 7 days   Lab Units 08/18/20  1108 08/17/20  2333   WBC 10*3/mm3 7.72 9.21   HEMOGLOBIN g/dL 13.6 14.4   PLATELETS 10*3/mm3 202 241     Results from last 7 days   Lab Units 08/19/20  0107 08/18/20  1020 08/17/20  2333   SODIUM mmol/L 139 139 140   POTASSIUM mmol/L 4.1 4.2 4.3   CHLORIDE mmol/L 106 103 103   CO2 mmol/L 19.6* 23.6 26.0   BUN mg/dL 24* 23* 25*   CREATININE mg/dL 1.32* 1.23 1.62*   CALCIUM mg/dL 8.2* 8.3* 8.6   GLUCOSE mg/dL 122* 96 99   ALT (SGPT) U/L  --   --  34   AST (SGOT) U/L  --   --  35     Results from last 7 days   Lab Units 08/19/20  0319 08/18/20  0839 08/18/20  0414 08/17/20  2333   TROPONIN T ng/mL <0.010 <0.010 <0.010 <0.010     Lab Results   Component Value Date    INR 1.10 08/18/2020    INR 1.24 (H) 05/30/2018    INR 0.99 06/05/2015     Lab Results   Component Value Date    MG 1.9 08/19/2020    MG 0.8 (C) 08/18/2020    MG 1.3 (L) 03/19/2020     Lab Results   Component Value Date    TSH 1.620 08/18/2020    PSA 0.300 03/20/2018    CHLPL 182 05/04/2016    TRIG 66 09/09/2019    HDL 43 09/09/2019    LDL 94 09/09/2019      Imaging Results (Last 48 Hours)     Procedure Component Value Units Date/Time    CT Abdomen Pelvis Without Contrast [[id number]] Collected:  08/18/20 0238     Updated:  08/18/20 0240    Narrative:       CT Abdomen Pelvis WO    INDICATION:   Right upper quadrant pain this morning    TECHNIQUE:   CT of the abdomen and pelvis without IV contrast. Coronal and sagittal reconstructions were obtained.  Radiation dose reduction techniques  included automated exposure control or exposure modulation based on body size. Count of known CT and cardiac nuc  med studies performed in previous 12 months: 0.     COMPARISON:   None available.    FINDINGS:  Abdomen: Lung bases are clear. Liver, gallbladder, spleen, pancreas, adrenal glands and kidneys are normal in appearance. Aorta is normal in size. There is no adenopathy. Bowel including the appendix is normal except for sigmoid diverticuli.    Pelvis: Bladder and prostate gland are normal. Bones are unremarkable.      Impression:       Normal appendix    Sigmoid diverticulosis.    Otherwise normal      Signer Name: Cas Newsome MD   Signed: 8/18/2020 2:38 AM   Workstation Name: [email or web address]    [facility]    XR Chest 1 View [[id number]] Collected:  08/18/20 0237     Updated:  08/18/20 0239    Narrative:       CR Chest 1 Vw    INDICATION:   Chest pain this morning     COMPARISON:    None available.    FINDINGS:  Single portable AP view(s) of the chest.  The heart and mediastinal contours are normal. The lungs are clear. No pneumothorax or pleural effusion.      Impression:       No acute cardiopulmonary findings.    Signer Name: Cas Newsome MD   Signed: 8/18/2020 2:37 AM   Workstation Name: [email or web address]    [facility]    US Gallbladder [[id number]] Collected:  08/18/20 0237     Updated:  08/18/20 0239    Narrative:       US Abdomen Ltd    INDICATION:   Right upper quadrant pain this morning    COMPARISON:   None available.    FINDINGS:    LIVER: The echogenicity and echotexture of the hepatic parenchyma is within normal limits.. No hepatic mass. The intrahepatic bile ducts are normal in caliber. The common duct is normal in size at the piyush hepatis measuring 7 mm.    GALLBLADDER: The gallbladder is normal. No gallstones.    .      Impression:       Negative right upper quadrant ultrasound.    Signer Name: Cas Newsome MD   Signed: 8/18/2020 2:37 AM   Workstation Name:  [email or web address]    [facility]        Echo   Results for orders placed during the hospital encounter of 08/17/20   Adult Transthoracic Echo Complete W/ Cont if Necessary Per Protocol    Narrative · Left ventricular diastolic dysfunction (grade I) consistent with   impaired relaxation.  · Left ventricular systolic function is normal, EF 61-65%.  · Left ventricular wall thickness is consistent with mild concentric   hypertrophy.          Nuclear Stress Test  · Findings consistent with a normal ECG stress test.  · Left ventricular ejection fraction is normal (Calculated EF = 62%).  · There is no prior study available for comparison.  · Impressions are consistent with an intermediate risk study.     I reviewed the patient's new clinical results.    Telemetry: NSR 70-80 bpm        Medication Review:     amLODIPine 10 mg Oral Q24H   aspirin 81 mg Oral Daily   atorvastatin 40 mg Oral Daily   clopidogrel 75 mg Oral Daily   folic acid 1 mg Oral Daily   magnesium oxide 400 mg Oral BID   metoprolol tartrate 100 mg Oral Q12H   mupirocin  Topical Q12H   pantoprazole 40 mg Oral Daily   potassium chloride 10 mEq Oral Daily   sodium chloride 10 mL Intravenous Q12H   thiamine 100 mg Oral Daily         heparin (porcine) 10.5 Units/kg/hr Last Rate: 10.5 Units/kg/hr (08/19/20 0904)   sodium chloride 100 mL/hr Last Rate: 100 mL/hr (08/19/20 0145)       Assessment      Assessment:  1. Some typical and atypical chest pain, troponin negative, EKG tracing reviewed and shows T wave inversion in V1-V4, stress test showed   2. ASCVD, left heart catheterization in 2015 showed 40% ostial narrowing of the left main, 40% narrowing in the LAD and 80 to 90% lesion in the proximal portion of the RCA with total occlusion of the second marginal with left-to-right collateral flow which was medically managed at that time  3. Essential hypertension, controlled  4. Hyperlipidemia, on statin  5. Acute kidney injury, creatinine 1.32,  improved since admission   6. Hypomagnesemia  7. History of alcoholism    Plan     Recommendations:  1. I discussed the results of the stress test with history of ischemia recommended cardiac catheterization patient understands the procedure he is agreeable to go for the procedure  2. His blood pressure was elevated the amlodipine was added we will monitor his blood pressure  3. Continue statin therapy  4. Reaction improved some we will monitor    I discussed the patients findings and my recommendations with patient and family      SHAWN Allen, acting as scribe for Dr. Cherelle Pollard MD  FACC  08/19/20  10:55  I, Cherelle Pollard MD, FACC, performed the services described in this documentation as documented by the above-named individual under my supervision and made necessary changes in the note is both accurate and complete  Please note that portions of this note were completed with a voice recognition program.

## 2020-08-19 NOTE — PLAN OF CARE
Pt resting on and off throughout the night. No complaints at this time. Pt complained of chest pain around 3 am. EKG, troponin and vitals obtained. Lacy CHEW and cardiology notified, see orders. Will continue to follow plan of care and monitor.

## 2020-08-19 NOTE — PROGRESS NOTES
Discharge Planning Assessment   [place]     Patient Name: Onel Jimenez  MRN: [id number]  Today's Date: 8/19/2020    Admit Date: 8/17/2020    Discharge Needs Assessment     Row Name 08/19/20 0938       Living Environment    Lives With  alone    Current Living Arrangements  home/apartment/condo    Primary Care Provided by  self    Family Caregiver if Needed  none    Able to Return to Prior Arrangements  yes       Transition Planning    Patient/Family Anticipates Transition to  home       Discharge Needs Assessment    Concerns to be Addressed  no discharge needs identified    Equipment Currently Used at Home  walker, rolling        Discharge Plan     Row Name 08/19/20 0939       Plan    Plan  Pt admitted on 8/17/20.  SS received consult on this date per Case Management for discharge planning.  SS spoke with pt on this date.  Pt lives at home alone and plans to return home at discharge.  Pt currently does not utilize home health services.  Pt currently utilizes rolling walker via unknown provider.  Pt stated no needs at this time.  SS will follow and assist as needed.           Expected Discharge Date and Time     Expected Discharge Date Expected Discharge Time    Aug 19, 2020         Demographic Summary     Row Name 08/19/20 0938       General Information    Admission Type  inpatient    Referral Source  nursing    Preferred Language  English            Ana Bowens, BSW

## 2020-08-20 ENCOUNTER — HOSPITAL ENCOUNTER (OUTPATIENT)
Facility: HOSPITAL | Age: 55
Setting detail: SURGERY ADMIT
End: 2020-08-20
Attending: THORACIC SURGERY (CARDIOTHORACIC VASCULAR SURGERY) | Admitting: THORACIC SURGERY (CARDIOTHORACIC VASCULAR SURGERY)

## 2020-08-20 DIAGNOSIS — I25.10 CORONARY ARTERY DISEASE INVOLVING NATIVE CORONARY ARTERY OF NATIVE HEART WITHOUT ANGINA PECTORIS: Primary | Chronic | ICD-10-CM

## 2020-08-20 LAB — TROPONIN T SERPL-MCNC: <0.01 NG/ML (ref 0–0.03)

## 2020-08-20 PROCEDURE — 93005 ELECTROCARDIOGRAM TRACING: CPT | Performed by: INTERNAL MEDICINE

## 2020-08-20 PROCEDURE — 99232 SBSQ HOSP IP/OBS MODERATE 35: CPT | Performed by: NURSE PRACTITIONER

## 2020-08-20 PROCEDURE — 99232 SBSQ HOSP IP/OBS MODERATE 35: CPT | Performed by: INTERNAL MEDICINE

## 2020-08-20 PROCEDURE — 84484 ASSAY OF TROPONIN QUANT: CPT | Performed by: INTERNAL MEDICINE

## 2020-08-20 RX ADMIN — CLOPIDOGREL 75 MG: 75 TABLET, FILM COATED ORAL at 08:20

## 2020-08-20 RX ADMIN — ATORVASTATIN CALCIUM 40 MG: 40 TABLET, FILM COATED ORAL at 08:21

## 2020-08-20 RX ADMIN — NITROGLYCERIN 1 INCH: 20 OINTMENT TOPICAL at 08:20

## 2020-08-20 RX ADMIN — MAGNESIUM GLUCONATE 500 MG ORAL TABLET 400 MG: 500 TABLET ORAL at 20:34

## 2020-08-20 RX ADMIN — MAGNESIUM GLUCONATE 500 MG ORAL TABLET 400 MG: 500 TABLET ORAL at 08:22

## 2020-08-20 RX ADMIN — AMLODIPINE BESYLATE 10 MG: 10 TABLET ORAL at 08:22

## 2020-08-20 RX ADMIN — FOLIC ACID 1 MG: 1 TABLET ORAL at 08:22

## 2020-08-20 RX ADMIN — MUPIROCIN: 20 OINTMENT TOPICAL at 08:22

## 2020-08-20 RX ADMIN — Medication 100 MG: at 08:22

## 2020-08-20 RX ADMIN — SODIUM CHLORIDE, PRESERVATIVE FREE 10 ML: 5 INJECTION INTRAVENOUS at 20:34

## 2020-08-20 RX ADMIN — ACETAMINOPHEN 650 MG: 325 TABLET ORAL at 20:34

## 2020-08-20 RX ADMIN — POLYETHYLENE GLYCOL 3350 17 G: 17 POWDER, FOR SOLUTION ORAL at 08:22

## 2020-08-20 RX ADMIN — NITROGLYCERIN 1 INCH: 20 OINTMENT TOPICAL at 16:25

## 2020-08-20 RX ADMIN — SODIUM CHLORIDE 100 ML/HR: 9 INJECTION, SOLUTION INTRAVENOUS at 11:02

## 2020-08-20 RX ADMIN — MUPIROCIN: 20 OINTMENT TOPICAL at 20:34

## 2020-08-20 RX ADMIN — METOPROLOL TARTRATE 100 MG: 100 TABLET, FILM COATED ORAL at 08:20

## 2020-08-20 RX ADMIN — SODIUM CHLORIDE 100 ML/HR: 9 INJECTION, SOLUTION INTRAVENOUS at 20:39

## 2020-08-20 RX ADMIN — PANTOPRAZOLE SODIUM 40 MG: 40 TABLET, DELAYED RELEASE ORAL at 08:22

## 2020-08-20 RX ADMIN — ASPIRIN 81 MG: 81 TABLET, COATED ORAL at 08:22

## 2020-08-20 RX ADMIN — METOPROLOL TARTRATE 100 MG: 100 TABLET, FILM COATED ORAL at 20:34

## 2020-08-20 RX ADMIN — POTASSIUM CHLORIDE 10 MEQ: 750 TABLET, FILM COATED, EXTENDED RELEASE ORAL at 08:20

## 2020-08-20 NOTE — PROGRESS NOTES
LOS: 2 days     Name: Onel Jimenez  Age/Sex: 55 y.o. male  :  1965        PCP: Alex Canales MD  REF: No ref. provider found    Principal Problem:    Chest pain  Active Problems:    Abnormal nuclear stress test    CAD (coronary artery disease), 90% proximal RCA, total occlusion of posterior lateral branch of RCA, total occlusion of second marginal with left to left collaterals, 40% LAD, 40% left main    Anxiety disorder    Chronic alcoholism (CMS/[facility])    Essential hypertension    Gastroesophageal reflux disease without esophagitis    Hyperlipidemia    Grade I diastolic dysfunction    CARLITOS (acute kidney injury) (CMS/[facility])      Reason for follow-up: Chest pain and coronary artery disease    Subjective       Subjective     Onel Jimenez is a 55 y.o. male with a past medical history significant for coronary artery disease, hyperlipidemia, GERD, essential hypertension, hypomagnesemia and alcoholism.  Patient presented to the ED with complaints of epigastric abdominal pain and chest pain.  He states that over the last week he has had intermittent epigastric pain that radiates into his chest. He describes as a sharp stabbing pain that radiates up into the substernal area of his chest.  He denies radiation to his arm and neck.  He states that over the last few nights he has been awakened from sleep due to the pain. The pain is aggravated by eating.  He had associated shortness of breath, diaphoresis and nausea.  He denies any alleviating factors.  He states this pain is different from the pain he had when he had his heart catheterization.  He has a history of coronary artery disease and left heart catheterization in  showed 40% ostial narrowing of the left main, 40% narrowing in the LAD and 80 to 90% lesion in the proximal portion of the RCA with total occlusion of the second marginal with left-to-right collateral flow which was medically managed at that time.  It was also recommended at that time that  in the future if he were to need further intervention that he be transferred to a tertiary care center for surgical backup.  He reports taking his medications.  He follows with Dr. Canales.  He is not a smoker.     Interval History: Patient underwent left heart catheterization yesterday that showed three-vessel disease and it has been recommended that he undergo CABG.  He did have some epigastric pain along with some chest pain this morning which is since resolved.  Left heart catheterization site is clean and dry with no hematoma.      Vital Signs  Temp:  [96.6 °F (35.9 °C)-98.3 °F (36.8 °C)] 98.1 °F (36.7 °C)  Heart Rate:  [61-75] 75  Resp:  [16-18] 18  BP: (114-166)/() 114/77     Vital Signs (last 72 hrs)       08/17 0700  -  08/18 0659 08/18 0700  -  08/19 0659 08/19 0700  -  08/20 0659 08/20 0700  -  08/20 1652   Most Recent    Temp (°F)   98.1    97.4 -  98.4    96.6 -  98.6    98 -  98.1     98.1 (36.7)    Heart Rate 65 -  78    64 -  97    61 -  78    69 -  75     75    Resp   18    18 -  20    16 -  18      18     18    /94 -  179/103    124/87 -  170/100    125/72 -  206/95    114/77 -  131/78     114/77    SpO2 (%) 94 -  100    93 -  100    94 -  99    96 -  98     96        Body mass index is 26.12 kg/m².    Intake/Output Summary (Last 24 hours) at 8/20/2020 1652  Last data filed at 8/20/2020 1430  Gross per 24 hour   Intake 5602.29 ml   Output 1150 ml   Net 4452.29 ml     Objective    Objective       Physical Exam:     General Appearance:    Alert, cooperative, in no acute distress   Head:    Normocephalic, without obvious abnormality, atraumatic   Eyes:            Conjunctivae and sclerae normal, no   icterus, no pallor, corneas clear.   Neck:   No adenopathy, supple, trachea midline, no thyromegaly, no   carotid bruit, no JVD   Lungs:     Clear to auscultation,respirations regular, even and                  unlabored    Heart:    Regular rhythm and normal rate, normal S1 and S2, no             murmur, no gallop, no rub, no click   Chest Wall:    No abnormalities observed   Abdomen:     Normal bowel sounds, no masses, no organomegaly, soft        non-tender, non-distended, no guarding, no rebound                tenderness   Extremities:   Moves all extremities well, no edema, no cyanosis, no             redness   Pulses:   Pulses palpable and equal bilaterally   Skin:   No bleeding, bruising or rash       Neurologic:  Alert and oriented   I have seen and performed a physical examination today.     Results review       Results Review:   Results from last 7 days   Lab Units 08/18/20  1108 08/17/20  2333   WBC 10*3/mm3 7.72 9.21   HEMOGLOBIN g/dL 13.6 14.4   PLATELETS 10*3/mm3 202 241     Results from last 7 days   Lab Units 08/19/20  0107 08/18/20  1020 08/17/20  2333   SODIUM mmol/L 139 139 140   POTASSIUM mmol/L 4.1 4.2 4.3   CHLORIDE mmol/L 106 103 103   CO2 mmol/L 19.6* 23.6 26.0   BUN mg/dL 24* 23* 25*   CREATININE mg/dL 1.32* 1.23 1.62*   CALCIUM mg/dL 8.2* 8.3* 8.6   GLUCOSE mg/dL 122* 96 99   ALT (SGPT) U/L  --   --  34   AST (SGOT) U/L  --   --  35     Results from last 7 days   Lab Units 08/20/20  0700 08/19/20  0319 08/18/20  0839 08/18/20  0414 08/17/20  2333   TROPONIN T ng/mL <0.010 <0.010 <0.010 <0.010 <0.010     Lab Results   Component Value Date    INR 1.10 08/18/2020    INR 1.24 (H) 05/30/2018    INR 0.99 06/05/2015     Lab Results   Component Value Date    MG 1.9 08/19/2020    MG 0.8 (C) 08/18/2020    MG 1.3 (L) 03/19/2020     Lab Results   Component Value Date    TSH 1.620 08/18/2020    PSA 0.300 03/20/2018    CHLPL 182 05/04/2016    TRIG 66 09/09/2019    HDL 43 09/09/2019    LDL 94 09/09/2019      Imaging Results (Last 48 Hours)     ** No results found for the last 48 hours. **          Echo   Results for orders placed during the hospital encounter of 08/17/20   Adult Transthoracic Echo Complete W/ Cont if Necessary Per Protocol    Narrative · Left ventricular diastolic dysfunction  (grade I) consistent with   impaired relaxation.  · Left ventricular systolic function is normal, EF 61-65%.  · Left ventricular wall thickness is consistent with mild concentric   hypertrophy.          Left heart catheterization  8/19/2020  Findings:     Coronary anatomy:     The left main coronary artery bifurcated into the LAD and left circumflex coronary.  The LAD coursed in the anterior interventricular groove, gave rise to diagonal branches and reached the apex.     Left circumflex coursed in the left atrial ventricular groove and gives rise to several marginal branches.     The right coronary artery course in the right atrial ventricular groove I gave rise to several acute marginal branches.                                 Dominant vessel: Right                                LM: Large and shows a distal narrowing 40%. This leads to trifurcation.                             LAD: True ostial lesion which extends into LM 75% severity.                               Diagonal Branch: NL                                LCX: Large and shows only mild mid segment plaque.                               Obtuse marginal branch:   Second OM is 100% occluded with collateral filling observed with delayed filling.                                                                    RCA: Large and shows prox 99% stenosis leading into mild distal disease, There are good collaterals left to right which fill the distal circulation.        No specimens were removed  EBL: 30 ML  Impression:   LAD lesion has worsened and RCA is subtotally occluded with good collaterals.  Hi current symptoms begin with abdominal pain but likely anginal as there could be ischemia from progressive narrowing of LAD.        Post-operative Diagnosis:    3 vessel CAD with RCA, OM occluded, and high grade stenosis of the ostial LAD     Plan:   I recommend CABG for safe long term results.         Jack Beebe MD  08/19/20       Director, Cardiac Cath  Lab       I reviewed the patient's new clinical results.    Telemetry:  NSR 60-70 bpm       Medication Review:     amLODIPine 10 mg Oral Q24H   aspirin 81 mg Oral Daily   atorvastatin 40 mg Oral Daily   folic acid 1 mg Oral Daily   magnesium oxide 400 mg Oral BID   metoprolol tartrate 100 mg Oral Q12H   mupirocin  Topical Q12H   nitroglycerin 1 inch Topical Q6H   pantoprazole 40 mg Oral Daily   polyethylene glycol 17 g Oral Daily   potassium chloride 10 mEq Oral Daily   sodium chloride 10 mL Intravenous Q12H   sodium chloride 100 mL/hr Intravenous Once   thiamine 100 mg Oral Daily         sodium chloride 100 mL/hr Last Rate: 100 mL/hr (08/20/20 1102)       Assessment      Assessment:  1. Some typical and atypical chest pain, troponin negative, EKG tracing reviewed and shows T wave inversion in V1-V4, stress test showed   2. ASCVD, left heart catheterization yesterday showed three-vessel CAD with a subtotally occluded RCA with good collaterals, occluded LIMA and high-grade stenosis in the ostial LAD  3. Essential hypertension, controlled  4. Hyperlipidemia, on statin  5. Acute kidney injury, creatinine 1.32, improved since admission   6. Hypomagnesemia  7. History of alcoholism    Plan     Recommendations:  1. Discussed the results of the left heart catheterization and the recommendation for CABG with the patient.  He is agreeable for surgery.  Dr. Beebe has discussed with Dr. Kuo with CT surgery at [facility] regarding getting the patient transferred.  He is currently awaiting a bed at [facility].  2. We will continue to hold the Plavix in anticipation for surgery.  Continue with aspirin, Lipitor and Lopressor.  No ACE/ARB due to acute kidney injury.  Continue with gentle hydration.  3. He may also need GI work-up when he goes to [facility] since we do not have GI available at this facility.     I discussed the patients findings and my recommendations with patient and  family    Discussed plan of care with Dr. Beebe and Dr. Atul Man, APRN  08/20/20  16:52    Please note that portions of this note were completed with a voice recognition program.

## 2020-08-20 NOTE — PROGRESS NOTES
[facility]     Patient Identification:  Name:  Onel Jimenez  Age:  55 y.o.  Sex:  male  :  1965  MRN:  [id number]  Visit Number:  [id number]  Primary Care Provider:  Alex Canales MD    Length of stay:  2    Chief complaint:  None    Subjective:    Patient reports he feels well today, denies any chest pain, fevers, chills or any other symptoms today.  Patient did have C yesterday demonstrating severe 3 vessel CAD.  Patient to be transferred to tertiary care center today once a bed is available.  ----------------------------------------------------------------------------------------------------------------------  Current Hospital Meds:    amLODIPine 10 mg Oral Q24H   aspirin 81 mg Oral Daily   atorvastatin 40 mg Oral Daily   folic acid 1 mg Oral Daily   magnesium oxide 400 mg Oral BID   metoprolol tartrate 100 mg Oral Q12H   mupirocin  Topical Q12H   nitroglycerin 1 inch Topical Q6H   pantoprazole 40 mg Oral Daily   polyethylene glycol 17 g Oral Daily   potassium chloride 10 mEq Oral Daily   sodium chloride 10 mL Intravenous Q12H   sodium chloride 100 mL/hr Intravenous Once   thiamine 100 mg Oral Daily       sodium chloride 100 mL/hr Last Rate: 100 mL/hr (20 1102)     ----------------------------------------------------------------------------------------------------------------------  Vital Signs:  Temp:  [96.6 °F (35.9 °C)-98.3 °F (36.8 °C)] 98.1 °F (36.7 °C)  Heart Rate:  [61-75] 75  Resp:  [16-18] 18  BP: (114-206)/() 114/77      20  1427 20  0500 20  0513   Weight: 93.5 kg (206 lb 3.2 oz) 94.4 kg (208 lb 3.2 oz) 94.8 kg (209 lb)     Body mass index is 26.12 kg/m².    Intake/Output Summary (Last 24 hours) at 2020 1522  Last data filed at 2020 1430  Gross per 24 hour   Intake 5602.29 ml   Output 1950 ml   Net 3652.29 ml     Diet Regular;  Cardiac  ----------------------------------------------------------------------------------------------------------------------  Physical exam:  Constitutional: Well-nourished  male in no apparent distress.     HENT:  Head:  Normocephalic and atraumatic.  Mouth:  Moist mucous membranes.    Eyes:  Conjunctivae and EOM are normal.  Pupils are equal, round, and reactive to light.  No scleral icterus.    Neck:  Neck supple. No thyromegaly.  No JVD present.    Cardiovascular:  Regular rate and rhythm with no murmurs, rubs, clicks or gallops appreciated.  Pulmonary/Chest:  Clear to auscultation bilaterally with no crackles, wheezes or rhonchi appreciated.  Abdominal:  Soft. Nontender. Nondistended  Bowel sounds are normal in all four quadrants. No organomegally appreciated.   Musculoskeletal:  5/5 muscle strength bilateral upper and lower extremities with equal muscle tone and bulk. No edema, no tenderness, and no deformity.  No red or swollen joints anywhere.    Neurological:  Alert and oriented to person, place, and time. Cranial nerves II-XII intact with no focal deficits.  No facial droop.  No slurred speech.   Skin:  Warm and dry to palpation with no rashes or lesions appreciated.  Peripheral vascular:  2+ radial and pedal pulses in bilateral upper and lower extremities.  Psychiatric:  Alert and oriented x3, demonstrates appropriate judgement and insight.  ----------------------------------------------------------------------------------------------------------------------  Tele:    ----------------------------------------------------------------------------------------------------------------------  Results from last 7 days   Lab Units 08/20/20  0700 08/19/20  0319 08/18/20  0839   TROPONIN T ng/mL <0.010 <0.010 <0.010     Results from last 7 days   Lab Units 08/18/20  1108 08/17/20  2333   WBC 10*3/mm3 7.72 9.21   HEMOGLOBIN g/dL 13.6 14.4   HEMATOCRIT % 42.4 43.4   MCV fL 92.0 90.8   MCHC g/dL 32.1 33.2    PLATELETS 10*3/mm3 202 241   INR  1.10  --          Results from last 7 days   Lab Units 08/19/20  0107 08/18/20  1020 08/18/20  0749 08/17/20  2333   SODIUM mmol/L 139 139  --  140   POTASSIUM mmol/L 4.1 4.2  --  4.3   MAGNESIUM mg/dL 1.9  --  0.8*  --    CHLORIDE mmol/L 106 103  --  103   CO2 mmol/L 19.6* 23.6  --  26.0   BUN mg/dL 24* 23*  --  25*   CREATININE mg/dL 1.32* 1.23  --  1.62*   EGFR IF NONAFRICN AM mL/min/1.73 56* 61  --  44*   CALCIUM mg/dL 8.2* 8.3*  --  8.6   GLUCOSE mg/dL 122* 96  --  99   ALBUMIN g/dL  --   --   --  4.63   BILIRUBIN mg/dL  --   --   --  0.3   ALK PHOS U/L  --   --   --  57   AST (SGOT) U/L  --   --   --  35   ALT (SGPT) U/L  --   --   --  34   Estimated Creatinine Clearance: 82.8 mL/min (A) (by C-G formula based on SCr of 1.32 mg/dL (H)).    No results found for: AMMONIA      No results found for: BLOODCX  No results found for: URINECX  No results found for: WOUNDCX  No results found for: STOOLCX    I have personally looked at the labs and they are summarized above.  ----------------------------------------------------------------------------------------------------------------------  Imaging Results (Last 24 Hours)     ** No results found for the last 24 hours. **        ----------------------------------------------------------------------------------------------------------------------  Assessment and Plan:    1.  Severe three-vessel coronary artery disease -patient to be transferred to tertiary care center today once bed is available for consideration of coronary artery bypass grafting    2.  Essential hypertension    3.  Hyperlipidemia -statin    4.  Acute kidney injury -serum creatinine 1.32, continue gentle IV fluid administration    5.  Hypomagnesemia -resolved            Yoav Mcbride DO  08/20/20  15:22

## 2020-08-20 NOTE — PLAN OF CARE
Patient waiting for a bed in [facility]. No distress at the moment. Will continue to monitor and follow plan of care

## 2020-08-20 NOTE — PLAN OF CARE
Pt resting majority of the night. TR band removed 2100. No bleeding noted. Denies chest pain. No complaints at this time. Will continue to follow plan of care and monitor.

## 2020-08-20 NOTE — DISCHARGE SUMMARY
Patient Identification:  Name:  Onel Jimenez  Age:  55 y.o.  Sex:  male  :  1965  MRN:  [id number]  Visit Number:  [id number]    Date of Admission: 2020  Date of Discharge:  2020    PCP: Alex Canales MD    DISCHARGE DIAGNOSIS  ASCVD  Acute kidney injury  Essential hypertension  Hyperlipidemia  Chronic alcoholism    CONSULTS   Hospital medicine    PROCEDURES PERFORMED  Nuclear stress test  Left heart catheterization    HOSPITAL COURSE  Patient is a 55 y.o. male presented to [facility] complaining of chest pain.  Please see the admitting history and physical for further details. On admission patient had acute kidney injury with creatinine of 1.62.  He was given gentle hydration with normal saline which improved creatinine to 1.23.  Patient underwent nuclear stress test on 2020 that showed reversible defect at the basal inferior, inferolateral and mid inferior wall,with also reversible defect at the high lateral wall, consistent with ischemia of the above walls.  Echocardiogram showed an EF of 61 to 65%.  Ultrasound gallbladder was negative.  Due to abnormal stress testing chest pains patient underwent left heart catheterization by Dr. Beebe with interventional cardiology on 2020.  Left heart catheterization showed LAD lesion that had worsened with subtotally occluded RCA with good collaterals.  Due to patient's three-vessel CAD with RCA, OM occluded and high-grade stenosis of the ostial LAD it was recommended the patient undergo CABG.  Dr. Beebe has discussed with Dr. Kuo with CT surgery at [facility] regarding the need for CABG.  He has accepted the patient and he will be transferred to higher level of care for further treatment today.  Plavix has been held.    CONDITION ON DISCHARGE  Stable.    VITAL SIGNS  BP (!) 175/107 (BP Location: Right arm, Patient Position: Lying) Comment: anant carl aware  Pulse 73   Temp 97.6 °F (36.4 °C) (Oral)   Resp  "18   Ht 190.5 cm (75\")   Wt 95.3 kg (210 lb)   SpO2 97%   BMI 26.25 kg/m²     DISCHARGE PHYSICAL EXAM  General Appearance:    Alert, cooperative, in no acute distress   Head:    Normocephalic, without obvious abnormality, atraumatic   Eyes:            Conjunctivae and sclerae normal, no   icterus, no pallor, corneas clear.   Neck:   No adenopathy, supple, trachea midline, no thyromegaly, no   carotid bruit, no JVD   Lungs:     Clear to auscultation,respirations regular, even and                  unlabored    Heart:    Regular rhythm and normal rate, normal S1 and S2, no            murmur, no gallop, no rub, no click   Chest Wall:    No abnormalities observed   Abdomen:     Normal bowel sounds, no masses, no organomegaly, soft        non-tender, non-distended, no guarding, no rebound                tenderness   Extremities:   Moves all extremities well, no edema, no cyanosis, no             redness   Pulses:   Pulses palpable and equal bilaterally   Skin:   No bleeding, bruising or rash         Neurologic:   Alert and oriented          Results Review:   Results from last 7 days   Lab Units 08/21/20  0643 08/18/20  1108 08/17/20  2333   WBC 10*3/mm3 8.82 7.72 9.21   HEMOGLOBIN g/dL 13.5 13.6 14.4   PLATELETS 10*3/mm3 167 202 241     Results from last 7 days   Lab Units 08/19/20  0107 08/18/20  1020 08/17/20  2333   SODIUM mmol/L 139 139 140   POTASSIUM mmol/L 4.1 4.2 4.3   CHLORIDE mmol/L 106 103 103   CO2 mmol/L 19.6* 23.6 26.0   BUN mg/dL 24* 23* 25*   CREATININE mg/dL 1.32* 1.23 1.62*   CALCIUM mg/dL 8.2* 8.3* 8.6   GLUCOSE mg/dL 122* 96 99   ALT (SGPT) U/L  --   --  34   AST (SGOT) U/L  --   --  35     Results from last 7 days   Lab Units 08/21/20  0643 08/20/20  0700 08/19/20  0319 08/18/20  0839 08/18/20  0414   TROPONIN T ng/mL <0.010 <0.010 <0.010 <0.010 <0.010     Lab Results   Component Value Date    INR 1.10 08/18/2020    INR 1.24 (H) 05/30/2018    INR 0.99 06/05/2015     Lab Results   Component Value " Date    MG 1.9 08/19/2020    MG 0.8 (C) 08/18/2020    MG 1.3 (L) 03/19/2020     Lab Results   Component Value Date    TSH 1.620 08/18/2020    PSA 0.300 03/20/2018    CHLPL 182 05/04/2016    TRIG 66 09/09/2019    HDL 43 09/09/2019    LDL 94 09/09/2019        DISCHARGE DISPOSITION   [facility]    DISCHARGE MEDICATIONS     Discharge Medications      New Medications      Instructions Start Date   amLODIPine 10 MG tablet  Commonly known as:  NORVASC   10 mg, Oral, Every 24 Hours Scheduled      folic acid 1 MG tablet  Commonly known as:  FOLVITE   1 mg, Oral, Daily      thiamine 100 MG tablet  Commonly known as:  VITAMIN B1   100 mg, Oral, Daily         Continue These Medications      Instructions Start Date   aspirin 81 MG EC tablet   81 mg, Oral, Daily      atorvastatin 40 MG tablet  Commonly known as:  LIPITOR   40 mg, Oral, Daily      losartan 100 MG tablet  Commonly known as:  COZAAR   100 mg, Oral, Daily      magnesium oxide 400 (241.3 Mg) MG tablet tablet  Commonly known as:  MAGOX   400 mg, Oral, 2 Times Daily      meloxicam 7.5 MG tablet  Commonly known as:  MOBIC   7.5 mg, Oral, Daily      nitroglycerin 0.4 MG SL tablet  Commonly known as:  NITROSTAT   0.4 mg, Sublingual, Every 5 Minutes PRN, Take no more than 3 doses in 15 minutes.       ondansetron ODT 4 MG disintegrating tablet  Commonly known as:  ZOFRAN-ODT   4 mg, Translingual, Every 12 Hours PRN      pantoprazole 40 MG EC tablet  Commonly known as:  PROTONIX   40 mg, Oral, Daily      potassium chloride 10 MEQ CR tablet  Commonly known as:  K-DUR   10 mEq, Oral, Daily         Stop These Medications    clopidogrel 75 MG tablet  Commonly known as:  PLAVIX                     Your Scheduled Appointments    Aug 25, 2020 10:30 AM EDT  Initial Evaluation with SHAWN Gross  [facility] (--) [address] [place] [address] [phone]   Bring all previous medical records and films, along with current  medications and insurance information.     Sep 17, 2020  2:30 PM EDT  Follow Up with Alex Canales MD  [facility] (--) [place] [id number] [phone]   Arrive 15 minutes prior to appointment.                TEST  RESULTS PENDING AT DISCHARGE  None        SHAWN Allen, acting as scribe for Dr. Cherelle Pollard MD [facility]  08/21/20  10:38      Time: greater than 30 minutes.    Please send a copy of this dictation to the following providers:  Alex Canales MD  ----------------------------------------------------------------------------------------------------------------------  Please note that portions of this note were completed with a voice recognition program.

## 2020-08-21 ENCOUNTER — APPOINTMENT (OUTPATIENT)
Dept: CARDIOLOGY | Facility: HOSPITAL | Age: 55
End: 2020-08-21

## 2020-08-21 ENCOUNTER — APPOINTMENT (OUTPATIENT)
Dept: GENERAL RADIOLOGY | Facility: HOSPITAL | Age: 55
End: 2020-08-21

## 2020-08-21 ENCOUNTER — HOSPITAL ENCOUNTER (INPATIENT)
Facility: HOSPITAL | Age: 55
LOS: 12 days | Discharge: HOME | End: 2020-09-02
Attending: THORACIC SURGERY (CARDIOTHORACIC VASCULAR SURGERY) | Admitting: THORACIC SURGERY (CARDIOTHORACIC VASCULAR SURGERY)

## 2020-08-21 VITALS
SYSTOLIC BLOOD PRESSURE: 172 MMHG | TEMPERATURE: 98 F | DIASTOLIC BLOOD PRESSURE: 63 MMHG | HEIGHT: 75 IN | RESPIRATION RATE: 18 BRPM | OXYGEN SATURATION: 96 % | HEART RATE: 73 BPM | BODY MASS INDEX: 26.11 KG/M2 | WEIGHT: 210 LBS

## 2020-08-21 DIAGNOSIS — Z95.1 S/P CABG X 4: ICD-10-CM

## 2020-08-21 DIAGNOSIS — I25.10 CAD (CORONARY ARTERY DISEASE): Primary | Chronic | ICD-10-CM

## 2020-08-21 LAB
ALBUMIN SERPL-MCNC: 4.2 G/DL (ref 3.5–5.2)
ALBUMIN/GLOB SERPL: 1.7 G/DL
ALP SERPL-CCNC: 47 U/L (ref 39–117)
ALT SERPL W P-5'-P-CCNC: 55 U/L (ref 1–41)
AMPHET+METHAMPHET UR QL: NEGATIVE
AMPHETAMINES UR QL: POSITIVE
ANION GAP SERPL CALCULATED.3IONS-SCNC: 11 MMOL/L (ref 5–15)
APTT PPP: 27.9 SECONDS (ref 24–37)
AST SERPL-CCNC: 54 U/L (ref 1–40)
BARBITURATES UR QL SCN: NEGATIVE
BASOPHILS # BLD AUTO: 0.06 10*3/MM3 (ref 0–0.2)
BASOPHILS NFR BLD AUTO: 0.7 % (ref 0–1.5)
BENZODIAZ UR QL SCN: POSITIVE
BH CV XLRA MEAS LEFT DIST CCA EDV: 21 CM/SEC
BH CV XLRA MEAS LEFT DIST CCA PSV: 79.6 CM/SEC
BH CV XLRA MEAS LEFT DIST ICA EDV: 36.3 CM/SEC
BH CV XLRA MEAS LEFT DIST ICA PSV: 84.5 CM/SEC
BH CV XLRA MEAS LEFT ICA/CCA RATIO: 1
BH CV XLRA MEAS LEFT MID CCA EDV: 18.9 CM/SEC
BH CV XLRA MEAS LEFT MID CCA PSV: 71.9 CM/SEC
BH CV XLRA MEAS LEFT MID ICA EDV: 33.5 CM/SEC
BH CV XLRA MEAS LEFT MID ICA PSV: 78.2 CM/SEC
BH CV XLRA MEAS LEFT PROX CCA EDV: 14.7 CM/SEC
BH CV XLRA MEAS LEFT PROX CCA PSV: 83.8 CM/SEC
BH CV XLRA MEAS LEFT PROX ECA EDV: 11.2 CM/SEC
BH CV XLRA MEAS LEFT PROX ECA PSV: 62.9 CM/SEC
BH CV XLRA MEAS LEFT PROX ICA EDV: 23 CM/SEC
BH CV XLRA MEAS LEFT PROX ICA PSV: 58.7 CM/SEC
BH CV XLRA MEAS LEFT PROX SCLA PSV: 99.9 CM/SEC
BH CV XLRA MEAS LEFT VERTEBRAL A EDV: 16.8 CM/SEC
BH CV XLRA MEAS LEFT VERTEBRAL A PSV: 46.1 CM/SEC
BH CV XLRA MEAS RIGHT DIST CCA EDV: 21.2 CM/SEC
BH CV XLRA MEAS RIGHT DIST CCA PSV: 66.8 CM/SEC
BH CV XLRA MEAS RIGHT DIST ICA EDV: 23.6 CM/SEC
BH CV XLRA MEAS RIGHT DIST ICA PSV: 61.1 CM/SEC
BH CV XLRA MEAS RIGHT ICA/CCA RATIO: 1
BH CV XLRA MEAS RIGHT MID CCA EDV: 21.2 CM/SEC
BH CV XLRA MEAS RIGHT MID CCA PSV: 84.9 CM/SEC
BH CV XLRA MEAS RIGHT MID ICA EDV: 21.8 CM/SEC
BH CV XLRA MEAS RIGHT MID ICA PSV: 55 CM/SEC
BH CV XLRA MEAS RIGHT PROX CCA EDV: 20.4 CM/SEC
BH CV XLRA MEAS RIGHT PROX CCA PSV: 99 CM/SEC
BH CV XLRA MEAS RIGHT PROX ECA PSV: 87.7 CM/SEC
BH CV XLRA MEAS RIGHT PROX ICA EDV: 26.2 CM/SEC
BH CV XLRA MEAS RIGHT PROX ICA PSV: 67.7 CM/SEC
BH CV XLRA MEAS RIGHT PROX SCLA PSV: 106.1 CM/SEC
BH CV XLRA MEAS RIGHT VERTEBRAL A EDV: 7.5 CM/SEC
BH CV XLRA MEAS RIGHT VERTEBRAL A PSV: 25.1 CM/SEC
BILIRUB SERPL-MCNC: 0.4 MG/DL (ref 0–1.2)
BILIRUB UR QL STRIP: NEGATIVE
BUN SERPL-MCNC: 16 MG/DL (ref 6–20)
BUN/CREAT SERPL: 15.5 (ref 7–25)
BUPRENORPHINE SERPL-MCNC: NEGATIVE NG/ML
CALCIUM SPEC-SCNC: 9.2 MG/DL (ref 8.6–10.5)
CANNABINOIDS SERPL QL: NEGATIVE
CHLORIDE SERPL-SCNC: 105 MMOL/L (ref 98–107)
CHOLEST SERPL-MCNC: 167 MG/DL (ref 0–200)
CLARITY UR: CLEAR
CO2 SERPL-SCNC: 24 MMOL/L (ref 22–29)
COCAINE UR QL: NEGATIVE
COLOR UR: YELLOW
CREAT SERPL-MCNC: 1.03 MG/DL (ref 0.76–1.27)
DEPRECATED RDW RBC AUTO: 44.6 FL (ref 37–54)
DEPRECATED RDW RBC AUTO: 44.6 FL (ref 37–54)
EOSINOPHIL # BLD AUTO: 0.32 10*3/MM3 (ref 0–0.4)
EOSINOPHIL NFR BLD AUTO: 3.6 % (ref 0.3–6.2)
ERYTHROCYTE [DISTWIDTH] IN BLOOD BY AUTOMATED COUNT: 13.2 % (ref 12.3–15.4)
ERYTHROCYTE [DISTWIDTH] IN BLOOD BY AUTOMATED COUNT: 13.3 % (ref 12.3–15.4)
GFR SERPL CREATININE-BSD FRML MDRD: 74 ML/MIN/1.73
GLOBULIN UR ELPH-MCNC: 2.5 GM/DL
GLUCOSE BLDC GLUCOMTR-MCNC: 104 MG/DL (ref 70–130)
GLUCOSE SERPL-MCNC: 119 MG/DL (ref 65–99)
GLUCOSE UR STRIP-MCNC: NEGATIVE MG/DL
HBA1C MFR BLD: 5.5 % (ref 4.8–5.6)
HCT VFR BLD AUTO: 40.9 % (ref 37.5–51)
HCT VFR BLD AUTO: 41.6 % (ref 37.5–51)
HDLC SERPL-MCNC: 46 MG/DL (ref 40–60)
HGB BLD-MCNC: 13.4 G/DL (ref 13–17.7)
HGB BLD-MCNC: 13.5 G/DL (ref 13–17.7)
HGB UR QL STRIP.AUTO: NEGATIVE
IMM GRANULOCYTES # BLD AUTO: 0.06 10*3/MM3 (ref 0–0.05)
IMM GRANULOCYTES NFR BLD AUTO: 0.7 % (ref 0–0.5)
INR PPP: 1.09 (ref 0.85–1.16)
KETONES UR QL STRIP: NEGATIVE
LDLC SERPL CALC-MCNC: 91 MG/DL (ref 0–100)
LDLC/HDLC SERPL: 1.97 {RATIO}
LEUKOCYTE ESTERASE UR QL STRIP.AUTO: NEGATIVE
LYMPHOCYTES # BLD AUTO: 1.54 10*3/MM3 (ref 0.7–3.1)
LYMPHOCYTES NFR BLD AUTO: 17.5 % (ref 19.6–45.3)
MCH RBC QN AUTO: 29.9 PG (ref 26.6–33)
MCH RBC QN AUTO: 30.5 PG (ref 26.6–33)
MCHC RBC AUTO-ENTMCNC: 32.5 G/DL (ref 31.5–35.7)
MCHC RBC AUTO-ENTMCNC: 32.8 G/DL (ref 31.5–35.7)
MCV RBC AUTO: 92 FL (ref 79–97)
MCV RBC AUTO: 93.2 FL (ref 79–97)
METHADONE UR QL SCN: NEGATIVE
MONOCYTES # BLD AUTO: 0.86 10*3/MM3 (ref 0.1–0.9)
MONOCYTES NFR BLD AUTO: 9.8 % (ref 5–12)
NEUTROPHILS NFR BLD AUTO: 5.98 10*3/MM3 (ref 1.7–7)
NEUTROPHILS NFR BLD AUTO: 67.7 % (ref 42.7–76)
NITRITE UR QL STRIP: NEGATIVE
NRBC BLD AUTO-RTO: 0 /100 WBC (ref 0–0.2)
OPIATES UR QL: NEGATIVE
OXYCODONE UR QL SCN: NEGATIVE
PA ADP PRP-ACNC: 170 PRU
PCP UR QL SCN: NEGATIVE
PH UR STRIP.AUTO: 6.5 [PH] (ref 5–8)
PLATELET # BLD AUTO: 167 10*3/MM3 (ref 140–450)
PLATELET # BLD AUTO: 182 10*3/MM3 (ref 140–450)
PMV BLD AUTO: 10.1 FL (ref 6–12)
PMV BLD AUTO: 9.9 FL (ref 6–12)
POTASSIUM SERPL-SCNC: 4.1 MMOL/L (ref 3.5–5.2)
PROPOXYPH UR QL: NEGATIVE
PROT SERPL-MCNC: 6.7 G/DL (ref 6–8.5)
PROT UR QL STRIP: NEGATIVE
PROTHROMBIN TIME: 13.9 SECONDS (ref 11.5–14)
RBC # BLD AUTO: 4.39 10*6/MM3 (ref 4.14–5.8)
RBC # BLD AUTO: 4.52 10*6/MM3 (ref 4.14–5.8)
SODIUM SERPL-SCNC: 140 MMOL/L (ref 136–145)
SP GR UR STRIP: 1.01 (ref 1–1.03)
TRICYCLICS UR QL SCN: NEGATIVE
TRIGL SERPL-MCNC: 152 MG/DL (ref 0–150)
TROPONIN T SERPL-MCNC: <0.01 NG/ML (ref 0–0.03)
UROBILINOGEN UR QL STRIP: NORMAL
VLDLC SERPL-MCNC: 30.4 MG/DL
WBC # BLD AUTO: 10.06 10*3/MM3 (ref 3.4–10.8)
WBC # BLD AUTO: 8.82 10*3/MM3 (ref 3.4–10.8)

## 2020-08-21 PROCEDURE — 93880 EXTRACRANIAL BILAT STUDY: CPT

## 2020-08-21 PROCEDURE — 85576 BLOOD PLATELET AGGREGATION: CPT | Performed by: PHYSICIAN ASSISTANT

## 2020-08-21 PROCEDURE — 85730 THROMBOPLASTIN TIME PARTIAL: CPT | Performed by: PHYSICIAN ASSISTANT

## 2020-08-21 PROCEDURE — 80061 LIPID PANEL: CPT | Performed by: PHYSICIAN ASSISTANT

## 2020-08-21 PROCEDURE — 81003 URINALYSIS AUTO W/O SCOPE: CPT | Performed by: PHYSICIAN ASSISTANT

## 2020-08-21 PROCEDURE — 80053 COMPREHEN METABOLIC PANEL: CPT | Performed by: PHYSICIAN ASSISTANT

## 2020-08-21 PROCEDURE — 93005 ELECTROCARDIOGRAM TRACING: CPT | Performed by: INTERNAL MEDICINE

## 2020-08-21 PROCEDURE — 94799 UNLISTED PULMONARY SVC/PX: CPT

## 2020-08-21 PROCEDURE — 80306 DRUG TEST PRSMV INSTRMNT: CPT | Performed by: PHYSICIAN ASSISTANT

## 2020-08-21 PROCEDURE — 93880 EXTRACRANIAL BILAT STUDY: CPT | Performed by: INTERNAL MEDICINE

## 2020-08-21 PROCEDURE — 85027 COMPLETE CBC AUTOMATED: CPT | Performed by: PHYSICIAN ASSISTANT

## 2020-08-21 PROCEDURE — 71045 X-RAY EXAM CHEST 1 VIEW: CPT

## 2020-08-21 PROCEDURE — 99239 HOSP IP/OBS DSCHRG MGMT >30: CPT | Performed by: NURSE PRACTITIONER

## 2020-08-21 PROCEDURE — 85610 PROTHROMBIN TIME: CPT | Performed by: PHYSICIAN ASSISTANT

## 2020-08-21 PROCEDURE — 85025 COMPLETE CBC W/AUTO DIFF WBC: CPT | Performed by: INTERNAL MEDICINE

## 2020-08-21 PROCEDURE — 99223 1ST HOSP IP/OBS HIGH 75: CPT | Performed by: PHYSICIAN ASSISTANT

## 2020-08-21 PROCEDURE — 83036 HEMOGLOBIN GLYCOSYLATED A1C: CPT | Performed by: PHYSICIAN ASSISTANT

## 2020-08-21 PROCEDURE — 82962 GLUCOSE BLOOD TEST: CPT

## 2020-08-21 PROCEDURE — 84484 ASSAY OF TROPONIN QUANT: CPT | Performed by: INTERNAL MEDICINE

## 2020-08-21 RX ORDER — ACETAMINOPHEN 325 MG/1
650 TABLET ORAL EVERY 4 HOURS PRN
Status: DISCONTINUED | OUTPATIENT
Start: 2020-08-21 | End: 2020-09-02 | Stop reason: HOSPADM

## 2020-08-21 RX ORDER — SODIUM CHLORIDE 9 MG/ML
50 INJECTION, SOLUTION INTRAVENOUS CONTINUOUS
Status: DISCONTINUED | OUTPATIENT
Start: 2020-08-21 | End: 2020-08-26

## 2020-08-21 RX ORDER — LANOLIN ALCOHOL/MO/W.PET/CERES
100 CREAM (GRAM) TOPICAL DAILY
Qty: 30 TABLET | Refills: 0 | Status: ON HOLD | OUTPATIENT
Start: 2020-08-21 | End: 2020-08-25

## 2020-08-21 RX ORDER — SODIUM CHLORIDE 0.9 % (FLUSH) 0.9 %
10 SYRINGE (ML) INJECTION AS NEEDED
Status: DISCONTINUED | OUTPATIENT
Start: 2020-08-21 | End: 2020-08-26

## 2020-08-21 RX ORDER — LORAZEPAM 1 MG/1
1 TABLET ORAL
Status: DISCONTINUED | OUTPATIENT
Start: 2020-08-21 | End: 2020-08-24

## 2020-08-21 RX ORDER — SODIUM CHLORIDE 0.9 % (FLUSH) 0.9 %
10 SYRINGE (ML) INJECTION EVERY 12 HOURS SCHEDULED
Status: DISCONTINUED | OUTPATIENT
Start: 2020-08-21 | End: 2020-08-22

## 2020-08-21 RX ORDER — FOLIC ACID 1 MG/1
1 TABLET ORAL DAILY
Status: DISCONTINUED | OUTPATIENT
Start: 2020-08-22 | End: 2020-09-02 | Stop reason: HOSPADM

## 2020-08-21 RX ORDER — LORAZEPAM 1 MG/1
2 TABLET ORAL
Status: DISCONTINUED | OUTPATIENT
Start: 2020-08-21 | End: 2020-08-24

## 2020-08-21 RX ORDER — LORAZEPAM 2 MG/ML
2 INJECTION INTRAMUSCULAR
Status: DISCONTINUED | OUTPATIENT
Start: 2020-08-21 | End: 2020-08-24

## 2020-08-21 RX ORDER — POTASSIUM CHLORIDE 750 MG/1
10 CAPSULE, EXTENDED RELEASE ORAL DAILY
Status: DISCONTINUED | OUTPATIENT
Start: 2020-08-22 | End: 2020-08-26

## 2020-08-21 RX ORDER — THIAMINE MONONITRATE (VIT B1) 100 MG
100 TABLET ORAL DAILY
Status: DISCONTINUED | OUTPATIENT
Start: 2020-08-22 | End: 2020-09-02 | Stop reason: HOSPADM

## 2020-08-21 RX ORDER — AMLODIPINE BESYLATE 10 MG/1
10 TABLET ORAL
Qty: 30 TABLET | Refills: 5 | Status: SHIPPED | OUTPATIENT
Start: 2020-08-21 | End: 2020-09-02 | Stop reason: HOSPADM

## 2020-08-21 RX ORDER — AMLODIPINE BESYLATE 10 MG/1
10 TABLET ORAL
Status: DISCONTINUED | OUTPATIENT
Start: 2020-08-22 | End: 2020-08-26

## 2020-08-21 RX ORDER — ATORVASTATIN CALCIUM 40 MG/1
40 TABLET, FILM COATED ORAL DAILY
Status: DISCONTINUED | OUTPATIENT
Start: 2020-08-22 | End: 2020-08-26

## 2020-08-21 RX ORDER — NITROGLYCERIN 0.4 MG/1
0.4 TABLET SUBLINGUAL
Status: DISCONTINUED | OUTPATIENT
Start: 2020-08-21 | End: 2020-09-02 | Stop reason: HOSPADM

## 2020-08-21 RX ORDER — FOLIC ACID 1 MG/1
1 TABLET ORAL DAILY
Qty: 30 TABLET | Refills: 0 | Status: ON HOLD | OUTPATIENT
Start: 2020-08-21 | End: 2020-08-25

## 2020-08-21 RX ORDER — ASPIRIN 81 MG/1
81 TABLET ORAL DAILY
Status: DISCONTINUED | OUTPATIENT
Start: 2020-08-22 | End: 2020-08-26

## 2020-08-21 RX ORDER — LORAZEPAM 2 MG/ML
1 INJECTION INTRAMUSCULAR
Status: DISCONTINUED | OUTPATIENT
Start: 2020-08-21 | End: 2020-08-24

## 2020-08-21 RX ORDER — METOPROLOL TARTRATE 50 MG/1
100 TABLET, FILM COATED ORAL EVERY 12 HOURS
Status: DISCONTINUED | OUTPATIENT
Start: 2020-08-21 | End: 2020-08-26

## 2020-08-21 RX ORDER — SODIUM CHLORIDE 0.9 % (FLUSH) 0.9 %
10 SYRINGE (ML) INJECTION EVERY 12 HOURS SCHEDULED
Status: DISCONTINUED | OUTPATIENT
Start: 2020-08-21 | End: 2020-08-27

## 2020-08-21 RX ORDER — PANTOPRAZOLE SODIUM 40 MG/1
40 TABLET, DELAYED RELEASE ORAL DAILY
Status: DISCONTINUED | OUTPATIENT
Start: 2020-08-22 | End: 2020-08-26

## 2020-08-21 RX ADMIN — SODIUM CHLORIDE 100 ML/HR: 9 INJECTION, SOLUTION INTRAVENOUS at 05:51

## 2020-08-21 RX ADMIN — ASPIRIN 81 MG: 81 TABLET, COATED ORAL at 07:44

## 2020-08-21 RX ADMIN — MAGNESIUM OXIDE TAB 400 MG (241.3 MG ELEMENTAL MG) 400 MG: 400 (241.3 MG) TAB at 20:59

## 2020-08-21 RX ADMIN — ACETAMINOPHEN 650 MG: 325 TABLET ORAL at 10:35

## 2020-08-21 RX ADMIN — NITROGLYCERIN 1 INCH: 20 OINTMENT TOPICAL at 05:51

## 2020-08-21 RX ADMIN — POTASSIUM CHLORIDE 10 MEQ: 750 TABLET, FILM COATED, EXTENDED RELEASE ORAL at 07:44

## 2020-08-21 RX ADMIN — PANTOPRAZOLE SODIUM 40 MG: 40 TABLET, DELAYED RELEASE ORAL at 07:44

## 2020-08-21 RX ADMIN — NITROGLYCERIN 1 INCH: 20 OINTMENT TOPICAL at 11:00

## 2020-08-21 RX ADMIN — Medication 100 MG: at 07:44

## 2020-08-21 RX ADMIN — MUPIROCIN: 20 OINTMENT TOPICAL at 07:45

## 2020-08-21 RX ADMIN — METOPROLOL TARTRATE 100 MG: 100 TABLET, FILM COATED ORAL at 06:15

## 2020-08-21 RX ADMIN — METOPROLOL TARTRATE 100 MG: 50 TABLET, FILM COATED ORAL at 20:58

## 2020-08-21 RX ADMIN — NITROGLYCERIN 1 INCH: 20 OINTMENT TOPICAL at 18:11

## 2020-08-21 RX ADMIN — LORAZEPAM 2 MG: 1 TABLET ORAL at 18:19

## 2020-08-21 RX ADMIN — FOLIC ACID 1 MG: 1 TABLET ORAL at 07:44

## 2020-08-21 RX ADMIN — ATORVASTATIN CALCIUM 40 MG: 40 TABLET, FILM COATED ORAL at 07:45

## 2020-08-21 RX ADMIN — MUPIROCIN: 20 OINTMENT TOPICAL at 20:59

## 2020-08-21 RX ADMIN — MAGNESIUM GLUCONATE 500 MG ORAL TABLET 400 MG: 500 TABLET ORAL at 07:44

## 2020-08-21 RX ADMIN — SODIUM CHLORIDE 100 ML/HR: 9 INJECTION, SOLUTION INTRAVENOUS at 18:11

## 2020-08-21 RX ADMIN — AMLODIPINE BESYLATE 10 MG: 10 TABLET ORAL at 06:15

## 2020-08-21 NOTE — PLAN OF CARE
Pt resting with no complaints. Denies chest pain. Will continue to monitor and follow plan of care.

## 2020-08-22 ENCOUNTER — APPOINTMENT (OUTPATIENT)
Dept: PULMONOLOGY | Facility: HOSPITAL | Age: 55
End: 2020-08-22

## 2020-08-22 LAB
ANION GAP SERPL CALCULATED.3IONS-SCNC: 9 MMOL/L (ref 5–15)
BUN SERPL-MCNC: 18 MG/DL (ref 6–20)
BUN/CREAT SERPL: 18.2 (ref 7–25)
CALCIUM SPEC-SCNC: 9.3 MG/DL (ref 8.6–10.5)
CHLORIDE SERPL-SCNC: 105 MMOL/L (ref 98–107)
CO2 SERPL-SCNC: 27 MMOL/L (ref 22–29)
CREAT SERPL-MCNC: 0.99 MG/DL (ref 0.76–1.27)
DEPRECATED RDW RBC AUTO: 46 FL (ref 37–54)
ERYTHROCYTE [DISTWIDTH] IN BLOOD BY AUTOMATED COUNT: 13.6 % (ref 12.3–15.4)
GFR SERPL CREATININE-BSD FRML MDRD: 78 ML/MIN/1.73
GLUCOSE BLDC GLUCOMTR-MCNC: 102 MG/DL (ref 70–130)
GLUCOSE BLDC GLUCOMTR-MCNC: 111 MG/DL (ref 70–130)
GLUCOSE BLDC GLUCOMTR-MCNC: 111 MG/DL (ref 70–130)
GLUCOSE BLDC GLUCOMTR-MCNC: 147 MG/DL (ref 70–130)
GLUCOSE BLDC GLUCOMTR-MCNC: 94 MG/DL (ref 70–130)
GLUCOSE SERPL-MCNC: 96 MG/DL (ref 65–99)
HCT VFR BLD AUTO: 43.7 % (ref 37.5–51)
HGB BLD-MCNC: 13.9 G/DL (ref 13–17.7)
MCH RBC QN AUTO: 29.8 PG (ref 26.6–33)
MCHC RBC AUTO-ENTMCNC: 31.8 G/DL (ref 31.5–35.7)
MCV RBC AUTO: 93.8 FL (ref 79–97)
PA ADP PRP-ACNC: 182 PRU
PLATELET # BLD AUTO: 177 10*3/MM3 (ref 140–450)
PMV BLD AUTO: 10.2 FL (ref 6–12)
POTASSIUM SERPL-SCNC: 4.3 MMOL/L (ref 3.5–5.2)
RBC # BLD AUTO: 4.66 10*6/MM3 (ref 4.14–5.8)
SODIUM SERPL-SCNC: 141 MMOL/L (ref 136–145)
WBC # BLD AUTO: 10.98 10*3/MM3 (ref 3.4–10.8)

## 2020-08-22 PROCEDURE — 80048 BASIC METABOLIC PNL TOTAL CA: CPT | Performed by: PHYSICIAN ASSISTANT

## 2020-08-22 PROCEDURE — 94010 BREATHING CAPACITY TEST: CPT | Performed by: INTERNAL MEDICINE

## 2020-08-22 PROCEDURE — 94010 BREATHING CAPACITY TEST: CPT

## 2020-08-22 PROCEDURE — 99231 SBSQ HOSP IP/OBS SF/LOW 25: CPT | Performed by: PHYSICIAN ASSISTANT

## 2020-08-22 PROCEDURE — 85027 COMPLETE CBC AUTOMATED: CPT | Performed by: PHYSICIAN ASSISTANT

## 2020-08-22 PROCEDURE — 82962 GLUCOSE BLOOD TEST: CPT

## 2020-08-22 PROCEDURE — 85576 BLOOD PLATELET AGGREGATION: CPT | Performed by: PHYSICIAN ASSISTANT

## 2020-08-22 RX ADMIN — METOPROLOL TARTRATE 100 MG: 50 TABLET, FILM COATED ORAL at 20:59

## 2020-08-22 RX ADMIN — NITROGLYCERIN 1 INCH: 20 OINTMENT TOPICAL at 17:59

## 2020-08-22 RX ADMIN — AMLODIPINE BESYLATE 10 MG: 10 TABLET ORAL at 08:41

## 2020-08-22 RX ADMIN — MUPIROCIN: 20 OINTMENT TOPICAL at 08:42

## 2020-08-22 RX ADMIN — MAGNESIUM OXIDE TAB 400 MG (241.3 MG ELEMENTAL MG) 400 MG: 400 (241.3 MG) TAB at 08:41

## 2020-08-22 RX ADMIN — NITROGLYCERIN 1 INCH: 20 OINTMENT TOPICAL at 06:23

## 2020-08-22 RX ADMIN — ATORVASTATIN CALCIUM 40 MG: 40 TABLET, FILM COATED ORAL at 08:42

## 2020-08-22 RX ADMIN — SODIUM CHLORIDE, PRESERVATIVE FREE 10 ML: 5 INJECTION INTRAVENOUS at 08:43

## 2020-08-22 RX ADMIN — SODIUM CHLORIDE, PRESERVATIVE FREE 10 ML: 5 INJECTION INTRAVENOUS at 21:00

## 2020-08-22 RX ADMIN — LORAZEPAM 1 MG: 1 TABLET ORAL at 21:04

## 2020-08-22 RX ADMIN — SODIUM CHLORIDE 100 ML/HR: 9 INJECTION, SOLUTION INTRAVENOUS at 04:11

## 2020-08-22 RX ADMIN — NITROGLYCERIN 1 INCH: 20 OINTMENT TOPICAL at 14:42

## 2020-08-22 RX ADMIN — MAGNESIUM OXIDE TAB 400 MG (241.3 MG ELEMENTAL MG) 400 MG: 400 (241.3 MG) TAB at 20:59

## 2020-08-22 RX ADMIN — METOPROLOL TARTRATE 100 MG: 50 TABLET, FILM COATED ORAL at 08:41

## 2020-08-22 RX ADMIN — PANTOPRAZOLE SODIUM 40 MG: 40 TABLET, DELAYED RELEASE ORAL at 08:41

## 2020-08-22 RX ADMIN — LORAZEPAM 1 MG: 1 TABLET ORAL at 14:42

## 2020-08-22 RX ADMIN — THIAMINE HCL TAB 100 MG 100 MG: 100 TAB at 08:42

## 2020-08-22 RX ADMIN — ASPIRIN 81 MG: 81 TABLET, COATED ORAL at 08:41

## 2020-08-22 RX ADMIN — SODIUM CHLORIDE 50 ML/HR: 9 INJECTION, SOLUTION INTRAVENOUS at 19:10

## 2020-08-22 RX ADMIN — FOLIC ACID 1 MG: 1 TABLET ORAL at 08:41

## 2020-08-22 RX ADMIN — MUPIROCIN: 20 OINTMENT TOPICAL at 21:00

## 2020-08-22 RX ADMIN — SODIUM CHLORIDE, PRESERVATIVE FREE 10 ML: 5 INJECTION INTRAVENOUS at 21:01

## 2020-08-22 RX ADMIN — POTASSIUM CHLORIDE 10 MEQ: 10 CAPSULE, COATED, EXTENDED RELEASE ORAL at 08:41

## 2020-08-22 NOTE — PAYOR COMM NOTE
"[facility]   KARI HALL  PHONE  [phone]  FAX  [phone]  NPI:  [id number]    PATIENT D/C 8/21/2020      Onel Cunha (55 y.o. Male)     Date of Birth Social Security Number Address Home Phone MRN    1965  [id number] [facility] [address] [phone] [id number]    [place] Marital Status          None        Admission Date Admission Type Admitting Provider Attending Provider Department, Room/Bed    8/17/20 Emergency Louis Howard MD  [facility] 3 [facility], 3318/1P    Discharge Date Discharge Disposition Discharge Destination        8/21/2020 [facility] (DC - External)              Attending Provider:  (none)   Allergies:  Niacin    Isolation:  None   Infection:  None   Code Status:  CPR    Ht:  190.5 cm (75\")   Wt:  95.3 kg (210 lb)    Admission Cmt:  None   Principal Problem:  Chest pain [R07.9]                 Active Insurance as of 8/17/2020     Primary Coverage     Payor Plan Insurance Group Employer/Plan Group    AN[place] MEDICAID [facility] MEDICAID [id number]     Payor Plan Address Payor Plan Phone Number Payor Plan Fax Number Effective Dates    [address] [address] [phone]  2/1/2014 - None Entered    [facility] [address]       Subscriber Name Subscriber Birth Date Member ID       ONEL CUNHA 1965 [id number]                 Emergency Contacts      (Rel.) Home Phone Work Phone Mobile Phone    Yasir Cunha (Son) [phone] -- --    [place] Cunha [phone] -- --              "

## 2020-08-23 LAB
GLUCOSE BLDC GLUCOMTR-MCNC: 101 MG/DL (ref 70–130)
GLUCOSE BLDC GLUCOMTR-MCNC: 120 MG/DL (ref 70–130)
GLUCOSE BLDC GLUCOMTR-MCNC: 127 MG/DL (ref 70–130)
GLUCOSE BLDC GLUCOMTR-MCNC: 94 MG/DL (ref 70–130)
PA ADP PRP-ACNC: 160 PRU

## 2020-08-23 PROCEDURE — 82962 GLUCOSE BLOOD TEST: CPT

## 2020-08-23 PROCEDURE — 99231 SBSQ HOSP IP/OBS SF/LOW 25: CPT | Performed by: THORACIC SURGERY (CARDIOTHORACIC VASCULAR SURGERY)

## 2020-08-23 PROCEDURE — 85576 BLOOD PLATELET AGGREGATION: CPT | Performed by: PHYSICIAN ASSISTANT

## 2020-08-23 RX ADMIN — ATORVASTATIN CALCIUM 40 MG: 40 TABLET, FILM COATED ORAL at 08:39

## 2020-08-23 RX ADMIN — METOPROLOL TARTRATE 100 MG: 50 TABLET, FILM COATED ORAL at 08:39

## 2020-08-23 RX ADMIN — MAGNESIUM OXIDE TAB 400 MG (241.3 MG ELEMENTAL MG) 400 MG: 400 (241.3 MG) TAB at 08:39

## 2020-08-23 RX ADMIN — SODIUM CHLORIDE, PRESERVATIVE FREE 10 ML: 5 INJECTION INTRAVENOUS at 20:33

## 2020-08-23 RX ADMIN — POLYETHYLENE GLYCOL 3350 17 G: 17 POWDER, FOR SOLUTION ORAL at 08:38

## 2020-08-23 RX ADMIN — AMLODIPINE BESYLATE 10 MG: 10 TABLET ORAL at 08:39

## 2020-08-23 RX ADMIN — SODIUM CHLORIDE, PRESERVATIVE FREE 10 ML: 5 INJECTION INTRAVENOUS at 08:39

## 2020-08-23 RX ADMIN — NITROGLYCERIN 1 INCH: 20 OINTMENT TOPICAL at 13:53

## 2020-08-23 RX ADMIN — METOPROLOL TARTRATE 100 MG: 50 TABLET, FILM COATED ORAL at 20:33

## 2020-08-23 RX ADMIN — NITROGLYCERIN 1 INCH: 20 OINTMENT TOPICAL at 06:43

## 2020-08-23 RX ADMIN — MUPIROCIN: 20 OINTMENT TOPICAL at 20:33

## 2020-08-23 RX ADMIN — LORAZEPAM 1 MG: 1 TABLET ORAL at 18:03

## 2020-08-23 RX ADMIN — MUPIROCIN: 20 OINTMENT TOPICAL at 08:38

## 2020-08-23 RX ADMIN — NITROGLYCERIN 1 INCH: 20 OINTMENT TOPICAL at 17:57

## 2020-08-23 RX ADMIN — THIAMINE HCL TAB 100 MG 100 MG: 100 TAB at 08:39

## 2020-08-23 RX ADMIN — LORAZEPAM 1 MG: 1 TABLET ORAL at 08:52

## 2020-08-23 RX ADMIN — POTASSIUM CHLORIDE 10 MEQ: 10 CAPSULE, COATED, EXTENDED RELEASE ORAL at 08:39

## 2020-08-23 RX ADMIN — ASPIRIN 81 MG: 81 TABLET, COATED ORAL at 08:39

## 2020-08-23 RX ADMIN — MAGNESIUM OXIDE TAB 400 MG (241.3 MG ELEMENTAL MG) 400 MG: 400 (241.3 MG) TAB at 20:33

## 2020-08-23 RX ADMIN — PANTOPRAZOLE SODIUM 40 MG: 40 TABLET, DELAYED RELEASE ORAL at 08:39

## 2020-08-23 RX ADMIN — FOLIC ACID 1 MG: 1 TABLET ORAL at 08:39

## 2020-08-23 RX ADMIN — SODIUM CHLORIDE 50 ML/HR: 9 INJECTION, SOLUTION INTRAVENOUS at 14:22

## 2020-08-24 LAB
BASOPHILS # BLD AUTO: 0.1 10*3/MM3 (ref 0–0.2)
BASOPHILS NFR BLD AUTO: 1 % (ref 0–1.5)
DEPRECATED RDW RBC AUTO: 46.9 FL (ref 37–54)
EOSINOPHIL # BLD AUTO: 0.3 10*3/MM3 (ref 0–0.4)
EOSINOPHIL NFR BLD AUTO: 3 % (ref 0.3–6.2)
ERYTHROCYTE [DISTWIDTH] IN BLOOD BY AUTOMATED COUNT: 13.6 % (ref 12.3–15.4)
GLUCOSE BLDC GLUCOMTR-MCNC: 110 MG/DL (ref 70–130)
GLUCOSE BLDC GLUCOMTR-MCNC: 120 MG/DL (ref 70–130)
GLUCOSE BLDC GLUCOMTR-MCNC: 121 MG/DL (ref 70–130)
GLUCOSE BLDC GLUCOMTR-MCNC: 92 MG/DL (ref 70–130)
HCT VFR BLD AUTO: 43.3 % (ref 37.5–51)
HGB BLD-MCNC: 13.7 G/DL (ref 13–17.7)
IMM GRANULOCYTES # BLD AUTO: 0.06 10*3/MM3 (ref 0–0.05)
IMM GRANULOCYTES NFR BLD AUTO: 0.6 % (ref 0–0.5)
LYMPHOCYTES # BLD AUTO: 1.78 10*3/MM3 (ref 0.7–3.1)
LYMPHOCYTES NFR BLD AUTO: 17.9 % (ref 19.6–45.3)
MCH RBC QN AUTO: 29.8 PG (ref 26.6–33)
MCHC RBC AUTO-ENTMCNC: 31.6 G/DL (ref 31.5–35.7)
MCV RBC AUTO: 94.3 FL (ref 79–97)
MONOCYTES # BLD AUTO: 1.05 10*3/MM3 (ref 0.1–0.9)
MONOCYTES NFR BLD AUTO: 10.6 % (ref 5–12)
NEUTROPHILS NFR BLD AUTO: 6.64 10*3/MM3 (ref 1.7–7)
NEUTROPHILS NFR BLD AUTO: 66.9 % (ref 42.7–76)
NRBC BLD AUTO-RTO: 0 /100 WBC (ref 0–0.2)
PA ADP PRP-ACNC: 163 PRU
PLATELET # BLD AUTO: 179 10*3/MM3 (ref 140–450)
PMV BLD AUTO: 10.1 FL (ref 6–12)
RBC # BLD AUTO: 4.59 10*6/MM3 (ref 4.14–5.8)
WBC # BLD AUTO: 9.93 10*3/MM3 (ref 3.4–10.8)

## 2020-08-24 PROCEDURE — 82962 GLUCOSE BLOOD TEST: CPT

## 2020-08-24 PROCEDURE — 85025 COMPLETE CBC W/AUTO DIFF WBC: CPT | Performed by: PHYSICIAN ASSISTANT

## 2020-08-24 PROCEDURE — 99231 SBSQ HOSP IP/OBS SF/LOW 25: CPT | Performed by: THORACIC SURGERY (CARDIOTHORACIC VASCULAR SURGERY)

## 2020-08-24 PROCEDURE — 86901 BLOOD TYPING SEROLOGIC RH(D): CPT

## 2020-08-24 PROCEDURE — 86900 BLOOD TYPING SEROLOGIC ABO: CPT

## 2020-08-24 PROCEDURE — 85576 BLOOD PLATELET AGGREGATION: CPT | Performed by: PHYSICIAN ASSISTANT

## 2020-08-24 RX ORDER — ALPRAZOLAM 0.5 MG/1
0.5 TABLET ORAL EVERY 12 HOURS PRN
Status: DISCONTINUED | OUTPATIENT
Start: 2020-08-24 | End: 2020-09-02 | Stop reason: HOSPADM

## 2020-08-24 RX ADMIN — NITROGLYCERIN 1 INCH: 20 OINTMENT TOPICAL at 13:02

## 2020-08-24 RX ADMIN — NITROGLYCERIN 1 INCH: 20 OINTMENT TOPICAL at 06:35

## 2020-08-24 RX ADMIN — MAGNESIUM OXIDE TAB 400 MG (241.3 MG ELEMENTAL MG) 400 MG: 400 (241.3 MG) TAB at 09:06

## 2020-08-24 RX ADMIN — THIAMINE HCL TAB 100 MG 100 MG: 100 TAB at 09:07

## 2020-08-24 RX ADMIN — METOPROLOL TARTRATE 100 MG: 50 TABLET, FILM COATED ORAL at 20:36

## 2020-08-24 RX ADMIN — MUPIROCIN: 20 OINTMENT TOPICAL at 20:36

## 2020-08-24 RX ADMIN — AMLODIPINE BESYLATE 10 MG: 10 TABLET ORAL at 09:06

## 2020-08-24 RX ADMIN — NITROGLYCERIN 1 INCH: 20 OINTMENT TOPICAL at 23:48

## 2020-08-24 RX ADMIN — NITROGLYCERIN 1 INCH: 20 OINTMENT TOPICAL at 17:48

## 2020-08-24 RX ADMIN — ATORVASTATIN CALCIUM 40 MG: 40 TABLET, FILM COATED ORAL at 09:07

## 2020-08-24 RX ADMIN — SODIUM CHLORIDE 50 ML/HR: 9 INJECTION, SOLUTION INTRAVENOUS at 09:07

## 2020-08-24 RX ADMIN — MUPIROCIN: 20 OINTMENT TOPICAL at 09:07

## 2020-08-24 RX ADMIN — SODIUM CHLORIDE, PRESERVATIVE FREE 10 ML: 5 INJECTION INTRAVENOUS at 09:07

## 2020-08-24 RX ADMIN — ASPIRIN 81 MG: 81 TABLET, COATED ORAL at 09:06

## 2020-08-24 RX ADMIN — LORAZEPAM 1 MG: 1 TABLET ORAL at 09:42

## 2020-08-24 RX ADMIN — MAGNESIUM OXIDE TAB 400 MG (241.3 MG ELEMENTAL MG) 400 MG: 400 (241.3 MG) TAB at 20:36

## 2020-08-24 RX ADMIN — NITROGLYCERIN 1 INCH: 20 OINTMENT TOPICAL at 00:07

## 2020-08-24 RX ADMIN — PANTOPRAZOLE SODIUM 40 MG: 40 TABLET, DELAYED RELEASE ORAL at 09:07

## 2020-08-24 RX ADMIN — SODIUM CHLORIDE, PRESERVATIVE FREE 10 ML: 5 INJECTION INTRAVENOUS at 20:36

## 2020-08-24 RX ADMIN — FOLIC ACID 1 MG: 1 TABLET ORAL at 09:06

## 2020-08-24 RX ADMIN — METOPROLOL TARTRATE 100 MG: 50 TABLET, FILM COATED ORAL at 09:06

## 2020-08-24 RX ADMIN — ALPRAZOLAM 0.5 MG: 0.5 TABLET ORAL at 20:36

## 2020-08-24 RX ADMIN — POTASSIUM CHLORIDE 10 MEQ: 10 CAPSULE, COATED, EXTENDED RELEASE ORAL at 09:06

## 2020-08-24 RX ADMIN — POLYETHYLENE GLYCOL 3350 17 G: 17 POWDER, FOR SOLUTION ORAL at 09:06

## 2020-08-25 ENCOUNTER — ANESTHESIA EVENT (OUTPATIENT)
Dept: PERIOP | Facility: HOSPITAL | Age: 55
End: 2020-08-25

## 2020-08-25 LAB
ABO GROUP BLD: NORMAL
ABO GROUP BLD: NORMAL
ALBUMIN SERPL-MCNC: 4.3 G/DL (ref 3.5–5.2)
ALBUMIN/GLOB SERPL: 1.7 G/DL
ALP SERPL-CCNC: 48 U/L (ref 39–117)
ALT SERPL W P-5'-P-CCNC: 29 U/L (ref 1–41)
ANION GAP SERPL CALCULATED.3IONS-SCNC: 7 MMOL/L (ref 5–15)
AST SERPL-CCNC: 17 U/L (ref 1–40)
BILIRUB SERPL-MCNC: 0.4 MG/DL (ref 0–1.2)
BLD GP AB SCN SERPL QL: NEGATIVE
BUN SERPL-MCNC: 23 MG/DL (ref 6–20)
BUN/CREAT SERPL: 20.2 (ref 7–25)
CALCIUM SPEC-SCNC: 9.2 MG/DL (ref 8.6–10.5)
CHLORIDE SERPL-SCNC: 107 MMOL/L (ref 98–107)
CO2 SERPL-SCNC: 26 MMOL/L (ref 22–29)
CREAT SERPL-MCNC: 1.14 MG/DL (ref 0.76–1.27)
DEPRECATED RDW RBC AUTO: 47.1 FL (ref 37–54)
ERYTHROCYTE [DISTWIDTH] IN BLOOD BY AUTOMATED COUNT: 13.9 % (ref 12.3–15.4)
GFR SERPL CREATININE-BSD FRML MDRD: 66 ML/MIN/1.73
GLOBULIN UR ELPH-MCNC: 2.5 GM/DL
GLUCOSE BLDC GLUCOMTR-MCNC: 101 MG/DL (ref 70–130)
GLUCOSE BLDC GLUCOMTR-MCNC: 108 MG/DL (ref 70–130)
GLUCOSE BLDC GLUCOMTR-MCNC: 125 MG/DL (ref 70–130)
GLUCOSE BLDC GLUCOMTR-MCNC: 77 MG/DL (ref 70–130)
GLUCOSE SERPL-MCNC: 100 MG/DL (ref 65–99)
HCT VFR BLD AUTO: 43 % (ref 37.5–51)
HGB BLD-MCNC: 13.6 G/DL (ref 13–17.7)
MCH RBC QN AUTO: 29.6 PG (ref 26.6–33)
MCHC RBC AUTO-ENTMCNC: 31.6 G/DL (ref 31.5–35.7)
MCV RBC AUTO: 93.5 FL (ref 79–97)
PA ADP PRP-ACNC: 196 PRU
PLATELET # BLD AUTO: 172 10*3/MM3 (ref 140–450)
PMV BLD AUTO: 10.2 FL (ref 6–12)
POTASSIUM SERPL-SCNC: 4.3 MMOL/L (ref 3.5–5.2)
PROT SERPL-MCNC: 6.8 G/DL (ref 6–8.5)
RBC # BLD AUTO: 4.6 10*6/MM3 (ref 4.14–5.8)
RH BLD: POSITIVE
RH BLD: POSITIVE
SODIUM SERPL-SCNC: 140 MMOL/L (ref 136–145)
T&S EXPIRATION DATE: NORMAL
WBC # BLD AUTO: 9.04 10*3/MM3 (ref 3.4–10.8)

## 2020-08-25 PROCEDURE — 86901 BLOOD TYPING SEROLOGIC RH(D): CPT | Performed by: PHYSICIAN ASSISTANT

## 2020-08-25 PROCEDURE — 86900 BLOOD TYPING SEROLOGIC ABO: CPT | Performed by: PHYSICIAN ASSISTANT

## 2020-08-25 PROCEDURE — 80053 COMPREHEN METABOLIC PANEL: CPT | Performed by: PHYSICIAN ASSISTANT

## 2020-08-25 PROCEDURE — 82962 GLUCOSE BLOOD TEST: CPT

## 2020-08-25 PROCEDURE — 99024 POSTOP FOLLOW-UP VISIT: CPT | Performed by: THORACIC SURGERY (CARDIOTHORACIC VASCULAR SURGERY)

## 2020-08-25 PROCEDURE — 86850 RBC ANTIBODY SCREEN: CPT | Performed by: PHYSICIAN ASSISTANT

## 2020-08-25 PROCEDURE — 85027 COMPLETE CBC AUTOMATED: CPT | Performed by: PHYSICIAN ASSISTANT

## 2020-08-25 PROCEDURE — 85576 BLOOD PLATELET AGGREGATION: CPT | Performed by: PHYSICIAN ASSISTANT

## 2020-08-25 PROCEDURE — 86923 COMPATIBILITY TEST ELECTRIC: CPT

## 2020-08-25 RX ORDER — FOLIC ACID 1 MG/1
TABLET ORAL
Qty: 30 TABLET | OUTPATIENT
Start: 2020-08-25

## 2020-08-25 RX ORDER — HYDROXYZINE HYDROCHLORIDE 10 MG/1
10 TABLET, FILM COATED ORAL 3 TIMES DAILY PRN
COMMUNITY
End: 2021-03-18

## 2020-08-25 RX ORDER — SODIUM CHLORIDE 0.9 % (FLUSH) 0.9 %
10 SYRINGE (ML) INJECTION EVERY 12 HOURS SCHEDULED
Status: CANCELLED | OUTPATIENT
Start: 2020-08-25

## 2020-08-25 RX ORDER — CLOPIDOGREL BISULFATE 75 MG/1
75 TABLET ORAL DAILY
Status: ON HOLD | COMMUNITY
End: 2020-09-02 | Stop reason: SDUPTHER

## 2020-08-25 RX ORDER — CHLORHEXIDINE GLUCONATE 0.12 MG/ML
15 RINSE ORAL EVERY 12 HOURS
Status: COMPLETED | OUTPATIENT
Start: 2020-08-25 | End: 2020-08-26

## 2020-08-25 RX ORDER — SODIUM CHLORIDE, SODIUM LACTATE, POTASSIUM CHLORIDE, CALCIUM CHLORIDE 600; 310; 30; 20 MG/100ML; MG/100ML; MG/100ML; MG/100ML
9 INJECTION, SOLUTION INTRAVENOUS CONTINUOUS
Status: CANCELLED | OUTPATIENT
Start: 2020-08-25

## 2020-08-25 RX ORDER — IPRATROPIUM BROMIDE AND ALBUTEROL SULFATE 2.5; .5 MG/3ML; MG/3ML
3 SOLUTION RESPIRATORY (INHALATION) EVERY 4 HOURS PRN
Status: DISCONTINUED | OUTPATIENT
Start: 2020-08-25 | End: 2020-09-02 | Stop reason: HOSPADM

## 2020-08-25 RX ORDER — SODIUM CHLORIDE 0.9 % (FLUSH) 0.9 %
10 SYRINGE (ML) INJECTION AS NEEDED
Status: CANCELLED | OUTPATIENT
Start: 2020-08-25

## 2020-08-25 RX ORDER — LIDOCAINE HYDROCHLORIDE 10 MG/ML
0.5 INJECTION, SOLUTION EPIDURAL; INFILTRATION; INTRACAUDAL; PERINEURAL ONCE AS NEEDED
Status: CANCELLED | OUTPATIENT
Start: 2020-08-25

## 2020-08-25 RX ORDER — LOSARTAN POTASSIUM 100 MG/1
100 TABLET ORAL DAILY
COMMUNITY
End: 2020-09-02 | Stop reason: HOSPADM

## 2020-08-25 RX ORDER — CHLORHEXIDINE GLUCONATE 500 MG/1
1 CLOTH TOPICAL EVERY 12 HOURS PRN
Status: DISCONTINUED | OUTPATIENT
Start: 2020-08-25 | End: 2020-08-26

## 2020-08-25 RX ORDER — METOPROLOL TARTRATE 100 MG/1
100 TABLET ORAL 2 TIMES DAILY
COMMUNITY
End: 2021-02-11

## 2020-08-25 RX ADMIN — NITROGLYCERIN 1 INCH: 20 OINTMENT TOPICAL at 18:21

## 2020-08-25 RX ADMIN — POLYETHYLENE GLYCOL 3350 17 G: 17 POWDER, FOR SOLUTION ORAL at 08:25

## 2020-08-25 RX ADMIN — AMLODIPINE BESYLATE 10 MG: 10 TABLET ORAL at 08:25

## 2020-08-25 RX ADMIN — POTASSIUM CHLORIDE 10 MEQ: 10 CAPSULE, COATED, EXTENDED RELEASE ORAL at 08:25

## 2020-08-25 RX ADMIN — METOPROLOL TARTRATE 100 MG: 50 TABLET, FILM COATED ORAL at 08:24

## 2020-08-25 RX ADMIN — METOPROLOL TARTRATE 100 MG: 50 TABLET, FILM COATED ORAL at 21:34

## 2020-08-25 RX ADMIN — MUPIROCIN: 20 OINTMENT TOPICAL at 08:24

## 2020-08-25 RX ADMIN — ATORVASTATIN CALCIUM 40 MG: 40 TABLET, FILM COATED ORAL at 08:24

## 2020-08-25 RX ADMIN — MAGNESIUM OXIDE TAB 400 MG (241.3 MG ELEMENTAL MG) 400 MG: 400 (241.3 MG) TAB at 21:31

## 2020-08-25 RX ADMIN — ALPRAZOLAM 0.5 MG: 0.5 TABLET ORAL at 08:24

## 2020-08-25 RX ADMIN — NITROGLYCERIN 1 INCH: 20 OINTMENT TOPICAL at 05:05

## 2020-08-25 RX ADMIN — SODIUM CHLORIDE, PRESERVATIVE FREE 10 ML: 5 INJECTION INTRAVENOUS at 21:32

## 2020-08-25 RX ADMIN — MUPIROCIN: 20 OINTMENT TOPICAL at 21:33

## 2020-08-25 RX ADMIN — ALPRAZOLAM 0.5 MG: 0.5 TABLET ORAL at 21:33

## 2020-08-25 RX ADMIN — SODIUM CHLORIDE 50 ML/HR: 9 INJECTION, SOLUTION INTRAVENOUS at 05:05

## 2020-08-25 RX ADMIN — CHLORHEXIDINE GLUCONATE 0.12% ORAL RINSE 15 ML: 1.2 LIQUID ORAL at 21:33

## 2020-08-25 RX ADMIN — ASPIRIN 81 MG: 81 TABLET, COATED ORAL at 08:25

## 2020-08-25 RX ADMIN — MUPIROCIN 1 APPLICATION: 20 OINTMENT TOPICAL at 21:33

## 2020-08-25 RX ADMIN — NITROGLYCERIN 1 INCH: 20 OINTMENT TOPICAL at 13:18

## 2020-08-25 RX ADMIN — FOLIC ACID 1 MG: 1 TABLET ORAL at 08:25

## 2020-08-25 RX ADMIN — MAGNESIUM OXIDE TAB 400 MG (241.3 MG ELEMENTAL MG) 400 MG: 400 (241.3 MG) TAB at 08:25

## 2020-08-25 RX ADMIN — THIAMINE HCL TAB 100 MG 100 MG: 100 TAB at 08:25

## 2020-08-25 RX ADMIN — PANTOPRAZOLE SODIUM 40 MG: 40 TABLET, DELAYED RELEASE ORAL at 08:25

## 2020-08-25 RX ADMIN — SODIUM CHLORIDE, PRESERVATIVE FREE 10 ML: 5 INJECTION INTRAVENOUS at 08:25

## 2020-08-26 ENCOUNTER — APPOINTMENT (OUTPATIENT)
Dept: GENERAL RADIOLOGY | Facility: HOSPITAL | Age: 55
End: 2020-08-26

## 2020-08-26 ENCOUNTER — ANESTHESIA (OUTPATIENT)
Dept: PERIOP | Facility: HOSPITAL | Age: 55
End: 2020-08-26

## 2020-08-26 LAB
ACT BLD: 109 SECONDS (ref 82–152)
ACT BLD: 543 SECONDS (ref 82–152)
ACT BLD: 610 SECONDS (ref 82–152)
ACT BLD: 835 SECONDS (ref 82–152)
ALBUMIN SERPL-MCNC: 3.6 G/DL (ref 3.5–5.2)
ALBUMIN SERPL-MCNC: 4.3 G/DL (ref 3.5–5.2)
ANION GAP SERPL CALCULATED.3IONS-SCNC: 10 MMOL/L (ref 5–15)
ANION GAP SERPL CALCULATED.3IONS-SCNC: 9 MMOL/L (ref 5–15)
APTT PPP: 32.9 SECONDS (ref 24–37)
ARTERIAL PATENCY WRIST A: ABNORMAL
ARTERIAL PATENCY WRIST A: ABNORMAL
ATMOSPHERIC PRESS: ABNORMAL MM[HG]
ATMOSPHERIC PRESS: ABNORMAL MM[HG]
BASE EXCESS BLDA CALC-SCNC: -1.2 MMOL/L (ref 0–2)
BASE EXCESS BLDA CALC-SCNC: -2 MMOL/L (ref -5–5)
BASE EXCESS BLDA CALC-SCNC: -2 MMOL/L (ref -5–5)
BASE EXCESS BLDA CALC-SCNC: -4 MMOL/L (ref -5–5)
BASE EXCESS BLDA CALC-SCNC: -4 MMOL/L (ref -5–5)
BASE EXCESS BLDA CALC-SCNC: -5 MMOL/L (ref -5–5)
BASE EXCESS BLDA CALC-SCNC: 1.2 MMOL/L (ref 0–2)
BASE EXCESS BLDA CALC-SCNC: 2 MMOL/L (ref -5–5)
BDY SITE: ABNORMAL
BDY SITE: ABNORMAL
BODY TEMPERATURE: 37 C
BODY TEMPERATURE: 37 C
BUN SERPL-MCNC: 20 MG/DL (ref 6–20)
BUN SERPL-MCNC: 20 MG/DL (ref 6–20)
BUN SERPL-MCNC: 21 MG/DL (ref 6–20)
BUN SERPL-MCNC: 24 MG/DL (ref 6–20)
BUN/CREAT SERPL: 15.7 (ref 7–25)
BUN/CREAT SERPL: 17.4 (ref 7–25)
BUN/CREAT SERPL: 18.3 (ref 7–25)
BUN/CREAT SERPL: 20.7 (ref 7–25)
CA-I BLDA-SCNC: 1.04 MMOL/L (ref 1.2–1.32)
CA-I BLDA-SCNC: 1.07 MMOL/L (ref 1.2–1.32)
CA-I BLDA-SCNC: 1.24 MMOL/L (ref 1.2–1.32)
CA-I BLDA-SCNC: 1.27 MMOL/L (ref 1.2–1.32)
CA-I BLDA-SCNC: 1.31 MMOL/L (ref 1.2–1.32)
CA-I BLDA-SCNC: 1.52 MMOL/L (ref 1.2–1.32)
CA-I SERPL ISE-MCNC: 1.44 MMOL/L (ref 1.12–1.32)
CALCIUM SPEC-SCNC: 9.2 MG/DL (ref 8.6–10.5)
CALCIUM SPEC-SCNC: 9.2 MG/DL (ref 8.6–10.5)
CALCIUM SPEC-SCNC: 9.4 MG/DL (ref 8.6–10.5)
CALCIUM SPEC-SCNC: 9.4 MG/DL (ref 8.6–10.5)
CHLORIDE SERPL-SCNC: 105 MMOL/L (ref 98–107)
CHLORIDE SERPL-SCNC: 105 MMOL/L (ref 98–107)
CHLORIDE SERPL-SCNC: 106 MMOL/L (ref 98–107)
CHLORIDE SERPL-SCNC: 106 MMOL/L (ref 98–107)
CO2 BLDA-SCNC: 21 MMOL/L (ref 24–29)
CO2 BLDA-SCNC: 22 MMOL/L (ref 24–29)
CO2 BLDA-SCNC: 23 MMOL/L (ref 24–29)
CO2 BLDA-SCNC: 23 MMOL/L (ref 24–29)
CO2 BLDA-SCNC: 25 MMOL/L (ref 24–29)
CO2 BLDA-SCNC: 26.6 MMOL/L (ref 22–33)
CO2 BLDA-SCNC: 28 MMOL/L (ref 24–29)
CO2 BLDA-SCNC: 28.1 MMOL/L (ref 22–33)
CO2 SERPL-SCNC: 26 MMOL/L (ref 22–29)
CO2 SERPL-SCNC: 27 MMOL/L (ref 22–29)
COHGB MFR BLD: 1 % (ref 0–2)
COHGB MFR BLD: 1.1 % (ref 0–2)
CREAT SERPL-MCNC: 1.09 MG/DL (ref 0.76–1.27)
CREAT SERPL-MCNC: 1.15 MG/DL (ref 0.76–1.27)
CREAT SERPL-MCNC: 1.16 MG/DL (ref 0.76–1.27)
CREAT SERPL-MCNC: 1.34 MG/DL (ref 0.76–1.27)
DEPRECATED RDW RBC AUTO: 46.3 FL (ref 37–54)
DEPRECATED RDW RBC AUTO: 46.9 FL (ref 37–54)
DEPRECATED RDW RBC AUTO: 47.1 FL (ref 37–54)
DEPRECATED RDW RBC AUTO: 47.4 FL (ref 37–54)
EPAP: 0
EPAP: 0
ERYTHROCYTE [DISTWIDTH] IN BLOOD BY AUTOMATED COUNT: 13.6 % (ref 12.3–15.4)
ERYTHROCYTE [DISTWIDTH] IN BLOOD BY AUTOMATED COUNT: 13.6 % (ref 12.3–15.4)
ERYTHROCYTE [DISTWIDTH] IN BLOOD BY AUTOMATED COUNT: 13.7 % (ref 12.3–15.4)
ERYTHROCYTE [DISTWIDTH] IN BLOOD BY AUTOMATED COUNT: 13.9 % (ref 12.3–15.4)
GFR SERPL CREATININE-BSD FRML MDRD: 55 ML/MIN/1.73
GFR SERPL CREATININE-BSD FRML MDRD: 65 ML/MIN/1.73
GFR SERPL CREATININE-BSD FRML MDRD: 66 ML/MIN/1.73
GFR SERPL CREATININE-BSD FRML MDRD: 70 ML/MIN/1.73
GLUCOSE BLDC GLUCOMTR-MCNC: 102 MG/DL (ref 70–130)
GLUCOSE BLDC GLUCOMTR-MCNC: 104 MG/DL (ref 70–130)
GLUCOSE BLDC GLUCOMTR-MCNC: 104 MG/DL (ref 70–130)
GLUCOSE BLDC GLUCOMTR-MCNC: 107 MG/DL (ref 70–130)
GLUCOSE BLDC GLUCOMTR-MCNC: 109 MG/DL (ref 70–130)
GLUCOSE BLDC GLUCOMTR-MCNC: 127 MG/DL (ref 70–130)
GLUCOSE BLDC GLUCOMTR-MCNC: 132 MG/DL (ref 70–130)
GLUCOSE BLDC GLUCOMTR-MCNC: 88 MG/DL (ref 70–130)
GLUCOSE BLDC GLUCOMTR-MCNC: 94 MG/DL (ref 70–130)
GLUCOSE BLDC GLUCOMTR-MCNC: 99 MG/DL (ref 70–130)
GLUCOSE SERPL-MCNC: 112 MG/DL (ref 65–99)
GLUCOSE SERPL-MCNC: 118 MG/DL (ref 65–99)
GLUCOSE SERPL-MCNC: 133 MG/DL (ref 65–99)
GLUCOSE SERPL-MCNC: 85 MG/DL (ref 65–99)
HCO3 BLDA-SCNC: 20.3 MMOL/L (ref 22–26)
HCO3 BLDA-SCNC: 21 MMOL/L (ref 22–26)
HCO3 BLDA-SCNC: 21.5 MMOL/L (ref 22–26)
HCO3 BLDA-SCNC: 21.8 MMOL/L (ref 22–26)
HCO3 BLDA-SCNC: 24 MMOL/L (ref 22–26)
HCO3 BLDA-SCNC: 25.1 MMOL/L (ref 20–26)
HCO3 BLDA-SCNC: 26.7 MMOL/L (ref 20–26)
HCO3 BLDA-SCNC: 26.8 MMOL/L (ref 22–26)
HCT VFR BLD AUTO: 28.6 % (ref 37.5–51)
HCT VFR BLD AUTO: 29.3 % (ref 37.5–51)
HCT VFR BLD AUTO: 29.4 % (ref 37.5–51)
HCT VFR BLD AUTO: 41.3 % (ref 37.5–51)
HCT VFR BLD CALC: 29.6 %
HCT VFR BLD CALC: 31 %
HCT VFR BLDA CALC: 26 % (ref 38–51)
HCT VFR BLDA CALC: 26 % (ref 38–51)
HCT VFR BLDA CALC: 27 % (ref 38–51)
HCT VFR BLDA CALC: 28 % (ref 38–51)
HCT VFR BLDA CALC: 35 % (ref 38–51)
HCT VFR BLDA CALC: 40 % (ref 38–51)
HGB BLD-MCNC: 13.4 G/DL (ref 13–17.7)
HGB BLD-MCNC: 9.3 G/DL (ref 13–17.7)
HGB BLD-MCNC: 9.6 G/DL (ref 13–17.7)
HGB BLD-MCNC: 9.6 G/DL (ref 13–17.7)
HGB BLDA-MCNC: 10.1 G/DL (ref 13.5–17.5)
HGB BLDA-MCNC: 11.9 G/DL (ref 12–17)
HGB BLDA-MCNC: 13.6 G/DL (ref 12–17)
HGB BLDA-MCNC: 8.8 G/DL (ref 12–17)
HGB BLDA-MCNC: 8.8 G/DL (ref 12–17)
HGB BLDA-MCNC: 9.2 G/DL (ref 12–17)
HGB BLDA-MCNC: 9.5 G/DL (ref 12–17)
HGB BLDA-MCNC: 9.7 G/DL (ref 13.5–17.5)
INHALED O2 CONCENTRATION: 100 %
INHALED O2 CONCENTRATION: 50 %
INR PPP: 1.49 (ref 0.85–1.16)
IPAP: 0
IPAP: 0
MAGNESIUM SERPL-MCNC: 1.9 MG/DL (ref 1.6–2.6)
MAGNESIUM SERPL-MCNC: 2 MG/DL (ref 1.6–2.6)
MAGNESIUM SERPL-MCNC: 2.2 MG/DL (ref 1.6–2.6)
MCH RBC QN AUTO: 30.1 PG (ref 26.6–33)
MCH RBC QN AUTO: 30.3 PG (ref 26.6–33)
MCH RBC QN AUTO: 30.4 PG (ref 26.6–33)
MCH RBC QN AUTO: 31 PG (ref 26.6–33)
MCHC RBC AUTO-ENTMCNC: 32.4 G/DL (ref 31.5–35.7)
MCHC RBC AUTO-ENTMCNC: 32.5 G/DL (ref 31.5–35.7)
MCHC RBC AUTO-ENTMCNC: 32.7 G/DL (ref 31.5–35.7)
MCHC RBC AUTO-ENTMCNC: 32.8 G/DL (ref 31.5–35.7)
MCV RBC AUTO: 92.2 FL (ref 79–97)
MCV RBC AUTO: 93.2 FL (ref 79–97)
MCV RBC AUTO: 93.7 FL (ref 79–97)
MCV RBC AUTO: 94.5 FL (ref 79–97)
METHGB BLD QL: 1.1 % (ref 0–1.5)
METHGB BLD QL: 1.4 % (ref 0–1.5)
MODALITY: ABNORMAL
MODALITY: ABNORMAL
NOTE: ABNORMAL
NOTE: ABNORMAL
OXYHGB MFR BLDV: 92.8 % (ref 94–99)
OXYHGB MFR BLDV: 93.8 % (ref 94–99)
PA ADP PRP-ACNC: 175 PRU
PAW @ PEAK INSP FLOW SETTING VENT: 0 CMH2O
PAW @ PEAK INSP FLOW SETTING VENT: 0 CMH2O
PCO2 BLDA: 31.4 MM HG (ref 35–45)
PCO2 BLDA: 32.5 MM HG (ref 35–45)
PCO2 BLDA: 34.2 MM HG (ref 35–45)
PCO2 BLDA: 41.5 MM HG (ref 35–45)
PCO2 BLDA: 42.4 MM HG (ref 35–45)
PCO2 BLDA: 45.5 MM HG (ref 35–45)
PCO2 BLDA: 45.8 MM HG (ref 35–45)
PCO2 BLDA: 48.8 MM HG (ref 35–45)
PCO2 TEMP ADJ BLD: 45.8 MM HG (ref 35–48)
PCO2 TEMP ADJ BLD: 48.8 MM HG (ref 35–48)
PH BLDA: 7.31 PH UNITS (ref 7.35–7.6)
PH BLDA: 7.32 PH UNITS (ref 7.35–7.45)
PH BLDA: 7.33 PH UNITS (ref 7.35–7.6)
PH BLDA: 7.37 PH UNITS (ref 7.35–7.45)
PH BLDA: 7.4 PH UNITS (ref 7.35–7.6)
PH BLDA: 7.42 PH UNITS (ref 7.35–7.6)
PH BLDA: 7.42 PH UNITS (ref 7.35–7.6)
PH BLDA: 7.44 PH UNITS (ref 7.35–7.6)
PH, TEMP CORRECTED: 7.32 PH UNITS
PH, TEMP CORRECTED: 7.37 PH UNITS
PHOSPHATE SERPL-MCNC: 3.4 MG/DL (ref 2.5–4.5)
PHOSPHATE SERPL-MCNC: 5.4 MG/DL (ref 2.5–4.5)
PLATELET # BLD AUTO: 118 10*3/MM3 (ref 140–450)
PLATELET # BLD AUTO: 153 10*3/MM3 (ref 140–450)
PLATELET # BLD AUTO: 155 10*3/MM3 (ref 140–450)
PLATELET # BLD AUTO: 178 10*3/MM3 (ref 140–450)
PMV BLD AUTO: 10.2 FL (ref 6–12)
PMV BLD AUTO: 10.4 FL (ref 6–12)
PMV BLD AUTO: 10.5 FL (ref 6–12)
PMV BLD AUTO: 10.5 FL (ref 6–12)
PO2 BLDA: 114 MMHG (ref 80–105)
PO2 BLDA: 176 MMHG (ref 80–105)
PO2 BLDA: 50 MMHG (ref 80–105)
PO2 BLDA: 521 MMHG (ref 80–105)
PO2 BLDA: 69.3 MM HG (ref 83–108)
PO2 BLDA: 79 MMHG (ref 80–105)
PO2 BLDA: 79.5 MM HG (ref 83–108)
PO2 BLDA: 89 MMHG (ref 80–105)
PO2 TEMP ADJ BLD: 69.3 MM HG (ref 83–108)
PO2 TEMP ADJ BLD: 79.5 MM HG (ref 83–108)
POTASSIUM BLDA-SCNC: 4.1 MMOL/L (ref 3.5–4.9)
POTASSIUM BLDA-SCNC: 4.5 MMOL/L (ref 3.5–4.9)
POTASSIUM BLDA-SCNC: 4.7 MMOL/L (ref 3.5–4.9)
POTASSIUM BLDA-SCNC: 4.7 MMOL/L (ref 3.5–4.9)
POTASSIUM BLDA-SCNC: 5 MMOL/L (ref 3.5–4.9)
POTASSIUM BLDA-SCNC: 5.5 MMOL/L (ref 3.5–4.9)
POTASSIUM SERPL-SCNC: 3.7 MMOL/L (ref 3.5–5.2)
POTASSIUM SERPL-SCNC: 4.2 MMOL/L (ref 3.5–5.2)
POTASSIUM SERPL-SCNC: 4.3 MMOL/L (ref 3.5–5.2)
POTASSIUM SERPL-SCNC: 4.6 MMOL/L (ref 3.5–5.2)
PROTHROMBIN TIME: 17.7 SECONDS (ref 11.5–14)
PSV: 10 CMH2O
RBC # BLD AUTO: 3.07 10*6/MM3 (ref 4.14–5.8)
RBC # BLD AUTO: 3.1 10*6/MM3 (ref 4.14–5.8)
RBC # BLD AUTO: 3.19 10*6/MM3 (ref 4.14–5.8)
RBC # BLD AUTO: 4.41 10*6/MM3 (ref 4.14–5.8)
SAO2 % BLDA: 100 % (ref 95–98)
SAO2 % BLDA: 82 % (ref 95–98)
SAO2 % BLDA: 96 % (ref 95–98)
SAO2 % BLDA: 97 % (ref 95–98)
SAO2 % BLDA: 99 % (ref 95–98)
SAO2 % BLDA: 99 % (ref 95–98)
SODIUM BLD-SCNC: 137 MMOL/L (ref 138–146)
SODIUM BLD-SCNC: 137 MMOL/L (ref 138–146)
SODIUM BLD-SCNC: 138 MMOL/L (ref 138–146)
SODIUM BLD-SCNC: 139 MMOL/L (ref 138–146)
SODIUM BLD-SCNC: 139 MMOL/L (ref 138–146)
SODIUM BLD-SCNC: 141 MMOL/L (ref 138–146)
SODIUM SERPL-SCNC: 140 MMOL/L (ref 136–145)
SODIUM SERPL-SCNC: 142 MMOL/L (ref 136–145)
TOTAL RATE: 0 BREATHS/MINUTE
TOTAL RATE: 0 BREATHS/MINUTE
VENTILATOR MODE: ABNORMAL
VENTILATOR MODE: ABNORMAL
WBC # BLD AUTO: 10.3 10*3/MM3 (ref 3.4–10.8)
WBC # BLD AUTO: 9.14 10*3/MM3 (ref 3.4–10.8)
WBC # BLD AUTO: 9.29 10*3/MM3 (ref 3.4–10.8)
WBC # BLD AUTO: 9.36 10*3/MM3 (ref 3.4–10.8)

## 2020-08-26 PROCEDURE — 83735 ASSAY OF MAGNESIUM: CPT | Performed by: THORACIC SURGERY (CARDIOTHORACIC VASCULAR SURGERY)

## 2020-08-26 PROCEDURE — 84132 ASSAY OF SERUM POTASSIUM: CPT

## 2020-08-26 PROCEDURE — 25010000002 AMIODARONE IN DEXTROSE 5% 360-4.14 MG/200ML-% SOLUTION

## 2020-08-26 PROCEDURE — 33533 CABG ARTERIAL SINGLE: CPT | Performed by: THORACIC SURGERY (CARDIOTHORACIC VASCULAR SURGERY)

## 2020-08-26 PROCEDURE — 94799 UNLISTED PULMONARY SVC/PX: CPT

## 2020-08-26 PROCEDURE — 06BP4ZZ EXCISION OF RIGHT SAPHENOUS VEIN, PERCUTANEOUS ENDOSCOPIC APPROACH: ICD-10-PCS | Performed by: THORACIC SURGERY (CARDIOTHORACIC VASCULAR SURGERY)

## 2020-08-26 PROCEDURE — C1751 CATH, INF, PER/CENT/MIDLINE: HCPCS | Performed by: THORACIC SURGERY (CARDIOTHORACIC VASCULAR SURGERY)

## 2020-08-26 PROCEDURE — 33519 CABG ARTERY-VEIN THREE: CPT | Performed by: PHYSICIAN ASSISTANT

## 2020-08-26 PROCEDURE — 85576 BLOOD PLATELET AGGREGATION: CPT | Performed by: PHYSICIAN ASSISTANT

## 2020-08-26 PROCEDURE — A4648 IMPLANTABLE TISSUE MARKER: HCPCS | Performed by: THORACIC SURGERY (CARDIOTHORACIC VASCULAR SURGERY)

## 2020-08-26 PROCEDURE — 99024 POSTOP FOLLOW-UP VISIT: CPT | Performed by: THORACIC SURGERY (CARDIOTHORACIC VASCULAR SURGERY)

## 2020-08-26 PROCEDURE — 82330 ASSAY OF CALCIUM: CPT | Performed by: PHYSICIAN ASSISTANT

## 2020-08-26 PROCEDURE — 85347 COAGULATION TIME ACTIVATED: CPT

## 2020-08-26 PROCEDURE — 85027 COMPLETE CBC AUTOMATED: CPT | Performed by: PHYSICIAN ASSISTANT

## 2020-08-26 PROCEDURE — 25010000002 PROTAMINE SULFATE PER 10 MG: Performed by: ANESTHESIOLOGY

## 2020-08-26 PROCEDURE — 25010000002 MIDAZOLAM PER 1 MG: Performed by: ANESTHESIOLOGY

## 2020-08-26 PROCEDURE — 82805 BLOOD GASES W/O2 SATURATION: CPT

## 2020-08-26 PROCEDURE — 25010000002 HEPARIN (PORCINE) PER 1000 UNITS: Performed by: THORACIC SURGERY (CARDIOTHORACIC VASCULAR SURGERY)

## 2020-08-26 PROCEDURE — 33533 CABG ARTERIAL SINGLE: CPT | Performed by: PHYSICIAN ASSISTANT

## 2020-08-26 PROCEDURE — 71045 X-RAY EXAM CHEST 1 VIEW: CPT

## 2020-08-26 PROCEDURE — 80069 RENAL FUNCTION PANEL: CPT | Performed by: PHYSICIAN ASSISTANT

## 2020-08-26 PROCEDURE — 25010000002 FENTANYL CITRATE (PF) 100 MCG/2ML SOLUTION: Performed by: THORACIC SURGERY (CARDIOTHORACIC VASCULAR SURGERY)

## 2020-08-26 PROCEDURE — C1894 INTRO/SHEATH, NON-LASER: HCPCS | Performed by: THORACIC SURGERY (CARDIOTHORACIC VASCULAR SURGERY)

## 2020-08-26 PROCEDURE — 25010000003 CEFUROXIME SODIUM 1.5 G RECONSTITUTED SOLUTION: Performed by: PHYSICIAN ASSISTANT

## 2020-08-26 PROCEDURE — P9041 ALBUMIN (HUMAN),5%, 50ML: HCPCS | Performed by: PHYSICIAN ASSISTANT

## 2020-08-26 PROCEDURE — 021209W BYPASS CORONARY ARTERY, THREE ARTERIES FROM AORTA WITH AUTOLOGOUS VENOUS TISSUE, OPEN APPROACH: ICD-10-PCS | Performed by: THORACIC SURGERY (CARDIOTHORACIC VASCULAR SURGERY)

## 2020-08-26 PROCEDURE — 85610 PROTHROMBIN TIME: CPT | Performed by: PHYSICIAN ASSISTANT

## 2020-08-26 PROCEDURE — 25010000003 CEFUROXIME SODIUM 1.5 G RECONSTITUTED SOLUTION: Performed by: ANESTHESIOLOGY

## 2020-08-26 PROCEDURE — 82803 BLOOD GASES ANY COMBINATION: CPT

## 2020-08-26 PROCEDURE — 85730 THROMBOPLASTIN TIME PARTIAL: CPT | Performed by: PHYSICIAN ASSISTANT

## 2020-08-26 PROCEDURE — 5A1221Z PERFORMANCE OF CARDIAC OUTPUT, CONTINUOUS: ICD-10-PCS | Performed by: THORACIC SURGERY (CARDIOTHORACIC VASCULAR SURGERY)

## 2020-08-26 PROCEDURE — 25010000002 PROTAMINE SULFATE PER 10 MG: Performed by: PHYSICIAN ASSISTANT

## 2020-08-26 PROCEDURE — 84295 ASSAY OF SERUM SODIUM: CPT

## 2020-08-26 PROCEDURE — 25810000003 DEXTROSE 5 % WITH KCL 20 MEQ 20-5 MEQ/L-% SOLUTION: Performed by: PHYSICIAN ASSISTANT

## 2020-08-26 PROCEDURE — 83735 ASSAY OF MAGNESIUM: CPT | Performed by: PHYSICIAN ASSISTANT

## 2020-08-26 PROCEDURE — 93010 ELECTROCARDIOGRAM REPORT: CPT | Performed by: INTERNAL MEDICINE

## 2020-08-26 PROCEDURE — 99291 CRITICAL CARE FIRST HOUR: CPT | Performed by: INTERNAL MEDICINE

## 2020-08-26 PROCEDURE — 25010000002 ALBUMIN HUMAN 5% PER 50 ML: Performed by: PHYSICIAN ASSISTANT

## 2020-08-26 PROCEDURE — 85027 COMPLETE CBC AUTOMATED: CPT | Performed by: THORACIC SURGERY (CARDIOTHORACIC VASCULAR SURGERY)

## 2020-08-26 PROCEDURE — 36430 TRANSFUSION BLD/BLD COMPNT: CPT

## 2020-08-26 PROCEDURE — 94002 VENT MGMT INPAT INIT DAY: CPT

## 2020-08-26 PROCEDURE — 25010000002 PROPOFOL 10 MG/ML EMULSION: Performed by: THORACIC SURGERY (CARDIOTHORACIC VASCULAR SURGERY)

## 2020-08-26 PROCEDURE — 85014 HEMATOCRIT: CPT

## 2020-08-26 PROCEDURE — 25010000002 PAPAVERINE PER 60 MG: Performed by: THORACIC SURGERY (CARDIOTHORACIC VASCULAR SURGERY)

## 2020-08-26 PROCEDURE — P9035 PLATELET PHERES LEUKOREDUCED: HCPCS

## 2020-08-26 PROCEDURE — 25010000002 MORPHINE PER 10 MG: Performed by: THORACIC SURGERY (CARDIOTHORACIC VASCULAR SURGERY)

## 2020-08-26 PROCEDURE — 33519 CABG ARTERY-VEIN THREE: CPT | Performed by: THORACIC SURGERY (CARDIOTHORACIC VASCULAR SURGERY)

## 2020-08-26 PROCEDURE — 33508 ENDOSCOPIC VEIN HARVEST: CPT | Performed by: PHYSICIAN ASSISTANT

## 2020-08-26 PROCEDURE — 25010000002 HEPARIN (PORCINE) PER 1000 UNITS: Performed by: ANESTHESIOLOGY

## 2020-08-26 PROCEDURE — 82947 ASSAY GLUCOSE BLOOD QUANT: CPT

## 2020-08-26 PROCEDURE — 02100Z9 BYPASS CORONARY ARTERY, ONE ARTERY FROM LEFT INTERNAL MAMMARY, OPEN APPROACH: ICD-10-PCS | Performed by: THORACIC SURGERY (CARDIOTHORACIC VASCULAR SURGERY)

## 2020-08-26 PROCEDURE — 80048 BASIC METABOLIC PNL TOTAL CA: CPT | Performed by: THORACIC SURGERY (CARDIOTHORACIC VASCULAR SURGERY)

## 2020-08-26 PROCEDURE — C1751 CATH, INF, PER/CENT/MIDLINE: HCPCS | Performed by: ANESTHESIOLOGY

## 2020-08-26 PROCEDURE — 82330 ASSAY OF CALCIUM: CPT

## 2020-08-26 PROCEDURE — 93005 ELECTROCARDIOGRAM TRACING: CPT | Performed by: PHYSICIAN ASSISTANT

## 2020-08-26 PROCEDURE — 80048 BASIC METABOLIC PNL TOTAL CA: CPT | Performed by: PHYSICIAN ASSISTANT

## 2020-08-26 PROCEDURE — 33508 ENDOSCOPIC VEIN HARVEST: CPT | Performed by: THORACIC SURGERY (CARDIOTHORACIC VASCULAR SURGERY)

## 2020-08-26 DEVICE — LIGACLIP MCA MULTIPLE CLIP APPLIERS, 20 SMALL CLIPS
Type: IMPLANTABLE DEVICE | Site: LEG | Status: FUNCTIONAL
Brand: LIGACLIP

## 2020-08-26 DEVICE — DISK-SHAPED STYLE, SILICONE (1 PER STERILE PKG)
Type: IMPLANTABLE DEVICE | Site: HEART | Status: FUNCTIONAL
Brand: SCANLAN® RADIOMARK® GRAFT MARKERS

## 2020-08-26 RX ORDER — HYDROCODONE BITARTRATE AND ACETAMINOPHEN 7.5; 325 MG/1; MG/1
1 TABLET ORAL EVERY 4 HOURS PRN
Status: DISCONTINUED | OUTPATIENT
Start: 2020-08-26 | End: 2020-09-02 | Stop reason: HOSPADM

## 2020-08-26 RX ORDER — PROTAMINE SULFATE 10 MG/ML
50 INJECTION, SOLUTION INTRAVENOUS ONCE
Status: COMPLETED | OUTPATIENT
Start: 2020-08-26 | End: 2020-08-26

## 2020-08-26 RX ORDER — SODIUM CHLORIDE 0.9 % (FLUSH) 0.9 %
30 SYRINGE (ML) INJECTION ONCE AS NEEDED
Status: DISCONTINUED | OUTPATIENT
Start: 2020-08-26 | End: 2020-08-26

## 2020-08-26 RX ORDER — ALBUTEROL SULFATE 2.5 MG/3ML
2.5 SOLUTION RESPIRATORY (INHALATION) EVERY 4 HOURS PRN
Status: ACTIVE | OUTPATIENT
Start: 2020-08-26 | End: 2020-08-27

## 2020-08-26 RX ORDER — MIDAZOLAM HYDROCHLORIDE 1 MG/ML
1 INJECTION INTRAMUSCULAR; INTRAVENOUS ONCE
Status: COMPLETED | OUTPATIENT
Start: 2020-08-26 | End: 2020-08-26

## 2020-08-26 RX ORDER — MIDAZOLAM HYDROCHLORIDE 1 MG/ML
INJECTION INTRAMUSCULAR; INTRAVENOUS AS NEEDED
Status: DISCONTINUED | OUTPATIENT
Start: 2020-08-26 | End: 2020-08-26 | Stop reason: SURG

## 2020-08-26 RX ORDER — CHLORHEXIDINE GLUCONATE 0.12 MG/ML
15 RINSE ORAL EVERY 12 HOURS SCHEDULED
Status: DISCONTINUED | OUTPATIENT
Start: 2020-08-26 | End: 2020-08-27

## 2020-08-26 RX ORDER — ROCURONIUM BROMIDE 10 MG/ML
INJECTION, SOLUTION INTRAVENOUS AS NEEDED
Status: DISCONTINUED | OUTPATIENT
Start: 2020-08-26 | End: 2020-08-26 | Stop reason: SURG

## 2020-08-26 RX ORDER — NALOXONE HCL 0.4 MG/ML
0.4 VIAL (ML) INJECTION
Status: DISCONTINUED | OUTPATIENT
Start: 2020-08-26 | End: 2020-08-27

## 2020-08-26 RX ORDER — MORPHINE SULFATE 2 MG/ML
2 INJECTION, SOLUTION INTRAMUSCULAR; INTRAVENOUS
Status: DISCONTINUED | OUTPATIENT
Start: 2020-08-26 | End: 2020-08-27

## 2020-08-26 RX ORDER — DOBUTAMINE HYDROCHLORIDE 100 MG/100ML
2-20 INJECTION INTRAVENOUS CONTINUOUS PRN
Status: DISCONTINUED | OUTPATIENT
Start: 2020-08-26 | End: 2020-08-27

## 2020-08-26 RX ORDER — FAMOTIDINE 20 MG/1
20 TABLET, FILM COATED ORAL ONCE
Status: COMPLETED | OUTPATIENT
Start: 2020-08-26 | End: 2020-08-26

## 2020-08-26 RX ORDER — LIDOCAINE HYDROCHLORIDE 10 MG/ML
0.5 INJECTION, SOLUTION EPIDURAL; INFILTRATION; INTRACAUDAL; PERINEURAL ONCE AS NEEDED
Status: DISCONTINUED | OUTPATIENT
Start: 2020-08-26 | End: 2020-08-26 | Stop reason: HOSPADM

## 2020-08-26 RX ORDER — ASPIRIN 325 MG
325 TABLET, DELAYED RELEASE (ENTERIC COATED) ORAL DAILY
Status: DISCONTINUED | OUTPATIENT
Start: 2020-08-27 | End: 2020-08-31

## 2020-08-26 RX ORDER — METOPROLOL TARTRATE 5 MG/5ML
2.5 INJECTION INTRAVENOUS EVERY 6 HOURS SCHEDULED
Status: DISCONTINUED | OUTPATIENT
Start: 2020-08-26 | End: 2020-08-27

## 2020-08-26 RX ORDER — DEXMEDETOMIDINE HYDROCHLORIDE 4 UG/ML
.2-1.5 INJECTION, SOLUTION INTRAVENOUS CONTINUOUS PRN
Status: DISCONTINUED | OUTPATIENT
Start: 2020-08-26 | End: 2020-08-27

## 2020-08-26 RX ORDER — CEFUROXIME SODIUM 1.5 G/16ML
INJECTION, POWDER, FOR SOLUTION INTRAVENOUS AS NEEDED
Status: DISCONTINUED | OUTPATIENT
Start: 2020-08-26 | End: 2020-08-26 | Stop reason: SURG

## 2020-08-26 RX ORDER — NOREPINEPHRINE BIT/0.9 % NACL 8 MG/250ML
.02-.3 INFUSION BOTTLE (ML) INTRAVENOUS CONTINUOUS PRN
Status: DISCONTINUED | OUTPATIENT
Start: 2020-08-26 | End: 2020-08-30 | Stop reason: ALTCHOICE

## 2020-08-26 RX ORDER — PHENYLEPHRINE HCL IN 0.9% NACL 0.5 MG/5ML
.5-3 SYRINGE (ML) INTRAVENOUS CONTINUOUS PRN
Status: DISCONTINUED | OUTPATIENT
Start: 2020-08-26 | End: 2020-08-29

## 2020-08-26 RX ORDER — MEPERIDINE HYDROCHLORIDE 25 MG/ML
25 INJECTION INTRAMUSCULAR; INTRAVENOUS; SUBCUTANEOUS EVERY 4 HOURS PRN
Status: ACTIVE | OUTPATIENT
Start: 2020-08-26 | End: 2020-08-26

## 2020-08-26 RX ORDER — POTASSIUM CHLORIDE 29.8 MG/ML
20 INJECTION INTRAVENOUS
Status: DISCONTINUED | OUTPATIENT
Start: 2020-08-26 | End: 2020-08-27

## 2020-08-26 RX ORDER — POTASSIUM CHLORIDE 750 MG/1
40 CAPSULE, EXTENDED RELEASE ORAL AS NEEDED
Status: DISCONTINUED | OUTPATIENT
Start: 2020-08-26 | End: 2020-08-31

## 2020-08-26 RX ORDER — FENTANYL CITRATE 50 UG/ML
25 INJECTION, SOLUTION INTRAMUSCULAR; INTRAVENOUS
Status: DISCONTINUED | OUTPATIENT
Start: 2020-08-26 | End: 2020-08-27

## 2020-08-26 RX ORDER — ONDANSETRON 2 MG/ML
4 INJECTION INTRAMUSCULAR; INTRAVENOUS EVERY 6 HOURS PRN
Status: DISCONTINUED | OUTPATIENT
Start: 2020-08-26 | End: 2020-09-02 | Stop reason: HOSPADM

## 2020-08-26 RX ORDER — NITROGLYCERIN 20 MG/100ML
5-200 INJECTION INTRAVENOUS CONTINUOUS PRN
Status: DISCONTINUED | OUTPATIENT
Start: 2020-08-26 | End: 2020-08-29

## 2020-08-26 RX ORDER — ASPIRIN 325 MG
325 TABLET ORAL ONCE
Status: COMPLETED | OUTPATIENT
Start: 2020-08-26 | End: 2020-08-26

## 2020-08-26 RX ORDER — OXYCODONE HYDROCHLORIDE AND ACETAMINOPHEN 5; 325 MG/1; MG/1
2 TABLET ORAL EVERY 4 HOURS PRN
Status: DISCONTINUED | OUTPATIENT
Start: 2020-08-26 | End: 2020-08-27

## 2020-08-26 RX ORDER — PROTAMINE SULFATE 10 MG/ML
INJECTION, SOLUTION INTRAVENOUS
Status: DISPENSED
Start: 2020-08-26 | End: 2020-08-26

## 2020-08-26 RX ORDER — AMINOCAPROIC ACID 250 MG/ML
INJECTION, SOLUTION INTRAVENOUS AS NEEDED
Status: DISCONTINUED | OUTPATIENT
Start: 2020-08-26 | End: 2020-08-26 | Stop reason: SURG

## 2020-08-26 RX ORDER — NOREPINEPHRINE BITARTRATE 1 MG/ML
INJECTION, SOLUTION INTRAVENOUS CONTINUOUS PRN
Status: DISCONTINUED | OUTPATIENT
Start: 2020-08-26 | End: 2020-08-26 | Stop reason: SURG

## 2020-08-26 RX ORDER — ALBUMIN, HUMAN INJ 5% 5 %
SOLUTION INTRAVENOUS
Status: DISPENSED
Start: 2020-08-26 | End: 2020-08-26

## 2020-08-26 RX ORDER — POTASSIUM CHLORIDE 1.5 G/1.77G
40 POWDER, FOR SOLUTION ORAL AS NEEDED
Status: DISCONTINUED | OUTPATIENT
Start: 2020-08-26 | End: 2020-08-31

## 2020-08-26 RX ORDER — NOREPINEPHRINE BIT/0.9 % NACL 8 MG/250ML
INFUSION BOTTLE (ML) INTRAVENOUS
Status: DISPENSED
Start: 2020-08-26 | End: 2020-08-26

## 2020-08-26 RX ORDER — ATORVASTATIN CALCIUM 40 MG/1
40 TABLET, FILM COATED ORAL NIGHTLY
Status: DISCONTINUED | OUTPATIENT
Start: 2020-08-26 | End: 2020-09-02 | Stop reason: HOSPADM

## 2020-08-26 RX ORDER — HEPARIN SODIUM 1000 [USP'U]/ML
INJECTION, SOLUTION INTRAVENOUS; SUBCUTANEOUS AS NEEDED
Status: DISCONTINUED | OUTPATIENT
Start: 2020-08-26 | End: 2020-08-26 | Stop reason: SURG

## 2020-08-26 RX ORDER — PROTAMINE SULFATE 10 MG/ML
INJECTION, SOLUTION INTRAVENOUS AS NEEDED
Status: DISCONTINUED | OUTPATIENT
Start: 2020-08-26 | End: 2020-08-26 | Stop reason: SURG

## 2020-08-26 RX ORDER — DOPAMINE HYDROCHLORIDE 160 MG/100ML
2-20 INJECTION, SOLUTION INTRAVENOUS CONTINUOUS PRN
Status: DISCONTINUED | OUTPATIENT
Start: 2020-08-26 | End: 2020-08-27

## 2020-08-26 RX ORDER — PANTOPRAZOLE SODIUM 40 MG/10ML
40 INJECTION, POWDER, LYOPHILIZED, FOR SOLUTION INTRAVENOUS
Status: DISCONTINUED | OUTPATIENT
Start: 2020-08-26 | End: 2020-08-28

## 2020-08-26 RX ORDER — SODIUM CHLORIDE, SODIUM LACTATE, POTASSIUM CHLORIDE, CALCIUM CHLORIDE 600; 310; 30; 20 MG/100ML; MG/100ML; MG/100ML; MG/100ML
9 INJECTION, SOLUTION INTRAVENOUS CONTINUOUS
Status: DISCONTINUED | OUTPATIENT
Start: 2020-08-26 | End: 2020-08-26

## 2020-08-26 RX ORDER — PAPAVERINE HYDROCHLORIDE 30 MG/ML
INJECTION INTRAMUSCULAR; INTRAVENOUS AS NEEDED
Status: DISCONTINUED | OUTPATIENT
Start: 2020-08-26 | End: 2020-08-26 | Stop reason: HOSPADM

## 2020-08-26 RX ORDER — ALBUMIN, HUMAN INJ 5% 5 %
500 SOLUTION INTRAVENOUS AS NEEDED
Status: COMPLETED | OUTPATIENT
Start: 2020-08-26 | End: 2020-08-26

## 2020-08-26 RX ORDER — SUFENTANIL CITRATE 50 UG/ML
INJECTION EPIDURAL; INTRAVENOUS AS NEEDED
Status: DISCONTINUED | OUTPATIENT
Start: 2020-08-26 | End: 2020-08-26 | Stop reason: SURG

## 2020-08-26 RX ORDER — VECURONIUM BROMIDE 1 MG/ML
INJECTION, POWDER, LYOPHILIZED, FOR SOLUTION INTRAVENOUS AS NEEDED
Status: DISCONTINUED | OUTPATIENT
Start: 2020-08-26 | End: 2020-08-26 | Stop reason: SURG

## 2020-08-26 RX ORDER — POTASSIUM CHLORIDE, DEXTROSE MONOHYDRATE 150; 5 MG/100ML; G/100ML
30 INJECTION, SOLUTION INTRAVENOUS CONTINUOUS
Status: DISCONTINUED | OUTPATIENT
Start: 2020-08-26 | End: 2020-08-27

## 2020-08-26 RX ORDER — SODIUM CHLORIDE 9 MG/ML
INJECTION, SOLUTION INTRAVENOUS AS NEEDED
Status: DISCONTINUED | OUTPATIENT
Start: 2020-08-26 | End: 2020-08-26 | Stop reason: HOSPADM

## 2020-08-26 RX ORDER — AMOXICILLIN 250 MG
2 CAPSULE ORAL 2 TIMES DAILY
Status: DISCONTINUED | OUTPATIENT
Start: 2020-08-26 | End: 2020-09-02 | Stop reason: HOSPADM

## 2020-08-26 RX ORDER — SODIUM CHLORIDE 9 MG/ML
30 INJECTION, SOLUTION INTRAVENOUS CONTINUOUS PRN
Status: DISCONTINUED | OUTPATIENT
Start: 2020-08-26 | End: 2020-08-26

## 2020-08-26 RX ORDER — CALCIUM CHLORIDE 100 MG/ML
INJECTION INTRAVENOUS; INTRAVENTRICULAR AS NEEDED
Status: DISCONTINUED | OUTPATIENT
Start: 2020-08-26 | End: 2020-08-26 | Stop reason: SURG

## 2020-08-26 RX ADMIN — SODIUM CHLORIDE, POTASSIUM CHLORIDE, SODIUM LACTATE AND CALCIUM CHLORIDE 9 ML/HR: 600; 310; 30; 20 INJECTION, SOLUTION INTRAVENOUS at 06:28

## 2020-08-26 RX ADMIN — PROPOFOL 50 MCG/KG/MIN: 10 INJECTION, EMULSION INTRAVENOUS at 13:10

## 2020-08-26 RX ADMIN — PANTOPRAZOLE SODIUM 40 MG: 40 INJECTION, POWDER, FOR SOLUTION INTRAVENOUS at 12:21

## 2020-08-26 RX ADMIN — MAGNESIUM OXIDE TAB 400 MG (241.3 MG ELEMENTAL MG) 400 MG: 400 (241.3 MG) TAB at 22:24

## 2020-08-26 RX ADMIN — CEFUROXIME 1.5 G: 1.5 INJECTION, POWDER, FOR SOLUTION INTRAVENOUS at 10:11

## 2020-08-26 RX ADMIN — SUFENTANIL CITRATE 100 MCG: 50 INJECTION, SOLUTION EPIDURAL; INTRAVENOUS at 08:16

## 2020-08-26 RX ADMIN — SODIUM CHLORIDE 50 ML/HR: 9 INJECTION, SOLUTION INTRAVENOUS at 05:18

## 2020-08-26 RX ADMIN — AMIODARONE HYDROCHLORIDE 1 MG/MIN: 1.8 INJECTION, SOLUTION INTRAVENOUS at 13:38

## 2020-08-26 RX ADMIN — HYDROCODONE BITARTRATE AND ACETAMINOPHEN 1 TABLET: 7.5; 325 TABLET ORAL at 23:03

## 2020-08-26 RX ADMIN — ROCURONIUM BROMIDE 50 MG: 10 INJECTION INTRAVENOUS at 07:07

## 2020-08-26 RX ADMIN — MUPIROCIN 1 APPLICATION: 20 OINTMENT TOPICAL at 05:15

## 2020-08-26 RX ADMIN — NOREPINEPHRINE BITARTRATE 0.1 MCG/KG/MIN: 1 INJECTION, SOLUTION, CONCENTRATE INTRAVENOUS at 09:36

## 2020-08-26 RX ADMIN — MORPHINE SULFATE 2 MG: 2 INJECTION, SOLUTION INTRAMUSCULAR; INTRAVENOUS at 23:59

## 2020-08-26 RX ADMIN — DEXMEDETOMIDINE HYDROCHLORIDE 0.2 MCG/KG/HR: 400 INJECTION INTRAVENOUS at 13:54

## 2020-08-26 RX ADMIN — SODIUM CHLORIDE, PRESERVATIVE FREE 10 ML: 5 INJECTION INTRAVENOUS at 06:28

## 2020-08-26 RX ADMIN — NITROGLYCERIN 1 INCH: 20 OINTMENT TOPICAL at 05:15

## 2020-08-26 RX ADMIN — ASPIRIN 325 MG ORAL TABLET 325 MG: 325 PILL ORAL at 12:20

## 2020-08-26 RX ADMIN — ROCURONIUM BROMIDE 50 MG: 10 INJECTION INTRAVENOUS at 08:16

## 2020-08-26 RX ADMIN — AMINOCAPROIC ACID 10 G: 250 INJECTION, SOLUTION INTRAVENOUS at 10:11

## 2020-08-26 RX ADMIN — MORPHINE SULFATE 2 MG: 2 INJECTION, SOLUTION INTRAMUSCULAR; INTRAVENOUS at 22:24

## 2020-08-26 RX ADMIN — AMINOCAPROIC ACID 10 G: 250 INJECTION, SOLUTION INTRAVENOUS at 07:30

## 2020-08-26 RX ADMIN — SUFENTANIL CITRATE 150 MCG: 50 INJECTION, SOLUTION EPIDURAL; INTRAVENOUS at 07:07

## 2020-08-26 RX ADMIN — DEXMEDETOMIDINE HYDROCHLORIDE 0.5 MCG/KG/HR: 400 INJECTION INTRAVENOUS at 18:37

## 2020-08-26 RX ADMIN — HEPARIN SODIUM 33000 UNITS: 1000 INJECTION, SOLUTION INTRAVENOUS; SUBCUTANEOUS at 08:03

## 2020-08-26 RX ADMIN — MIDAZOLAM 5 MG: 1 INJECTION INTRAMUSCULAR; INTRAVENOUS at 07:07

## 2020-08-26 RX ADMIN — MIDAZOLAM 1 MG: 1 INJECTION INTRAMUSCULAR; INTRAVENOUS at 06:40

## 2020-08-26 RX ADMIN — CALCIUM CHLORIDE 1 G: 100 INJECTION INTRAVENOUS; INTRAVENTRICULAR at 10:02

## 2020-08-26 RX ADMIN — HYDROCODONE BITARTRATE AND ACETAMINOPHEN 1 TABLET: 7.5; 325 TABLET ORAL at 13:55

## 2020-08-26 RX ADMIN — PROTAMINE SULFATE 50 MG: 10 INJECTION, SOLUTION INTRAVENOUS at 11:46

## 2020-08-26 RX ADMIN — CEFUROXIME 1.5 G: 1.5 INJECTION, POWDER, FOR SOLUTION INTRAVENOUS at 07:30

## 2020-08-26 RX ADMIN — Medication 0.06 MCG/KG/MIN: at 11:49

## 2020-08-26 RX ADMIN — CHLORHEXIDINE GLUCONATE 0.12% ORAL RINSE 15 ML: 1.2 LIQUID ORAL at 05:15

## 2020-08-26 RX ADMIN — ATORVASTATIN CALCIUM 40 MG: 40 TABLET, FILM COATED ORAL at 22:34

## 2020-08-26 RX ADMIN — PROTAMINE SULFATE 400 MG: 10 INJECTION, SOLUTION INTRAVENOUS at 10:02

## 2020-08-26 RX ADMIN — POTASSIUM CHLORIDE AND DEXTROSE MONOHYDRATE 30 ML/HR: 150; 5 INJECTION, SOLUTION INTRAVENOUS at 11:49

## 2020-08-26 RX ADMIN — PROPOFOL 50 MCG/KG/MIN: 10 INJECTION, EMULSION INTRAVENOUS at 11:47

## 2020-08-26 RX ADMIN — PROTAMINE SULFATE 50 MG: 10 INJECTION, SOLUTION INTRAVENOUS at 10:23

## 2020-08-26 RX ADMIN — CHLORHEXIDINE GLUCONATE 0.12% ORAL RINSE 15 ML: 1.2 LIQUID ORAL at 22:25

## 2020-08-26 RX ADMIN — MIDAZOLAM 5 MG: 1 INJECTION INTRAMUSCULAR; INTRAVENOUS at 08:16

## 2020-08-26 RX ADMIN — CEFUROXIME SODIUM 1.5 G: 1.5 INJECTION, POWDER, FOR SOLUTION INTRAVENOUS at 18:33

## 2020-08-26 RX ADMIN — SUFENTANIL CITRATE 50 MCG: 50 INJECTION, SOLUTION EPIDURAL; INTRAVENOUS at 07:35

## 2020-08-26 RX ADMIN — FAMOTIDINE 20 MG: 20 TABLET, FILM COATED ORAL at 06:28

## 2020-08-26 RX ADMIN — ALBUMIN HUMAN 500 ML: 0.05 INJECTION, SOLUTION INTRAVENOUS at 13:10

## 2020-08-26 RX ADMIN — FENTANYL CITRATE 25 MCG: 0.05 INJECTION, SOLUTION INTRAMUSCULAR; INTRAVENOUS at 15:03

## 2020-08-26 RX ADMIN — OXYCODONE HYDROCHLORIDE AND ACETAMINOPHEN 2 TABLET: 5; 325 TABLET ORAL at 20:10

## 2020-08-26 RX ADMIN — MORPHINE SULFATE 2 MG: 2 INJECTION, SOLUTION INTRAMUSCULAR; INTRAVENOUS at 20:35

## 2020-08-26 RX ADMIN — ALBUMIN HUMAN 500 ML: 0.05 INJECTION, SOLUTION INTRAVENOUS at 11:47

## 2020-08-26 RX ADMIN — VECURONIUM BROMIDE 6 MG: 1 INJECTION, POWDER, LYOPHILIZED, FOR SOLUTION INTRAVENOUS at 09:00

## 2020-08-26 NOTE — ANESTHESIA PROCEDURE NOTES
Central Line      Patient reassessed immediately prior to procedure    Patient location during procedure: OR  Indications: vascular access  Staff  Anesthesiologist: Yoav William MD  Preanesthetic Checklist  Completed: patient identified, site marked, surgical consent, pre-op evaluation, timeout performed, IV checked, risks and benefits discussed and monitors and equipment checked  Central Line Prep  Sterile Tech:cap, gloves, gown, mask and sterile barriers  Prep: chloraprep  Patient monitoring: blood pressure monitoring, continuous pulse oximetry and EKG  Central Line Procedure  Laterality:right  Location:internal jugular  Catheter Type:Cordis and [place]-An  Catheter Size:9 Fr  Guidance:ultrasound guided  PROCEDURE NOTE/ULTRASOUND INTERPRETATION.  Using ultrasound guidance the potential vascular sites for insertion of the catheter were visualized to determine the patency of the vessel to be used for vascular access.  After selecting the appropriate site for insertion, the needle was visualized under ultrasound being inserted into the internal jugular vein, followed by ultrasound confirmation of wire and catheter placement. There were no abnormalities seen on ultrasound; an image was taken; and the patient tolerated the procedure with no complications. Images: still images obtained, printed/placed on chart  Assessment  Post procedure:biopatch applied, line sutured, occlusive dressing applied and secured with tape  Assessement:blood return through all ports, free fluid flow and chest x-ray ordered  Complications:no  Patient Tolerance:patient tolerated the procedure well with no apparent complications

## 2020-08-26 NOTE — ANESTHESIA PREPROCEDURE EVALUATION
Anesthesia Evaluation                  Airway   Mallampati: II  Dental      Pulmonary    Cardiovascular     (+) hypertension,       Neuro/Psych  GI/Hepatic/Renal/Endo    (+)   renal disease,     Musculoskeletal     Abdominal    Substance History      OB/GYN          Other                        Anesthesia Plan    ASA 4     general     intravenous induction     Anesthetic plan, all risks, benefits, and alternatives have been provided, discussed and informed consent has been obtained with: patient.

## 2020-08-26 NOTE — ANESTHESIA PROCEDURE NOTES
Arterial Line      Patient reassessed immediately prior to procedure    Patient location during procedure: pre-op   Line placed for hemodynamic monitoring.  Performed By   Anesthesiologist: Yoav William MD  Preanesthetic Checklist  Completed: patient identified, site marked, surgical consent, pre-op evaluation, timeout performed, IV checked, risks and benefits discussed and monitors and equipment checked  Arterial Line Prep   Sterile Tech: cap, gloves and sterile barriers  Prep: ChloraPrep  Patient monitoring: blood pressure monitoring, continuous pulse oximetry and EKG  Arterial Line Procedure   Laterality:left  Location:  radial artery  Catheter size: 20 G   Guidance: palpation technique  Number of attempts: 1  Successful placement: yes  Post Assessment   Dressing Type: line sutured, occlusive dressing applied, secured with tape and wrist guard applied.   Complications no  Circ/Move/Sens Assessment: normal and unchanged.   Patient Tolerance: patient tolerated the procedure well with no apparent complications

## 2020-08-26 NOTE — ANESTHESIA PROCEDURE NOTES
Airway  Urgency: elective    Airway not difficult    General Information and Staff    Patient location during procedure: OR  Anesthesiologist: Yoav William MD    Indications and Patient Condition  Indications for airway management: airway protection    Preoxygenated: yes  MILS not maintained throughout  Mask difficulty assessment: 1 - vent by mask    Final Airway Details  Final airway type: endotracheal airway      Successful airway: ETT  Cuffed: yes   Successful intubation technique: direct laryngoscopy  Endotracheal tube insertion site: oral  Blade: Phong  Blade size: 3  ETT size (mm): 7.5  Cormack-Lehane Classification: grade I - full view of glottis  Placement verified by: chest auscultation and capnometry   Measured from: lips  ETT/EBT  to lips (cm): 20  Number of attempts at approach: 1  Assessment: lips, teeth, and gum same as pre-op and atraumatic intubation    Additional Comments  Negative epigastric sounds, Breath sound equal bilaterally with symmetric chest rise and fall

## 2020-08-27 ENCOUNTER — APPOINTMENT (OUTPATIENT)
Dept: GENERAL RADIOLOGY | Facility: HOSPITAL | Age: 55
End: 2020-08-27

## 2020-08-27 LAB
ALBUMIN SERPL-MCNC: 4 G/DL (ref 3.5–5.2)
ANION GAP SERPL CALCULATED.3IONS-SCNC: 11 MMOL/L (ref 5–15)
ANION GAP SERPL CALCULATED.3IONS-SCNC: 12 MMOL/L (ref 5–15)
BASOPHILS # BLD AUTO: 0.06 10*3/MM3 (ref 0–0.2)
BASOPHILS # BLD AUTO: 0.06 10*3/MM3 (ref 0–0.2)
BASOPHILS NFR BLD AUTO: 0.6 % (ref 0–1.5)
BASOPHILS NFR BLD AUTO: 0.6 % (ref 0–1.5)
BH BB BLOOD EXPIRATION DATE: NORMAL
BH BB BLOOD TYPE BARCODE: 6200
BH BB DISPENSE STATUS: NORMAL
BH BB PRODUCT CODE: NORMAL
BH BB UNIT NUMBER: NORMAL
BUN SERPL-MCNC: 24 MG/DL (ref 6–20)
BUN SERPL-MCNC: 24 MG/DL (ref 6–20)
BUN/CREAT SERPL: 16.9 (ref 7–25)
BUN/CREAT SERPL: 18.3 (ref 7–25)
CALCIUM SPEC-SCNC: 8.7 MG/DL (ref 8.6–10.5)
CALCIUM SPEC-SCNC: 9 MG/DL (ref 8.6–10.5)
CHLORIDE SERPL-SCNC: 104 MMOL/L (ref 98–107)
CHLORIDE SERPL-SCNC: 106 MMOL/L (ref 98–107)
CO2 SERPL-SCNC: 23 MMOL/L (ref 22–29)
CO2 SERPL-SCNC: 23 MMOL/L (ref 22–29)
CREAT SERPL-MCNC: 1.31 MG/DL (ref 0.76–1.27)
CREAT SERPL-MCNC: 1.42 MG/DL (ref 0.76–1.27)
DEPRECATED RDW RBC AUTO: 46 FL (ref 37–54)
DEPRECATED RDW RBC AUTO: 46.7 FL (ref 37–54)
DEPRECATED RDW RBC AUTO: 46.9 FL (ref 37–54)
EOSINOPHIL # BLD AUTO: 0.04 10*3/MM3 (ref 0–0.4)
EOSINOPHIL # BLD AUTO: 0.11 10*3/MM3 (ref 0–0.4)
EOSINOPHIL NFR BLD AUTO: 0.4 % (ref 0.3–6.2)
EOSINOPHIL NFR BLD AUTO: 1 % (ref 0.3–6.2)
ERYTHROCYTE [DISTWIDTH] IN BLOOD BY AUTOMATED COUNT: 13.5 % (ref 12.3–15.4)
ERYTHROCYTE [DISTWIDTH] IN BLOOD BY AUTOMATED COUNT: 13.5 % (ref 12.3–15.4)
ERYTHROCYTE [DISTWIDTH] IN BLOOD BY AUTOMATED COUNT: 13.6 % (ref 12.3–15.4)
GFR SERPL CREATININE-BSD FRML MDRD: 51 ML/MIN/1.73
GFR SERPL CREATININE-BSD FRML MDRD: 56 ML/MIN/1.73
GLUCOSE BLDC GLUCOMTR-MCNC: 102 MG/DL (ref 70–130)
GLUCOSE BLDC GLUCOMTR-MCNC: 113 MG/DL (ref 70–130)
GLUCOSE BLDC GLUCOMTR-MCNC: 115 MG/DL (ref 70–130)
GLUCOSE BLDC GLUCOMTR-MCNC: 116 MG/DL (ref 70–130)
GLUCOSE BLDC GLUCOMTR-MCNC: 119 MG/DL (ref 70–130)
GLUCOSE BLDC GLUCOMTR-MCNC: 121 MG/DL (ref 70–130)
GLUCOSE SERPL-MCNC: 124 MG/DL (ref 65–99)
GLUCOSE SERPL-MCNC: 139 MG/DL (ref 65–99)
HCT VFR BLD AUTO: 27.6 % (ref 37.5–51)
HCT VFR BLD AUTO: 28.2 % (ref 37.5–51)
HCT VFR BLD AUTO: 28.9 % (ref 37.5–51)
HGB BLD-MCNC: 8.9 G/DL (ref 13–17.7)
HGB BLD-MCNC: 9.1 G/DL (ref 13–17.7)
HGB BLD-MCNC: 9.2 G/DL (ref 13–17.7)
IMM GRANULOCYTES # BLD AUTO: 0.04 10*3/MM3 (ref 0–0.05)
IMM GRANULOCYTES # BLD AUTO: 0.05 10*3/MM3 (ref 0–0.05)
IMM GRANULOCYTES NFR BLD AUTO: 0.4 % (ref 0–0.5)
IMM GRANULOCYTES NFR BLD AUTO: 0.5 % (ref 0–0.5)
INR PPP: 1.44 (ref 0.85–1.16)
LYMPHOCYTES # BLD AUTO: 0.97 10*3/MM3 (ref 0.7–3.1)
LYMPHOCYTES # BLD AUTO: 1.1 10*3/MM3 (ref 0.7–3.1)
LYMPHOCYTES NFR BLD AUTO: 10.2 % (ref 19.6–45.3)
LYMPHOCYTES NFR BLD AUTO: 9.2 % (ref 19.6–45.3)
MAGNESIUM SERPL-MCNC: 1.7 MG/DL (ref 1.6–2.6)
MAGNESIUM SERPL-MCNC: 1.7 MG/DL (ref 1.6–2.6)
MAGNESIUM SERPL-MCNC: 2.7 MG/DL (ref 1.6–2.6)
MCH RBC QN AUTO: 29.4 PG (ref 26.6–33)
MCH RBC QN AUTO: 29.7 PG (ref 26.6–33)
MCH RBC QN AUTO: 30.5 PG (ref 26.6–33)
MCHC RBC AUTO-ENTMCNC: 31.5 G/DL (ref 31.5–35.7)
MCHC RBC AUTO-ENTMCNC: 32.2 G/DL (ref 31.5–35.7)
MCHC RBC AUTO-ENTMCNC: 32.6 G/DL (ref 31.5–35.7)
MCV RBC AUTO: 92 FL (ref 79–97)
MCV RBC AUTO: 93.4 FL (ref 79–97)
MCV RBC AUTO: 93.5 FL (ref 79–97)
MONOCYTES # BLD AUTO: 1.77 10*3/MM3 (ref 0.1–0.9)
MONOCYTES # BLD AUTO: 1.96 10*3/MM3 (ref 0.1–0.9)
MONOCYTES NFR BLD AUTO: 16.4 % (ref 5–12)
MONOCYTES NFR BLD AUTO: 18.6 % (ref 5–12)
NEUTROPHILS NFR BLD AUTO: 7.36 10*3/MM3 (ref 1.7–7)
NEUTROPHILS NFR BLD AUTO: 7.76 10*3/MM3 (ref 1.7–7)
NEUTROPHILS NFR BLD AUTO: 70.1 % (ref 42.7–76)
NEUTROPHILS NFR BLD AUTO: 72 % (ref 42.7–76)
NRBC BLD AUTO-RTO: 0 /100 WBC (ref 0–0.2)
NRBC BLD AUTO-RTO: 0 /100 WBC (ref 0–0.2)
PHOSPHATE SERPL-MCNC: 4.4 MG/DL (ref 2.5–4.5)
PLAT MORPH BLD: NORMAL
PLAT MORPH BLD: NORMAL
PLATELET # BLD AUTO: 132 10*3/MM3 (ref 140–450)
PLATELET # BLD AUTO: 155 10*3/MM3 (ref 140–450)
PLATELET # BLD AUTO: 159 10*3/MM3 (ref 140–450)
PMV BLD AUTO: 10 FL (ref 6–12)
PMV BLD AUTO: 10.5 FL (ref 6–12)
PMV BLD AUTO: 10.7 FL (ref 6–12)
POTASSIUM SERPL-SCNC: 4 MMOL/L (ref 3.5–5.2)
POTASSIUM SERPL-SCNC: 4.2 MMOL/L (ref 3.5–5.2)
PROTHROMBIN TIME: 17.3 SECONDS (ref 11.5–14)
RBC # BLD AUTO: 3 10*6/MM3 (ref 4.14–5.8)
RBC # BLD AUTO: 3.02 10*6/MM3 (ref 4.14–5.8)
RBC # BLD AUTO: 3.09 10*6/MM3 (ref 4.14–5.8)
RBC MORPH BLD: NORMAL
RBC MORPH BLD: NORMAL
SODIUM SERPL-SCNC: 138 MMOL/L (ref 136–145)
SODIUM SERPL-SCNC: 141 MMOL/L (ref 136–145)
UNIT  ABO: NORMAL
UNIT  RH: NORMAL
WBC # BLD AUTO: 10.35 10*3/MM3 (ref 3.4–10.8)
WBC # BLD AUTO: 10.51 10*3/MM3 (ref 3.4–10.8)
WBC # BLD AUTO: 10.77 10*3/MM3 (ref 3.4–10.8)
WBC MORPH BLD: NORMAL
WBC MORPH BLD: NORMAL

## 2020-08-27 PROCEDURE — 80048 BASIC METABOLIC PNL TOTAL CA: CPT | Performed by: THORACIC SURGERY (CARDIOTHORACIC VASCULAR SURGERY)

## 2020-08-27 PROCEDURE — 80069 RENAL FUNCTION PANEL: CPT | Performed by: PHYSICIAN ASSISTANT

## 2020-08-27 PROCEDURE — 99024 POSTOP FOLLOW-UP VISIT: CPT | Performed by: THORACIC SURGERY (CARDIOTHORACIC VASCULAR SURGERY)

## 2020-08-27 PROCEDURE — 94799 UNLISTED PULMONARY SVC/PX: CPT

## 2020-08-27 PROCEDURE — 25010000002 AMIODARONE IN DEXTROSE 5% 360-4.14 MG/200ML-% SOLUTION: Performed by: THORACIC SURGERY (CARDIOTHORACIC VASCULAR SURGERY)

## 2020-08-27 PROCEDURE — P9041 ALBUMIN (HUMAN),5%, 50ML: HCPCS | Performed by: INTERNAL MEDICINE

## 2020-08-27 PROCEDURE — 82962 GLUCOSE BLOOD TEST: CPT

## 2020-08-27 PROCEDURE — 85027 COMPLETE CBC AUTOMATED: CPT | Performed by: THORACIC SURGERY (CARDIOTHORACIC VASCULAR SURGERY)

## 2020-08-27 PROCEDURE — 25010000002 MORPHINE PER 10 MG: Performed by: THORACIC SURGERY (CARDIOTHORACIC VASCULAR SURGERY)

## 2020-08-27 PROCEDURE — 83735 ASSAY OF MAGNESIUM: CPT | Performed by: PHYSICIAN ASSISTANT

## 2020-08-27 PROCEDURE — 71045 X-RAY EXAM CHEST 1 VIEW: CPT

## 2020-08-27 PROCEDURE — 25010000002 FENTANYL CITRATE (PF) 100 MCG/2ML SOLUTION: Performed by: THORACIC SURGERY (CARDIOTHORACIC VASCULAR SURGERY)

## 2020-08-27 PROCEDURE — 85007 BL SMEAR W/DIFF WBC COUNT: CPT | Performed by: PHYSICIAN ASSISTANT

## 2020-08-27 PROCEDURE — 85025 COMPLETE CBC W/AUTO DIFF WBC: CPT | Performed by: PHYSICIAN ASSISTANT

## 2020-08-27 PROCEDURE — 97162 PT EVAL MOD COMPLEX 30 MIN: CPT

## 2020-08-27 PROCEDURE — 93005 ELECTROCARDIOGRAM TRACING: CPT | Performed by: PHYSICIAN ASSISTANT

## 2020-08-27 PROCEDURE — 99291 CRITICAL CARE FIRST HOUR: CPT | Performed by: INTERNAL MEDICINE

## 2020-08-27 PROCEDURE — 25010000002 ALBUMIN HUMAN 5% PER 50 ML: Performed by: INTERNAL MEDICINE

## 2020-08-27 PROCEDURE — 85610 PROTHROMBIN TIME: CPT | Performed by: PHYSICIAN ASSISTANT

## 2020-08-27 PROCEDURE — 83735 ASSAY OF MAGNESIUM: CPT | Performed by: THORACIC SURGERY (CARDIOTHORACIC VASCULAR SURGERY)

## 2020-08-27 PROCEDURE — 25010000003 MAGNESIUM SULFATE 4 GM/100ML SOLUTION

## 2020-08-27 PROCEDURE — 25010000003 CEFUROXIME SODIUM 1.5 G RECONSTITUTED SOLUTION: Performed by: PHYSICIAN ASSISTANT

## 2020-08-27 RX ORDER — MAGNESIUM SULFATE HEPTAHYDRATE 40 MG/ML
2 INJECTION, SOLUTION INTRAVENOUS AS NEEDED
Status: DISCONTINUED | OUTPATIENT
Start: 2020-08-27 | End: 2020-08-31

## 2020-08-27 RX ORDER — ALBUMIN, HUMAN INJ 5% 5 %
500 SOLUTION INTRAVENOUS ONCE
Status: COMPLETED | OUTPATIENT
Start: 2020-08-27 | End: 2020-08-27

## 2020-08-27 RX ORDER — OXYCODONE HYDROCHLORIDE 5 MG/1
10 TABLET ORAL EVERY 4 HOURS PRN
Status: DISCONTINUED | OUTPATIENT
Start: 2020-08-27 | End: 2020-09-02 | Stop reason: HOSPADM

## 2020-08-27 RX ORDER — MORPHINE SULFATE 2 MG/ML
2 INJECTION, SOLUTION INTRAMUSCULAR; INTRAVENOUS
Status: DISCONTINUED | OUTPATIENT
Start: 2020-08-27 | End: 2020-09-02 | Stop reason: HOSPADM

## 2020-08-27 RX ORDER — CLOPIDOGREL BISULFATE 75 MG/1
75 TABLET ORAL DAILY
Status: DISCONTINUED | OUTPATIENT
Start: 2020-08-28 | End: 2020-08-28

## 2020-08-27 RX ORDER — MAGNESIUM SULFATE HEPTAHYDRATE 40 MG/ML
4 INJECTION, SOLUTION INTRAVENOUS AS NEEDED
Status: DISCONTINUED | OUTPATIENT
Start: 2020-08-27 | End: 2020-08-31

## 2020-08-27 RX ADMIN — Medication 0.06 MCG/KG/MIN: at 11:24

## 2020-08-27 RX ADMIN — ATORVASTATIN CALCIUM 40 MG: 40 TABLET, FILM COATED ORAL at 20:20

## 2020-08-27 RX ADMIN — MORPHINE SULFATE 2 MG: 2 INJECTION, SOLUTION INTRAMUSCULAR; INTRAVENOUS at 06:22

## 2020-08-27 RX ADMIN — CEFUROXIME SODIUM 1.5 G: 1.5 INJECTION, POWDER, FOR SOLUTION INTRAVENOUS at 02:17

## 2020-08-27 RX ADMIN — DOCUSATE SODIUM 50MG AND SENNOSIDES 8.6MG 2 TABLET: 8.6; 5 TABLET, FILM COATED ORAL at 08:13

## 2020-08-27 RX ADMIN — AMIODARONE HYDROCHLORIDE 0.5 MG/MIN: 1.8 INJECTION, SOLUTION INTRAVENOUS at 00:29

## 2020-08-27 RX ADMIN — MORPHINE SULFATE 2 MG: 2 INJECTION, SOLUTION INTRAMUSCULAR; INTRAVENOUS at 02:18

## 2020-08-27 RX ADMIN — MORPHINE SULFATE 2 MG: 2 INJECTION, SOLUTION INTRAMUSCULAR; INTRAVENOUS at 08:13

## 2020-08-27 RX ADMIN — MAGNESIUM OXIDE TAB 400 MG (241.3 MG ELEMENTAL MG) 400 MG: 400 (241.3 MG) TAB at 21:27

## 2020-08-27 RX ADMIN — MORPHINE SULFATE 2 MG: 2 INJECTION, SOLUTION INTRAMUSCULAR; INTRAVENOUS at 20:19

## 2020-08-27 RX ADMIN — ALBUMIN HUMAN 500 ML: 0.05 INJECTION, SOLUTION INTRAVENOUS at 08:14

## 2020-08-27 RX ADMIN — HYDROCODONE BITARTRATE AND ACETAMINOPHEN 1 TABLET: 7.5; 325 TABLET ORAL at 23:29

## 2020-08-27 RX ADMIN — FENTANYL CITRATE 25 MCG: 0.05 INJECTION, SOLUTION INTRAMUSCULAR; INTRAVENOUS at 03:30

## 2020-08-27 RX ADMIN — CEFUROXIME SODIUM 1.5 G: 1.5 INJECTION, POWDER, FOR SOLUTION INTRAVENOUS at 17:41

## 2020-08-27 RX ADMIN — OXYCODONE 10 MG: 5 TABLET ORAL at 11:11

## 2020-08-27 RX ADMIN — OXYCODONE 10 MG: 5 TABLET ORAL at 15:10

## 2020-08-27 RX ADMIN — MAGNESIUM OXIDE TAB 400 MG (241.3 MG ELEMENTAL MG) 400 MG: 400 (241.3 MG) TAB at 08:13

## 2020-08-27 RX ADMIN — HYDROCODONE BITARTRATE AND ACETAMINOPHEN 1 TABLET: 7.5; 325 TABLET ORAL at 04:01

## 2020-08-27 RX ADMIN — MAGNESIUM SULFATE HEPTAHYDRATE 4 G: 40 INJECTION, SOLUTION INTRAVENOUS at 17:42

## 2020-08-27 RX ADMIN — ALPRAZOLAM 0.5 MG: 0.5 TABLET ORAL at 04:01

## 2020-08-27 RX ADMIN — METOPROLOL TARTRATE 12.5 MG: 25 TABLET, FILM COATED ORAL at 20:20

## 2020-08-27 RX ADMIN — OXYCODONE 10 MG: 5 TABLET ORAL at 20:20

## 2020-08-27 RX ADMIN — HYDROCODONE BITARTRATE AND ACETAMINOPHEN 1 TABLET: 7.5; 325 TABLET ORAL at 13:16

## 2020-08-27 RX ADMIN — OXYCODONE HYDROCHLORIDE AND ACETAMINOPHEN 2 TABLET: 5; 325 TABLET ORAL at 02:18

## 2020-08-27 RX ADMIN — CEFUROXIME SODIUM 1.5 G: 1.5 INJECTION, POWDER, FOR SOLUTION INTRAVENOUS at 11:11

## 2020-08-27 RX ADMIN — ASPIRIN 325 MG: 325 TABLET, COATED ORAL at 08:13

## 2020-08-27 RX ADMIN — DEXMEDETOMIDINE HYDROCHLORIDE 0.9 MCG/KG/HR: 400 INJECTION INTRAVENOUS at 01:40

## 2020-08-27 RX ADMIN — MORPHINE SULFATE 2 MG: 2 INJECTION, SOLUTION INTRAMUSCULAR; INTRAVENOUS at 22:22

## 2020-08-27 RX ADMIN — MORPHINE SULFATE 2 MG: 2 INJECTION, SOLUTION INTRAMUSCULAR; INTRAVENOUS at 13:15

## 2020-08-27 RX ADMIN — MORPHINE SULFATE 2 MG: 2 INJECTION, SOLUTION INTRAMUSCULAR; INTRAVENOUS at 04:01

## 2020-08-27 RX ADMIN — OXYCODONE HYDROCHLORIDE AND ACETAMINOPHEN 2 TABLET: 5; 325 TABLET ORAL at 06:22

## 2020-08-27 RX ADMIN — THIAMINE HCL TAB 100 MG 100 MG: 100 TAB at 08:13

## 2020-08-27 RX ADMIN — PANTOPRAZOLE SODIUM 40 MG: 40 INJECTION, POWDER, FOR SOLUTION INTRAVENOUS at 06:22

## 2020-08-27 RX ADMIN — FOLIC ACID 1 MG: 1 TABLET ORAL at 08:13

## 2020-08-27 RX ADMIN — MORPHINE SULFATE 2 MG: 2 INJECTION, SOLUTION INTRAMUSCULAR; INTRAVENOUS at 02:55

## 2020-08-27 RX ADMIN — HYDROCODONE BITARTRATE AND ACETAMINOPHEN 1 TABLET: 7.5; 325 TABLET ORAL at 17:48

## 2020-08-27 RX ADMIN — HYDROCODONE BITARTRATE AND ACETAMINOPHEN 1 TABLET: 7.5; 325 TABLET ORAL at 09:06

## 2020-08-27 RX ADMIN — ALPRAZOLAM 0.5 MG: 0.5 TABLET ORAL at 21:23

## 2020-08-28 ENCOUNTER — APPOINTMENT (OUTPATIENT)
Dept: GENERAL RADIOLOGY | Facility: HOSPITAL | Age: 55
End: 2020-08-28

## 2020-08-28 ENCOUNTER — DOCUMENTATION (OUTPATIENT)
Dept: CARDIAC REHAB | Facility: HOSPITAL | Age: 55
End: 2020-08-28

## 2020-08-28 LAB
ANION GAP SERPL CALCULATED.3IONS-SCNC: 8 MMOL/L (ref 5–15)
BUN SERPL-MCNC: 19 MG/DL (ref 6–20)
BUN/CREAT SERPL: 16.4 (ref 7–25)
CALCIUM SPEC-SCNC: 8.5 MG/DL (ref 8.6–10.5)
CHLORIDE SERPL-SCNC: 98 MMOL/L (ref 98–107)
CO2 SERPL-SCNC: 28 MMOL/L (ref 22–29)
CREAT SERPL-MCNC: 1.16 MG/DL (ref 0.76–1.27)
DEPRECATED RDW RBC AUTO: 48.3 FL (ref 37–54)
ERYTHROCYTE [DISTWIDTH] IN BLOOD BY AUTOMATED COUNT: 13.7 % (ref 12.3–15.4)
GFR SERPL CREATININE-BSD FRML MDRD: 65 ML/MIN/1.73
GLUCOSE BLDC GLUCOMTR-MCNC: 103 MG/DL (ref 70–130)
GLUCOSE SERPL-MCNC: 111 MG/DL (ref 65–99)
HCT VFR BLD AUTO: 28.7 % (ref 37.5–51)
HGB BLD-MCNC: 9.2 G/DL (ref 13–17.7)
MAGNESIUM SERPL-MCNC: 2.4 MG/DL (ref 1.6–2.6)
MCH RBC QN AUTO: 30.7 PG (ref 26.6–33)
MCHC RBC AUTO-ENTMCNC: 32.1 G/DL (ref 31.5–35.7)
MCV RBC AUTO: 95.7 FL (ref 79–97)
PLATELET # BLD AUTO: 127 10*3/MM3 (ref 140–450)
PMV BLD AUTO: 10.2 FL (ref 6–12)
POTASSIUM SERPL-SCNC: 4.3 MMOL/L (ref 3.5–5.2)
RBC # BLD AUTO: 3 10*6/MM3 (ref 4.14–5.8)
SODIUM SERPL-SCNC: 134 MMOL/L (ref 136–145)
WBC # BLD AUTO: 10.21 10*3/MM3 (ref 3.4–10.8)

## 2020-08-28 PROCEDURE — 85027 COMPLETE CBC AUTOMATED: CPT | Performed by: PHYSICIAN ASSISTANT

## 2020-08-28 PROCEDURE — 97530 THERAPEUTIC ACTIVITIES: CPT

## 2020-08-28 PROCEDURE — 25010000002 MORPHINE PER 10 MG: Performed by: THORACIC SURGERY (CARDIOTHORACIC VASCULAR SURGERY)

## 2020-08-28 PROCEDURE — 82962 GLUCOSE BLOOD TEST: CPT

## 2020-08-28 PROCEDURE — 71045 X-RAY EXAM CHEST 1 VIEW: CPT

## 2020-08-28 PROCEDURE — 25010000003 CEFUROXIME SODIUM 1.5 G RECONSTITUTED SOLUTION: Performed by: PHYSICIAN ASSISTANT

## 2020-08-28 PROCEDURE — 25010000002 ONDANSETRON PER 1 MG: Performed by: PHYSICIAN ASSISTANT

## 2020-08-28 PROCEDURE — 99291 CRITICAL CARE FIRST HOUR: CPT | Performed by: INTERNAL MEDICINE

## 2020-08-28 PROCEDURE — 25010000002 HEPARIN (PORCINE) PER 1000 UNITS: Performed by: INTERNAL MEDICINE

## 2020-08-28 PROCEDURE — 93005 ELECTROCARDIOGRAM TRACING: CPT | Performed by: PHYSICIAN ASSISTANT

## 2020-08-28 PROCEDURE — 80048 BASIC METABOLIC PNL TOTAL CA: CPT | Performed by: PHYSICIAN ASSISTANT

## 2020-08-28 PROCEDURE — 99024 POSTOP FOLLOW-UP VISIT: CPT | Performed by: THORACIC SURGERY (CARDIOTHORACIC VASCULAR SURGERY)

## 2020-08-28 PROCEDURE — 25010000002 FUROSEMIDE PER 20 MG: Performed by: THORACIC SURGERY (CARDIOTHORACIC VASCULAR SURGERY)

## 2020-08-28 PROCEDURE — 93010 ELECTROCARDIOGRAM REPORT: CPT | Performed by: INTERNAL MEDICINE

## 2020-08-28 PROCEDURE — 83735 ASSAY OF MAGNESIUM: CPT | Performed by: THORACIC SURGERY (CARDIOTHORACIC VASCULAR SURGERY)

## 2020-08-28 RX ORDER — HEPARIN SODIUM 5000 [USP'U]/ML
5000 INJECTION, SOLUTION INTRAVENOUS; SUBCUTANEOUS EVERY 8 HOURS SCHEDULED
Status: DISCONTINUED | OUTPATIENT
Start: 2020-08-28 | End: 2020-09-02 | Stop reason: HOSPADM

## 2020-08-28 RX ORDER — PANTOPRAZOLE SODIUM 40 MG/1
40 TABLET, DELAYED RELEASE ORAL
Status: DISCONTINUED | OUTPATIENT
Start: 2020-08-29 | End: 2020-09-02 | Stop reason: HOSPADM

## 2020-08-28 RX ORDER — POLYETHYLENE GLYCOL 3350 17 G/17G
17 POWDER, FOR SOLUTION ORAL 2 TIMES DAILY
Status: DISCONTINUED | OUTPATIENT
Start: 2020-08-28 | End: 2020-09-02 | Stop reason: HOSPADM

## 2020-08-28 RX ORDER — HEPARIN SODIUM 5000 [USP'U]/ML
5000 INJECTION, SOLUTION INTRAVENOUS; SUBCUTANEOUS EVERY 8 HOURS SCHEDULED
Status: DISCONTINUED | OUTPATIENT
Start: 2020-08-28 | End: 2020-08-28

## 2020-08-28 RX ORDER — FUROSEMIDE 10 MG/ML
40 INJECTION INTRAMUSCULAR; INTRAVENOUS ONCE
Status: COMPLETED | OUTPATIENT
Start: 2020-08-28 | End: 2020-08-28

## 2020-08-28 RX ORDER — CLOPIDOGREL BISULFATE 75 MG/1
75 TABLET ORAL DAILY
Status: DISCONTINUED | OUTPATIENT
Start: 2020-08-29 | End: 2020-09-02

## 2020-08-28 RX ORDER — BISACODYL 5 MG/1
10 TABLET, DELAYED RELEASE ORAL DAILY PRN
Status: DISCONTINUED | OUTPATIENT
Start: 2020-08-28 | End: 2020-09-02 | Stop reason: HOSPADM

## 2020-08-28 RX ORDER — CLOPIDOGREL BISULFATE 75 MG/1
150 TABLET ORAL ONCE
Status: COMPLETED | OUTPATIENT
Start: 2020-08-28 | End: 2020-08-28

## 2020-08-28 RX ADMIN — DOCUSATE SODIUM 50MG AND SENNOSIDES 8.6MG 2 TABLET: 8.6; 5 TABLET, FILM COATED ORAL at 20:59

## 2020-08-28 RX ADMIN — MAGNESIUM OXIDE TAB 400 MG (241.3 MG ELEMENTAL MG) 400 MG: 400 (241.3 MG) TAB at 09:32

## 2020-08-28 RX ADMIN — OXYCODONE 10 MG: 5 TABLET ORAL at 06:06

## 2020-08-28 RX ADMIN — OXYCODONE 10 MG: 5 TABLET ORAL at 20:58

## 2020-08-28 RX ADMIN — CLOPIDOGREL BISULFATE 150 MG: 75 TABLET ORAL at 13:17

## 2020-08-28 RX ADMIN — ONDANSETRON 4 MG: 2 INJECTION INTRAMUSCULAR; INTRAVENOUS at 21:02

## 2020-08-28 RX ADMIN — OXYCODONE 10 MG: 5 TABLET ORAL at 00:37

## 2020-08-28 RX ADMIN — MAGNESIUM OXIDE TAB 400 MG (241.3 MG ELEMENTAL MG) 400 MG: 400 (241.3 MG) TAB at 21:02

## 2020-08-28 RX ADMIN — CEFUROXIME SODIUM 1.5 G: 1.5 INJECTION, POWDER, FOR SOLUTION INTRAVENOUS at 02:00

## 2020-08-28 RX ADMIN — FOLIC ACID 1 MG: 1 TABLET ORAL at 09:31

## 2020-08-28 RX ADMIN — HYDROCODONE BITARTRATE AND ACETAMINOPHEN 1 TABLET: 7.5; 325 TABLET ORAL at 04:09

## 2020-08-28 RX ADMIN — POLYETHYLENE GLYCOL 3350 17 G: 17 POWDER, FOR SOLUTION ORAL at 13:17

## 2020-08-28 RX ADMIN — POLYETHYLENE GLYCOL 3350 17 G: 17 POWDER, FOR SOLUTION ORAL at 20:59

## 2020-08-28 RX ADMIN — ALPRAZOLAM 0.5 MG: 0.5 TABLET ORAL at 21:35

## 2020-08-28 RX ADMIN — ATORVASTATIN CALCIUM 40 MG: 40 TABLET, FILM COATED ORAL at 20:59

## 2020-08-28 RX ADMIN — METOPROLOL TARTRATE 25 MG: 25 TABLET, FILM COATED ORAL at 20:58

## 2020-08-28 RX ADMIN — OXYCODONE 10 MG: 5 TABLET ORAL at 13:18

## 2020-08-28 RX ADMIN — HYDROCODONE BITARTRATE AND ACETAMINOPHEN 1 TABLET: 7.5; 325 TABLET ORAL at 09:31

## 2020-08-28 RX ADMIN — ASPIRIN 325 MG: 325 TABLET, COATED ORAL at 09:31

## 2020-08-28 RX ADMIN — HEPARIN SODIUM 5000 UNITS: 5000 INJECTION INTRAVENOUS; SUBCUTANEOUS at 21:02

## 2020-08-28 RX ADMIN — PANTOPRAZOLE SODIUM 40 MG: 40 INJECTION, POWDER, FOR SOLUTION INTRAVENOUS at 06:33

## 2020-08-28 RX ADMIN — FUROSEMIDE 40 MG: 10 INJECTION, SOLUTION INTRAMUSCULAR; INTRAVENOUS at 08:16

## 2020-08-28 RX ADMIN — THIAMINE HCL TAB 100 MG 100 MG: 100 TAB at 09:31

## 2020-08-28 RX ADMIN — METOPROLOL TARTRATE 25 MG: 25 TABLET, FILM COATED ORAL at 09:31

## 2020-08-28 RX ADMIN — MORPHINE SULFATE 2 MG: 2 INJECTION, SOLUTION INTRAMUSCULAR; INTRAVENOUS at 22:26

## 2020-08-28 RX ADMIN — DOCUSATE SODIUM 50MG AND SENNOSIDES 8.6MG 2 TABLET: 8.6; 5 TABLET, FILM COATED ORAL at 09:31

## 2020-08-28 NOTE — PROGRESS NOTES
Thank You for your referral to cardiac rehab. Staff will contact patient to explain the program and schedule if he is interested.

## 2020-08-29 ENCOUNTER — APPOINTMENT (OUTPATIENT)
Dept: GENERAL RADIOLOGY | Facility: HOSPITAL | Age: 55
End: 2020-08-29

## 2020-08-29 PROBLEM — Z95.1 S/P CABG X 4: Status: ACTIVE | Noted: 2020-08-29

## 2020-08-29 LAB
ABO GROUP BLD: NORMAL
ANION GAP SERPL CALCULATED.3IONS-SCNC: 9 MMOL/L (ref 5–15)
BH BB BLOOD EXPIRATION DATE: NORMAL
BH BB BLOOD EXPIRATION DATE: NORMAL
BH BB BLOOD TYPE BARCODE: 6200
BH BB BLOOD TYPE BARCODE: 6200
BH BB DISPENSE STATUS: NORMAL
BH BB DISPENSE STATUS: NORMAL
BH BB PRODUCT CODE: NORMAL
BH BB PRODUCT CODE: NORMAL
BH BB UNIT NUMBER: NORMAL
BH BB UNIT NUMBER: NORMAL
BLD GP AB SCN SERPL QL: NEGATIVE
BUN SERPL-MCNC: 18 MG/DL (ref 6–20)
BUN/CREAT SERPL: 17.8 (ref 7–25)
CALCIUM SPEC-SCNC: 8.4 MG/DL (ref 8.6–10.5)
CHLORIDE SERPL-SCNC: 99 MMOL/L (ref 98–107)
CO2 SERPL-SCNC: 26 MMOL/L (ref 22–29)
CREAT SERPL-MCNC: 1.01 MG/DL (ref 0.76–1.27)
CROSSMATCH INTERPRETATION: NORMAL
CROSSMATCH INTERPRETATION: NORMAL
DEPRECATED RDW RBC AUTO: 47.3 FL (ref 37–54)
ERYTHROCYTE [DISTWIDTH] IN BLOOD BY AUTOMATED COUNT: 13.6 % (ref 12.3–15.4)
GFR SERPL CREATININE-BSD FRML MDRD: 76 ML/MIN/1.73
GLUCOSE BLDC GLUCOMTR-MCNC: 94 MG/DL (ref 70–130)
GLUCOSE SERPL-MCNC: 120 MG/DL (ref 65–99)
HCT VFR BLD AUTO: 25 % (ref 37.5–51)
HCT VFR BLD AUTO: 25.2 % (ref 37.5–51)
HCT VFR BLD AUTO: 26.4 % (ref 37.5–51)
HGB BLD-MCNC: 7.8 G/DL (ref 13–17.7)
HGB BLD-MCNC: 7.8 G/DL (ref 13–17.7)
HGB BLD-MCNC: 8.4 G/DL (ref 13–17.7)
MCH RBC QN AUTO: 29.5 PG (ref 26.6–33)
MCHC RBC AUTO-ENTMCNC: 31 G/DL (ref 31.5–35.7)
MCV RBC AUTO: 95.5 FL (ref 79–97)
PLATELET # BLD AUTO: 125 10*3/MM3 (ref 140–450)
PMV BLD AUTO: 10.7 FL (ref 6–12)
POTASSIUM SERPL-SCNC: 3.9 MMOL/L (ref 3.5–5.2)
RBC # BLD AUTO: 2.64 10*6/MM3 (ref 4.14–5.8)
RH BLD: POSITIVE
SODIUM SERPL-SCNC: 134 MMOL/L (ref 136–145)
T&S EXPIRATION DATE: NORMAL
UNIT  ABO: NORMAL
UNIT  ABO: NORMAL
UNIT  RH: NORMAL
UNIT  RH: NORMAL
WBC # BLD AUTO: 7.05 10*3/MM3 (ref 3.4–10.8)

## 2020-08-29 PROCEDURE — 99232 SBSQ HOSP IP/OBS MODERATE 35: CPT | Performed by: INTERNAL MEDICINE

## 2020-08-29 PROCEDURE — 86923 COMPATIBILITY TEST ELECTRIC: CPT

## 2020-08-29 PROCEDURE — 85018 HEMOGLOBIN: CPT | Performed by: THORACIC SURGERY (CARDIOTHORACIC VASCULAR SURGERY)

## 2020-08-29 PROCEDURE — 80048 BASIC METABOLIC PNL TOTAL CA: CPT | Performed by: PHYSICIAN ASSISTANT

## 2020-08-29 PROCEDURE — 86901 BLOOD TYPING SEROLOGIC RH(D): CPT | Performed by: THORACIC SURGERY (CARDIOTHORACIC VASCULAR SURGERY)

## 2020-08-29 PROCEDURE — 25010000002 HEPARIN (PORCINE) PER 1000 UNITS: Performed by: INTERNAL MEDICINE

## 2020-08-29 PROCEDURE — P9016 RBC LEUKOCYTES REDUCED: HCPCS

## 2020-08-29 PROCEDURE — 85014 HEMATOCRIT: CPT | Performed by: THORACIC SURGERY (CARDIOTHORACIC VASCULAR SURGERY)

## 2020-08-29 PROCEDURE — 71045 X-RAY EXAM CHEST 1 VIEW: CPT

## 2020-08-29 PROCEDURE — 86900 BLOOD TYPING SEROLOGIC ABO: CPT

## 2020-08-29 PROCEDURE — 86900 BLOOD TYPING SEROLOGIC ABO: CPT | Performed by: THORACIC SURGERY (CARDIOTHORACIC VASCULAR SURGERY)

## 2020-08-29 PROCEDURE — 86850 RBC ANTIBODY SCREEN: CPT | Performed by: THORACIC SURGERY (CARDIOTHORACIC VASCULAR SURGERY)

## 2020-08-29 PROCEDURE — 99024 POSTOP FOLLOW-UP VISIT: CPT | Performed by: THORACIC SURGERY (CARDIOTHORACIC VASCULAR SURGERY)

## 2020-08-29 PROCEDURE — 25010000002 MORPHINE PER 10 MG: Performed by: THORACIC SURGERY (CARDIOTHORACIC VASCULAR SURGERY)

## 2020-08-29 PROCEDURE — 82962 GLUCOSE BLOOD TEST: CPT

## 2020-08-29 PROCEDURE — 36430 TRANSFUSION BLD/BLD COMPNT: CPT

## 2020-08-29 PROCEDURE — 85027 COMPLETE CBC AUTOMATED: CPT | Performed by: PHYSICIAN ASSISTANT

## 2020-08-29 RX ORDER — HYDROXYZINE HYDROCHLORIDE 25 MG/1
25 TABLET, FILM COATED ORAL 3 TIMES DAILY PRN
Status: DISCONTINUED | OUTPATIENT
Start: 2020-08-29 | End: 2020-09-02 | Stop reason: HOSPADM

## 2020-08-29 RX ADMIN — HEPARIN SODIUM 5000 UNITS: 5000 INJECTION INTRAVENOUS; SUBCUTANEOUS at 05:22

## 2020-08-29 RX ADMIN — FOLIC ACID 1 MG: 1 TABLET ORAL at 08:47

## 2020-08-29 RX ADMIN — HEPARIN SODIUM 5000 UNITS: 5000 INJECTION INTRAVENOUS; SUBCUTANEOUS at 14:37

## 2020-08-29 RX ADMIN — ALPRAZOLAM 0.5 MG: 0.5 TABLET ORAL at 20:13

## 2020-08-29 RX ADMIN — METOPROLOL TARTRATE 25 MG: 25 TABLET, FILM COATED ORAL at 08:47

## 2020-08-29 RX ADMIN — METOPROLOL TARTRATE 25 MG: 25 TABLET, FILM COATED ORAL at 20:08

## 2020-08-29 RX ADMIN — CLOPIDOGREL BISULFATE 75 MG: 75 TABLET ORAL at 08:47

## 2020-08-29 RX ADMIN — PANTOPRAZOLE SODIUM 40 MG: 40 TABLET, DELAYED RELEASE ORAL at 05:23

## 2020-08-29 RX ADMIN — THIAMINE HCL TAB 100 MG 100 MG: 100 TAB at 08:47

## 2020-08-29 RX ADMIN — HYDROCODONE BITARTRATE AND ACETAMINOPHEN 1 TABLET: 7.5; 325 TABLET ORAL at 18:34

## 2020-08-29 RX ADMIN — POLYETHYLENE GLYCOL 3350 17 G: 17 POWDER, FOR SOLUTION ORAL at 08:47

## 2020-08-29 RX ADMIN — MAGNESIUM OXIDE TAB 400 MG (241.3 MG ELEMENTAL MG) 400 MG: 400 (241.3 MG) TAB at 22:53

## 2020-08-29 RX ADMIN — MORPHINE SULFATE 2 MG: 2 INJECTION, SOLUTION INTRAMUSCULAR; INTRAVENOUS at 20:08

## 2020-08-29 RX ADMIN — HYDROCODONE BITARTRATE AND ACETAMINOPHEN 1 TABLET: 7.5; 325 TABLET ORAL at 12:24

## 2020-08-29 RX ADMIN — OXYCODONE 10 MG: 5 TABLET ORAL at 05:22

## 2020-08-29 RX ADMIN — MORPHINE SULFATE 2 MG: 2 INJECTION, SOLUTION INTRAMUSCULAR; INTRAVENOUS at 02:38

## 2020-08-29 RX ADMIN — HYDROXYZINE HYDROCHLORIDE 25 MG: 25 TABLET, FILM COATED ORAL at 10:48

## 2020-08-29 RX ADMIN — ASPIRIN 325 MG: 325 TABLET, COATED ORAL at 08:47

## 2020-08-29 RX ADMIN — DOCUSATE SODIUM 50MG AND SENNOSIDES 8.6MG 2 TABLET: 8.6; 5 TABLET, FILM COATED ORAL at 08:47

## 2020-08-29 RX ADMIN — ATORVASTATIN CALCIUM 40 MG: 40 TABLET, FILM COATED ORAL at 20:08

## 2020-08-29 RX ADMIN — OXYCODONE 10 MG: 5 TABLET ORAL at 01:03

## 2020-08-29 RX ADMIN — HEPARIN SODIUM 5000 UNITS: 5000 INJECTION INTRAVENOUS; SUBCUTANEOUS at 22:54

## 2020-08-29 RX ADMIN — HYDROCODONE BITARTRATE AND ACETAMINOPHEN 1 TABLET: 7.5; 325 TABLET ORAL at 22:55

## 2020-08-29 RX ADMIN — OXYCODONE 10 MG: 5 TABLET ORAL at 20:08

## 2020-08-29 RX ADMIN — MAGNESIUM OXIDE TAB 400 MG (241.3 MG ELEMENTAL MG) 400 MG: 400 (241.3 MG) TAB at 08:47

## 2020-08-30 ENCOUNTER — APPOINTMENT (OUTPATIENT)
Dept: GENERAL RADIOLOGY | Facility: HOSPITAL | Age: 55
End: 2020-08-30

## 2020-08-30 LAB
ANION GAP SERPL CALCULATED.3IONS-SCNC: 7 MMOL/L (ref 5–15)
BASOPHILS # BLD AUTO: 0.04 10*3/MM3 (ref 0–0.2)
BASOPHILS # BLD AUTO: 0.05 10*3/MM3 (ref 0–0.2)
BASOPHILS NFR BLD AUTO: 0.7 % (ref 0–1.5)
BASOPHILS NFR BLD AUTO: 0.9 % (ref 0–1.5)
BH BB BLOOD EXPIRATION DATE: NORMAL
BH BB BLOOD TYPE BARCODE: 600
BH BB DISPENSE STATUS: NORMAL
BH BB PRODUCT CODE: NORMAL
BH BB UNIT NUMBER: NORMAL
BUN SERPL-MCNC: 15 MG/DL (ref 6–20)
BUN/CREAT SERPL: 15.2 (ref 7–25)
CALCIUM SPEC-SCNC: 8.9 MG/DL (ref 8.6–10.5)
CHLORIDE SERPL-SCNC: 101 MMOL/L (ref 98–107)
CO2 SERPL-SCNC: 30 MMOL/L (ref 22–29)
CREAT SERPL-MCNC: 0.99 MG/DL (ref 0.76–1.27)
CROSSMATCH INTERPRETATION: NORMAL
DEPRECATED RDW RBC AUTO: 49.4 FL (ref 37–54)
DEPRECATED RDW RBC AUTO: 49.9 FL (ref 37–54)
EOSINOPHIL # BLD AUTO: 0.29 10*3/MM3 (ref 0–0.4)
EOSINOPHIL # BLD AUTO: 0.31 10*3/MM3 (ref 0–0.4)
EOSINOPHIL NFR BLD AUTO: 5.2 % (ref 0.3–6.2)
EOSINOPHIL NFR BLD AUTO: 5.4 % (ref 0.3–6.2)
ERYTHROCYTE [DISTWIDTH] IN BLOOD BY AUTOMATED COUNT: 13.9 % (ref 12.3–15.4)
ERYTHROCYTE [DISTWIDTH] IN BLOOD BY AUTOMATED COUNT: 14 % (ref 12.3–15.4)
GFR SERPL CREATININE-BSD FRML MDRD: 78 ML/MIN/1.73
GLUCOSE SERPL-MCNC: 102 MG/DL (ref 65–99)
HCT VFR BLD AUTO: 28 % (ref 37.5–51)
HCT VFR BLD AUTO: 28.3 % (ref 37.5–51)
HGB BLD-MCNC: 8.6 G/DL (ref 13–17.7)
HGB BLD-MCNC: 8.7 G/DL (ref 13–17.7)
IMM GRANULOCYTES # BLD AUTO: 0.02 10*3/MM3 (ref 0–0.05)
IMM GRANULOCYTES # BLD AUTO: 0.02 10*3/MM3 (ref 0–0.05)
IMM GRANULOCYTES NFR BLD AUTO: 0.4 % (ref 0–0.5)
IMM GRANULOCYTES NFR BLD AUTO: 0.4 % (ref 0–0.5)
LYMPHOCYTES # BLD AUTO: 0.98 10*3/MM3 (ref 0.7–3.1)
LYMPHOCYTES # BLD AUTO: 1.12 10*3/MM3 (ref 0.7–3.1)
LYMPHOCYTES NFR BLD AUTO: 17.2 % (ref 19.6–45.3)
LYMPHOCYTES NFR BLD AUTO: 19.9 % (ref 19.6–45.3)
MAGNESIUM SERPL-MCNC: 1.8 MG/DL (ref 1.6–2.6)
MCH RBC QN AUTO: 29 PG (ref 26.6–33)
MCH RBC QN AUTO: 29.7 PG (ref 26.6–33)
MCHC RBC AUTO-ENTMCNC: 30.4 G/DL (ref 31.5–35.7)
MCHC RBC AUTO-ENTMCNC: 31.1 G/DL (ref 31.5–35.7)
MCV RBC AUTO: 95.3 FL (ref 79–97)
MCV RBC AUTO: 95.6 FL (ref 79–97)
MONOCYTES # BLD AUTO: 0.9 10*3/MM3 (ref 0.1–0.9)
MONOCYTES # BLD AUTO: 0.91 10*3/MM3 (ref 0.1–0.9)
MONOCYTES NFR BLD AUTO: 16 % (ref 5–12)
MONOCYTES NFR BLD AUTO: 16 % (ref 5–12)
NEUTROPHILS NFR BLD AUTO: 3.25 10*3/MM3 (ref 1.7–7)
NEUTROPHILS NFR BLD AUTO: 3.43 10*3/MM3 (ref 1.7–7)
NEUTROPHILS NFR BLD AUTO: 57.6 % (ref 42.7–76)
NEUTROPHILS NFR BLD AUTO: 60.3 % (ref 42.7–76)
NRBC BLD AUTO-RTO: 0 /100 WBC (ref 0–0.2)
NRBC BLD AUTO-RTO: 0 /100 WBC (ref 0–0.2)
PLATELET # BLD AUTO: 164 10*3/MM3 (ref 140–450)
PLATELET # BLD AUTO: 166 10*3/MM3 (ref 140–450)
PMV BLD AUTO: 10.4 FL (ref 6–12)
PMV BLD AUTO: 9.9 FL (ref 6–12)
POTASSIUM SERPL-SCNC: 4 MMOL/L (ref 3.5–5.2)
RBC # BLD AUTO: 2.93 10*6/MM3 (ref 4.14–5.8)
RBC # BLD AUTO: 2.97 10*6/MM3 (ref 4.14–5.8)
SODIUM SERPL-SCNC: 138 MMOL/L (ref 136–145)
UNIT  ABO: NORMAL
UNIT  RH: NORMAL
WBC # BLD AUTO: 5.63 10*3/MM3 (ref 3.4–10.8)
WBC # BLD AUTO: 5.69 10*3/MM3 (ref 3.4–10.8)

## 2020-08-30 PROCEDURE — 83735 ASSAY OF MAGNESIUM: CPT | Performed by: INTERNAL MEDICINE

## 2020-08-30 PROCEDURE — 97116 GAIT TRAINING THERAPY: CPT

## 2020-08-30 PROCEDURE — 80048 BASIC METABOLIC PNL TOTAL CA: CPT | Performed by: PHYSICIAN ASSISTANT

## 2020-08-30 PROCEDURE — 85025 COMPLETE CBC W/AUTO DIFF WBC: CPT | Performed by: PHYSICIAN ASSISTANT

## 2020-08-30 PROCEDURE — 25010000002 HEPARIN (PORCINE) PER 1000 UNITS: Performed by: INTERNAL MEDICINE

## 2020-08-30 PROCEDURE — 85025 COMPLETE CBC W/AUTO DIFF WBC: CPT | Performed by: INTERNAL MEDICINE

## 2020-08-30 PROCEDURE — 97530 THERAPEUTIC ACTIVITIES: CPT

## 2020-08-30 PROCEDURE — 99232 SBSQ HOSP IP/OBS MODERATE 35: CPT | Performed by: INTERNAL MEDICINE

## 2020-08-30 PROCEDURE — 71045 X-RAY EXAM CHEST 1 VIEW: CPT

## 2020-08-30 RX ADMIN — CLOPIDOGREL BISULFATE 75 MG: 75 TABLET ORAL at 08:45

## 2020-08-30 RX ADMIN — OXYCODONE 10 MG: 5 TABLET ORAL at 14:48

## 2020-08-30 RX ADMIN — METOPROLOL TARTRATE 25 MG: 25 TABLET, FILM COATED ORAL at 08:45

## 2020-08-30 RX ADMIN — THIAMINE HCL TAB 100 MG 100 MG: 100 TAB at 08:45

## 2020-08-30 RX ADMIN — MAGNESIUM OXIDE TAB 400 MG (241.3 MG ELEMENTAL MG) 400 MG: 400 (241.3 MG) TAB at 08:46

## 2020-08-30 RX ADMIN — OXYCODONE 10 MG: 5 TABLET ORAL at 23:49

## 2020-08-30 RX ADMIN — HEPARIN SODIUM 5000 UNITS: 5000 INJECTION INTRAVENOUS; SUBCUTANEOUS at 22:36

## 2020-08-30 RX ADMIN — MAGNESIUM OXIDE TAB 400 MG (241.3 MG ELEMENTAL MG) 400 MG: 400 (241.3 MG) TAB at 20:33

## 2020-08-30 RX ADMIN — HYDROCODONE BITARTRATE AND ACETAMINOPHEN 1 TABLET: 7.5; 325 TABLET ORAL at 03:26

## 2020-08-30 RX ADMIN — ALPRAZOLAM 0.5 MG: 0.5 TABLET ORAL at 20:10

## 2020-08-30 RX ADMIN — HYDROCODONE BITARTRATE AND ACETAMINOPHEN 1 TABLET: 7.5; 325 TABLET ORAL at 10:58

## 2020-08-30 RX ADMIN — HYDROXYZINE HYDROCHLORIDE 25 MG: 25 TABLET, FILM COATED ORAL at 23:56

## 2020-08-30 RX ADMIN — ATORVASTATIN CALCIUM 40 MG: 40 TABLET, FILM COATED ORAL at 20:10

## 2020-08-30 RX ADMIN — FOLIC ACID 1 MG: 1 TABLET ORAL at 08:45

## 2020-08-30 RX ADMIN — HEPARIN SODIUM 5000 UNITS: 5000 INJECTION INTRAVENOUS; SUBCUTANEOUS at 14:47

## 2020-08-30 RX ADMIN — ASPIRIN 325 MG: 325 TABLET, COATED ORAL at 08:46

## 2020-08-30 RX ADMIN — METOPROLOL TARTRATE 25 MG: 25 TABLET, FILM COATED ORAL at 20:10

## 2020-08-30 RX ADMIN — PANTOPRAZOLE SODIUM 40 MG: 40 TABLET, DELAYED RELEASE ORAL at 06:14

## 2020-08-30 RX ADMIN — HYDROCODONE BITARTRATE AND ACETAMINOPHEN 1 TABLET: 7.5; 325 TABLET ORAL at 20:10

## 2020-08-30 RX ADMIN — HEPARIN SODIUM 5000 UNITS: 5000 INJECTION INTRAVENOUS; SUBCUTANEOUS at 06:14

## 2020-08-31 LAB
ALBUMIN SERPL-MCNC: 4 G/DL (ref 3.5–5.2)
ALP SERPL-CCNC: 46 U/L (ref 39–117)
ALT SERPL W P-5'-P-CCNC: 13 U/L (ref 1–41)
ANION GAP SERPL CALCULATED.3IONS-SCNC: 9 MMOL/L (ref 5–15)
AST SERPL-CCNC: 17 U/L (ref 1–40)
BASOPHILS # BLD MANUAL: 0 10*3/MM3 (ref 0–0.2)
BASOPHILS NFR BLD AUTO: 0 % (ref 0–1.5)
BILIRUB SERPL-MCNC: 0.6 MG/DL (ref 0–1.2)
BUN SERPL-MCNC: 12 MG/DL (ref 6–20)
CALCIUM SPEC-SCNC: 9.9 MG/DL (ref 8.6–10.5)
CHLORIDE SERPL-SCNC: 100 MMOL/L (ref 98–107)
CHOLEST SERPL-MCNC: 118 MG/DL (ref 0–200)
CO2 SERPL-SCNC: 29 MMOL/L (ref 22–29)
CREAT SERPL-MCNC: 1.04 MG/DL (ref 0.76–1.27)
DEPRECATED RDW RBC AUTO: 46.5 FL (ref 37–54)
EOSINOPHIL # BLD MANUAL: 0.31 10*3/MM3 (ref 0–0.4)
EOSINOPHIL NFR BLD MANUAL: 4 % (ref 0.3–6.2)
ERYTHROCYTE [DISTWIDTH] IN BLOOD BY AUTOMATED COUNT: 13.5 % (ref 12.3–15.4)
GLUCOSE SERPL-MCNC: 111 MG/DL (ref 65–99)
HCT VFR BLD AUTO: 30.8 % (ref 37.5–51)
HGB BLD-MCNC: 9.6 G/DL (ref 13–17.7)
LYMPHOCYTES # BLD MANUAL: 1.73 10*3/MM3 (ref 0.7–3.1)
LYMPHOCYTES NFR BLD MANUAL: 11 % (ref 5–12)
LYMPHOCYTES NFR BLD MANUAL: 22 % (ref 19.6–45.3)
MAGNESIUM SERPL-MCNC: 1.8 MG/DL (ref 1.6–2.6)
MAGNESIUM SERPL-MCNC: 1.8 MG/DL (ref 1.6–2.6)
MCH RBC QN AUTO: 29.4 PG (ref 26.6–33)
MCHC RBC AUTO-ENTMCNC: 31.2 G/DL (ref 31.5–35.7)
MCV RBC AUTO: 94.2 FL (ref 79–97)
METAMYELOCYTES NFR BLD MANUAL: 1 % (ref 0–0)
MONOCYTES # BLD AUTO: 0.86 10*3/MM3 (ref 0.1–0.9)
MYELOCYTES NFR BLD MANUAL: 1 % (ref 0–0)
NEUTROPHILS # BLD AUTO: 4.79 10*3/MM3 (ref 1.7–7)
NEUTROPHILS NFR BLD MANUAL: 60 % (ref 42.7–76)
NEUTS BAND NFR BLD MANUAL: 1 % (ref 0–5)
PA ADP PRP-ACNC: 207 PRU
PHOSPHATE SERPL-MCNC: 3.9 MG/DL (ref 2.5–4.5)
PLAT MORPH BLD: NORMAL
PLATELET # BLD AUTO: 227 10*3/MM3 (ref 140–450)
PMV BLD AUTO: 9.6 FL (ref 6–12)
POTASSIUM SERPL-SCNC: 4.5 MMOL/L (ref 3.5–5.2)
PROT SERPL-MCNC: 6.9 G/DL (ref 6–8.5)
RBC # BLD AUTO: 3.27 10*6/MM3 (ref 4.14–5.8)
RBC MORPH BLD: NORMAL
SODIUM SERPL-SCNC: 138 MMOL/L (ref 136–145)
TRIGL SERPL-MCNC: 186 MG/DL (ref 0–150)
WBC # BLD AUTO: 7.86 10*3/MM3 (ref 3.4–10.8)
WBC MORPH BLD: NORMAL

## 2020-08-31 PROCEDURE — 84478 ASSAY OF TRIGLYCERIDES: CPT | Performed by: INTERNAL MEDICINE

## 2020-08-31 PROCEDURE — 82465 ASSAY BLD/SERUM CHOLESTEROL: CPT | Performed by: INTERNAL MEDICINE

## 2020-08-31 PROCEDURE — 25010000002 MORPHINE PER 10 MG: Performed by: THORACIC SURGERY (CARDIOTHORACIC VASCULAR SURGERY)

## 2020-08-31 PROCEDURE — 83735 ASSAY OF MAGNESIUM: CPT | Performed by: THORACIC SURGERY (CARDIOTHORACIC VASCULAR SURGERY)

## 2020-08-31 PROCEDURE — 85025 COMPLETE CBC W/AUTO DIFF WBC: CPT | Performed by: INTERNAL MEDICINE

## 2020-08-31 PROCEDURE — 85007 BL SMEAR W/DIFF WBC COUNT: CPT | Performed by: INTERNAL MEDICINE

## 2020-08-31 PROCEDURE — 25010000002 HEPARIN (PORCINE) PER 1000 UNITS: Performed by: INTERNAL MEDICINE

## 2020-08-31 PROCEDURE — 84100 ASSAY OF PHOSPHORUS: CPT | Performed by: INTERNAL MEDICINE

## 2020-08-31 PROCEDURE — 80053 COMPREHEN METABOLIC PANEL: CPT | Performed by: INTERNAL MEDICINE

## 2020-08-31 PROCEDURE — 99024 POSTOP FOLLOW-UP VISIT: CPT | Performed by: THORACIC SURGERY (CARDIOTHORACIC VASCULAR SURGERY)

## 2020-08-31 PROCEDURE — 85576 BLOOD PLATELET AGGREGATION: CPT | Performed by: THORACIC SURGERY (CARDIOTHORACIC VASCULAR SURGERY)

## 2020-08-31 PROCEDURE — 99232 SBSQ HOSP IP/OBS MODERATE 35: CPT | Performed by: INTERNAL MEDICINE

## 2020-08-31 PROCEDURE — 97530 THERAPEUTIC ACTIVITIES: CPT

## 2020-08-31 PROCEDURE — 25010000002 HEPARIN (PORCINE) PER 1000 UNITS: Performed by: PHYSICIAN ASSISTANT

## 2020-08-31 PROCEDURE — 83735 ASSAY OF MAGNESIUM: CPT | Performed by: INTERNAL MEDICINE

## 2020-08-31 RX ORDER — SODIUM CHLORIDE 0.9 % (FLUSH) 0.9 %
10 SYRINGE (ML) INJECTION EVERY 12 HOURS SCHEDULED
Status: DISCONTINUED | OUTPATIENT
Start: 2020-08-31 | End: 2020-09-02 | Stop reason: HOSPADM

## 2020-08-31 RX ORDER — ASPIRIN 81 MG/1
81 TABLET ORAL DAILY
Status: DISCONTINUED | OUTPATIENT
Start: 2020-09-01 | End: 2020-09-02 | Stop reason: HOSPADM

## 2020-08-31 RX ORDER — SODIUM CHLORIDE 0.9 % (FLUSH) 0.9 %
10 SYRINGE (ML) INJECTION EVERY 8 HOURS SCHEDULED
Status: DISCONTINUED | OUTPATIENT
Start: 2020-08-31 | End: 2020-09-01

## 2020-08-31 RX ORDER — SIMETHICONE 80 MG
80 TABLET,CHEWABLE ORAL 4 TIMES DAILY PRN
Status: DISCONTINUED | OUTPATIENT
Start: 2020-08-31 | End: 2020-09-02 | Stop reason: HOSPADM

## 2020-08-31 RX ADMIN — HEPARIN SODIUM 5000 UNITS: 5000 INJECTION INTRAVENOUS; SUBCUTANEOUS at 05:37

## 2020-08-31 RX ADMIN — ATORVASTATIN CALCIUM 40 MG: 40 TABLET, FILM COATED ORAL at 20:56

## 2020-08-31 RX ADMIN — OXYCODONE 10 MG: 5 TABLET ORAL at 05:35

## 2020-08-31 RX ADMIN — HYDROCODONE BITARTRATE AND ACETAMINOPHEN 1 TABLET: 7.5; 325 TABLET ORAL at 21:36

## 2020-08-31 RX ADMIN — MAGNESIUM OXIDE TAB 400 MG (241.3 MG ELEMENTAL MG) 400 MG: 400 (241.3 MG) TAB at 08:14

## 2020-08-31 RX ADMIN — CLOPIDOGREL BISULFATE 75 MG: 75 TABLET ORAL at 08:14

## 2020-08-31 RX ADMIN — HYDROCODONE BITARTRATE AND ACETAMINOPHEN 1 TABLET: 7.5; 325 TABLET ORAL at 15:40

## 2020-08-31 RX ADMIN — FOLIC ACID 1 MG: 1 TABLET ORAL at 08:14

## 2020-08-31 RX ADMIN — Medication 10 ML: at 15:40

## 2020-08-31 RX ADMIN — Medication 400 MG: at 15:40

## 2020-08-31 RX ADMIN — DOCUSATE SODIUM 50MG AND SENNOSIDES 8.6MG 2 TABLET: 8.6; 5 TABLET, FILM COATED ORAL at 20:56

## 2020-08-31 RX ADMIN — Medication 400 MG: at 20:56

## 2020-08-31 RX ADMIN — POLYETHYLENE GLYCOL 3350 17 G: 17 POWDER, FOR SOLUTION ORAL at 20:56

## 2020-08-31 RX ADMIN — THIAMINE HCL TAB 100 MG 100 MG: 100 TAB at 08:14

## 2020-08-31 RX ADMIN — METOPROLOL TARTRATE 25 MG: 25 TABLET, FILM COATED ORAL at 08:13

## 2020-08-31 RX ADMIN — SIMETHICONE 80 MG: 80 TABLET, CHEWABLE ORAL at 17:29

## 2020-08-31 RX ADMIN — METOPROLOL TARTRATE 25 MG: 25 TABLET, FILM COATED ORAL at 20:56

## 2020-08-31 RX ADMIN — HEPARIN SODIUM 5000 UNITS: 5000 INJECTION INTRAVENOUS; SUBCUTANEOUS at 20:56

## 2020-08-31 RX ADMIN — HEPARIN SODIUM 5000 UNITS: 5000 INJECTION INTRAVENOUS; SUBCUTANEOUS at 15:00

## 2020-08-31 RX ADMIN — ASPIRIN 325 MG: 325 TABLET, COATED ORAL at 08:14

## 2020-08-31 RX ADMIN — HYDROCODONE BITARTRATE AND ACETAMINOPHEN 1 TABLET: 7.5; 325 TABLET ORAL at 02:25

## 2020-08-31 RX ADMIN — MORPHINE SULFATE 2 MG: 2 INJECTION, SOLUTION INTRAMUSCULAR; INTRAVENOUS at 02:29

## 2020-08-31 RX ADMIN — SODIUM CHLORIDE, PRESERVATIVE FREE 10 ML: 5 INJECTION INTRAVENOUS at 20:58

## 2020-08-31 RX ADMIN — PANTOPRAZOLE SODIUM 40 MG: 40 TABLET, DELAYED RELEASE ORAL at 05:37

## 2020-09-01 LAB
ANION GAP SERPL CALCULATED.3IONS-SCNC: 13 MMOL/L (ref 5–15)
BUN SERPL-MCNC: 15 MG/DL (ref 6–20)
BUN/CREAT SERPL: 16.7 (ref 7–25)
CALCIUM SPEC-SCNC: 9.9 MG/DL (ref 8.6–10.5)
CHLORIDE SERPL-SCNC: 102 MMOL/L (ref 98–107)
CO2 SERPL-SCNC: 23 MMOL/L (ref 22–29)
CREAT SERPL-MCNC: 0.9 MG/DL (ref 0.76–1.27)
GFR SERPL CREATININE-BSD FRML MDRD: 87 ML/MIN/1.73
GLUCOSE SERPL-MCNC: 121 MG/DL (ref 65–99)
HCT VFR BLD AUTO: 33.3 % (ref 37.5–51)
HGB BLD-MCNC: 10.3 G/DL (ref 13–17.7)
POTASSIUM SERPL-SCNC: 4 MMOL/L (ref 3.5–5.2)
SODIUM SERPL-SCNC: 138 MMOL/L (ref 136–145)

## 2020-09-01 PROCEDURE — 25010000002 HEPARIN (PORCINE) PER 1000 UNITS: Performed by: PHYSICIAN ASSISTANT

## 2020-09-01 PROCEDURE — 80048 BASIC METABOLIC PNL TOTAL CA: CPT | Performed by: PHYSICIAN ASSISTANT

## 2020-09-01 PROCEDURE — 25010000002 ONDANSETRON PER 1 MG: Performed by: PHYSICIAN ASSISTANT

## 2020-09-01 PROCEDURE — 99222 1ST HOSP IP/OBS MODERATE 55: CPT | Performed by: NURSE PRACTITIONER

## 2020-09-01 PROCEDURE — 97116 GAIT TRAINING THERAPY: CPT

## 2020-09-01 PROCEDURE — 85014 HEMATOCRIT: CPT | Performed by: PHYSICIAN ASSISTANT

## 2020-09-01 PROCEDURE — 99024 POSTOP FOLLOW-UP VISIT: CPT | Performed by: THORACIC SURGERY (CARDIOTHORACIC VASCULAR SURGERY)

## 2020-09-01 PROCEDURE — 97110 THERAPEUTIC EXERCISES: CPT

## 2020-09-01 PROCEDURE — 85018 HEMOGLOBIN: CPT | Performed by: PHYSICIAN ASSISTANT

## 2020-09-01 RX ORDER — HYDROCODONE BITARTRATE AND ACETAMINOPHEN 7.5; 325 MG/1; MG/1
1 TABLET ORAL EVERY 6 HOURS PRN
Qty: 15 TABLET | Refills: 0 | Status: SHIPPED | OUTPATIENT
Start: 2020-09-01 | End: 2020-09-05

## 2020-09-01 RX ORDER — METOPROLOL TARTRATE 50 MG/1
50 TABLET, FILM COATED ORAL EVERY 12 HOURS SCHEDULED
Status: DISCONTINUED | OUTPATIENT
Start: 2020-09-01 | End: 2020-09-02 | Stop reason: HOSPADM

## 2020-09-01 RX ADMIN — SIMETHICONE 80 MG: 80 TABLET, CHEWABLE ORAL at 16:47

## 2020-09-01 RX ADMIN — METOPROLOL TARTRATE 25 MG: 25 TABLET, FILM COATED ORAL at 08:15

## 2020-09-01 RX ADMIN — SODIUM CHLORIDE, PRESERVATIVE FREE 10 ML: 5 INJECTION INTRAVENOUS at 20:30

## 2020-09-01 RX ADMIN — DOCUSATE SODIUM 50MG AND SENNOSIDES 8.6MG 2 TABLET: 8.6; 5 TABLET, FILM COATED ORAL at 20:33

## 2020-09-01 RX ADMIN — CLOPIDOGREL BISULFATE 75 MG: 75 TABLET ORAL at 08:07

## 2020-09-01 RX ADMIN — HYDROCODONE BITARTRATE AND ACETAMINOPHEN 1 TABLET: 7.5; 325 TABLET ORAL at 04:11

## 2020-09-01 RX ADMIN — THIAMINE HCL TAB 100 MG 100 MG: 100 TAB at 08:07

## 2020-09-01 RX ADMIN — Medication 400 MG: at 16:41

## 2020-09-01 RX ADMIN — Medication 400 MG: at 20:33

## 2020-09-01 RX ADMIN — HEPARIN SODIUM 5000 UNITS: 5000 INJECTION INTRAVENOUS; SUBCUTANEOUS at 13:33

## 2020-09-01 RX ADMIN — Medication 400 MG: at 08:07

## 2020-09-01 RX ADMIN — METOPROLOL TARTRATE 50 MG: 50 TABLET, FILM COATED ORAL at 20:33

## 2020-09-01 RX ADMIN — FOLIC ACID 1 MG: 1 TABLET ORAL at 08:07

## 2020-09-01 RX ADMIN — METOPROLOL TARTRATE 25 MG: 25 TABLET, FILM COATED ORAL at 08:07

## 2020-09-01 RX ADMIN — SODIUM CHLORIDE, PRESERVATIVE FREE 10 ML: 5 INJECTION INTRAVENOUS at 08:07

## 2020-09-01 RX ADMIN — HEPARIN SODIUM 5000 UNITS: 5000 INJECTION INTRAVENOUS; SUBCUTANEOUS at 20:33

## 2020-09-01 RX ADMIN — ALPRAZOLAM 0.5 MG: 0.5 TABLET ORAL at 20:33

## 2020-09-01 RX ADMIN — HYDROCODONE BITARTRATE AND ACETAMINOPHEN 1 TABLET: 7.5; 325 TABLET ORAL at 18:49

## 2020-09-01 RX ADMIN — HEPARIN SODIUM 5000 UNITS: 5000 INJECTION INTRAVENOUS; SUBCUTANEOUS at 04:11

## 2020-09-01 RX ADMIN — ASPIRIN 81 MG: 81 TABLET, COATED ORAL at 08:07

## 2020-09-01 RX ADMIN — PANTOPRAZOLE SODIUM 40 MG: 40 TABLET, DELAYED RELEASE ORAL at 04:12

## 2020-09-01 RX ADMIN — ATORVASTATIN CALCIUM 40 MG: 40 TABLET, FILM COATED ORAL at 20:33

## 2020-09-01 RX ADMIN — DOCUSATE SODIUM 50MG AND SENNOSIDES 8.6MG 2 TABLET: 8.6; 5 TABLET, FILM COATED ORAL at 08:07

## 2020-09-01 RX ADMIN — POLYETHYLENE GLYCOL 3350 17 G: 17 POWDER, FOR SOLUTION ORAL at 08:07

## 2020-09-01 RX ADMIN — POLYETHYLENE GLYCOL 3350 17 G: 17 POWDER, FOR SOLUTION ORAL at 20:34

## 2020-09-01 RX ADMIN — HYDROCODONE BITARTRATE AND ACETAMINOPHEN 1 TABLET: 7.5; 325 TABLET ORAL at 13:33

## 2020-09-01 RX ADMIN — HYDROCODONE BITARTRATE AND ACETAMINOPHEN 1 TABLET: 7.5; 325 TABLET ORAL at 08:08

## 2020-09-01 RX ADMIN — ONDANSETRON 4 MG: 2 INJECTION INTRAMUSCULAR; INTRAVENOUS at 20:34

## 2020-09-01 NOTE — PROGRESS NOTES
"                                 [facility] Progress Note      LOS: 11 days   Patient Care Team:  Alex Canales MD as PCP - General (Cardiology)    Chief Complaint: Chest pain and shortness of breath    Subjective     Interval History: Uneventful night    Patient Complaints:  Some sob      Review of Systems:   A 14 point review of systems was negative except as was stated in the HPI      Objective     Vital Sign Min/Max for last 24 hours  Temp  Min: 98.1 °F (36.7 °C)  Max: 99.2 °F (37.3 °C)   BP  Min: 117/75  Max: 141/73   Pulse  Min: 96  Max: 125   Resp  Min: 18  Max: 20   SpO2  Min: 89 %  Max: 99 %   No data recorded   Weight  Min: 94.1 kg (207 lb 6.4 oz)  Max: 99.3 kg (219 lb)     Flowsheet Rows      First Filed Value   Admission Height  190 cm (74.8\") Documented at 08/21/2020 1426   Admission Weight  95.3 kg (210 lb) Documented at 08/21/2020 1426          Physical Exam:  General Appearance: Alert, appears stated age and cooperative  Lungs: Decreased at bases  Heart:: Regular, tachy. no murmur or rub  Abdomen: Soft and nontender with adequate bowel sounds.  No organomegaly  Extremities: No cyanosis, clubbing.  Trace pretibial edema  Pulses: Pulses palpable and equal bilaterally  Skin: Warm and dry with no rash  Psych: Normal     Results Review:     I reviewed the patient's new clinical results.  Results from last 7 days   Lab Units 09/01/20  0503 08/31/20  0425 08/30/20  0327   SODIUM mmol/L 138 138 138   POTASSIUM mmol/L 4.0 4.5 4.0   CHLORIDE mmol/L 102 100 101   CO2 mmol/L 23.0 29.0 30.0*   BUN mg/dL 15 12 15   CREATININE mg/dL 0.90 1.04 0.99   GLUCOSE mg/dL 121* 111* 102*   CALCIUM mg/dL 9.9 9.9 8.9     Results from last 7 days   Lab Units 09/01/20  0503 08/31/20  0425 08/30/20  1011 08/30/20  0327   WBC 10*3/mm3  --  7.86 5.69 5.63   HEMOGLOBIN g/dL 10.3* 9.6* 8.6* 8.7*   HEMATOCRIT % 33.3* 30.8* 28.3* 28.0*   PLATELETS 10*3/mm3  --  227 166 164     Lab Results   Lab Value Date/Time "    TROPONINT <0.010 08/21/2020 0643    TROPONINT <0.010 08/20/2020 0700    TROPONINT <0.010 08/19/2020 0319    TROPONINT <0.010 08/18/2020 0839    TROPONINT <0.010 08/18/2020 0414    TROPONINT <0.010 08/17/2020 2333    TROPONINT <0.010 03/20/2020 0233    TROPONINT <0.010 03/19/2020 2228    TROPONINT <0.010 08/01/2019 2026    TROPONINT <0.010 08/01/2019 1457    TROPONINT <0.010 08/01/2019 0840    TROPONINT <0.010 06/27/2019 0010    TROPONINT <0.010 06/26/2019 2212    TROPONINT <0.010 03/31/2019 2333    TROPONINT <0.010 03/28/2019 2124     Results from last 7 days   Lab Units 08/31/20  0425   CHOLESTEROL mg/dL 118   TRIGLYCERIDES mg/dL 186*     Results from last 7 days   Lab Units 08/27/20  0404 08/26/20  1113   INR  1.44* 1.49*     Results from last 7 days   Lab Units 08/26/20  1604   PH, ARTERIAL pH units 7.374   PO2 ART mm Hg 69.3*   PCO2, ARTERIAL mm Hg 45.8*   HCO3 ART mmol/L 26.7*           Medication Review: yes  Current Facility-Administered Medications   Medication Dose Route Frequency Provider Last Rate Last Dose   • acetaminophen (TYLENOL) tablet 650 mg  650 mg Oral Q4H PRN Yoav Browning PA       • ALPRAZolam (XANAX) tablet 0.5 mg  0.5 mg Oral Q12H PRN Yoav Browning PA   0.5 mg at 08/30/20 2010   • aspirin EC tablet 81 mg  81 mg Oral Daily David Kuo MD   81 mg at 09/01/20 0807   • atorvastatin (LIPITOR) tablet 40 mg  40 mg Oral Nightly Yoav Browning PA   40 mg at 08/31/20 2056   • beer 1 each  1 each Oral 4x Daily Jabari Garcia MD   1 each at 09/01/20 0816   • bisacodyl (DULCOLAX) EC tablet 10 mg  10 mg Oral Daily PRN Yoav Browning PA       • clopidogrel (PLAVIX) tablet 75 mg  75 mg Oral Daily Yoav Browning PA   75 mg at 09/01/20 0807   • folic acid (FOLVITE) tablet 1 mg  1 mg Oral Daily Yoav Browning PA   1 mg at 09/01/20 0807   • heparin (porcine) 5000 UNIT/ML injection 5,000 Units  5,000 Units Subcutaneous Q8H Yoav Browning PA   5,000 Units  at 09/01/20 0411   • HYDROcodone-acetaminophen (NORCO) 7.5-325 MG per tablet 1 tablet  1 tablet Oral Q4H PRN Yoav Browning PA   1 tablet at 09/01/20 0808   • hydrOXYzine (ATARAX) tablet 25 mg  25 mg Oral TID PRN Yoav Browning PA   25 mg at 08/30/20 2356   • ipratropium-albuterol (DUO-NEB) nebulizer solution 3 mL  3 mL Nebulization Q4H PRN Yoav Browning PA       • magnesium oxide (MAGOX) tablet 400 mg  400 mg Oral TID Jabari Garcia MD   400 mg at 09/01/20 0807   • metoprolol tartrate (LOPRESSOR) tablet 50 mg  50 mg Oral Q12H Artemio Montoya MD       • morphine injection 2 mg  2 mg Intravenous Q2H PRN Yoav Browning PA   2 mg at 08/31/20 0229   • nitroglycerin (NITROSTAT) SL tablet 0.4 mg  0.4 mg Sublingual Q5 Min PRN Yoav Browning PA       • ondansetron (ZOFRAN) injection 4 mg  4 mg Intravenous Q6H PRN Yoav Browning PA   4 mg at 08/28/20 2102   • oxyCODONE (ROXICODONE) immediate release tablet 10 mg  10 mg Oral Q4H PRN Yoav Browning PA   10 mg at 08/31/20 0535   • pantoprazole (PROTONIX) EC tablet 40 mg  40 mg Oral Q AM Yoav Browning PA   40 mg at 09/01/20 0412   • Pharmacy Consult - MTM   Does not apply Daily Yoav Browning PA       • polyethylene glycol (MIRALAX) packet 17 g  17 g Oral BID Yoav Browning PA   17 g at 09/01/20 0807   • sennosides-docusate (PERICOLACE) 8.6-50 MG per tablet 2 tablet  2 tablet Oral BID Yoav Browning PA   2 tablet at 09/01/20 0807   • simethicone (MYLICON) chewable tablet 80 mg  80 mg Oral 4x Daily PRN Jailene Monroe APRN   80 mg at 08/31/20 1729   • sodium chloride 0.9 % flush 10 mL  10 mL Intravenous Q12H Yoav Browning PA   10 mL at 09/01/20 0807   • thiamine (VITAMIN B-1) tablet 100 mg  100 mg Oral Daily Yoav Browning PA   100 mg at 09/01/20 0807         CAD (coronary artery disease)    Chronic alcoholism (CMS/HCC)    Essential hypertension    Hyperlipidemia    Grade I diastolic  dysfunction    S/P CABG x 4 8/26/2020        Impression      Postop  CABG  Normal preop LV function  COPD  Hypertension  Zakiya-Parkinson-White syndrome by history but no preexcitation noted on resting EKG  History of EtOH abuse      Plan     Increase beta-blocker  Continue high-dose statin and DAPT  Continue to monitor blood pressure and rhythm  Mobilize  Satisfactory progress  Home soon    MD Mykel Torre PA  09/01/20  09:04

## 2020-09-01 NOTE — PROGRESS NOTES
CTS Progress Note      POD #6 s/p CABG x4      Subjective  Patient is doing well admits to mild incisional pain.  Denies dyspnea.      Objective    Physical Exam:   Vital Signs   Temp:  [98.1 °F (36.7 °C)-99.2 °F (37.3 °C)] 98.1 °F (36.7 °C)  Heart Rate:  [] 92  Resp:  [18-20] 18  BP: (117-141)/(72-88) 137/83   GEN: NAD   RESP: Respirations even and unlabored and clear to auscultation bilaterally no wheezes, rales or rhonchi    CV: Regular rate and rhythm no murmurs, rubs or gallops.  Normal S1-S2.  Sternum stable.   ABD: Soft, nontender/nondistended with normoactive bowel sounds    EXT: Warm good color perfused no bilateral lower extremity edema   INT: Incisions c/d/i      Intake/Output Summary (Last 24 hours) at 9/1/2020 1207  Last data filed at 9/1/2020 0756  Gross per 24 hour   Intake 850 ml   Output 1950 ml   Net -1100 ml     Results     Results from last 7 days   Lab Units 09/01/20  0503 08/31/20  0425   WBC 10*3/mm3  --  7.86   HEMOGLOBIN g/dL 10.3* 9.6*   HEMATOCRIT % 33.3* 30.8*   PLATELETS 10*3/mm3  --  227     Results from last 7 days   Lab Units 09/01/20  0503   SODIUM mmol/L 138   POTASSIUM mmol/L 4.0   CHLORIDE mmol/L 102   CO2 mmol/L 23.0   BUN mg/dL 15   CREATININE mg/dL 0.90   GLUCOSE mg/dL 121*   CALCIUM mg/dL 9.9     Results from last 7 days   Lab Units 08/27/20  0404 08/26/20  1113   INR  1.44* 1.49*   APTT seconds  --  32.9         Assessment/Plan       CAD (coronary artery disease)    Chronic alcoholism (CMS/HCC)    Essential hypertension    Hyperlipidemia    Grade I diastolic dysfunction    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

## 2020-09-01 NOTE — PLAN OF CARE
Pt has been ambulating in [place]. He has ambulated x2. Complaints of incisional pain. PRN Norco given. Beers with meals. Pt has been using IS. Incisions CDI. HR has been between 90-130s. Cards notified, Metoprolol increased. Will cont to monitor. Home tomorrow if all agree.

## 2020-09-01 NOTE — THERAPY TREATMENT NOTE
Patient Name: Onel Jimenez  : 1965    MRN: [id number]                              Today's Date: 2020       Admit Date: 2020    Visit Dx:     ICD-10-CM ICD-9-CM   1. CAD (coronary artery disease) I25.10 414.00   2. S/P CABG x 4 2020 Z95.1 V45.81     Patient Active Problem List   Diagnosis   • CAD (coronary artery disease)   • Anxiety disorder   • Chronic alcoholism (CMS/HCC)   • Essential hypertension   • Hyperlipidemia type IV,uncontrolled   • Gastroesophageal reflux disease without esophagitis   • Sorethroat   • Hypokalemia   • Hypomagnesemia   • Hypocalcemia   • Chest pain   • Hyperlipidemia   • Grade I diastolic dysfunction   • CARLITOS (acute kidney injury) (CMS/HCC)   • Abnormal nuclear stress test   • S/P CABG x 4 2020     Past Medical History:   Diagnosis Date   • Anxiety disorder    • CAD (coronary artery disease)    • Chronic alcoholism (CMS/HCC)    • Hyperlipidemia    • Hypertension    • WPW (Zakiya-Parkinson-White syndrome)      Past Surgical History:   Procedure Laterality Date   • CARDIAC CATHETERIZATION  2015   • CARDIAC CATHETERIZATION N/A 2020    Procedure: Left Heart Cath;  Surgeon: Jack Beebe MD;  Location:  [facility];  Service: Cardiology;  Laterality: N/A;   • CARDIOVASCULAR STRESS TEST  2015   • CORONARY ARTERY BYPASS GRAFT N/A 2020    Procedure: MEDIAN STERNOTOMY, CORONARY ARTERY BYPASS GRAFTING X  4, UTILIZING THE LEFT INTERNAL MAMMARY ARTERY AND EVH OF THE RIGHT GREATER SAPHENOUS VEIN;  Surgeon: David Kuo MD;  Location: [facility] OR;  Service: Cardiothoracic;  Laterality: N/A;  LEG START - 0740  VEIN OUT - 0820  VEIN READY - 0832   • ECHO - CONVERTED  2015   • EYE SURGERY       General Information     Row Name 20 9971          PT Evaluation Time/Intention    Document Type  therapy note (daily note)  -MB     Mode of Treatment  physical therapy  -MB     Row Name 20 1339          General Information     Patient Profile Reviewed?  yes  -MB     Existing Precautions/Restrictions  cardiac;fall;sternal  -MB     Row Name 09/01/20 1330          Cognitive Assessment/Intervention- PT/OT    Orientation Status (Cognition)  oriented x 4  -MB     Row Name 09/01/20 1330          Safety Issues, Functional Mobility    Safety Issues Affecting Function (Mobility)  awareness of need for assistance;insight into deficits/self awareness;safety precaution awareness;safety precautions follow-through/compliance  -MB     Impairments Affecting Function (Mobility)  balance;coordination;endurance/activity tolerance;postural/trunk control;pain;strength  -MB       User Key  (r) = Recorded By, (t) = Taken By, (c) = Cosigned By    Initials Name Provider Type    Crissy Salas, PT Physical Therapist        Mobility     Row Name 09/01/20 1330          Bed Mobility Assessment/Treatment    Supine-Sit [place] (Bed Mobility)  contact guard;verbal cues  -MB     Sit-Supine [place] (Bed Mobility)  supervision;verbal cues  -MB     Assistive Device (Bed Mobility)  head of bed elevated  -MB     Comment (Bed Mobility)  Pt. required assist to raise trunk/shoulders and VCs to maintain sternal precautions.    -MB     Row Name 09/01/20 1330          Transfer Assessment/Treatment    Comment (Transfers)  VCs for safety and sequencing to maintain sternal precautions.    -MB     Row Name 09/01/20 1330          Sit-Stand Transfer    Sit-Stand [place] (Transfers)  contact guard;verbal cues  -MB     Assistive Device (Sit-Stand Transfers)  other (see comments) gait belt, sternal pillow  -MB     Row Name 09/01/20 1330          Gait/Stairs Assessment/Training    Gait/Stairs Assessment/Training  gait/ambulation independence;gait/ambulation assistive device;distance ambulated;gait pattern  -MB     [place] Level (Gait)  contact guard;verbal cues  -MB     Assistive Device (Gait)  other (see comments) Pt. declined RW.  UE support  -MB     Distance in  Feet (Gait)  300  -MB     Pattern (Gait)  step-through  -MB     Deviations/Abnormal Patterns (Gait)  heath decreased;stride length decreased  -MB     Bilateral Gait Deviations  forward flexed posture;heel strike decreased  -MB     Comment (Gait/Stairs)  Pt. declined use of RW; ambulated w/ step through gait pattern, lateral sway, and dec B step length.  VCs for upright posture and increased B step length.    -MB       User Key  (r) = Recorded By, (t) = Taken By, (c) = Cosigned By    Initials Name Provider Type    Crissy Salas, PT Physical Therapist        Obj/Interventions     Row Name 09/01/20 1330          Therapeutic Exercise    Lower Extremity (Therapeutic Exercise)  gluteal sets;LAQ (long arc quad), bilateral;marching while seated  -MB     Lower Extremity Range of Motion (Therapeutic Exercise)  hip abduction/adduction, bilateral;ankle dorsiflexion/plantar flexion, bilateral  -MB     Exercise Type (Therapeutic Exercise)  AROM (active range of motion)  -MB     Position (Therapeutic Exercise)  seated  -MB     Sets/Reps (Therapeutic Exercise)  1x15  -MB     Expected Outcome (Therapeutic Exercise)  improve functional tolerance, household activity;improve performance, gait skills  -MB     Row Name 09/01/20 1330          Static Standing Balance    Level of [place] (Supported Standing, Static Balance)  contact guard assist  -MB     Time Able to Maintain Position (Supported Standing, Static Balance)  2 to 3 minutes  -MB     Assistive Device Utilized (Supported Standing, Static Balance)  other (see comments) no AD  -MB     Row Name 09/01/20 1330          Dynamic Standing Balance    Level of [place], Reaches Outside Midline (Standing, Dynamic Balance)  contact guard assist  -MB     Time Able to Maintain Position, Reaches Outside Midline (Standing, Dynamic Balance)  more than 5 minutes  -MB     Assistive Device Utilized (Supported Standing, Dynamic Balance)  other (see comments) no AD  -MB       User  Key  (r) = Recorded By, (t) = Taken By, (c) = Cosigned By    Initials Name Provider Type    Crissy Salas, PT Physical Therapist        Goals/Plan    No documentation.       Clinical Impression     Row Name 09/01/20 1330          Pain Assessment    Additional Documentation  Pain Scale: FACES Pre/Post-Treatment (Group)  -MB     Row Name 09/01/20 5690          Pain Scale: Numbers Pre/Post-Treatment    Pain Scale: Numbers, Pretreatment  10/10  -MB     Pain Scale: Numbers, Post-Treatment  10/10  -MB     Pain Location - Orientation  incisional  -MB     Pain Location  chest  -MB     Pain Intervention(s)  Medication (See MAR);Ambulation/increased activity;Repositioned RN premedicated.   -MB     Row Name 09/01/20 1330          Pain Scale: FACES Pre/Post-Treatment    Pain: FACES Scale, Pretreatment  2-->hurts little bit  -MB     Pain: FACES Scale, Post-Treatment  2-->hurts little bit  -MB     Row Name 09/01/20 4020          Plan of Care Review    Plan of Care Reviewed With  patient  -MB     Progress  improving  -MB     Outcome Summary  Pt. demonstrates improved independence w/ mobility w/ continued cueing for safety.  Pt. performed bed mobility w/ CGA, transfers w/ CGA, and progressed forward ambulation to 300ft w/out AD, w/ CGA.  Pt. participated in seated BLE TherEx w/ encouragement to continue at home 2-3x/day.    -MB     Row Name 09/01/20 1330          Vital Signs    Pre Systolic BP Rehab  137  -MB     Pre Treatment Diastolic BP  83  -MB     Pre SpO2 (%)  95  -MB     O2 Delivery Pre Treatment  room air  -MB     O2 Delivery Intra Treatment  room air  -MB     Post SpO2 (%)  94  -MB     O2 Delivery Post Treatment  room air  -MB     Pre Patient Position  Supine  -MB     Intra Patient Position  Standing  -MB     Post Patient Position  Supine  -MB     Row Name 09/01/20 1330          Positioning and Restraints    Pre-Treatment Position  in bed  -MB     Post Treatment Position  bed  -MB     In Bed  notified nsg;call  light within reach;encouraged to call for assist;exit alarm on  -MB       User Key  (r) = Recorded By, (t) = Taken By, (c) = Cosigned By    Initials Name Provider Type    Crissy Salas, PT Physical Therapist        Outcome Measures     Row Name 09/01/20 9033          How much help from another person do you currently need...    Turning from your back to your side while in flat bed without using bedrails?  4  -MB     Moving from lying on back to sitting on the side of a flat bed without bedrails?  3  -MB     Moving to and from a bed to a chair (including a wheelchair)?  4  -MB     Standing up from a chair using your arms (e.g., wheelchair, bedside chair)?  3  -MB     Climbing 3-5 steps with a railing?  3  -MB     To walk in hospital room?  3  -MB     AM-PAC 6 Clicks Score (PT)  20  -MB       User Key  (r) = Recorded By, (t) = Taken By, (c) = Cosigned By    Initials Name Provider Type    Crissy Salas, PT Physical Therapist        Physical Therapy Education                 Title: PT OT SLP Therapies (In Progress)     Topic: Physical Therapy (In Progress)     Point: Mobility training (In Progress)     Description:   Instruct learner(s) on safety and technique for assisting patient out of bed, chair or wheelchair.  Instruct in the proper use of assistive devices, such as walker, crutches, cane or brace.              Patient Friendly Description:   It's important to get you on your feet again, but we need to do so in a way that is safe for you. Falling has serious consequences, and your personal safety is the most important thing of all.        When it's time to get out of bed, one of us or a family member will sit next to you on the bed to give you support.     If your doctor or nurse tells you to use a walker, crutches, a cane, or a brace, be sure you use it every time you get out of bed, even if you think you don't need it.    Learning Progress Summary           Patient Acceptance, E, NR by KG at  8/31/2020 0858    Acceptance, E, NR by ES at 8/30/2020 1046    Acceptance, E, NR by MERCEDEZ at 8/28/2020 0930    Acceptance, E, NR by MERCEDEZ at 8/27/2020 1100                   Point: Home exercise program (In Progress)     Description:   Instruct learner(s) on appropriate technique for monitoring, assisting and/or progressing patient with therapeutic exercises and activities.              Learning Progress Summary           Patient Acceptance, E, NR by KG at 8/31/2020 0858    Acceptance, E, NR by ES at 8/30/2020 1046    Acceptance, E, NR by MERCEDEZ at 8/28/2020 0930    Acceptance, E, NR by MERCEDEZ at 8/27/2020 1100                   Point: Body mechanics (In Progress)     Description:   Instruct learner(s) on proper positioning and spine alignment for patient and/or caregiver during mobility tasks and/or exercises.              Learning Progress Summary           Patient Acceptance, E, NR by KG at 8/31/2020 0858    Acceptance, E, NR by ES at 8/30/2020 1046    Acceptance, E, NR by MERCEDEZ at 8/28/2020 0930    Acceptance, E, NR by MERCEDEZ at 8/27/2020 1100                   Point: Precautions (In Progress)     Description:   Instruct learner(s) on prescribed precautions during mobility and gait tasks              Learning Progress Summary           Patient Acceptance, E, NR by KG at 8/31/2020 0858    Acceptance, E, NR by ES at 8/30/2020 1046    Acceptance, E, NR by MERCEDEZ at 8/28/2020 0930    Acceptance, E, NR by MERCEDEZ at 8/27/2020 1100                               User Key     Initials Effective Dates Name Provider Type Discipline    MERCEDEZ 06/19/15 -  Ebony Arora, PT Physical Therapist PT    KG 05/22/20 -  Fanny Oakley, PT Physical Therapist PT    ES 06/18/19 -  Lissa Sánchez, PT Physical Therapist PT              PT Recommendation and Plan     Plan of Care Reviewed With: patient  Progress: improving  Outcome Summary: Pt. demonstrates improved independence w/ mobility w/ continued cueing for safety.  Pt. performed bed mobility w/ CGA,  transfers w/ CGA, and progressed forward ambulation to 300ft w/out AD, w/ CGA.  Pt. participated in seated BLE TherEx w/ encouragement to continue at home 2-3x/day.       Time Calculation:   PT Charges     Row Name 09/01/20 1445             Time Calculation    Start Time  1330  -MB      PT Received On  09/01/20  -MB      PT Goal Re-Cert Due Date  09/06/20  -MB         Time Calculation- PT    Total Timed Code Minutes- PT  30 minute(s)  -MB         Timed Charges    07353 - PT Therapeutic Exercise Minutes  15  -MB      87786 - Gait Training Minutes   15  -MB        User Key  (r) = Recorded By, (t) = Taken By, (c) = Cosigned By    Initials Name Provider Type    Crissy Salas, PT Physical Therapist        Therapy Charges for Today     Code Description Service Date Service Provider Modifiers Qty    02711952096 HC PT THER PROC EA 15 MIN 9/1/2020 Crissy Mcmanus, PT GP 1    28057346817 HC GAIT TRAINING EA 15 MIN 9/1/2020 Crissy Mcmanus, PT GP 1          PT G-Codes  Outcome Measure Options: AM-PAC 6 Clicks Basic Mobility (PT)  AM-PAC 6 Clicks Score (PT): 20    Crissy Mcmanus, ELIZABETH  9/1/2020

## 2020-09-01 NOTE — PROGRESS NOTES
[facility]  PROGRESS NOTE    Patient Name: Onel Jimenez  : 1965  MRN: [id number]    Date of Admission: 2020  Length of Stay: 11  Primary Care Physician: Alex Canales MD    Subjective   Subjective     CC:  CAD s/p CABG    HPI:  Pt with PMH CAD, anxiety disorder, chronic alcoholism, HTN, HLD, GERD, Grade I daistolic dysfunction presenting with chest pain and hypertension. He was also found to have CARLITOS. Pt troponin was neg, EKG showed T-wave inversion in V1-4. Pt found to have multivessel disease. He underwent CABGx4. Today, patient resting in bed, mild sternal pain 4/10. Denies N/V/SOA.     Review of Systems  Gen- No fevers, chills  CV- No chest pain, palpitations  Resp- No cough, dyspnea  GI- No N/V/D, abd pain    Objective   Objective     Vital Signs:   Temp:  [98.1 °F (36.7 °C)-99.2 °F (37.3 °C)] 98.1 °F (36.7 °C)  Heart Rate:  [] 92  Resp:  [18-20] 18  BP: (117-137)/(73-88) 137/83     Physical Exam:  Constitutional: Awake, alert  Eyes: PERRLA, sclerae anicteric, no conjunctival injection  HENT: NCAT, mucous membranes moist  Neck: Supple, no thyromegaly, no lymphadenopathy, trachea midline  Respiratory: Clear but diminished in bases, nonlabored respirations   Cardiovascular: RRR, no murmurs, rubs, or gallops, palpable pedal pulses bilaterally  Gastrointestinal: Positive bowel sounds, soft, nontender, nondistended  Musculoskeletal: No bilateral ankle edema, no clubbing or cyanosis to extremities  Psychiatric: Appropriate affect, cooperative  Neurologic: Oriented x 3, strength symmetric in all extremities, Cranial Nerves grossly intact to confrontation, speech clear  Skin: No rashes. Surgical incision site to chest wall. CDI no drainage or erythema.     Results Reviewed:    Results from last 7 days   Lab Units 20  0503 20  0425 20  1011 20  0327  20  0404  20  1113   WBC 10*3/mm3  --  7.86 5.69 5.63   < >  10.77   < > 9.14   HEMOGLOBIN g/dL 10.3* 9.6* 8.6* 8.7*   < > 9.2*   < > 9.6*   HEMATOCRIT % 33.3* 30.8* 28.3* 28.0*   < > 28.2*   < > 29.3*   PLATELETS 10*3/mm3  --  227 166 164   < > 155   < > 118*   INR   --   --   --   --   --  1.44*  --  1.49*    < > = values in this interval not displayed.     Results from last 7 days   Lab Units 09/01/20  0503 08/31/20  0425 08/30/20  0327   SODIUM mmol/L 138 138 138   POTASSIUM mmol/L 4.0 4.5 4.0   CHLORIDE mmol/L 102 100 101   CO2 mmol/L 23.0 29.0 30.0*   BUN mg/dL 15 12 15   CREATININE mg/dL 0.90 1.04 0.99   GLUCOSE mg/dL 121* 111* 102*   CALCIUM mg/dL 9.9 9.9 8.9   ALT (SGPT) U/L  --  13  --    AST (SGOT) U/L  --  17  --      Estimated Creatinine Clearance: 127.2 mL/min (by C-G formula based on SCr of 0.9 mg/dL).    Microbiology Results Abnormal     None        Imaging Results (Last 24 Hours)     ** No results found for the last 24 hours. **          Results for orders placed during the hospital encounter of 08/17/20   Adult Transthoracic Echo Complete W/ Cont if Necessary Per Protocol    Narrative · Left ventricular diastolic dysfunction (grade I) consistent with   impaired relaxation.  · Left ventricular systolic function is normal, EF 61-65%.  · Left ventricular wall thickness is consistent with mild concentric   hypertrophy.        I have reviewed the medications:  Scheduled Meds:  aspirin 81 mg Oral Daily   atorvastatin 40 mg Oral Nightly   beer 1 each Oral 4x Daily   clopidogrel 75 mg Oral Daily   folic acid 1 mg Oral Daily   heparin (porcine) 5,000 Units Subcutaneous Q8H   magnesium oxide 400 mg Oral TID   metoprolol tartrate 50 mg Oral Q12H   pantoprazole 40 mg Oral Q AM   pharmacy consult - MTM  Does not apply Daily   polyethylene glycol 17 g Oral BID   sennosides-docusate 2 tablet Oral BID   sodium chloride 10 mL Intravenous Q12H   thiamine 100 mg Oral Daily     Continuous Infusions:   PRN Meds:.•  acetaminophen  •  ALPRAZolam  •  bisacodyl  •   HYDROcodone-acetaminophen  •  hydrOXYzine  •  ipratropium-albuterol  •  Morphine  •  nitroglycerin  •  ondansetron  •  oxyCODONE  •  simethicone    Assessment/Plan   Assessment / Plan     Active Hospital Problems    Diagnosis  POA   • S/P CABG x 4 8/26/2020 [Z95.1]  Not Applicable   • Grade I diastolic dysfunction [I51.9]  Yes   • Hyperlipidemia [E78.5]  Yes   • Essential hypertension [I10]  Yes   • CAD (coronary artery disease) [I25.10]  Yes   • Chronic alcoholism (CMS/HCC) [F10.20]  Yes      Resolved Hospital Problems   No resolved problems to display.     Brief Hospital Course to date:  Onel Jimenez is a 55 y.o. male PMH CAD, anxiety disorder, chronic alcoholism, HTN, HLD, GERD, Grade I daistolic dysfunction presenting with chest pain and hypertension. He was also found to have CARLITOS. Pt troponin was neg, EKG showed T-wave inversion in V1-4. Pt found to have multivessel disease. He underwent CABGx4.     CAD with multi-vessel disease  Grade 1 diastolic dysfunction  Hypertension/HLD  -s/p CABG x4  - pain management  - PT/OT  - ASA, atorvastatin, plavix, metoprolol    History of alcoholism  -No Symptoms of withdrawal at this time  -No Alcohol withdrawal seizures  - 1 beer QID  - folic acid, thiamine    Anxiety  - atarax prn  - xanax prn    GERD  - protonix     Constipation  - had BM today.   - continue miralax and pericolace     Slightly elevated glucose  - A1C 5.5 8/20    DVT Prophylaxis:  SQH    Disposition: I expect the patient to be discharged TBD    CODE STATUS:   Code Status and Medical Interventions:   Ordered at: 08/21/20 1458     Code Status:    CPR     Medical Interventions (Level of Support Prior to Arrest):    Full         Electronically signed by SHAWN Kumar, 09/01/20, 15:01.

## 2020-09-01 NOTE — PLAN OF CARE
Problem: Patient Care Overview  Goal: Plan of Care Review  Flowsheets (Taken 9/1/2020 1330)  Outcome Summary: Pt. demonstrates improved independence w/ mobility w/ continued cueing for safety.  Pt. performed bed mobility w/ CGA, transfers w/ CGA, and progressed forward ambulation to 300ft w/out AD, w/ CGA.  Pt. participated in seated BLE TherEx w/ encouragement to continue at home 2-3x/day.

## 2020-09-01 NOTE — PAYOR COMM NOTE
"Aspen Bingham RN  Utilization Review  P: [phone]  F: [phone]    Ref # [id number]  Updated clinicals for continued stay    Onel Cunha (55 y.o. Male)     Date of Birth Social Security Number Address Home Phone MRN    1965  [address]  [place] [address] [phone] [id number]    [employer] Marital Status          None        Admission Date Admission Type Admitting Provider Attending Provider Department, Room/Bed    8/21/20 Urgent David Kuo MD Mitchell, Robert O, MD [address], [address]/1    Discharge Date Discharge Disposition Discharge Destination                       Attending Provider:  David Kuo MD    Allergies:  Niacin    Isolation:  None   Infection:  None   Code Status:  CPR    Ht:  190 cm (74.8\")   Wt:  99.3 kg (219 lb)    Admission Cmt:  None   Principal Problem:  None                Active Insurance as of 8/21/2020     Primary Coverage     Payor Plan Insurance Group Employer/Plan Group    [employer] MEDICAID [facility] MEDICAID [id number]     Payor Plan Address Payor Plan Phone Number Payor Plan Fax Number Effective Dates    [address] [address] [phone]  2/1/2014 - None Entered    [facility] [id number]       Subscriber Name Subscriber Birth Date Member ID       ONEL CUNHA 1965 [id number]                 Emergency Contacts      (Rel.) Home Phone Work Phone Mobile Phone    Yasir Cunha (Son) [phone] -- --    [place] Cunha [phone] -- --               Physician Progress Notes (last 24 hours) (Notes from 08/31/20 1453 through 09/01/20 1453)      Damaso Nation PA at 09/01/20 1207          CTS Progress Note      POD #6 s/p CABG x4      Subjective  Patient is doing well admits to mild incisional pain.  Denies dyspnea.      Objective    Physical Exam:   Vital Signs   Temp:  [98.1 °F (36.7 °C)-99.2 °F (37.3 °C)] 98.1 °F (36.7 °C)  Heart Rate:  [] 92  Resp:  [18-20] 18  BP: (117-141)/(72-88) 137/83   GEN: NAD   RESP: " Respirations even and unlabored and clear to auscultation bilaterally no wheezes, rales or rhonchi    CV: Regular rate and rhythm no murmurs, rubs or gallops.  Normal S1-S2.  Sternum stable.   ABD: Soft, nontender/nondistended with normoactive bowel sounds    EXT: Warm good color perfused no bilateral lower extremity edema   INT: Incisions c/d/i      Intake/Output Summary (Last 24 hours) at 9/1/2020 1207  Last data filed at 9/1/2020 0756  Gross per 24 hour   Intake 850 ml   Output 1950 ml   Net -1100 ml     Results     Results from last 7 days   Lab Units 09/01/20  0503 08/31/20  0425   WBC 10*3/mm3  --  7.86   HEMOGLOBIN g/dL 10.3* 9.6*   HEMATOCRIT % 33.3* 30.8*   PLATELETS 10*3/mm3  --  227     Results from last 7 days   Lab Units 09/01/20  0503   SODIUM mmol/L 138   POTASSIUM mmol/L 4.0   CHLORIDE mmol/L 102   CO2 mmol/L 23.0   BUN mg/dL 15   CREATININE mg/dL 0.90   GLUCOSE mg/dL 121*   CALCIUM mg/dL 9.9     Results from last 7 days   Lab Units 08/27/20  0404 08/26/20  1113   INR  1.44* 1.49*   APTT seconds  --  32.9         Assessment/Plan       CAD (coronary artery disease)    Chronic alcoholism (CMS/[facility])    Essential hypertension    Hyperlipidemia    Grade I diastolic dysfunction    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    NAINA Valerio  09/01/20  12:07                    Electronically signed by Damaso Nation PA at 09/01/20 1209     Artemio Montoya MD at 09/01/20 0903                                           [facility] Progress Note      LOS: 11 days   Patient Care Team:  Alex Canales MD as PCP - General (Cardiology)    Chief Complaint: Chest pain and shortness of breath    Subjective     Interval History: Uneventful night    Patient Complaints:  Some sob      Review of Systems:   A 14 point review of systems was negative except as was stated in the  "HPI      Objective     Vital Sign Min/Max for last 24 hours  Temp  Min: 98.1 °F (36.7 °C)  Max: 99.2 °F (37.3 °C)   BP  Min: 117/75  Max: 141/73   Pulse  Min: 96  Max: 125   Resp  Min: 18  Max: 20   SpO2  Min: 89 %  Max: 99 %   No data recorded   Weight  Min: 94.1 kg (207 lb 6.4 oz)  Max: 99.3 kg (219 lb)     Flowsheet Rows      First Filed Value   Admission Height  190 cm (74.8\") Documented at 08/21/2020 1426   Admission Weight  95.3 kg (210 lb) Documented at 08/21/2020 1426          Physical Exam:  General Appearance: Alert, appears stated age and cooperative  Lungs: Decreased at bases  Heart:: Regular, tachy. no murmur or rub  Abdomen: Soft and nontender with adequate bowel sounds.  No organomegaly  Extremities: No cyanosis, clubbing.  Trace pretibial edema  Pulses: Pulses palpable and equal bilaterally  Skin: Warm and dry with no rash  Psych: Normal     Results Review:     I reviewed the patient's new clinical results.  Results from last 7 days   Lab Units 09/01/20  0503 08/31/20  0425 08/30/20  0327   SODIUM mmol/L 138 138 138   POTASSIUM mmol/L 4.0 4.5 4.0   CHLORIDE mmol/L 102 100 101   CO2 mmol/L 23.0 29.0 30.0*   BUN mg/dL 15 12 15   CREATININE mg/dL 0.90 1.04 0.99   GLUCOSE mg/dL 121* 111* 102*   CALCIUM mg/dL 9.9 9.9 8.9     Results from last 7 days   Lab Units 09/01/20  0503 08/31/20  0425 08/30/20  1011 08/30/20  0327   WBC 10*3/mm3  --  7.86 5.69 5.63   HEMOGLOBIN g/dL 10.3* 9.6* 8.6* 8.7*   HEMATOCRIT % 33.3* 30.8* 28.3* 28.0*   PLATELETS 10*3/mm3  --  227 166 164     Lab Results   Lab Value Date/Time    TROPONINT <0.010 08/21/2020 0643    TROPONINT <0.010 08/20/2020 0700    TROPONINT <0.010 08/19/2020 0319    TROPONINT <0.010 08/18/2020 0839    TROPONINT <0.010 08/18/2020 0414    TROPONINT <0.010 08/17/2020 2333    TROPONINT <0.010 03/20/2020 0233    TROPONINT <0.010 03/19/2020 2228    TROPONINT <0.010 08/01/2019 2026    TROPONINT <0.010 08/01/2019 1457    TROPONINT <0.010 08/01/2019 0840    " TROPONINT <0.010 06/27/2019 0010    TROPONINT <0.010 06/26/2019 2212    TROPONINT <0.010 03/31/2019 2333    TROPONINT <0.010 03/28/2019 2124     Results from last 7 days   Lab Units 08/31/20  0425   CHOLESTEROL mg/dL 118   TRIGLYCERIDES mg/dL 186*     Results from last 7 days   Lab Units 08/27/20  0404 08/26/20  1113   INR  1.44* 1.49*     Results from last 7 days   Lab Units 08/26/20  1604   PH, ARTERIAL pH units 7.374   PO2 ART mm Hg 69.3*   PCO2, ARTERIAL mm Hg 45.8*   HCO3 ART mmol/L 26.7*           Medication Review: yes  Current Facility-Administered Medications   Medication Dose Route Frequency Provider Last Rate Last Dose   • acetaminophen (TYLENOL) tablet 650 mg  650 mg Oral Q4H PRN Yoav Browning PA       • ALPRAZolam (XANAX) tablet 0.5 mg  0.5 mg Oral Q12H PRN Yoav Browning PA   0.5 mg at 08/30/20 2010   • aspirin EC tablet 81 mg  81 mg Oral Daily David Kuo MD   81 mg at 09/01/20 0807   • atorvastatin (LIPITOR) tablet 40 mg  40 mg Oral Nightly Yoav Browning PA   40 mg at 08/31/20 2056   • beer 1 each  1 each Oral 4x Daily Jabari Garcia MD   1 each at 09/01/20 0816   • bisacodyl (DULCOLAX) EC tablet 10 mg  10 mg Oral Daily PRN Yaov Browning PA       • clopidogrel (PLAVIX) tablet 75 mg  75 mg Oral Daily Yoav Browning PA   75 mg at 09/01/20 0807   • folic acid (FOLVITE) tablet 1 mg  1 mg Oral Daily Yoav Browning PA   1 mg at 09/01/20 0807   • heparin (porcine) 5000 UNIT/ML injection 5,000 Units  5,000 Units Subcutaneous Q8H Yoav Browning PA   5,000 Units at 09/01/20 0411   • HYDROcodone-acetaminophen (NORCO) 7.5-325 MG per tablet 1 tablet  1 tablet Oral Q4H PRN Yoav Browning PA   1 tablet at 09/01/20 0808   • hydrOXYzine (ATARAX) tablet 25 mg  25 mg Oral TID PRN Yoav Browning PA   25 mg at 08/30/20 0956   • ipratropium-albuterol (DUO-NEB) nebulizer solution 3 mL  3 mL Nebulization Q4H PRN Yoav Browning PA       • magnesium oxide  (MAGOX) tablet 400 mg  400 mg Oral TID Jabari Garcia MD   400 mg at 09/01/20 0807   • metoprolol tartrate (LOPRESSOR) tablet 50 mg  50 mg Oral Q12H Artemio Montoya MD       • morphine injection 2 mg  2 mg Intravenous Q2H PRN Yoav Browning PA   2 mg at 08/31/20 0229   • nitroglycerin (NITROSTAT) SL tablet 0.4 mg  0.4 mg Sublingual Q5 Min PRN Yoav Browning PA       • ondansetron (ZOFRAN) injection 4 mg  4 mg Intravenous Q6H PRN Yoav Browning PA   4 mg at 08/28/20 2102   • oxyCODONE (ROXICODONE) immediate release tablet 10 mg  10 mg Oral Q4H PRN Yoav Browning PA   10 mg at 08/31/20 0535   • pantoprazole (PROTONIX) EC tablet 40 mg  40 mg Oral Q AM Yoav Browning PA   40 mg at 09/01/20 0412   • Pharmacy Consult - MTM   Does not apply Daily Yoav Browning PA       • polyethylene glycol (MIRALAX) packet 17 g  17 g Oral BID Yoav Browning PA   17 g at 09/01/20 0807   • sennosides-docusate (PERICOLACE) 8.6-50 MG per tablet 2 tablet  2 tablet Oral BID Yoav Browning PA   2 tablet at 09/01/20 0807   • simethicone (MYLICON) chewable tablet 80 mg  80 mg Oral 4x Daily PRN Jailene Monroe APRN   80 mg at 08/31/20 1729   • sodium chloride 0.9 % flush 10 mL  10 mL Intravenous Q12H Yoav Browning PA   10 mL at 09/01/20 0807   • thiamine (VITAMIN B-1) tablet 100 mg  100 mg Oral Daily Yoav Browning PA   100 mg at 09/01/20 0807         CAD (coronary artery disease)    Chronic alcoholism (CMS/HCC)    Essential hypertension    Hyperlipidemia    Grade I diastolic dysfunction    S/P CABG x 4 8/26/2020        Impression      Postop  CABG  Normal preop LV function  COPD  Hypertension  Zakiya-Parkinson-White syndrome by history but no preexcitation noted on resting EKG  History of EtOH abuse      Plan     Increase beta-blocker  Continue high-dose statin and DAPT  Continue to monitor blood pressure and rhythm  Mobilize  Satisfactory progress  Home soon    Artemio  MD Mykel Montoya PA  09/01/20  09:04          Electronically signed by Artemio Montoya MD at 09/01/20 0917       Consult Notes (last 24 hours) (Notes from 08/31/20 1453 through 09/01/20 1453)    No notes of this type exist for this encounter.

## 2020-09-02 ENCOUNTER — READMISSION MANAGEMENT (OUTPATIENT)
Dept: CALL CENTER | Facility: HOSPITAL | Age: 55
End: 2020-09-02

## 2020-09-02 VITALS
BODY MASS INDEX: 27.45 KG/M2 | SYSTOLIC BLOOD PRESSURE: 120 MMHG | OXYGEN SATURATION: 95 % | RESPIRATION RATE: 18 BRPM | HEIGHT: 75 IN | HEART RATE: 95 BPM | TEMPERATURE: 99.3 F | WEIGHT: 220.8 LBS | DIASTOLIC BLOOD PRESSURE: 82 MMHG

## 2020-09-02 LAB
ANION GAP SERPL CALCULATED.3IONS-SCNC: 12 MMOL/L (ref 5–15)
BASOPHILS # BLD AUTO: 0.13 10*3/MM3 (ref 0–0.2)
BASOPHILS NFR BLD AUTO: 1.3 % (ref 0–1.5)
BUN SERPL-MCNC: 15 MG/DL (ref 6–20)
BUN/CREAT SERPL: 14.2 (ref 7–25)
CALCIUM SPEC-SCNC: 9.8 MG/DL (ref 8.6–10.5)
CHLORIDE SERPL-SCNC: 103 MMOL/L (ref 98–107)
CO2 SERPL-SCNC: 24 MMOL/L (ref 22–29)
CREAT SERPL-MCNC: 1.06 MG/DL (ref 0.76–1.27)
DEPRECATED RDW RBC AUTO: 46.4 FL (ref 37–54)
EOSINOPHIL # BLD AUTO: 0.79 10*3/MM3 (ref 0–0.4)
EOSINOPHIL NFR BLD AUTO: 7.7 % (ref 0.3–6.2)
ERYTHROCYTE [DISTWIDTH] IN BLOOD BY AUTOMATED COUNT: 13.6 % (ref 12.3–15.4)
GFR SERPL CREATININE-BSD FRML MDRD: 72 ML/MIN/1.73
GLUCOSE SERPL-MCNC: 116 MG/DL (ref 65–99)
HCT VFR BLD AUTO: 32 % (ref 37.5–51)
HGB BLD-MCNC: 10.1 G/DL (ref 13–17.7)
IMM GRANULOCYTES # BLD AUTO: 0.11 10*3/MM3 (ref 0–0.05)
IMM GRANULOCYTES NFR BLD AUTO: 1.1 % (ref 0–0.5)
LYMPHOCYTES # BLD AUTO: 1.69 10*3/MM3 (ref 0.7–3.1)
LYMPHOCYTES NFR BLD AUTO: 16.5 % (ref 19.6–45.3)
MCH RBC QN AUTO: 29.4 PG (ref 26.6–33)
MCHC RBC AUTO-ENTMCNC: 31.6 G/DL (ref 31.5–35.7)
MCV RBC AUTO: 93 FL (ref 79–97)
MONOCYTES # BLD AUTO: 1.56 10*3/MM3 (ref 0.1–0.9)
MONOCYTES NFR BLD AUTO: 15.2 % (ref 5–12)
NEUTROPHILS NFR BLD AUTO: 5.95 10*3/MM3 (ref 1.7–7)
NEUTROPHILS NFR BLD AUTO: 58.2 % (ref 42.7–76)
NRBC BLD AUTO-RTO: 0 /100 WBC (ref 0–0.2)
PLATELET # BLD AUTO: 370 10*3/MM3 (ref 140–450)
PMV BLD AUTO: 9.5 FL (ref 6–12)
POTASSIUM SERPL-SCNC: 4.1 MMOL/L (ref 3.5–5.2)
RBC # BLD AUTO: 3.44 10*6/MM3 (ref 4.14–5.8)
SODIUM SERPL-SCNC: 139 MMOL/L (ref 136–145)
WBC # BLD AUTO: 10.23 10*3/MM3 (ref 3.4–10.8)

## 2020-09-02 PROCEDURE — 99024 POSTOP FOLLOW-UP VISIT: CPT | Performed by: THORACIC SURGERY (CARDIOTHORACIC VASCULAR SURGERY)

## 2020-09-02 PROCEDURE — 80048 BASIC METABOLIC PNL TOTAL CA: CPT | Performed by: PHYSICIAN ASSISTANT

## 2020-09-02 PROCEDURE — 25010000002 HEPARIN (PORCINE) PER 1000 UNITS: Performed by: PHYSICIAN ASSISTANT

## 2020-09-02 PROCEDURE — 85025 COMPLETE CBC W/AUTO DIFF WBC: CPT | Performed by: PHYSICIAN ASSISTANT

## 2020-09-02 PROCEDURE — 99024 POSTOP FOLLOW-UP VISIT: CPT | Performed by: PHYSICIAN ASSISTANT

## 2020-09-02 PROCEDURE — 99232 SBSQ HOSP IP/OBS MODERATE 35: CPT | Performed by: NURSE PRACTITIONER

## 2020-09-02 RX ORDER — SIMETHICONE 80 MG
80 TABLET,CHEWABLE ORAL EVERY 6 HOURS PRN
Qty: 120 TABLET | Refills: 2 | Status: SHIPPED | OUTPATIENT
Start: 2020-09-02 | End: 2021-10-11

## 2020-09-02 RX ORDER — LANOLIN ALCOHOL/MO/W.PET/CERES
100 CREAM (GRAM) TOPICAL DAILY
Qty: 30 TABLET | Refills: 5 | Status: SHIPPED | OUTPATIENT
Start: 2020-09-03

## 2020-09-02 RX ORDER — SIMETHICONE 80 MG
80 TABLET,CHEWABLE ORAL EVERY 6 HOURS PRN
Qty: 120 TABLET | Refills: 2 | Status: SHIPPED | OUTPATIENT
Start: 2020-09-02 | End: 2020-09-02 | Stop reason: SDUPTHER

## 2020-09-02 RX ORDER — CLOPIDOGREL BISULFATE 75 MG/1
75 TABLET ORAL DAILY
Status: DISCONTINUED | OUTPATIENT
Start: 2020-09-03 | End: 2020-09-02 | Stop reason: HOSPADM

## 2020-09-02 RX ORDER — FOLIC ACID 1 MG/1
1 TABLET ORAL DAILY
Qty: 30 TABLET | Refills: 5 | Status: SHIPPED | OUTPATIENT
Start: 2020-09-03 | End: 2020-09-02

## 2020-09-02 RX ORDER — LANOLIN ALCOHOL/MO/W.PET/CERES
100 CREAM (GRAM) TOPICAL DAILY
Qty: 30 TABLET | Refills: 5 | Status: SHIPPED | OUTPATIENT
Start: 2020-09-03 | End: 2020-09-02

## 2020-09-02 RX ORDER — CLOPIDOGREL BISULFATE 75 MG/1
150 TABLET ORAL ONCE
Status: COMPLETED | OUTPATIENT
Start: 2020-09-02 | End: 2020-09-02

## 2020-09-02 RX ORDER — CLOPIDOGREL BISULFATE 75 MG/1
75 TABLET ORAL DAILY
Qty: 30 TABLET | Refills: 3 | Status: SHIPPED | OUTPATIENT
Start: 2020-09-02 | End: 2021-05-17 | Stop reason: SDUPTHER

## 2020-09-02 RX ORDER — FOLIC ACID 1 MG/1
1 TABLET ORAL DAILY
Qty: 30 TABLET | Refills: 5 | Status: SHIPPED | OUTPATIENT
Start: 2020-09-03

## 2020-09-02 RX ADMIN — THIAMINE HCL TAB 100 MG 100 MG: 100 TAB at 08:14

## 2020-09-02 RX ADMIN — HYDROXYZINE HYDROCHLORIDE 25 MG: 25 TABLET, FILM COATED ORAL at 11:01

## 2020-09-02 RX ADMIN — ACETAMINOPHEN 650 MG: 325 TABLET, FILM COATED ORAL at 05:59

## 2020-09-02 RX ADMIN — METOPROLOL TARTRATE 50 MG: 50 TABLET, FILM COATED ORAL at 08:13

## 2020-09-02 RX ADMIN — PANTOPRAZOLE SODIUM 40 MG: 40 TABLET, DELAYED RELEASE ORAL at 05:51

## 2020-09-02 RX ADMIN — FOLIC ACID 1 MG: 1 TABLET ORAL at 08:13

## 2020-09-02 RX ADMIN — CLOPIDOGREL BISULFATE 150 MG: 75 TABLET ORAL at 08:14

## 2020-09-02 RX ADMIN — HYDROCODONE BITARTRATE AND ACETAMINOPHEN 1 TABLET: 7.5; 325 TABLET ORAL at 08:13

## 2020-09-02 RX ADMIN — HEPARIN SODIUM 5000 UNITS: 5000 INJECTION INTRAVENOUS; SUBCUTANEOUS at 05:51

## 2020-09-02 RX ADMIN — DOCUSATE SODIUM 50MG AND SENNOSIDES 8.6MG 2 TABLET: 8.6; 5 TABLET, FILM COATED ORAL at 08:13

## 2020-09-02 RX ADMIN — POLYETHYLENE GLYCOL 3350 17 G: 17 POWDER, FOR SOLUTION ORAL at 08:13

## 2020-09-02 RX ADMIN — ASPIRIN 81 MG: 81 TABLET, COATED ORAL at 08:13

## 2020-09-02 RX ADMIN — Medication 400 MG: at 08:13

## 2020-09-02 RX ADMIN — SODIUM CHLORIDE, PRESERVATIVE FREE 10 ML: 5 INJECTION INTRAVENOUS at 08:14

## 2020-09-02 RX ADMIN — ACETAMINOPHEN 650 MG: 325 TABLET, FILM COATED ORAL at 10:53

## 2020-09-02 NOTE — OUTREACH NOTE
Prep Survey      Responses   [facility] patient discharged from?  [place]   Is LACE score < 7 ?  No   Eligibility  [facility]   Date of Admission  08/21/20   Date of Discharge  09/02/20   Discharge Disposition  Home or Self Care   Discharge diagnosis  sp CABG x 4,  hyperlipidemia, HTN, CAD, chronic alcoholism   COVID-19 Test Status  Not tested   Does the patient have one of the following disease processes/diagnoses(primary or secondary)?  Cardiothoracic surgery   Does the patient have Home health ordered?  No   Is there a DME ordered?  No   Prep survey completed?  Yes          Antonia Chambers RN

## 2020-09-02 NOTE — PAYOR COMM NOTE
"Aspen Bingham RN  Utilization Review  P: [phone]  F: [phone]    Ref # [id number]  DC date = 9/2/2020      Onel Cunha (55 y.o. Male)     Date of Birth Social Security Number Address Home Phone MRN    1965  [address]  [place] [address] [phone] [id number]    [employer] Marital Status          None        Admission Date Admission Type Admitting Provider Attending Provider Department, Room/Bed    8/21/20 Urgent David Kuo MD  [address], S[address]/1    Discharge Date Discharge Disposition Discharge Destination        9/2/2020 Home or Self Care              Attending Provider:  (none)   Allergies:  Niacin    Isolation:  None   Infection:  None   Code Status:  CPR    Ht:  190 cm (74.8\")   Wt:  100 kg (220 lb 12.8 oz)    Admission Cmt:  None   Principal Problem:  None                Active Insurance as of 8/21/2020     Primary Coverage     Payor Plan Insurance Group Employer/Plan Group    [employer] MEDICAID [facility] MEDICAID [id number]     Payor Plan Address Payor Plan Phone Number Payor Plan Fax Number Effective Dates    [address] [address] [phone]  2/1/2014 - None Entered    [facility] [id number]       Subscriber Name Subscriber Birth Date Member ID       ONEL CUNHA 1965 [id number]                 Emergency Contacts      (Rel.) Home Phone Work Phone Mobile Phone    Yasir Cunha (Son) [phone] -- --    [place] Cunha [phone] -- --               Physician Progress Notes (last 24 hours) (Notes from 09/01/20 1528 through 09/02/20 1528)      Artemio Montoya MD at 09/02/20 0907                                           [facility] Progress Note      LOS: 12 days   Patient Care Team:  Alex Canales MD as PCP - General (Cardiology)    Chief Complaint: Chest pain and shortness of breath    Subjective     Interval History: Uneventful night    Patient Complaints:  Feels good      Review of Systems:   A 14 point " "review of systems was negative except as was stated in the HPI      Objective     Vital Sign Min/Max for last 24 hours  Temp  Min: 98.4 °F (36.9 °C)  Max: 99.3 °F (37.4 °C)   BP  Min: 107/81  Max: 137/83   Pulse  Min: 89  Max: 118   Resp  Min: 18  Max: 20   SpO2  Min: 87 %  Max: 95 %   Flow (L/min)  Min: 2.5  Max: 2.5   Weight  Min: 100 kg (220 lb 12.8 oz)  Max: 100 kg (220 lb 12.8 oz)     Flowsheet Rows      First Filed Value   Admission Height  190 cm (74.8\") Documented at 08/21/2020 1426   Admission Weight  95.3 kg (210 lb) Documented at 08/21/2020 1426          Physical Exam:  General Appearance: Alert, appears stated age and cooperative  Lungs: Decreased at bases  Heart:: Regular, tachy. no murmur or rub  Abdomen: Soft and nontender with adequate bowel sounds.  No organomegaly  Extremities: No cyanosis, clubbing.  Trace pretibial edema  Pulses: Pulses palpable and equal bilaterally  Skin: Warm and dry with no rash  Psych: Normal     Results Review:     I reviewed the patient's new clinical results.  Results from last 7 days   Lab Units 09/02/20 0447 09/01/20  0503 08/31/20  0425   SODIUM mmol/L 139 138 138   POTASSIUM mmol/L 4.1 4.0 4.5   CHLORIDE mmol/L 103 102 100   CO2 mmol/L 24.0 23.0 29.0   BUN mg/dL 15 15 12   CREATININE mg/dL 1.06 0.90 1.04   GLUCOSE mg/dL 116* 121* 111*   CALCIUM mg/dL 9.8 9.9 9.9     Results from last 7 days   Lab Units 09/02/20 0447 09/01/20  0503 08/31/20  0425 08/30/20  1011   WBC 10*3/mm3 10.23  --  7.86 5.69   HEMOGLOBIN g/dL 10.1* 10.3* 9.6* 8.6*   HEMATOCRIT % 32.0* 33.3* 30.8* 28.3*   PLATELETS 10*3/mm3 370  --  227 166     Lab Results   Lab Value Date/Time    TROPONINT <0.010 08/21/2020 0643    TROPONINT <0.010 08/20/2020 0700    TROPONINT <0.010 08/19/2020 0319    TROPONINT <0.010 08/18/2020 0839    TROPONINT <0.010 08/18/2020 0414    TROPONINT <0.010 08/17/2020 2333    TROPONINT <0.010 03/20/2020 0233    TROPONINT <0.010 03/19/2020 2228    TROPONINT <0.010 08/01/2019 2026  "    TROPONINT <0.010 08/01/2019 1457    TROPONINT <0.010 08/01/2019 0840    TROPONINT <0.010 06/27/2019 0010    TROPONINT <0.010 06/26/2019 2212    TROPONINT <0.010 03/31/2019 2333    TROPONINT <0.010 03/28/2019 2124     Results from last 7 days   Lab Units 08/31/20  0425   CHOLESTEROL mg/dL 118   TRIGLYCERIDES mg/dL 186*     Results from last 7 days   Lab Units 08/27/20  0404 08/26/20  1113   INR  1.44* 1.49*     Results from last 7 days   Lab Units 08/26/20  1604   PH, ARTERIAL pH units 7.374   PO2 ART mm Hg 69.3*   PCO2, ARTERIAL mm Hg 45.8*   HCO3 ART mmol/L 26.7*           Medication Review: yes  Current Facility-Administered Medications   Medication Dose Route Frequency Provider Last Rate Last Dose   • acetaminophen (TYLENOL) tablet 650 mg  650 mg Oral Q4H PRN Yoav Browning PA   650 mg at 09/02/20 0559   • ALPRAZolam (XANAX) tablet 0.5 mg  0.5 mg Oral Q12H PRN Yoav Browning PA   0.5 mg at 09/01/20 2033   • aspirin EC tablet 81 mg  81 mg Oral Daily David Kuo MD   81 mg at 09/02/20 0813   • atorvastatin (LIPITOR) tablet 40 mg  40 mg Oral Nightly Yoav Browning PA   40 mg at 09/01/20 2033   • beer 1 each  1 each Oral 4x Daily Jabari Garcia MD   1 each at 09/02/20 0835   • bisacodyl (DULCOLAX) EC tablet 10 mg  10 mg Oral Daily PRN Yoav Browning PA       • [START ON 9/3/2020] clopidogrel (PLAVIX) tablet 75 mg  75 mg Oral Daily Andrew Figueroa PA       • folic acid (FOLVITE) tablet 1 mg  1 mg Oral Daily Yoav Browning PA   1 mg at 09/02/20 0813   • heparin (porcine) 5000 UNIT/ML injection 5,000 Units  5,000 Units Subcutaneous Q8H Yoav Browning PA   5,000 Units at 09/02/20 0551   • HYDROcodone-acetaminophen (NORCO) 7.5-325 MG per tablet 1 tablet  1 tablet Oral Q4H PRN Yoav Browning PA   1 tablet at 09/02/20 0813   • hydrOXYzine (ATARAX) tablet 25 mg  25 mg Oral TID PRN Yoav Browning PA   25 mg at 08/30/20 0503   • ipratropium-albuterol (DUO-NEB)  nebulizer solution 3 mL  3 mL Nebulization Q4H PRN Yoav Browning PA       • magnesium oxide (MAGOX) tablet 400 mg  400 mg Oral TID Jabari Garcia MD   400 mg at 09/02/20 0813   • metoprolol tartrate (LOPRESSOR) tablet 50 mg  50 mg Oral Q12H Artemio Montoya MD   50 mg at 09/02/20 0813   • morphine injection 2 mg  2 mg Intravenous Q2H PRN Yoav Browning PA   2 mg at 08/31/20 0229   • nitroglycerin (NITROSTAT) SL tablet 0.4 mg  0.4 mg Sublingual Q5 Min PRN Yoav Browning PA       • ondansetron (ZOFRAN) injection 4 mg  4 mg Intravenous Q6H PRN Yoav Browning PA   4 mg at 09/01/20 2034   • oxyCODONE (ROXICODONE) immediate release tablet 10 mg  10 mg Oral Q4H PRN Yoav Browning PA   10 mg at 08/31/20 0535   • pantoprazole (PROTONIX) EC tablet 40 mg  40 mg Oral Q AM Yoav Browning PA   40 mg at 09/02/20 0551   • Pharmacy Consult - MTM   Does not apply Daily Yoav Browning PA       • polyethylene glycol (MIRALAX) packet 17 g  17 g Oral BID Yoav Browning PA   17 g at 09/02/20 0813   • sennosides-docusate (PERICOLACE) 8.6-50 MG per tablet 2 tablet  2 tablet Oral BID Yoav Browning PA   2 tablet at 09/02/20 0813   • simethicone (MYLICON) chewable tablet 80 mg  80 mg Oral 4x Daily PRN Jailene Monroe APRN   80 mg at 09/01/20 1647   • sodium chloride 0.9 % flush 10 mL  10 mL Intravenous Q12H Yoav Browning PA   10 mL at 09/02/20 0814   • thiamine (VITAMIN B-1) tablet 100 mg  100 mg Oral Daily Yoav Browning PA   100 mg at 09/02/20 0814         CAD (coronary artery disease)    Chronic alcoholism (CMS/HCC)    Essential hypertension    Hyperlipidemia    Grade I diastolic dysfunction    S/P CABG x 4 8/26/2020        Impression      Postop  CABG  Normal preop LV function  COPD  Hypertension  Zakiya-Parkinson-White syndrome by history but no preexcitation noted on resting EKG  History of EtOH abuse      Plan       Continue high-dose statin and DAPT  Continue  to monitor blood pressure and rhythm  Mobilize  Satisfactory progress  F/u with Dr. Canales  Ok home    MD Mykel Torre PA  20  09:07          Electronically signed by Artemio Montoya MD at 20 0945     Maegan Lomeli APRN at 20 0735              [facility] Medicine Services  PROGRESS NOTE    Patient Name: Onel Jimenez  : 1965  MRN: [id number]    Date of Admission: 2020  Length of Stay: 12  Primary Care Physician: Alex Canales MD    Subjective   Subjective   CC:  CAD s/p CABG    HPI:  Patient is sitting up in chair in NAD.  Patient states he is feeling pretty good and thinks he is can be discharged home today.  Patient is requesting some simethicone count at discharge.  Patient's oxygen saturation decreased to 89 overnight and he was placed on 2-1/2 L of oxygen, but he is now sitting up in a chair without oxygen saturating at 94%.  +BM.  Patient eating well.  No family in the room.    Review of Systems  Gen- No fevers, chills  CV- No chest pain, palpitations  Resp- No cough, dyspnea  GI- No N/V/D, abd pain  All other systems have been reviewed and the pertinent positives and negatives have been listed above in the HPI or ROS    Objective   Objective   Vital Signs:   Temp:  [98.4 °F (36.9 °C)-99.1 °F (37.3 °C)] 99.1 °F (37.3 °C)  Heart Rate:  [] 101  Resp:  [18-20] 18  BP: (107-137)/(71-83) 112/72  Physical Exam:  Constitutional: Alert, WD, WN male in NAD  Eyes: EOMI, sclerae anicteric, no conjunctival injection  Head: NCAT  ENT: [place], moist mucous membranes   Neck: Supple, non-tender, no thyromegaly, no lymphadenopathy, trachea midline  Respiratory: Non-labored, symmetrical chest expansion, CTAB  Cardiovascular: RRR, no M/R/G, +DP pulses bilaterally  Gastrointestinal: Soft, NT, ND +BS  Musculoskeletal: PERES; no LE edema bilaterally  Neurologic: Oriented x4, strength symmetric in all extremities, follows all commands,  speech clear  Skin: No rashes on exposed skin; sternal and EVH incisions healing well without drainage or erythema  Psychiatric: Pleasant and cooperative; normal affect    Results Reviewed:  Results from last 7 days   Lab Units 09/02/20 0447 09/01/20  0503 08/31/20  0425 08/30/20  1011  08/27/20  0404  08/26/20  1113   WBC 10*3/mm3 10.23  --  7.86 5.69   < > 10.77   < > 9.14   HEMOGLOBIN g/dL 10.1* 10.3* 9.6* 8.6*   < > 9.2*   < > 9.6*   HEMATOCRIT % 32.0* 33.3* 30.8* 28.3*   < > 28.2*   < > 29.3*   PLATELETS 10*3/mm3 370  --  227 166   < > 155   < > 118*   INR   --   --   --   --   --  1.44*  --  1.49*    < > = values in this interval not displayed.     Results from last 7 days   Lab Units 09/02/20 0447 09/01/20  0503 08/31/20  0425   SODIUM mmol/L 139 138 138   POTASSIUM mmol/L 4.1 4.0 4.5   CHLORIDE mmol/L 103 102 100   CO2 mmol/L 24.0 23.0 29.0   BUN mg/dL 15 15 12   CREATININE mg/dL 1.06 0.90 1.04   GLUCOSE mg/dL 116* 121* 111*   CALCIUM mg/dL 9.8 9.9 9.9   ALT (SGPT) U/L  --   --  13   AST (SGOT) U/L  --   --  17     Estimated Creatinine Clearance: 108.8 mL/min (by C-G formula based on SCr of 1.06 mg/dL).    Microbiology Results Abnormal     None        Imaging Results (Last 24 Hours)     ** No results found for the last 24 hours. **        Results for orders placed during the hospital encounter of 08/17/20   Adult Transthoracic Echo Complete W/ Cont if Necessary Per Protocol    Narrative · Left ventricular diastolic dysfunction (grade I) consistent with   impaired relaxation.  · Left ventricular systolic function is normal, EF 61-65%.  · Left ventricular wall thickness is consistent with mild concentric   hypertrophy.        I have reviewed the medications:  Scheduled Meds:    aspirin 81 mg Oral Daily   atorvastatin 40 mg Oral Nightly   beer 1 each Oral 4x Daily   clopidogrel 75 mg Oral Daily   folic acid 1 mg Oral Daily   heparin (porcine) 5,000 Units Subcutaneous Q8H   magnesium oxide 400 mg Oral TID      metoprolol tartrate 50 mg Oral Q12H   pantoprazole 40 mg Oral Q AM   pharmacy consult - MTM  Does not apply Daily   polyethylene glycol 17 g Oral BID   sennosides-docusate 2 tablet Oral BID   sodium chloride 10 mL Intravenous Q12H   thiamine 100 mg Oral Daily     Continuous Infusions:   PRN Meds:.•  acetaminophen  •  ALPRAZolam  •  bisacodyl  •  HYDROcodone-acetaminophen  •  hydrOXYzine  •  ipratropium-albuterol  •  Morphine  •  nitroglycerin  •  ondansetron  •  oxyCODONE  •  simethicone    Assessment/Plan   Assessment / Plan     Active Hospital Problems    Diagnosis  POA   • S/P CABG x 4 8/26/2020 [Z95.1]  Not Applicable   • Grade I diastolic dysfunction [I51.9]  Yes   • Hyperlipidemia [E78.5]  Yes   • Essential hypertension [I10]  Yes   • CAD (coronary artery disease) [I25.10]  Yes   • Chronic alcoholism (CMS/HCC) [F10.20]  Yes      Resolved Hospital Problems   No resolved problems to display.     Brief Hospital Course to date:  Onel Jimenez is a 55 y.o. male PMH CAD, anxiety disorder, chronic alcoholism, HTN, HLD, GERD, Grade I daistolic dysfunction presenting with chest pain and hypertension. He was also found to have CARLITOS. Pt troponin was neg, EKG showed T-wave inversion in V1-4. Pt found to have multivessel disease. He underwent CABGx4.     These problems are new to me today    This patient's problems and plans were partially entered by my partner and updated as appropriate by me 09/02/20.    CAD with multi-vessel disease  Grade 1 diastolic dysfunction  Hypertension/HLD  -s/p CABG x4  - pain management  - PT/OT  - ASA, atorvastatin, plavix, metoprolol  --slight fever and hypoxia over night with oxygen needs    History of alcoholism  -No Symptoms of withdrawal at this time  -No Alcohol withdrawal seizures  - 1 beer QID  - folic acid, thiamine    Anxiety  - atarax prn  - xanax prn    GERD  - protonix     Constipation  - +BM   - continue miralax and pericolace   --Asking for simethicose at discharge    Slightly  elevated glucose  - A1C 5.5 8/20    DVT Prophylaxis:  [facility]    Disposition: I expect the patient to be discharged per CTS; Ok from hospital medicine service to [place] home    CODE STATUS:   Code Status and Medical Interventions:   Ordered at: 08/21/20 1458     Code Status:    CPR     Medical Interventions (Level of Support Prior to Arrest):    Full     Electronically signed by SHAWN Tomlinson, 09/02/20, 07:35.        Electronically signed by Maegan Lomeli APRN at 09/02/20 1121     David Kuo MD at 09/02/20 0618          CTS Progress Note       LOS: 12 days   Patient Care Team:  Alex Canales MD as PCP - General (Cardiology)    Chief Complaint: <principal problem not specified>    Vital Signs:  Temp:  [98.1 °F (36.7 °C)-99.1 °F (37.3 °C)] 99.1 °F (37.3 °C)  Heart Rate:  [] 101  Resp:  [18-20] 18  BP: (107-137)/(71-83) 112/72    Physical Exam:       Results:   Results from last 7 days   Lab Units 09/02/20  0447   WBC 10*3/mm3 10.23   HEMOGLOBIN g/dL 10.1*   HEMATOCRIT % 32.0*   PLATELETS 10*3/mm3 370     Results from last 7 days   Lab Units 09/02/20  0447   SODIUM mmol/L 139   POTASSIUM mmol/L 4.1   CHLORIDE mmol/L 103   CO2 mmol/L 24.0   BUN mg/dL 15   CREATININE mg/dL 1.06   GLUCOSE mg/dL 116*   CALCIUM mg/dL 9.8           Imaging Results (Last 24 Hours)     ** No results found for the last 24 hours. **          Assessment      CAD (coronary artery disease)    Chronic alcoholism (CMS/[facility])    Essential hypertension    Hyperlipidemia    Grade I diastolic dysfunction    S/P CABG x 4 8/26/2020    Some target artery small will possibly benefit from Plavix for 90 days postop.    Plan   Give an additional 75 mg Plavix p.o. today for a total of 150 mg p.o.  May discharge home today if satisfactory with cardiology.  My physician's assistant to write Plavix prescription for 90 days at discharge    Please note that portions of this note were completed with a voice recognition program. Efforts were made  to edit the dictations, but occasionally words are mistranscribed.    David Kuo MD  09/02/20  06:18        Electronically signed by David Kuo MD at 09/02/20 0619       Consult Notes (last 24 hours) (Notes from 09/01/20 1528 through 09/02/20 1528)    No notes of this type exist for this encounter.

## 2020-09-02 NOTE — PROGRESS NOTES
CTS Progress Note       LOS: 12 days   Patient Care Team:  Alex Canales MD as PCP - General (Cardiology)    Chief Complaint: <principal problem not specified>    Vital Signs:  Temp:  [98.1 °F (36.7 °C)-99.1 °F (37.3 °C)] 99.1 °F (37.3 °C)  Heart Rate:  [] 101  Resp:  [18-20] 18  BP: (107-137)/(71-83) 112/72    Physical Exam:       Results:   Results from last 7 days   Lab Units 09/02/20  0447   WBC 10*3/mm3 10.23   HEMOGLOBIN g/dL 10.1*   HEMATOCRIT % 32.0*   PLATELETS 10*3/mm3 370     Results from last 7 days   Lab Units 09/02/20  0447   SODIUM mmol/L 139   POTASSIUM mmol/L 4.1   CHLORIDE mmol/L 103   CO2 mmol/L 24.0   BUN mg/dL 15   CREATININE mg/dL 1.06   GLUCOSE mg/dL 116*   CALCIUM mg/dL 9.8           Imaging Results (Last 24 Hours)     ** No results found for the last 24 hours. **          Assessment      CAD (coronary artery disease)    Chronic alcoholism (CMS/[facility])    Essential hypertension    Hyperlipidemia    Grade I diastolic dysfunction    S/P CABG x 4 8/26/2020    Some target artery small will possibly benefit from Plavix for 90 days postop.    Plan   Give an additional 75 mg Plavix p.o. today for a total of 150 mg p.o.  May discharge home today if satisfactory with cardiology.  My physician's assistant to write Plavix prescription for 90 days at discharge    Please note that portions of this note were completed with a voice recognition program. Efforts were made to edit the dictations, but occasionally words are mistranscribed.    David Kuo MD  09/02/20  06:18

## 2020-09-02 NOTE — PROGRESS NOTES
[facility] Medicine Services  PROGRESS NOTE    Patient Name: Onel Jimenez  : 1965  MRN: [id number]    Date of Admission: 2020  Length of Stay: 12  Primary Care Physician: Alex Canales MD    Subjective   Subjective   CC:  CAD s/p CABG    HPI:  Patient is sitting up in chair in NAD.  Patient states he is feeling pretty good and thinks he is can be discharged home today.  Patient is requesting some simethicone count at discharge.  Patient's oxygen saturation decreased to 89 overnight and he was placed on 2-1/2 L of oxygen, but he is now sitting up in a chair without oxygen saturating at 94%.  +BM.  Patient eating well.  No family in the room.    Review of Systems  Gen- No fevers, chills  CV- No chest pain, palpitations  Resp- No cough, dyspnea  GI- No N/V/D, abd pain  All other systems have been reviewed and the pertinent positives and negatives have been listed above in the HPI or ROS    Objective   Objective   Vital Signs:   Temp:  [98.4 °F (36.9 °C)-99.1 °F (37.3 °C)] 99.1 °F (37.3 °C)  Heart Rate:  [] 101  Resp:  [18-20] 18  BP: (107-137)/(71-83) 112/72  Physical Exam:  Constitutional: Alert, WD, WN male in NAD  Eyes: EOMI, sclerae anicteric, no conjunctival injection  Head: NCAT  ENT: [place], moist mucous membranes   Neck: Supple, non-tender, no thyromegaly, no lymphadenopathy, trachea midline  Respiratory: Non-labored, symmetrical chest expansion, CTAB  Cardiovascular: RRR, no M/R/G, +DP pulses bilaterally  Gastrointestinal: Soft, NT, ND +BS  Musculoskeletal: PERES; no LE edema bilaterally  Neurologic: Oriented x4, strength symmetric in all extremities, follows all commands, speech clear  Skin: No rashes on exposed skin; sternal and EVH incisions healing well without drainage or erythema  Psychiatric: Pleasant and cooperative; normal affect    Results Reviewed:  Results from last 7 days   Lab Units 20  0447 20  0503 20  0425 20  1011   08/27/20  0404  08/26/20  1113   WBC 10*3/mm3 10.23  --  7.86 5.69   < > 10.77   < > 9.14   HEMOGLOBIN g/dL 10.1* 10.3* 9.6* 8.6*   < > 9.2*   < > 9.6*   HEMATOCRIT % 32.0* 33.3* 30.8* 28.3*   < > 28.2*   < > 29.3*   PLATELETS 10*3/mm3 370  --  227 166   < > 155   < > 118*   INR   --   --   --   --   --  1.44*  --  1.49*    < > = values in this interval not displayed.     Results from last 7 days   Lab Units 09/02/20  0447 09/01/20  0503 08/31/20  0425   SODIUM mmol/L 139 138 138   POTASSIUM mmol/L 4.1 4.0 4.5   CHLORIDE mmol/L 103 102 100   CO2 mmol/L 24.0 23.0 29.0   BUN mg/dL 15 15 12   CREATININE mg/dL 1.06 0.90 1.04   GLUCOSE mg/dL 116* 121* 111*   CALCIUM mg/dL 9.8 9.9 9.9   ALT (SGPT) U/L  --   --  13   AST (SGOT) U/L  --   --  17     Estimated Creatinine Clearance: 108.8 mL/min (by C-G formula based on SCr of 1.06 mg/dL).    Microbiology Results Abnormal     None        Imaging Results (Last 24 Hours)     ** No results found for the last 24 hours. **        Results for orders placed during the hospital encounter of 08/17/20   Adult Transthoracic Echo Complete W/ Cont if Necessary Per Protocol    Narrative · Left ventricular diastolic dysfunction (grade I) consistent with   impaired relaxation.  · Left ventricular systolic function is normal, EF 61-65%.  · Left ventricular wall thickness is consistent with mild concentric   hypertrophy.        I have reviewed the medications:  Scheduled Meds:    aspirin 81 mg Oral Daily   atorvastatin 40 mg Oral Nightly   beer 1 each Oral 4x Daily   clopidogrel 75 mg Oral Daily   folic acid 1 mg Oral Daily   heparin (porcine) 5,000 Units Subcutaneous Q8H   magnesium oxide 400 mg Oral TID   metoprolol tartrate 50 mg Oral Q12H   pantoprazole 40 mg Oral Q AM   pharmacy consult - MTM  Does not apply Daily   polyethylene glycol 17 g Oral BID   sennosides-docusate 2 tablet Oral BID   sodium chloride 10 mL Intravenous Q12H   thiamine 100 mg Oral Daily     Continuous Infusions:   PRN  Meds:.•  acetaminophen  •  ALPRAZolam  •  bisacodyl  •  HYDROcodone-acetaminophen  •  hydrOXYzine  •  ipratropium-albuterol  •  Morphine  •  nitroglycerin  •  ondansetron  •  oxyCODONE  •  simethicone    Assessment/Plan   Assessment / Plan     Active Hospital Problems    Diagnosis  POA   • S/P CABG x 4 8/26/2020 [Z95.1]  Not Applicable   • Grade I diastolic dysfunction [I51.9]  Yes   • Hyperlipidemia [E78.5]  Yes   • Essential hypertension [I10]  Yes   • CAD (coronary artery disease) [I25.10]  Yes   • Chronic alcoholism (CMS/HCC) [F10.20]  Yes      Resolved Hospital Problems   No resolved problems to display.     Brief Hospital Course to date:  Onel Jimenez is a 55 y.o. male PMH CAD, anxiety disorder, chronic alcoholism, HTN, HLD, GERD, Grade I daistolic dysfunction presenting with chest pain and hypertension. He was also found to have CARLITOS. Pt troponin was neg, EKG showed T-wave inversion in V1-4. Pt found to have multivessel disease. He underwent CABGx4.     These problems are new to me today    This patient's problems and plans were partially entered by my partner and updated as appropriate by me 09/02/20.    CAD with multi-vessel disease  Grade 1 diastolic dysfunction  Hypertension/HLD  -s/p CABG x4  - pain management  - PT/OT  - ASA, atorvastatin, plavix, metoprolol  --slight fever and hypoxia over night with oxygen needs    History of alcoholism  -No Symptoms of withdrawal at this time  -No Alcohol withdrawal seizures  - 1 beer QID  - folic acid, thiamine    Anxiety  - atarax prn  - xanax prn    GERD  - protonix     Constipation  - +BM   - continue miralax and pericolace   --Asking for simethicose at discharge    Slightly elevated glucose  - A1C 5.5 8/20    DVT Prophylaxis:  [facility]    Disposition: I expect the patient to be discharged per CTS; Ok from hospital medicine service to [place] home    CODE STATUS:   Code Status and Medical Interventions:   Ordered at: 08/21/20 1458     Code Status:    CPR     Medical  Interventions (Level of Support Prior to Arrest):    Full     Electronically signed by SHAWN Tomlinson, 09/02/20, 07:35.

## 2020-09-02 NOTE — PROGRESS NOTES
"                                 [facility] Progress Note      LOS: 12 days   Patient Care Team:  Alex Canales MD as PCP - General (Cardiology)    Chief Complaint: Chest pain and shortness of breath    Subjective     Interval History: Uneventful night    Patient Complaints:  Feels good      Review of Systems:   A 14 point review of systems was negative except as was stated in the HPI      Objective     Vital Sign Min/Max for last 24 hours  Temp  Min: 98.4 °F (36.9 °C)  Max: 99.3 °F (37.4 °C)   BP  Min: 107/81  Max: 137/83   Pulse  Min: 89  Max: 118   Resp  Min: 18  Max: 20   SpO2  Min: 87 %  Max: 95 %   Flow (L/min)  Min: 2.5  Max: 2.5   Weight  Min: 100 kg (220 lb 12.8 oz)  Max: 100 kg (220 lb 12.8 oz)     Flowsheet Rows      First Filed Value   Admission Height  190 cm (74.8\") Documented at 08/21/2020 1426   Admission Weight  95.3 kg (210 lb) Documented at 08/21/2020 1426          Physical Exam:  General Appearance: Alert, appears stated age and cooperative  Lungs: Decreased at bases  Heart:: Regular, tachy. no murmur or rub  Abdomen: Soft and nontender with adequate bowel sounds.  No organomegaly  Extremities: No cyanosis, clubbing.  Trace pretibial edema  Pulses: Pulses palpable and equal bilaterally  Skin: Warm and dry with no rash  Psych: Normal     Results Review:     I reviewed the patient's new clinical results.  Results from last 7 days   Lab Units 09/02/20 0447 09/01/20 0503 08/31/20  0425   SODIUM mmol/L 139 138 138   POTASSIUM mmol/L 4.1 4.0 4.5   CHLORIDE mmol/L 103 102 100   CO2 mmol/L 24.0 23.0 29.0   BUN mg/dL 15 15 12   CREATININE mg/dL 1.06 0.90 1.04   GLUCOSE mg/dL 116* 121* 111*   CALCIUM mg/dL 9.8 9.9 9.9     Results from last 7 days   Lab Units 09/02/20 0447 09/01/20  0503 08/31/20  0425 08/30/20  1011   WBC 10*3/mm3 10.23  --  7.86 5.69   HEMOGLOBIN g/dL 10.1* 10.3* 9.6* 8.6*   HEMATOCRIT % 32.0* 33.3* 30.8* 28.3*   PLATELETS 10*3/mm3 370  --  314 912     Lab " Results   Lab Value Date/Time    TROPONINT <0.010 08/21/2020 0643    TROPONINT <0.010 08/20/2020 0700    TROPONINT <0.010 08/19/2020 0319    TROPONINT <0.010 08/18/2020 0839    TROPONINT <0.010 08/18/2020 0414    TROPONINT <0.010 08/17/2020 2333    TROPONINT <0.010 03/20/2020 0233    TROPONINT <0.010 03/19/2020 2228    TROPONINT <0.010 08/01/2019 2026    TROPONINT <0.010 08/01/2019 1457    TROPONINT <0.010 08/01/2019 0840    TROPONINT <0.010 06/27/2019 0010    TROPONINT <0.010 06/26/2019 2212    TROPONINT <0.010 03/31/2019 2333    TROPONINT <0.010 03/28/2019 2124     Results from last 7 days   Lab Units 08/31/20  0425   CHOLESTEROL mg/dL 118   TRIGLYCERIDES mg/dL 186*     Results from last 7 days   Lab Units 08/27/20  0404 08/26/20  1113   INR  1.44* 1.49*     Results from last 7 days   Lab Units 08/26/20  1604   PH, ARTERIAL pH units 7.374   PO2 ART mm Hg 69.3*   PCO2, ARTERIAL mm Hg 45.8*   HCO3 ART mmol/L 26.7*           Medication Review: yes  Current Facility-Administered Medications   Medication Dose Route Frequency Provider Last Rate Last Dose   • acetaminophen (TYLENOL) tablet 650 mg  650 mg Oral Q4H PRN Yoav Browning PA   650 mg at 09/02/20 0559   • ALPRAZolam (XANAX) tablet 0.5 mg  0.5 mg Oral Q12H PRN Yoav Browning PA   0.5 mg at 09/01/20 2033   • aspirin EC tablet 81 mg  81 mg Oral Daily David Kuo MD   81 mg at 09/02/20 0813   • atorvastatin (LIPITOR) tablet 40 mg  40 mg Oral Nightly Yoav Browning PA   40 mg at 09/01/20 2033   • beer 1 each  1 each Oral 4x Daily Jabari Garcia MD   1 each at 09/02/20 0835   • bisacodyl (DULCOLAX) EC tablet 10 mg  10 mg Oral Daily PRN Yoav Browning PA       • [START ON 9/3/2020] clopidogrel (PLAVIX) tablet 75 mg  75 mg Oral Daily Andrew Figueroa PA       • folic acid (FOLVITE) tablet 1 mg  1 mg Oral Daily Yoav Browning PA   1 mg at 09/02/20 0813   • heparin (porcine) 5000 UNIT/ML injection 5,000 Units  5,000 Units  Subcutaneous Q8H Yoav Browning PA   5,000 Units at 09/02/20 0551   • HYDROcodone-acetaminophen (NORCO) 7.5-325 MG per tablet 1 tablet  1 tablet Oral Q4H PRN Yoav Browning PA   1 tablet at 09/02/20 0813   • hydrOXYzine (ATARAX) tablet 25 mg  25 mg Oral TID PRN Yoav Browning PA   25 mg at 08/30/20 2356   • ipratropium-albuterol (DUO-NEB) nebulizer solution 3 mL  3 mL Nebulization Q4H PRN Yoav Browning PA       • magnesium oxide (MAGOX) tablet 400 mg  400 mg Oral TID Jabari Garcia MD   400 mg at 09/02/20 0813   • metoprolol tartrate (LOPRESSOR) tablet 50 mg  50 mg Oral Q12H Artemio Montoya MD   50 mg at 09/02/20 0813   • morphine injection 2 mg  2 mg Intravenous Q2H PRN Yoav Browning PA   2 mg at 08/31/20 0229   • nitroglycerin (NITROSTAT) SL tablet 0.4 mg  0.4 mg Sublingual Q5 Min PRN Yoav Browning PA       • ondansetron (ZOFRAN) injection 4 mg  4 mg Intravenous Q6H PRN Yoav Browning PA   4 mg at 09/01/20 2034   • oxyCODONE (ROXICODONE) immediate release tablet 10 mg  10 mg Oral Q4H PRN Yoav Browning PA   10 mg at 08/31/20 0535   • pantoprazole (PROTONIX) EC tablet 40 mg  40 mg Oral Q AM Yoav Browning PA   40 mg at 09/02/20 0551   • Pharmacy Consult - MTM   Does not apply Daily Yoav Browning PA       • polyethylene glycol (MIRALAX) packet 17 g  17 g Oral BID Yoav Browning PA   17 g at 09/02/20 0813   • sennosides-docusate (PERICOLACE) 8.6-50 MG per tablet 2 tablet  2 tablet Oral BID Yoav Browning PA   2 tablet at 09/02/20 0813   • simethicone (MYLICON) chewable tablet 80 mg  80 mg Oral 4x Daily PRN Jailene Monroe APRN   80 mg at 09/01/20 1647   • sodium chloride 0.9 % flush 10 mL  10 mL Intravenous Q12H Yoav Browning PA   10 mL at 09/02/20 0814   • thiamine (VITAMIN B-1) tablet 100 mg  100 mg Oral Daily Yoav Browning PA   100 mg at 09/02/20 0814         CAD (coronary artery disease)    Chronic alcoholism  (CMS/[facility])    Essential hypertension    Hyperlipidemia    Grade I diastolic dysfunction    S/P CABG x 4 8/26/2020        Impression      Postop  CABG  Normal preop LV function  COPD  Hypertension  Zakiya-Parkinson-White syndrome by history but no preexcitation noted on resting EKG  History of EtOH abuse      Plan       Continue high-dose statin and DAPT  Continue to monitor blood pressure and rhythm  Mobilize  Satisfactory progress  F/u with Dr. Canales  Ok home    MD Mykel Torre PA  09/02/20  09:07

## 2020-09-02 NOTE — PLAN OF CARE
Problem: Patient Care Overview  Goal: Plan of Care Review  Outcome: Ongoing (interventions implemented as appropriate)  Flowsheets  Taken 9/2/2020 0428 by Amira Erazo, RN  Progress: no change  Taken 9/1/2020 1800 by Mya Wayne RN  Plan of Care Reviewed With: patient  Note:   Pt VSS, Seems to be resting well throughout the shift. Surgical sites C,D, and I. Pain controlled by PO pain medication. Will cont to monitor.

## 2020-09-03 ENCOUNTER — TELEPHONE (OUTPATIENT)
Dept: CARDIOLOGY | Facility: HOSPITAL | Age: 55
End: 2020-09-03

## 2020-09-03 ENCOUNTER — TRANSITIONAL CARE MANAGEMENT TELEPHONE ENCOUNTER (OUTPATIENT)
Dept: CALL CENTER | Facility: HOSPITAL | Age: 55
End: 2020-09-03

## 2020-09-03 LAB — GLUCOSE BLDC GLUCOMTR-MCNC: 104 MG/DL (ref 70–130)

## 2020-09-03 NOTE — OUTREACH NOTE
Call Center TCM Note      Responses   [facility] patient discharged from?  [place]   Does the patient have one of the following disease processes/diagnoses(primary or secondary)?  Cardiothoracic surgery   TCM attempt successful?  No   Unsuccessful attempts  Attempt 2          Brooke Montgomery RN    9/3/2020, 09:57

## 2020-09-03 NOTE — TELEPHONE ENCOUNTER
Attempted to call patient to complete H&V d/c f/u call.  Unable to reach.  Left contact info to call back.  Left message to remind of scheduled appointment.

## 2020-09-03 NOTE — OUTREACH NOTE
Call Center TCM Note      Responses   [facility] patient discharged from?  [place]   Does the patient have one of the following disease processes/diagnoses(primary or secondary)?  Cardiothoracic surgery   TCM attempt successful?  No   Unsuccessful attempts  Attempt 1          Brooke Montgomery RN    9/3/2020, 09:31

## 2020-09-04 ENCOUNTER — TRANSITIONAL CARE MANAGEMENT TELEPHONE ENCOUNTER (OUTPATIENT)
Dept: CALL CENTER | Facility: HOSPITAL | Age: 55
End: 2020-09-04

## 2020-09-04 NOTE — OUTREACH NOTE
Call Center TCM Note      Responses   [facility] patient discharged from?  [place]   Does the patient have one of the following disease processes/diagnoses(primary or secondary)?  Cardiothoracic surgery   TCM attempt successful?  Yes   Call start time  0844   Call end time  0900   Discharge diagnosis  sp CABG x 4,  hyperlipidemia, HTN, CAD, chronic alcoholism   Meds reviewed with patient/caregiver?  Yes   Is the patient having any side effects they believe may be caused by any medication additions or changes?  No   Does the patient have all medications related to this admission filled (includes all antibiotics, pain medications, cardiac medications, etc.)  Yes   Is the patient taking all medications as directed (includes completed medication regime)?  Yes   Comments regarding appointments  Cardiology appt on 9/17   Does the patient have a primary care provider?   Yes   Comments regarding PCP  Has a new PCP appt on 9/11 with Dr Van Peres   Nursing Interventions  Verified appointment date/time/provider   Has the patient kept scheduled appointments due by today?  N/A   Comments  Also reminded patient [facility]   Has home health visited the patient within 72 hours of discharge?  N/A   Psychosocial issues?  No   Did the patient receive a copy of their discharge instructions?  Yes   Nursing interventions  Reviewed instructions with patient   What is the patient's perception of their health status since discharge?  Improving   Nursing interventions  Nurse provided patient education   Is the patient/caregiver able to teach back normal signs of recovery?  Pain or discomfort at incisional site   Nursing interventions  Reassured on normal signs of recovery   Is the patient /caregiver able to teach back basic post-op care?  Practice 'cough and deep breath', Keep incision areas clean, dry and protected, No tub bath, swimming, or hot tub until instructed by MD, Take showers only  when approved by MD-sponge bathe until then, Lifting as instructed by MD in discharge instructions   Is the patient/caregiver able to teach back signs and symptoms of incisional infection?  Increased redness, swelling or pain at the incisonal site, Incisional warmth, Fever, Increased drainage or bleeding, Pus or odor from incision   Is the patient/caregiver able to teach back steps to recovery at home?  Set small, achievable goals for return to baseline health, Rest and rebuild strength, gradually increase activity, Make a list of questions for surgeon's appointment   Is the patient /caregiver able to teach back the importance of cardiac rehab?  Yes   Is the patient/caregiver able to teach back the hierarchy of who to call/visit for symptoms/problems? PCP, Specialist, Home health nurse, Urgent Care, ED, 911  Yes   TCM call completed?  Yes          Cruz Burnett RN    9/4/2020, 09:00

## 2020-09-05 ENCOUNTER — APPOINTMENT (OUTPATIENT)
Dept: GENERAL RADIOLOGY | Facility: HOSPITAL | Age: 55
End: 2020-09-05

## 2020-09-05 ENCOUNTER — APPOINTMENT (OUTPATIENT)
Dept: CT IMAGING | Facility: HOSPITAL | Age: 55
End: 2020-09-05

## 2020-09-05 ENCOUNTER — HOSPITAL ENCOUNTER (EMERGENCY)
Facility: HOSPITAL | Age: 55
Discharge: LEFT BEFORE DISCHARGE | End: 2020-09-05
Attending: EMERGENCY MEDICINE | Admitting: EMERGENCY MEDICINE

## 2020-09-05 VITALS
RESPIRATION RATE: 18 BRPM | HEART RATE: 130 BPM | WEIGHT: 210 LBS | DIASTOLIC BLOOD PRESSURE: 68 MMHG | HEIGHT: 76 IN | OXYGEN SATURATION: 97 % | SYSTOLIC BLOOD PRESSURE: 107 MMHG | BODY MASS INDEX: 25.57 KG/M2 | TEMPERATURE: 97.9 F

## 2020-09-05 DIAGNOSIS — I50.9 ACUTE ON CHRONIC CONGESTIVE HEART FAILURE, UNSPECIFIED HEART FAILURE TYPE (HCC): Primary | ICD-10-CM

## 2020-09-05 DIAGNOSIS — R07.9 CHEST PAIN, UNSPECIFIED TYPE: ICD-10-CM

## 2020-09-05 DIAGNOSIS — Z95.1 HX OF CABG: ICD-10-CM

## 2020-09-05 DIAGNOSIS — R06.02 SHORTNESS OF BREATH: ICD-10-CM

## 2020-09-05 DIAGNOSIS — R00.0 TACHYCARDIA: ICD-10-CM

## 2020-09-05 LAB
6-ACETYL MORPHINE: NEGATIVE
A-A DO2: 48.5 MMHG (ref 0–300)
ALBUMIN SERPL-MCNC: 3.92 G/DL (ref 3.5–5.2)
ALBUMIN/GLOB SERPL: 1.3 G/DL
ALP SERPL-CCNC: 52 U/L (ref 39–117)
ALT SERPL W P-5'-P-CCNC: 14 U/L (ref 1–41)
AMPHET+METHAMPHET UR QL: POSITIVE
ANION GAP SERPL CALCULATED.3IONS-SCNC: 14.5 MMOL/L (ref 5–15)
APAP SERPL-MCNC: <5 MCG/ML (ref 10–30)
ARTERIAL PATENCY WRIST A: ABNORMAL
AST SERPL-CCNC: 16 U/L (ref 1–40)
ATMOSPHERIC PRESS: 734 MMHG
BARBITURATES UR QL SCN: NEGATIVE
BASE EXCESS BLDA CALC-SCNC: -6.5 MMOL/L (ref 0–2)
BASOPHILS # BLD AUTO: 0.16 10*3/MM3 (ref 0–0.2)
BASOPHILS NFR BLD AUTO: 1.4 % (ref 0–1.5)
BDY SITE: ABNORMAL
BENZODIAZ UR QL SCN: NEGATIVE
BILIRUB SERPL-MCNC: 0.2 MG/DL (ref 0–1.2)
BILIRUB UR QL STRIP: NEGATIVE
BODY TEMPERATURE: 0 C
BUN SERPL-MCNC: 13 MG/DL (ref 6–20)
BUN/CREAT SERPL: 14.1 (ref 7–25)
BUPRENORPHINE SERPL-MCNC: NEGATIVE NG/ML
CALCIUM SPEC-SCNC: 9.2 MG/DL (ref 8.6–10.5)
CANNABINOIDS SERPL QL: NEGATIVE
CHLORIDE SERPL-SCNC: 107 MMOL/L (ref 98–107)
CK SERPL-CCNC: 64 U/L (ref 20–200)
CLARITY UR: CLEAR
CO2 BLDA-SCNC: 13.3 MMOL/L (ref 22–33)
CO2 SERPL-SCNC: 18.5 MMOL/L (ref 22–29)
COCAINE UR QL: NEGATIVE
COHGB MFR BLD: 0.9 % (ref 0–5)
COLOR UR: YELLOW
CREAT SERPL-MCNC: 0.92 MG/DL (ref 0.76–1.27)
CRP SERPL-MCNC: 4.58 MG/DL (ref 0–0.5)
D-LACTATE SERPL-SCNC: 2.8 MMOL/L (ref 0.5–2)
DEPRECATED RDW RBC AUTO: 47.3 FL (ref 37–54)
EOSINOPHIL # BLD AUTO: 0.38 10*3/MM3 (ref 0–0.4)
EOSINOPHIL NFR BLD AUTO: 3.4 % (ref 0.3–6.2)
ERYTHROCYTE [DISTWIDTH] IN BLOOD BY AUTOMATED COUNT: 14 % (ref 12.3–15.4)
ERYTHROCYTE [SEDIMENTATION RATE] IN BLOOD: 53 MM/HR (ref 0–20)
ETHANOL BLD-MCNC: 209 MG/DL (ref 0–10)
ETHANOL UR QL: 0.21 %
GFR SERPL CREATININE-BSD FRML MDRD: 85 ML/MIN/1.73
GLOBULIN UR ELPH-MCNC: 3 GM/DL
GLUCOSE SERPL-MCNC: 87 MG/DL (ref 65–99)
GLUCOSE UR STRIP-MCNC: NEGATIVE MG/DL
HCO3 BLDA-SCNC: 12.9 MMOL/L (ref 20–26)
HCT VFR BLD AUTO: 32.6 % (ref 37.5–51)
HCT VFR BLD CALC: 34 % (ref 38–51)
HGB BLD-MCNC: 10.2 G/DL (ref 13–17.7)
HGB BLDA-MCNC: 11.1 G/DL (ref 14–18)
HGB UR QL STRIP.AUTO: NEGATIVE
IMM GRANULOCYTES # BLD AUTO: 0.21 10*3/MM3 (ref 0–0.05)
IMM GRANULOCYTES NFR BLD AUTO: 1.9 % (ref 0–0.5)
INHALED O2 CONCENTRATION: 21 %
INR PPP: 1.2 (ref 0.9–1.1)
KETONES UR QL STRIP: NEGATIVE
LACTATE HOLD SPECIMEN: NORMAL
LEUKOCYTE ESTERASE UR QL STRIP.AUTO: NEGATIVE
LYMPHOCYTES # BLD AUTO: 2.05 10*3/MM3 (ref 0.7–3.1)
LYMPHOCYTES NFR BLD AUTO: 18.1 % (ref 19.6–45.3)
Lab: ABNORMAL
Lab: ABNORMAL
MAGNESIUM SERPL-MCNC: 1.6 MG/DL (ref 1.6–2.6)
MCH RBC QN AUTO: 29.1 PG (ref 26.6–33)
MCHC RBC AUTO-ENTMCNC: 31.3 G/DL (ref 31.5–35.7)
MCV RBC AUTO: 93.1 FL (ref 79–97)
METHADONE UR QL SCN: NEGATIVE
METHGB BLD QL: 0.4 % (ref 0–3)
MODALITY: ABNORMAL
MONOCYTES # BLD AUTO: 1.76 10*3/MM3 (ref 0.1–0.9)
MONOCYTES NFR BLD AUTO: 15.5 % (ref 5–12)
NEUTROPHILS NFR BLD AUTO: 59.7 % (ref 42.7–76)
NEUTROPHILS NFR BLD AUTO: 6.76 10*3/MM3 (ref 1.7–7)
NITRITE UR QL STRIP: NEGATIVE
NOTE: ABNORMAL
NOTIFIED BY: ABNORMAL
NOTIFIED WHO: ABNORMAL
NRBC BLD AUTO-RTO: 0 /100 WBC (ref 0–0.2)
NT-PROBNP SERPL-MCNC: 2038 PG/ML (ref 0–900)
OPIATES UR QL: POSITIVE
OXYCODONE UR QL SCN: NEGATIVE
OXYHGB MFR BLDV: 96.5 % (ref 94–99)
PCO2 BLDA: 13.8 MM HG (ref 35–45)
PCO2 TEMP ADJ BLD: ABNORMAL MM[HG]
PCP UR QL SCN: NEGATIVE
PH BLDA: 7.58 PH UNITS (ref 7.35–7.45)
PH UR STRIP.AUTO: 7 [PH] (ref 5–8)
PH, TEMP CORRECTED: ABNORMAL
PHOSPHATE SERPL-MCNC: 1.4 MG/DL (ref 2.5–4.5)
PLATELET # BLD AUTO: 491 10*3/MM3 (ref 140–450)
PMV BLD AUTO: 9 FL (ref 6–12)
PO2 BLDA: 80.2 MM HG (ref 83–108)
PO2 TEMP ADJ BLD: ABNORMAL MM[HG]
POTASSIUM SERPL-SCNC: 3.2 MMOL/L (ref 3.5–5.2)
PROCALCITONIN SERPL-MCNC: 0.1 NG/ML (ref 0–0.25)
PROT SERPL-MCNC: 6.9 G/DL (ref 6–8.5)
PROT UR QL STRIP: NEGATIVE
PROTHROMBIN TIME: 15 SECONDS (ref 11.9–14.1)
RBC # BLD AUTO: 3.5 10*6/MM3 (ref 4.14–5.8)
SALICYLATES SERPL-MCNC: 0.8 MG/DL
SAO2 % BLDCOA: 97.8 % (ref 94–99)
SODIUM SERPL-SCNC: 140 MMOL/L (ref 136–145)
SP GR UR STRIP: 1.01 (ref 1–1.03)
T4 FREE SERPL-MCNC: 1.29 NG/DL (ref 0.93–1.7)
TROPONIN T SERPL-MCNC: <0.01 NG/ML (ref 0–0.03)
TSH SERPL DL<=0.05 MIU/L-ACNC: 2.38 UIU/ML (ref 0.27–4.2)
UROBILINOGEN UR QL STRIP: NORMAL
VENTILATOR MODE: ABNORMAL
WBC # BLD AUTO: 11.32 10*3/MM3 (ref 3.4–10.8)

## 2020-09-05 PROCEDURE — 80307 DRUG TEST PRSMV CHEM ANLYZR: CPT | Performed by: EMERGENCY MEDICINE

## 2020-09-05 PROCEDURE — 87040 BLOOD CULTURE FOR BACTERIA: CPT | Performed by: EMERGENCY MEDICINE

## 2020-09-05 PROCEDURE — 25010000002 ONDANSETRON PER 1 MG: Performed by: EMERGENCY MEDICINE

## 2020-09-05 PROCEDURE — 85610 PROTHROMBIN TIME: CPT | Performed by: EMERGENCY MEDICINE

## 2020-09-05 PROCEDURE — 99284 EMERGENCY DEPT VISIT MOD MDM: CPT

## 2020-09-05 PROCEDURE — 84484 ASSAY OF TROPONIN QUANT: CPT | Performed by: EMERGENCY MEDICINE

## 2020-09-05 PROCEDURE — 82375 ASSAY CARBOXYHB QUANT: CPT

## 2020-09-05 PROCEDURE — 71045 X-RAY EXAM CHEST 1 VIEW: CPT

## 2020-09-05 PROCEDURE — 93005 ELECTROCARDIOGRAM TRACING: CPT | Performed by: EMERGENCY MEDICINE

## 2020-09-05 PROCEDURE — 84443 ASSAY THYROID STIM HORMONE: CPT | Performed by: EMERGENCY MEDICINE

## 2020-09-05 PROCEDURE — 83605 ASSAY OF LACTIC ACID: CPT | Performed by: EMERGENCY MEDICINE

## 2020-09-05 PROCEDURE — 36600 WITHDRAWAL OF ARTERIAL BLOOD: CPT

## 2020-09-05 PROCEDURE — 83050 HGB METHEMOGLOBIN QUAN: CPT

## 2020-09-05 PROCEDURE — 93010 ELECTROCARDIOGRAM REPORT: CPT | Performed by: INTERNAL MEDICINE

## 2020-09-05 PROCEDURE — 81003 URINALYSIS AUTO W/O SCOPE: CPT | Performed by: EMERGENCY MEDICINE

## 2020-09-05 PROCEDURE — 83880 ASSAY OF NATRIURETIC PEPTIDE: CPT | Performed by: EMERGENCY MEDICINE

## 2020-09-05 PROCEDURE — 25010000002 MORPHINE PER 10 MG: Performed by: EMERGENCY MEDICINE

## 2020-09-05 PROCEDURE — 83735 ASSAY OF MAGNESIUM: CPT | Performed by: EMERGENCY MEDICINE

## 2020-09-05 PROCEDURE — 96374 THER/PROPH/DIAG INJ IV PUSH: CPT

## 2020-09-05 PROCEDURE — 96375 TX/PRO/DX INJ NEW DRUG ADDON: CPT

## 2020-09-05 PROCEDURE — 80053 COMPREHEN METABOLIC PANEL: CPT | Performed by: EMERGENCY MEDICINE

## 2020-09-05 PROCEDURE — 84145 PROCALCITONIN (PCT): CPT | Performed by: EMERGENCY MEDICINE

## 2020-09-05 PROCEDURE — 25010000002 FUROSEMIDE PER 20 MG: Performed by: EMERGENCY MEDICINE

## 2020-09-05 PROCEDURE — 82805 BLOOD GASES W/O2 SATURATION: CPT

## 2020-09-05 PROCEDURE — 85652 RBC SED RATE AUTOMATED: CPT | Performed by: EMERGENCY MEDICINE

## 2020-09-05 PROCEDURE — 71250 CT THORAX DX C-: CPT

## 2020-09-05 PROCEDURE — 85025 COMPLETE CBC W/AUTO DIFF WBC: CPT | Performed by: EMERGENCY MEDICINE

## 2020-09-05 PROCEDURE — 84100 ASSAY OF PHOSPHORUS: CPT | Performed by: EMERGENCY MEDICINE

## 2020-09-05 PROCEDURE — 86140 C-REACTIVE PROTEIN: CPT | Performed by: EMERGENCY MEDICINE

## 2020-09-05 PROCEDURE — 36415 COLL VENOUS BLD VENIPUNCTURE: CPT

## 2020-09-05 PROCEDURE — 82550 ASSAY OF CK (CPK): CPT | Performed by: EMERGENCY MEDICINE

## 2020-09-05 PROCEDURE — 84439 ASSAY OF FREE THYROXINE: CPT | Performed by: EMERGENCY MEDICINE

## 2020-09-05 RX ORDER — POTASSIUM CHLORIDE 20 MEQ/1
60 TABLET, EXTENDED RELEASE ORAL ONCE
Status: COMPLETED | OUTPATIENT
Start: 2020-09-05 | End: 2020-09-05

## 2020-09-05 RX ORDER — FUROSEMIDE 10 MG/ML
40 INJECTION INTRAMUSCULAR; INTRAVENOUS ONCE
Status: COMPLETED | OUTPATIENT
Start: 2020-09-05 | End: 2020-09-05

## 2020-09-05 RX ORDER — ONDANSETRON 2 MG/ML
4 INJECTION INTRAMUSCULAR; INTRAVENOUS ONCE
Status: COMPLETED | OUTPATIENT
Start: 2020-09-05 | End: 2020-09-05

## 2020-09-05 RX ORDER — SODIUM CHLORIDE 0.9 % (FLUSH) 0.9 %
10 SYRINGE (ML) INJECTION AS NEEDED
Status: DISCONTINUED | OUTPATIENT
Start: 2020-09-05 | End: 2020-09-05 | Stop reason: HOSPADM

## 2020-09-05 RX ORDER — MORPHINE SULFATE 2 MG/ML
2 INJECTION, SOLUTION INTRAMUSCULAR; INTRAVENOUS ONCE
Status: COMPLETED | OUTPATIENT
Start: 2020-09-05 | End: 2020-09-05

## 2020-09-05 RX ADMIN — NITROGLYCERIN 1 INCH: 20 OINTMENT TOPICAL at 04:17

## 2020-09-05 RX ADMIN — POTASSIUM CHLORIDE 60 MEQ: 1500 TABLET, EXTENDED RELEASE ORAL at 05:49

## 2020-09-05 RX ADMIN — MORPHINE SULFATE 2 MG: 2 INJECTION, SOLUTION INTRAMUSCULAR; INTRAVENOUS at 04:18

## 2020-09-05 RX ADMIN — ONDANSETRON 4 MG: 2 INJECTION INTRAMUSCULAR; INTRAVENOUS at 04:18

## 2020-09-05 RX ADMIN — SODIUM CHLORIDE 500 ML: 9 INJECTION, SOLUTION INTRAVENOUS at 04:17

## 2020-09-05 RX ADMIN — FUROSEMIDE 40 MG: 10 INJECTION, SOLUTION INTRAMUSCULAR; INTRAVENOUS at 05:50

## 2020-09-05 NOTE — PROGRESS NOTES
I went to patient's room to stick ABG, pt requested to speak with Dr. Valenzuela wishing to leave hospital and have his son drive him to [facility]. ABG was not done notified Dr. Llanes of Patients request.

## 2020-09-05 NOTE — ED NOTES
Pt placed in lobby to wait on ride per Dr. Llanes. Pt AAOX4, no resp distress noted; pt ambulatory on discharge. Pt signed AMA forms before leaving and verbalized the risks of leaving AMA.      Kristin Moulton RN  09/05/20 0608

## 2020-09-05 NOTE — ED NOTES
Pt requesting to leave AMA because he does not want to ride in a helicopter or ambulance to [facility]. Dr. Llanes went to pts room to speak to pt about this and explained the risks of leaving AMA and the benefits of staying for treatment.      Kristin Moulton, RN  09/05/20 0081

## 2020-09-08 NOTE — DISCHARGE SUMMARY
CTS Discharge Summary    Patient Care Team:  Alex Canales MD as PCP - General (Cardiology)  Consults:   Consults     Date and Time Order Name Status Description    8/26/2020 1056 Inpatient Consult to Cardiology Completed     8/18/2020 0613 Inpatient Hospitalist Consult Completed           Date of Admission: 8/21/2020  1:37 PM  Date of Discharge:  9/2/2020    Discharge Diagnosis  Past Medical History:   Diagnosis Date   • Anxiety disorder    • CAD (coronary artery disease)    • Chronic alcoholism (CMS/HCC)    • Hyperlipidemia    • Hypertension    • WPW (Zakiya-Parkinson-White syndrome)      CAD (coronary artery disease) [I25.10]     Procedures Performed  Procedure(s):08/26/20   Coronary bypass grafting x4 with endoscopic harvesting of the right great saphenous vein.  Left internal mammary artery to left anterior descending coronary.  Saphenous vein graft to the posterior descending branch of the right.  Saphenous vein graft to a high ramus intermedius branch.  Saphenous vein graft to an obtuse marginal branch of the circumflex.     Surgeons: David Kuo MD surgeon.       History of Present Illness  Patient is a 55 y.o. male with a history of Zakiya-Parkinson-White syndrome, hypertension, dyslipidemia, alcoholism and coronary artery disease who presented to the [facility] emergency department on 8/18/2020 with a one-week history of worsening substernal chest pain with radiation to his arm and neck with associated dyspnea and nausea.  The patient was subsequently admitted and and had an abnormal stress test.  He then underwent a left heart catheterization which revealed severe multivessel coronary artery disease.  The patient was subsequently transferred to [facility] for possible surgical revascularization.  Currently, the patient denies any chest pain, dyspnea, fever, chills or cough.         Hospital Course  Admitted on 8/21/2020 from outside facility.  Admitted with coronary  artery disease.  Plan for surgical intervention.  Preoperative work-up ensued.  Underwent surgical procedure mentioned above on 8/26/2020.  Following the procedure patient was taken to the cardiac ICU, extubated.  While in the ICU the patient followed by the hospital intensivist service per hospital protocol.  Followed closely for postop complications.  IV medications ordered as needed to maintain tight blood pressure parameters and hemodynamic stability.  Postoperative cardiology consult was obtained to help with the medical management patient's cardiac risk factors.  Followed on daily rounds of all services involved.  POD 1: Patient found to be extubated neurologically intact.  Postoperative rehab initiated with physical therapy.  ICU care continued.  Cable ordered to be discontinued.  POD 2: ICU care continue.  High flow nasal cannula.  Diuresed.  Plavix therapy ordered.  POD 3: ICU care continued.  High flow nasal cannula weaning off.  POD 4: Postoperative confusion continuing.  Nasal cannula O2 supplementation  POD 5:.  Progressing.  Orders placed to transfer to telemetry.  POD 6: Satisfactory progress noted.  All in agreement patient stable for discharge home.  Follow-up was made.    Discharge Medications     Discharge Medications      New Medications      Instructions Start Date   folic acid 1 MG tablet  Commonly known as:  FOLVITE   1 mg, Oral, Daily      pharmacy consult - MTM   Does not apply, Daily      simethicone 80 MG chewable tablet  Commonly known as:  Gas-X   80 mg, Oral, Every 6 Hours PRN      thiamine 100 MG tablet  Commonly known as:  VITAMIN B1   100 mg, Oral, Daily         Continue These Medications      Instructions Start Date   aspirin 81 MG EC tablet  Notes to patient:  For your heart to prevent blockages   81 mg, Oral, Daily      atorvastatin 40 MG tablet  Commonly known as:  LIPITOR  Notes to patient:  To help lower your cholesterol    40 mg, Oral, Daily      clopidogrel 75 MG  tablet  Commonly known as:  PLAVIX  Notes to patient:  For your heart to prevent blockages   75 mg, Oral, Daily      hydrOXYzine 10 MG tablet  Commonly known as:  ATARAX  Notes to patient:  As needed for itching   10 mg, Oral, 3 Times Daily PRN      magnesium oxide 400 (241.3 Mg) MG tablet tablet  Commonly known as:  MAGOX  Notes to patient:  Supplement   400 mg, Oral, 3 Times Daily      metoprolol tartrate 100 MG tablet  Commonly known as:  LOPRESSOR  Notes to patient:  To help lower heart rate   100 mg, Oral, 2 Times Daily      nitroglycerin 0.4 MG SL tablet  Commonly known as:  NITROSTAT  Notes to patient:  As needed for chest pain   0.4 mg, Sublingual, Every 5 Minutes PRN, Take no more than 3 doses in 15 minutes.       ondansetron ODT 4 MG disintegrating tablet  Commonly known as:  ZOFRAN-ODT  Notes to patient:  As needed for nausea and vomiting   4 mg, Translingual, Every 12 Hours PRN      pantoprazole 40 MG EC tablet  Commonly known as:  PROTONIX  Notes to patient:  For your stomach   40 mg, Oral, Daily      potassium chloride 10 MEQ CR tablet  Commonly known as:  K-DUR  Notes to patient:  Supplement   10 mEq, Oral, Daily         Stop These Medications    amLODIPine 10 MG tablet  Commonly known as:  NORVASC     losartan 100 MG tablet  Commonly known as:  COZAAR     meloxicam 7.5 MG tablet  Commonly known as:  MOBIC        ASK your doctor about these medications      Instructions Start Date   HYDROcodone-acetaminophen 7.5-325 MG per tablet  Commonly known as:  NORCO  Ask about: Should I take this medication?   1 tablet, Oral, Every 6 Hours PRN             Discharge Diet:   Diet Instructions     Diet: Regular, Consistent Carbohydrate, Cardiac      Discharge Diet:   Regular  Consistent Carbohydrate  Cardiac             Activity at Discharge:   Activity Instructions     Bathing Restrictions      Type of Restriction:  Bathing    Bathing Restrictions:  No Tub Bath    Driving Restrictions      No driving until  released and follow-up    Type of Restriction:  Driving    Driving Restrictions:  No Driving While Taking Narcotics    Lifting Restrictions      Type of Restriction:  Lifting    Lifting Restrictions:  Lifting Restriction (Indicate Limit)    Weight Limit (Pounds):  10    Length of Lifting Restriction:  Until released and follow-up appointment    Work Restrictions      Type of Restriction:  Work    May Return to Work:  After Next Appointment    With / Without Restrictions:  With Restrictions    Explain Work Restrictions:  To be addressed in follow-up appointment          Follow-up Appointments  Future Appointments   Date Time Provider Department Center   9/9/2020  9:45 AM Lexy Chaudhary APRN [facility]   9/11/2020  9:00 AM Van Peres DO [facility] None   9/17/2020  2:30 PM Alex Canales MD [facility] None   10/19/2020  9:30 AM Nancy Rahman APRN [facility] None        WOUND CARE: Monitor surgical wounds daily.  Keep clean and dry.  Call Dr. Kuo's office with questions or concerns.  Change dressings as needed to keep clean and dry otherwise dressing not needed.  Let Dr. Kuo's office know if increased pain, swelling, redness, open up incision or drainage occur.    NAINA Toth  09/08/20  14:16

## 2020-09-10 LAB
BACTERIA SPEC AEROBE CULT: NORMAL
BACTERIA SPEC AEROBE CULT: NORMAL

## 2020-09-14 ENCOUNTER — READMISSION MANAGEMENT (OUTPATIENT)
Dept: CALL CENTER | Facility: HOSPITAL | Age: 55
End: 2020-09-14

## 2020-09-14 NOTE — OUTREACH NOTE
CT Surgery Week 2 Survey      Responses   [facility] patient discharged from?  [place]   Does the patient have one of the following disease processes/diagnoses(primary or secondary)?  Cardiothoracic surgery   Week 2 attempt successful?  No   Unsuccessful attempts  Attempt 1          Jia Sen RN

## 2020-09-17 ENCOUNTER — OFFICE VISIT (OUTPATIENT)
Dept: CARDIOLOGY | Facility: CLINIC | Age: 55
End: 2020-09-17

## 2020-09-17 ENCOUNTER — READMISSION MANAGEMENT (OUTPATIENT)
Dept: CALL CENTER | Facility: HOSPITAL | Age: 55
End: 2020-09-17

## 2020-09-17 VITALS
WEIGHT: 210.2 LBS | RESPIRATION RATE: 14 BRPM | SYSTOLIC BLOOD PRESSURE: 126 MMHG | HEIGHT: 76 IN | HEART RATE: 78 BPM | DIASTOLIC BLOOD PRESSURE: 72 MMHG | TEMPERATURE: 98.3 F | OXYGEN SATURATION: 96 % | BODY MASS INDEX: 25.6 KG/M2

## 2020-09-17 DIAGNOSIS — E78.49 OTHER HYPERLIPIDEMIA: Chronic | ICD-10-CM

## 2020-09-17 DIAGNOSIS — F10.20 CHRONIC ALCOHOLISM (HCC): Chronic | ICD-10-CM

## 2020-09-17 DIAGNOSIS — I10 ESSENTIAL HYPERTENSION: Chronic | ICD-10-CM

## 2020-09-17 DIAGNOSIS — E87.6 HYPOKALEMIA: ICD-10-CM

## 2020-09-17 DIAGNOSIS — I25.10 CORONARY ARTERY DISEASE INVOLVING NATIVE CORONARY ARTERY OF NATIVE HEART WITHOUT ANGINA PECTORIS: Primary | Chronic | ICD-10-CM

## 2020-09-17 DIAGNOSIS — Z95.1 S/P CABG X 4: ICD-10-CM

## 2020-09-17 DIAGNOSIS — E83.42 HYPOMAGNESEMIA: ICD-10-CM

## 2020-09-17 PROCEDURE — 99214 OFFICE O/P EST MOD 30 MIN: CPT | Performed by: INTERNAL MEDICINE

## 2020-09-17 PROCEDURE — 93000 ELECTROCARDIOGRAM COMPLETE: CPT | Performed by: INTERNAL MEDICINE

## 2020-09-17 NOTE — PROGRESS NOTES
subjective     Chief Complaint   Patient presents with   • Hyperlipidemia     follow up   • Hypertension     follow up   • Follow-up     by pass 11 days ago     History of Present Illness  Patient is 55 years old white male who is here for hospital follow-up checkup    Patient recently had coronary angiography requiring four-vessel CABG with LIMA to LAD.  He was discharged from the hospital on September 2, 2020 he then presented to [facility] on September 5, 2020 and left AMA.  His B NP was elevated patient was hyper ventilating had respiratory alkalosis and low potassium and low phosphorus.  Magnesium was normal.  Alcohol level was quite high.  He also had elevated lactic acid patient is a heavy alcoholic and continues to drink and has recurrent hypomagnesemia and hypokalemia.  Chest x-ray did not show any pneumonia.    Patient now presents to the office feeling good without any complaints  He is feeling good today denies any chest pain palpitations or shortness of breath.  He does admit to chest pain tender and worse with movement of the chest muscles.  Blood pressure is controlled.  He also has hyperlipidemia and is taking Lipitor 40 mg daily.  Because of heavy alcoholism he has hypomagnesemia and is taking magnesium 400 mg 3 times a day last magnesium 11 days ago was normal.  He is taking dual antiplatelet therapy with aspirin Plavix, beta-blocker therapy and statin therapy.    Past Surgical History:   Procedure Laterality Date   • CARDIAC CATHETERIZATION  06/06/2015   • CARDIAC CATHETERIZATION N/A 8/19/2020    Procedure: Left Heart Cath;  Surgeon: Jack Beebe MD;  Location: [facility] INVASIVE LOCATION;  Service: Cardiology;  Laterality: N/A;   • CARDIOVASCULAR STRESS TEST  06/06/2015   • CORONARY ARTERY BYPASS GRAFT N/A 8/26/2020    Procedure: MEDIAN STERNOTOMY, CORONARY ARTERY BYPASS GRAFTING X  4, UTILIZING THE LEFT INTERNAL MAMMARY ARTERY AND EVH OF THE RIGHT GREATER SAPHENOUS VEIN;  Surgeon:  David Kuo MD;  Location: [facility];  Service: Cardiothoracic;  Laterality: N/A;  LEG START - 0740  VEIN OUT - 0820  VEIN READY - 0832   • ECHO - CONVERTED  2015   • EYE SURGERY       Family History   Problem Relation Age of Onset   • Heart attack Father    • Heart disease Father    • Heart failure Father    • Heart disease Sister    • Heart failure Sister    • Heart disease Brother    • Heart failure Brother    • Heart failure Sister    • Heart disease Sister      Past Medical History:   Diagnosis Date   • Anxiety disorder    • CAD (coronary artery disease)    • Chronic alcoholism (CMS/HCC)    • Hyperlipidemia    • Hypertension    • WPW (Zakiya-Parkinson-White syndrome)      Patient Active Problem List   Diagnosis   • CAD (coronary artery disease)   • Anxiety disorder   • Chronic alcoholism (CMS/HCC)   • Essential hypertension   • Hyperlipidemia type IV,uncontrolled   • Gastroesophageal reflux disease without esophagitis   • Sorethroat   • Hypokalemia   • Hypomagnesemia   • Hypocalcemia   • Chest pain   • Hyperlipidemia   • Grade I diastolic dysfunction   • CARLITOS (acute kidney injury) (CMS/HCC)   • Abnormal nuclear stress test   • S/P CABG x 4 2020       Social History     Tobacco Use   • Smoking status: Former Smoker     Packs/day: 1.00     Years: 20.00     Pack years: 20.00     Types: Cigarettes     Quit date:      Years since quittin.7   • Smokeless tobacco: Current User     Types: Chew, Snuff   • Tobacco comment: chew 1 can per week   Substance Use Topics   • Alcohol use: Yes     Alcohol/week: 10.0 standard drinks     Types: 6 Cans of beer, 4 Shots of liquor per week     Comment: pint of alcohol every other day    • Drug use: Yes     Types: Hydrocodone     Comment: occ       Allergies   Allergen Reactions   • Niacin Rash     Whelps       Current Outpatient Medications on File Prior to Visit   Medication Sig   • aspirin 81 MG EC tablet Take 1 tablet by mouth Daily.   • atorvastatin  (LIPITOR) 40 MG tablet Take 40 mg by mouth Daily.   • clopidogrel (PLAVIX) 75 MG tablet Take 1 tablet by mouth Daily.   • folic acid (FOLVITE) 1 MG tablet Take 1 tablet by mouth Daily.   • hydrOXYzine (ATARAX) 10 MG tablet Take 10 mg by mouth 3 (Three) Times a Day As Needed for Itching.   • magnesium oxide (MAGOX) 400 (241.3 Mg) MG tablet tablet Take 400 mg by mouth 3 (Three) Times a Day.   • metoprolol tartrate (LOPRESSOR) 100 MG tablet Take 100 mg by mouth 2 (Two) Times a Day.   • nitroglycerin (NITROSTAT) 0.4 MG SL tablet Place 0.4 mg under the tongue Every 5 (Five) Minutes As Needed for Chest Pain. Take no more than 3 doses in 15 minutes.   • ondansetron ODT (ZOFRAN-ODT) 4 MG disintegrating tablet Place 4 mg on the tongue Every 12 (Twelve) Hours As Needed for Nausea or Vomiting.   • pantoprazole (PROTONIX) 40 MG EC tablet Take 40 mg by mouth Daily.   • potassium chloride (K-DUR) 10 MEQ CR tablet Take 10 mEq by mouth Daily.   • simethicone (Gas-X) 80 MG chewable tablet Chew 1 tablet Every 6 (Six) Hours As Needed for Flatulence.   • thiamine (VITAMIN B1) 100 MG tablet Take 1 tablet by mouth Daily.   • pharmacy consult - MTM Daily.     No current facility-administered medications on file prior to visit.          The following portions of the patient's history were reviewed and updated as appropriate: allergies, current medications, past family history, past medical history, past social history, past surgical history and problem list.    Review of Systems   Constitution: Negative.   HENT: Negative.  Negative for congestion.    Eyes: Negative.    Cardiovascular: Negative.  Negative for chest pain, cyanosis, dyspnea on exertion, irregular heartbeat, leg swelling, near-syncope, orthopnea, palpitations, paroxysmal nocturnal dyspnea and syncope.   Respiratory: Negative.  Negative for shortness of breath.    Hematologic/Lymphatic: Negative.    Musculoskeletal: Negative.    Gastrointestinal: Negative.    Neurological:  "Negative.  Negative for headaches.          Objective:     /72 (BP Location: Left arm, Patient Position: Sitting, Cuff Size: Adult)   Pulse 78   Temp 98.3 °F (36.8 °C) (Temporal)   Resp 14   Ht 193 cm (75.98\")   Wt 95.3 kg (210 lb 3.2 oz)   SpO2 96%   BMI 25.60 kg/m²   Constitutional:       General: Not in acute distress.     Appearance: Well-developed. Not diaphoretic.   Eyes:      General: No scleral icterus.     Conjunctiva/sclera: Conjunctivae normal.      Pupils: Pupils are equal, round, and reactive to light.   HENT:      Head: Normocephalic and atraumatic.    Mouth/Throat:      Pharynx: No oropharyngeal exudate.   Neck:      Musculoskeletal: Normal range of motion and neck supple.      Thyroid: No thyromegaly.      Vascular: No JVD.      Trachea: No tracheal deviation.      Lymphadenopathy: No cervical adenopathy.   Pulmonary:      Effort: Pulmonary effort is normal. No respiratory distress.      Breath sounds: Normal breath sounds.   Chest:      Chest wall: Not tender to palpatation.   Cardiovascular:      Normal rate. Regular rhythm.      No gallop.   Pulses:     Intact distal pulses. No decreased pulses.   Abdominal:      General: Bowel sounds are normal. There is no distension or abdominal bruit.      Palpations: Abdomen is soft. There is no hepatomegaly, splenomegaly or abdominal mass.      Tenderness: There is no abdominal tenderness. There is no guarding or rebound.   Musculoskeletal: Normal range of motion.         General: No tenderness or deformity.   Skin:     General: Skin is warm and dry.      Findings: No erythema or rash.   Neurological:      Mental Status: Alert and oriented to person, place and time.      Cranial Nerves: No cranial nerve deficit.      Motor: Normal muscle tone.      Coordination: Coordination normal.      Deep Tendon Reflexes: Reflexes normal.   Psychiatric:         Behavior: Behavior normal.         Thought Content: Thought content normal.         Judgment: " Judgment normal.           Lab Review  Lab Results   Component Value Date     09/05/2020    K 3.2 (L) 09/05/2020     09/05/2020    BUN 13 09/05/2020    CREATININE 0.92 09/05/2020    GLUCOSE 87 09/05/2020    CALCIUM 9.2 09/05/2020    ALT 14 09/05/2020    ALKPHOS 52 09/05/2020    LABIL2 1.7 05/04/2016     Lab Results   Component Value Date    CKTOTAL 64 09/05/2020     Lab Results   Component Value Date    WBC 11.32 (H) 09/05/2020    HGB 10.2 (L) 09/05/2020    HCT 32.6 (L) 09/05/2020     (H) 09/05/2020     Lab Results   Component Value Date    INR 1.20 (H) 09/05/2020    INR 1.44 (H) 08/27/2020    INR 1.49 (H) 08/26/2020     Lab Results   Component Value Date    MG 1.6 09/05/2020     Lab Results   Component Value Date    PSA 0.300 03/20/2018    TSH 2.380 09/05/2020     No results found for: BNP  Lab Results   Component Value Date    CHLPL 182 05/04/2016    CHOL 118 08/31/2020    TRIG 186 (H) 08/31/2020    HDL 46 08/21/2020    VLDL 30.4 08/21/2020    LDLHDL 1.97 08/21/2020     Lab Results   Component Value Date    LDL 91 08/21/2020    LDL 94 09/09/2019         ECG 12 Lead    Date/Time: 9/17/2020 4:31 PM  Performed by: Alex Canales MD  Authorized by: Alex Canales MD   Comparison: compared with previous ECG from 9/5/2020  Comparison to previous ECG: Patient had sinus tachycardia 132/min which is no longer noted.  Rhythm: sinus rhythm  Rate: normal  BPM: 76  Conduction: conduction normal  T inversion: V1, V2, V3 and V4  QRS axis: normal    Clinical impression: abnormal EKG and myocardial infarction  Comments: Old inferior wall myocardial infarction               I personally viewed and interpreted the patient's LAB data         Assessment:     1. Coronary artery disease involving native coronary artery of native heart without angina pectoris    2. Essential hypertension    3. Other hyperlipidemia    4. Chronic alcoholism (CMS/HCC)    5. Hypokalemia    6. S/P CABG x 4 8/26/2020           Plan:     Patient is 55 years old white male who recently had coronary artery bypass graft surgery and is doing very well.  Blood pressure is also very well controlled and heart rate is now back to normal.  Patient will continue current medication.  Because of heavy alcoholism he will continue folic acid B1 and magnesium replacement.  He also gets frequent hypokalemia which need to be watched.  He will also take beta-blocker therapy dual antiplatelet therapy and statin therapy.  Cessation of alcohol abuse was strongly urged and discussed.  Follow-up scheduled        No follow-ups on file.

## 2020-09-17 NOTE — OUTREACH NOTE
CT Surgery Week 2 Survey      Responses   [facility] patient discharged from?  [place]   Does the patient have one of the following disease processes/diagnoses(primary or secondary)?  Cardiothoracic surgery   Week 2 attempt successful?  No   Unsuccessful attempts  Attempt 2          Fabienne Dewitt RN

## 2020-09-24 ENCOUNTER — READMISSION MANAGEMENT (OUTPATIENT)
Dept: CALL CENTER | Facility: HOSPITAL | Age: 55
End: 2020-09-24

## 2020-09-24 NOTE — OUTREACH NOTE
CT Surgery Week 3 Survey      Responses   [facility] patient discharged from?  [place]   Does the patient have one of the following disease processes/diagnoses(primary or secondary)?  Cardiothoracic surgery   Week 3 attempt successful?  No   Unsuccessful attempts  Attempt 1          Bettie Blum RN

## 2020-09-25 ENCOUNTER — READMISSION MANAGEMENT (OUTPATIENT)
Dept: CALL CENTER | Facility: HOSPITAL | Age: 55
End: 2020-09-25

## 2020-09-25 NOTE — OUTREACH NOTE
CT Surgery Week 3 Survey      Responses   [facility] patient discharged from?  [place]   Does the patient have one of the following disease processes/diagnoses(primary or secondary)?  Cardiothoracic surgery   Week 3 attempt successful?  No   Unsuccessful attempts  Attempt 2          Melissa Yuen RN

## 2020-09-25 NOTE — ED PROVIDER NOTES
Subjective   55-year-old male in the emergency department with increased shortness of breath and chest pain.  Patient is status post CABG at [facility].  Now having significant chest pain and shortness of breath so came to emergency department for further evaluation.  Has history of anxiety disorder, CAD, chronic alcoholism, hyperlipidemia, hypertension, and WPW.      History provided by:  Patient   used: No        Review of Systems   Constitutional: Negative for activity change, appetite change, chills, diaphoresis, fatigue and fever.   HENT: Negative for congestion, ear pain and sore throat.    Eyes: Negative for redness.   Respiratory: Positive for shortness of breath. Negative for cough, chest tightness and wheezing.    Cardiovascular: Positive for chest pain. Negative for palpitations and leg swelling.   Gastrointestinal: Negative for abdominal pain, diarrhea, nausea and vomiting.   Genitourinary: Negative for dysuria and urgency.   Musculoskeletal: Negative for arthralgias, back pain, myalgias and neck pain.   Skin: Negative for pallor, rash and wound.   Neurological: Negative for dizziness, speech difficulty, weakness and headaches.   Psychiatric/Behavioral: Negative for agitation, behavioral problems, confusion and decreased concentration.   All other systems reviewed and are negative.      Past Medical History:   Diagnosis Date   • Anxiety disorder    • CAD (coronary artery disease)    • Chronic alcoholism (CMS/HCC)    • Hyperlipidemia    • Hypertension    • WPW (Zakiya-Parkinson-White syndrome)        Allergies   Allergen Reactions   • Niacin Rash     Whelps       Past Surgical History:   Procedure Laterality Date   • CARDIAC CATHETERIZATION  06/06/2015   • CARDIAC CATHETERIZATION N/A 8/19/2020    Procedure: Left Heart Cath;  Surgeon: Jack Beebe MD;  Location: [facility] CATH INVASIVE LOCATION;  Service: Cardiology;  Laterality: N/A;   • CARDIOVASCULAR STRESS TEST  06/06/2015   •  CORONARY ARTERY BYPASS GRAFT N/A 2020    Procedure: MEDIAN STERNOTOMY, CORONARY ARTERY BYPASS GRAFTING X  4, UTILIZING THE LEFT INTERNAL MAMMARY ARTERY AND EVH OF THE RIGHT GREATER SAPHENOUS VEIN;  Surgeon: David Kuo MD;  Location: [facility];  Service: Cardiothoracic;  Laterality: N/A;  LEG START - 0740  VEIN OUT - 0820  VEIN READY - 0832   • ECHO - CONVERTED  2015   • EYE SURGERY         Family History   Problem Relation Age of Onset   • Heart attack Father    • Heart disease Father    • Heart failure Father    • Heart disease Sister    • Heart failure Sister    • Heart disease Brother    • Heart failure Brother    • Heart failure Sister    • Heart disease Sister        Social History     Socioeconomic History   • Marital status:      Spouse name: Not on file   • Number of children: Not on file   • Years of education: Not on file   • Highest education level: Not on file   Tobacco Use   • Smoking status: Former Smoker     Packs/day: 1.00     Years: 20.00     Pack years: 20.00     Types: Cigarettes     Quit date:      Years since quittin.7   • Smokeless tobacco: Current User     Types: Chew, Snuff   • Tobacco comment: chew 1 can per week   Substance and Sexual Activity   • Alcohol use: Yes     Alcohol/week: 10.0 standard drinks     Types: 6 Cans of beer, 4 Shots of liquor per week     Comment: pint of alcohol every other day    • Drug use: Yes     Types: Hydrocodone     Comment: occ   • Sexual activity: Defer           Objective   Physical Exam  Vitals signs and nursing note reviewed.   Constitutional:       General: He is not in acute distress.     Appearance: Normal appearance. He is well-developed. He is not toxic-appearing or diaphoretic.   HENT:      Head: Normocephalic and atraumatic.      Right Ear: External ear normal.      Left Ear: External ear normal.      Nose: Nose normal.      Mouth/Throat:      Pharynx: No oropharyngeal exudate.      Tonsils: No tonsillar exudate.    Eyes:      General: Lids are normal.      Conjunctiva/sclera: Conjunctivae normal.      Pupils: Pupils are equal, round, and reactive to light.   Neck:      Musculoskeletal: Full passive range of motion without pain, normal range of motion and neck supple.      Thyroid: No thyromegaly.   Cardiovascular:      Rate and Rhythm: Regular rhythm. Tachycardia present.      Pulses: Normal pulses.      Heart sounds: Normal heart sounds, S1 normal and S2 normal.   Pulmonary:      Effort: Pulmonary effort is normal. No tachypnea or respiratory distress.      Breath sounds: Decreased breath sounds and rales present. No wheezing.   Chest:      Chest wall: No tenderness.   Abdominal:      General: Bowel sounds are normal. There is no distension.      Palpations: Abdomen is soft.      Tenderness: There is no abdominal tenderness. There is no guarding or rebound.   Musculoskeletal: Normal range of motion.         General: No tenderness or deformity.   Lymphadenopathy:      Cervical: No cervical adenopathy.   Skin:     General: Skin is warm and dry.      Coloration: Skin is not pale.      Findings: No erythema or rash.   Neurological:      Mental Status: He is alert and oriented to person, place, and time.      GCS: GCS eye subscore is 4. GCS verbal subscore is 5. GCS motor subscore is 6.      Cranial Nerves: No cranial nerve deficit.      Sensory: No sensory deficit.   Psychiatric:         Speech: Speech normal.         Behavior: Behavior normal.         Thought Content: Thought content normal.         Judgment: Judgment normal.         Procedures           ED Course  ED Course as of Sep 25 0035   Sat Sep 05, 2020   0526 IMPRESSION:  Cardiomegaly and mild vascular congestion. No infiltrates are identified.   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  Comparing imaging from his discharge a few days ago status post CABG, his  heart is enlarged.  Patient accepted by hospitalist Dr. Fuentes at [facility] for immediate further evaluation and treatment.  Awaiting bed at this time, and plan to fly the patient once bed assignment.  However, patient declined refused to be transferred at this time via ambulance and/or helicopter.  Patient will only go to [facility] with his son.  Patient is signing out AGAINST MEDICAL ADVICE at this time.  Informed him of high mortality rate and subsequent sudden death by leaving the ER at this time.  Patient alert and oriented x4 with a GCS of 15.  He has declined refused transfer at this time, and will sign out AGAINST MEDICAL ADVICE with a high likelihood of possibly dying.  He is to return to the emergency department with any worsening symptoms.  Patient informs me he is going to drive straight to [facility] ED at this time.      [ES]   Fri Sep 25, 2020   0034 IMPRESSION:  Cardiomegaly and mild vascular congestion. No infiltrates are identified.   XR Chest 1 View [ES]   0035 IMPRESSION:     1. Changes of relatively recent CABG.  2. Small left pleural effusion.  3. Negative for pneumonia. There are scattered areas of mild atelectasis in both lungs.   CT Chest Without Contrast [ES]   0035 Vent. Rate : 132 BPM     Atrial Rate : 138 BPM     P-R Int : 000 ms          QRS Dur : 086 ms      QT Int : 380 ms       P-R-T Axes : 000 057 018 degrees     QTc Int : 563 ms     Sinus tachycardia  Left ventricular hypertrophy with repolarization abnormality  Possible Inferior infarct , age undetermined  Abnormal ECG  When compared with ECG of 21-AUG-2020 07:32,  Vent. rate has increased BY  63 BPM  Borderline criteria for Inferior infarct are now present  T wave inversion now evident in Inferior leads  T wave inversion now evident in Lateral leads  Confirmed by Jack Beebe ([id number]) on 9/6/2020 3:58:55 PM   ECG 12 Lead [ES]      ED Course User Index  [ES] Eric Llanes MD                                            MDM  Number of Diagnoses or Management Options  Acute on chronic congestive heart failure, unspecified heart failure type (CMS/HCC): new and requires workup  Chest pain, unspecified type: new and requires workup  Hx of CABG: new and requires workup  Shortness of breath: new and requires workup  Tachycardia: new and requires workup     Amount and/or Complexity of Data Reviewed  Clinical lab tests: reviewed and ordered  Tests in the radiology section of CPT®: reviewed and ordered  Tests in the medicine section of CPT®: reviewed and ordered  Review and summarize past medical records: yes  Discuss the patient with other providers: yes  Independent visualization of images, tracings, or specimens: yes    Risk of Complications, Morbidity, and/or Mortality  Presenting problems: high  Diagnostic procedures: high  Management options: high    Critical Care  Total time providing critical care: 30-74 minutes    Patient Progress  Patient progress: other (comment) (Critical.)      Final diagnoses:   Acute on chronic congestive heart failure, unspecified heart failure type (CMS/HCC)   Chest pain, unspecified type   Tachycardia   Hx of CABG   Shortness of breath            Eric Llanes MD  09/25/20 0035

## 2020-09-30 ENCOUNTER — DOCUMENTATION (OUTPATIENT)
Dept: CARDIAC REHAB | Facility: HOSPITAL | Age: 55
End: 2020-09-30

## 2020-09-30 NOTE — PROGRESS NOTES
Due to Covid 19 Pandemic. Cardiac Rehab has limited space at this time.  Patients are contacted and made aware of this and if they are interested in participating in the program they are called back as soon as an opening is available.  We are sorry for the inconvenience .       Left message for patient to call back concerning cardiac rehab we had a cancellation and was wondering if he would like that spot.

## 2020-10-22 ENCOUNTER — TELEPHONE (OUTPATIENT)
Dept: CARDIAC SURGERY | Facility: CLINIC | Age: 55
End: 2020-10-22

## 2020-10-29 ENCOUNTER — DOCUMENTATION (OUTPATIENT)
Dept: CARDIAC REHAB | Facility: HOSPITAL | Age: 55
End: 2020-10-29

## 2020-10-29 NOTE — PROGRESS NOTES
Due to the Covid 19 pandemic [facility]  is open but we are social distancing that means space is limited  We will do our best to get  the patients that want to participate into the program scheduled. Patients are being notified of the the restrictions and  being placed on a waiting list at this time and if they are interested staff will schedule patients as soon as possible.    Second message left  for patient to call us back concerning cardiac rehab

## 2020-11-06 ENCOUNTER — DOCUMENTATION (OUTPATIENT)
Dept: CARDIAC REHAB | Facility: HOSPITAL | Age: 55
End: 2020-11-06

## 2020-11-06 NOTE — PROGRESS NOTES
Due to Covid 19 Pandemic. Cardiac Rehab has limited space at this time.  Patients are contacted and made aware of this and if they are interested in participating in the program they are called back as soon as an opening is available.  We are sorry for the inconvenience .       Cardiac rehab staff has attempted to reach patient three times with no call back. If patient calls back we will discuss the program with him and schedule him appointment if he is interested .

## 2020-11-30 ENCOUNTER — TELEPHONE (OUTPATIENT)
Dept: CARDIAC SURGERY | Facility: CLINIC | Age: 55
End: 2020-11-30

## 2020-12-31 ENCOUNTER — TELEPHONE (OUTPATIENT)
Dept: CARDIAC SURGERY | Facility: CLINIC | Age: 55
End: 2020-12-31

## 2021-02-11 RX ORDER — METOPROLOL TARTRATE 100 MG/1
TABLET ORAL
Qty: 60 TABLET | Refills: 3 | Status: SHIPPED | OUTPATIENT
Start: 2021-02-11 | End: 2021-03-18 | Stop reason: SDUPTHER

## 2021-02-11 RX ORDER — ATORVASTATIN CALCIUM 40 MG/1
TABLET, FILM COATED ORAL
Qty: 90 TABLET | Refills: 0 | Status: SHIPPED | OUTPATIENT
Start: 2021-02-11 | End: 2021-06-07

## 2021-03-18 ENCOUNTER — OFFICE VISIT (OUTPATIENT)
Dept: CARDIOLOGY | Facility: CLINIC | Age: 56
End: 2021-03-18

## 2021-03-18 VITALS
OXYGEN SATURATION: 97 % | RESPIRATION RATE: 16 BRPM | SYSTOLIC BLOOD PRESSURE: 120 MMHG | WEIGHT: 220.8 LBS | DIASTOLIC BLOOD PRESSURE: 68 MMHG | BODY MASS INDEX: 27.45 KG/M2 | TEMPERATURE: 96.9 F | HEART RATE: 105 BPM | HEIGHT: 75 IN

## 2021-03-18 DIAGNOSIS — R00.0 SINUS TACHYCARDIA: Primary | ICD-10-CM

## 2021-03-18 DIAGNOSIS — E83.42 HYPOMAGNESEMIA: ICD-10-CM

## 2021-03-18 DIAGNOSIS — E83.39 HYPOPHOSPHATEMIA: ICD-10-CM

## 2021-03-18 DIAGNOSIS — I51.89 GRADE I DIASTOLIC DYSFUNCTION: Chronic | ICD-10-CM

## 2021-03-18 DIAGNOSIS — I25.10 CORONARY ARTERY DISEASE INVOLVING NATIVE CORONARY ARTERY OF NATIVE HEART WITHOUT ANGINA PECTORIS: Chronic | ICD-10-CM

## 2021-03-18 DIAGNOSIS — F10.20 CHRONIC ALCOHOLISM (HCC): Chronic | ICD-10-CM

## 2021-03-18 DIAGNOSIS — I10 ESSENTIAL HYPERTENSION: Chronic | ICD-10-CM

## 2021-03-18 DIAGNOSIS — E78.1 HYPERLIPIDEMIA TYPE IV: ICD-10-CM

## 2021-03-18 PROCEDURE — 99214 OFFICE O/P EST MOD 30 MIN: CPT | Performed by: INTERNAL MEDICINE

## 2021-03-18 PROCEDURE — 93000 ELECTROCARDIOGRAM COMPLETE: CPT | Performed by: INTERNAL MEDICINE

## 2021-03-18 RX ORDER — METOPROLOL TARTRATE 100 MG/1
TABLET ORAL
Qty: 270 TABLET | Refills: 1 | Status: SHIPPED | OUTPATIENT
Start: 2021-03-18 | End: 2022-04-26 | Stop reason: SDUPTHER

## 2021-03-18 RX ORDER — METOPROLOL TARTRATE 100 MG/1
100 TABLET ORAL EVERY 12 HOURS
Qty: 60 TABLET | Refills: 3 | Status: CANCELLED | OUTPATIENT
Start: 2021-03-18

## 2021-03-18 NOTE — PROGRESS NOTES
subjective     Chief Complaint   Patient presents with   • Hypertension     follow up   • Hyperlipidemia     follow up   • Coronary Artery Disease     patient had not had any chest pain     History of Present Illness  Patient is 55 years old white male who is here for for follow-up.  Patient has history of chronic alcoholism and has multiple episodes of admission to the hospital with hypomagnesemia and hypokalemia.  He states that he has been doing very well however he missed few appointments and did not get his lab work drawn as ordered.  Blood pressure is very well controlled with current medications.  Heart rate is fast but he insists that he is taking metoprolol 100 twice daily.    He has known coronary artery disease requiring CABG about a year ago.  He is taking dual antiplatelet therapy with aspirin and Plavix, beta-blocker therapy with Lopressor and statin therapy with Lipitor.  Is also supposed to be taking potassium replacement and magnesium replacement therapy.  GERD symptoms are better with Protonix.  Patient complains of arthritis and wants Neurontin which was denied.    Past Surgical History:   Procedure Laterality Date   • CARDIAC CATHETERIZATION  06/06/2015   • CARDIAC CATHETERIZATION N/A 8/19/2020    Procedure: Left Heart Cath;  Surgeon: Jack Beebe MD;  Location:  [facility];  Service: Cardiology;  Laterality: N/A;   • CARDIOVASCULAR STRESS TEST  06/06/2015   • CORONARY ARTERY BYPASS GRAFT N/A 8/26/2020    Procedure: MEDIAN STERNOTOMY, CORONARY ARTERY BYPASS GRAFTING X  4, UTILIZING THE LEFT INTERNAL MAMMARY ARTERY AND EVH OF THE RIGHT GREATER SAPHENOUS VEIN;  Surgeon: David Kuo MD;  Location: [facility] OR;  Service: Cardiothoracic;  Laterality: N/A;  LEG START - 0740  VEIN OUT - 0820  VEIN READY - 0832   • ECHO - CONVERTED  06/06/2015   • EYE SURGERY       Family History   Problem Relation Age of Onset   • Heart attack Father    • Heart disease Father    • Heart  failure Father    • Heart disease Sister    • Heart failure Sister    • Heart disease Brother    • Heart failure Brother    • Heart failure Sister    • Heart disease Sister      Past Medical History:   Diagnosis Date   • Anxiety disorder    • CAD (coronary artery disease)    • Chronic alcoholism (CMS/HCC)    • Hyperlipidemia    • Hypertension    • WPW (Zakiya-Parkinson-White syndrome)      Patient Active Problem List   Diagnosis   • CAD (coronary artery disease)   • Anxiety disorder   • Chronic alcoholism (CMS/[facility])   • Essential hypertension   • Hyperlipidemia type IV,uncontrolled   • Gastroesophageal reflux disease without esophagitis   • Sorethroat   • Hypokalemia   • Hypomagnesemia   • Hypocalcemia   • Chest pain   • Hyperlipidemia   • Grade I diastolic dysfunction   • CARLITOS (acute kidney injury) (CMS/[facility])   • Abnormal nuclear stress test   • S/P CABG x 4 2020   • Hypophosphatemia   • Sinus tachycardia       Social History     Tobacco Use   • Smoking status: Former Smoker     Packs/day: 1.00     Years: 20.00     Pack years: 20.00     Types: Cigarettes     Quit date:      Years since quittin.2   • Smokeless tobacco: Current User     Types: Chew, Snuff   • Tobacco comment: chew 1 can per week   Substance Use Topics   • Alcohol use: Yes     Alcohol/week: 10.0 standard drinks     Types: 6 Cans of beer, 4 Shots of liquor per week     Comment: pint of alcohol every other day    • Drug use: Yes     Types: Hydrocodone     Comment: occ       Allergies   Allergen Reactions   • Niacin Rash     Whelps       Current Outpatient Medications on File Prior to Visit   Medication Sig   • aspirin 81 MG EC tablet Take 1 tablet by mouth Daily.   • atorvastatin (LIPITOR) 40 MG tablet TAKE ONE TABLET BY MOUTH EVERY DAY FOR CHOLESTEROL    • clopidogrel (PLAVIX) 75 MG tablet Take 1 tablet by mouth Daily.   • folic acid (FOLVITE) 1 MG tablet Take 1 tablet by mouth Daily.   • magnesium oxide (MAGOX) 400 (241.3 Mg) MG tablet  tablet Take 400 mg by mouth 3 (Three) Times a Day.   • nitroglycerin (NITROSTAT) 0.4 MG SL tablet Place 0.4 mg under the tongue Every 5 (Five) Minutes As Needed for Chest Pain. Take no more than 3 doses in 15 minutes.   • ondansetron ODT (ZOFRAN-ODT) 4 MG disintegrating tablet Place 4 mg on the tongue Every 12 (Twelve) Hours As Needed for Nausea or Vomiting.   • pantoprazole (PROTONIX) 40 MG EC tablet Take 40 mg by mouth Daily.   • potassium chloride (K-DUR) 10 MEQ CR tablet Take 10 mEq by mouth Daily.   • simethicone (Gas-X) 80 MG chewable tablet Chew 1 tablet Every 6 (Six) Hours As Needed for Flatulence.   • thiamine (VITAMIN B1) 100 MG tablet Take 1 tablet by mouth Daily.   • [DISCONTINUED] metoprolol tartrate (LOPRESSOR) 100 MG tablet TAKE ONE TABLET BY MOUTH EVERY TWELVE HOURS    • [DISCONTINUED] hydrOXYzine (ATARAX) 10 MG tablet Take 10 mg by mouth 3 (Three) Times a Day As Needed for Itching.   • [DISCONTINUED] pharmacy consult - MTM Daily.     No current facility-administered medications on file prior to visit.         The following portions of the patient's history were reviewed and updated as appropriate: allergies, current medications, past family history, past medical history, past social history, past surgical history and problem list.    Review of Systems   Constitutional: Negative.   HENT: Negative.  Negative for congestion.    Eyes: Negative.    Cardiovascular: Negative.  Negative for chest pain, cyanosis, dyspnea on exertion, irregular heartbeat, leg swelling, near-syncope, orthopnea, palpitations, paroxysmal nocturnal dyspnea and syncope.   Respiratory: Negative.  Negative for shortness of breath.    Hematologic/Lymphatic: Negative.    Musculoskeletal: Negative.    Gastrointestinal: Negative.    Neurological: Negative.  Negative for headaches.          Objective:     /68 (BP Location: Left arm, Patient Position: Sitting, Cuff Size: Adult)   Pulse 105   Temp 96.9 °F (36.1 °C) (Temporal)    "Resp 16   Ht 190.5 cm (75\")   Wt 100 kg (220 lb 12.8 oz)   SpO2 97%   BMI 27.60 kg/m²   Vitals and nursing note reviewed.   Constitutional:       General: Not in acute distress.     Appearance: Healthy appearance. Well-developed and not in distress. Not diaphoretic.   Eyes:      General: No scleral icterus.     Conjunctiva/sclera: Conjunctivae normal.      Pupils: Pupils are equal, round, and reactive to light.   HENT:      Head: Normocephalic and atraumatic.   Neck:      Thyroid: Thyroid normal. No thyromegaly.      Vascular: No JVD. JVD normal.      Trachea: No tracheal deviation.      Lymphadenopathy: No cervical adenopathy.   Pulmonary:      Effort: Pulmonary effort is normal. No respiratory distress.      Breath sounds: Normal breath sounds and air entry.   Chest:      Chest wall: Not tender to palpatation.   Cardiovascular:      PMI at left midclavicular line. Normal rate. Regular rhythm.      No gallop.   Pulses:     Intact distal pulses. No decreased pulses.   Abdominal:      General: Bowel sounds are normal. There is no distension or abdominal bruit.      Palpations: Abdomen is soft. There is no hepatomegaly, splenomegaly or abdominal mass.      Tenderness: There is no abdominal tenderness. There is no guarding or rebound.   Musculoskeletal:      Cervical back: Normal range of motion and neck supple. Skin:     General: Skin is warm and dry. There is no cyanosis.      Coloration: Skin is not jaundiced or pale.      Findings: No erythema or rash.   Neurological:      Mental Status: Alert, oriented to person, place, and time and oriented to person, place and time.   Psychiatric:         Attention and Perception: Attention normal.         Mood and Affect: Mood and affect normal.         Speech: Speech normal.         Behavior: Behavior is cooperative.           Lab Review  Lab Results   Component Value Date     09/05/2020    K 3.2 (L) 09/05/2020     09/05/2020    BUN 13 09/05/2020    CREATININE " 0.92 09/05/2020    GLUCOSE 87 09/05/2020    CALCIUM 9.2 09/05/2020    ALT 14 09/05/2020    ALKPHOS 52 09/05/2020    LABIL2 1.7 05/04/2016     Lab Results   Component Value Date    CKTOTAL 64 09/05/2020     Lab Results   Component Value Date    WBC 11.32 (H) 09/05/2020    HGB 10.2 (L) 09/05/2020    HCT 32.6 (L) 09/05/2020     (H) 09/05/2020     Lab Results   Component Value Date    INR 1.20 (H) 09/05/2020    INR 1.44 (H) 08/27/2020    INR 1.49 (H) 08/26/2020     Lab Results   Component Value Date    MG 1.6 09/05/2020     Lab Results   Component Value Date    PSA 0.300 03/20/2018    TSH 2.380 09/05/2020     No results found for: BNP  Lab Results   Component Value Date    CHLPL 182 05/04/2016    CHOL 118 08/31/2020    TRIG 186 (H) 08/31/2020    HDL 46 08/21/2020    VLDL 30.4 08/21/2020    LDLHDL 1.97 08/21/2020     Lab Results   Component Value Date    LDL 91 08/21/2020    LDL 94 09/09/2019         ECG 12 Lead    Date/Time: 3/18/2021 5:26 PM  Performed by: Alex Canales MD  Authorized by: Alex Canales MD   Comparison: compared with previous ECG from 9/17/2020  Comparison to previous ECG: Anterior wall T wave changes are back to normal.  Rhythm: sinus tachycardia  Rate: tachycardic  BPM: 106  Conduction: conduction normal  QRS axis: normal  Other findings: non-specific ST-T wave changes    Clinical impression: abnormal EKG               I personally viewed and interpreted the patient's LAB data         Assessment:     1. Sinus tachycardia    2. Hyperlipidemia type IV,uncontrolled    3. Grade I diastolic dysfunction    4. Essential hypertension    5. Coronary artery disease involving native coronary artery of native heart without angina pectoris    6. Hypomagnesemia    7. Hypophosphatemia    8. Chronic alcoholism (CMS/HCC)          Plan:     Patient has known coronary artery disease with prior CABG a year ago he is doing very well and is asymptomatic however his heartbeat is quite fast.  He is  taking metoprolol 100 twice daily he was advised to increase to 250 in the morning and 100 in the evening.    He has hyperlipidemia and has been taking Lipitor however no lab work available he was advised to have lab work done as soon as possible.  Is also taking dual antiplatelet therapy which was continued.  Patient has history of hypomagnesemia and hypophosphatemia hypokalemia and chronic alcoholism he needs to have all the lab work done patient promises to have lab work done soon and he also says that he is not drinking much alcohol at this time.  Healthy lifestyle emphasized follow-up scheduled        No follow-ups on file.

## 2021-03-19 ENCOUNTER — LAB (OUTPATIENT)
Dept: LAB | Facility: HOSPITAL | Age: 56
End: 2021-03-19

## 2021-03-19 DIAGNOSIS — E87.6 HYPOKALEMIA: ICD-10-CM

## 2021-03-19 DIAGNOSIS — I51.89 GRADE I DIASTOLIC DYSFUNCTION: Chronic | ICD-10-CM

## 2021-03-19 DIAGNOSIS — E78.1 HYPERLIPIDEMIA TYPE IV: ICD-10-CM

## 2021-03-19 DIAGNOSIS — E83.39 HYPOPHOSPHATEMIA: ICD-10-CM

## 2021-03-19 DIAGNOSIS — E83.42 HYPOMAGNESEMIA: ICD-10-CM

## 2021-03-19 PROCEDURE — 82550 ASSAY OF CK (CPK): CPT

## 2021-03-19 PROCEDURE — 36415 COLL VENOUS BLD VENIPUNCTURE: CPT

## 2021-03-19 PROCEDURE — 80053 COMPREHEN METABOLIC PANEL: CPT

## 2021-03-19 PROCEDURE — 85025 COMPLETE CBC W/AUTO DIFF WBC: CPT

## 2021-03-19 PROCEDURE — 84100 ASSAY OF PHOSPHORUS: CPT

## 2021-03-19 PROCEDURE — 80061 LIPID PANEL: CPT

## 2021-03-19 PROCEDURE — 83880 ASSAY OF NATRIURETIC PEPTIDE: CPT

## 2021-03-19 PROCEDURE — 83735 ASSAY OF MAGNESIUM: CPT

## 2021-03-20 LAB
ALBUMIN SERPL-MCNC: 4.4 G/DL (ref 3.5–5.2)
ALBUMIN/GLOB SERPL: 1.6 G/DL
ALP SERPL-CCNC: 56 U/L (ref 39–117)
ALT SERPL W P-5'-P-CCNC: 20 U/L (ref 1–41)
ANION GAP SERPL CALCULATED.3IONS-SCNC: 11.9 MMOL/L (ref 5–15)
AST SERPL-CCNC: 20 U/L (ref 1–40)
BASOPHILS # BLD AUTO: 0.08 10*3/MM3 (ref 0–0.2)
BASOPHILS NFR BLD AUTO: 1.1 % (ref 0–1.5)
BILIRUB SERPL-MCNC: 0.3 MG/DL (ref 0–1.2)
BUN SERPL-MCNC: 17 MG/DL (ref 6–20)
BUN/CREAT SERPL: 17.7 (ref 7–25)
CALCIUM SPEC-SCNC: 9.6 MG/DL (ref 8.6–10.5)
CHLORIDE SERPL-SCNC: 105 MMOL/L (ref 98–107)
CHOLEST SERPL-MCNC: 158 MG/DL (ref 0–200)
CK SERPL-CCNC: 34 U/L (ref 20–200)
CO2 SERPL-SCNC: 23.1 MMOL/L (ref 22–29)
CREAT SERPL-MCNC: 0.96 MG/DL (ref 0.76–1.27)
DEPRECATED RDW RBC AUTO: 44.2 FL (ref 37–54)
EOSINOPHIL # BLD AUTO: 0.22 10*3/MM3 (ref 0–0.4)
EOSINOPHIL NFR BLD AUTO: 2.9 % (ref 0.3–6.2)
ERYTHROCYTE [DISTWIDTH] IN BLOOD BY AUTOMATED COUNT: 13.2 % (ref 12.3–15.4)
GFR SERPL CREATININE-BSD FRML MDRD: 81 ML/MIN/1.73
GLOBULIN UR ELPH-MCNC: 2.8 GM/DL
GLUCOSE SERPL-MCNC: 100 MG/DL (ref 65–99)
HCT VFR BLD AUTO: 50.8 % (ref 37.5–51)
HDLC SERPL-MCNC: 37 MG/DL (ref 40–60)
HGB BLD-MCNC: 17.2 G/DL (ref 13–17.7)
IMM GRANULOCYTES # BLD AUTO: 0.02 10*3/MM3 (ref 0–0.05)
IMM GRANULOCYTES NFR BLD AUTO: 0.3 % (ref 0–0.5)
LDLC SERPL CALC-MCNC: 58 MG/DL (ref 0–100)
LDLC/HDLC SERPL: 1.03 {RATIO}
LYMPHOCYTES # BLD AUTO: 2.07 10*3/MM3 (ref 0.7–3.1)
LYMPHOCYTES NFR BLD AUTO: 27.4 % (ref 19.6–45.3)
MAGNESIUM SERPL-MCNC: 1.7 MG/DL (ref 1.6–2.6)
MCH RBC QN AUTO: 30.9 PG (ref 26.6–33)
MCHC RBC AUTO-ENTMCNC: 33.9 G/DL (ref 31.5–35.7)
MCV RBC AUTO: 91.4 FL (ref 79–97)
MONOCYTES # BLD AUTO: 1.05 10*3/MM3 (ref 0.1–0.9)
MONOCYTES NFR BLD AUTO: 13.9 % (ref 5–12)
NEUTROPHILS NFR BLD AUTO: 4.11 10*3/MM3 (ref 1.7–7)
NEUTROPHILS NFR BLD AUTO: 54.4 % (ref 42.7–76)
NRBC BLD AUTO-RTO: 0 /100 WBC (ref 0–0.2)
NT-PROBNP SERPL-MCNC: 194 PG/ML (ref 0–900)
PHOSPHATE SERPL-MCNC: 2.7 MG/DL (ref 2.5–4.5)
PLATELET # BLD AUTO: 236 10*3/MM3 (ref 140–450)
PMV BLD AUTO: 10.4 FL (ref 6–12)
POTASSIUM SERPL-SCNC: 4.7 MMOL/L (ref 3.5–5.2)
PROT SERPL-MCNC: 7.2 G/DL (ref 6–8.5)
RBC # BLD AUTO: 5.56 10*6/MM3 (ref 4.14–5.8)
SODIUM SERPL-SCNC: 140 MMOL/L (ref 136–145)
TRIGL SERPL-MCNC: 415 MG/DL (ref 0–150)
VLDLC SERPL-MCNC: 63 MG/DL (ref 5–40)
WBC # BLD AUTO: 7.55 10*3/MM3 (ref 3.4–10.8)

## 2021-03-22 RX ORDER — CHLORAL HYDRATE 500 MG
1000 CAPSULE ORAL
Qty: 90 CAPSULE | Refills: 2 | Status: SHIPPED | OUTPATIENT
Start: 2021-03-22 | End: 2022-04-28

## 2021-03-22 NOTE — TELEPHONE ENCOUNTER
----- Message from Alex Canales MD sent at 3/22/2021  9:36 AM EDT -----  Blood work is all good this time except triglyceride is elevated.  Take fish oil 1000 twice a day.

## 2021-05-06 RX ORDER — CLOPIDOGREL BISULFATE 75 MG/1
TABLET ORAL
Qty: 30 TABLET | Refills: 3 | OUTPATIENT
Start: 2021-05-06

## 2021-05-17 RX ORDER — CLOPIDOGREL BISULFATE 75 MG/1
75 TABLET ORAL DAILY
Qty: 30 TABLET | Refills: 1 | Status: SHIPPED | OUTPATIENT
Start: 2021-05-17 | End: 2021-07-01 | Stop reason: SDUPTHER

## 2021-06-07 RX ORDER — ATORVASTATIN CALCIUM 40 MG/1
TABLET, FILM COATED ORAL
Qty: 30 TABLET | Refills: 1 | Status: SHIPPED | OUTPATIENT
Start: 2021-06-07 | End: 2021-09-14

## 2021-07-01 RX ORDER — CLOPIDOGREL BISULFATE 75 MG/1
75 TABLET ORAL DAILY
Qty: 30 TABLET | Refills: 1 | Status: SHIPPED | OUTPATIENT
Start: 2021-07-01 | End: 2021-10-19 | Stop reason: SDUPTHER

## 2021-07-20 RX ORDER — ONDANSETRON 4 MG/1
TABLET, ORALLY DISINTEGRATING ORAL
Qty: 60 TABLET | OUTPATIENT
Start: 2021-07-20

## 2021-08-26 ENCOUNTER — OFFICE VISIT (OUTPATIENT)
Dept: CARDIOLOGY | Facility: CLINIC | Age: 56
End: 2021-08-26

## 2021-08-26 VITALS
TEMPERATURE: 97.1 F | HEIGHT: 75 IN | SYSTOLIC BLOOD PRESSURE: 116 MMHG | OXYGEN SATURATION: 94 % | BODY MASS INDEX: 27.68 KG/M2 | HEART RATE: 77 BPM | WEIGHT: 222.6 LBS | DIASTOLIC BLOOD PRESSURE: 66 MMHG

## 2021-08-26 DIAGNOSIS — Z12.5 SCREENING FOR PROSTATE CANCER: ICD-10-CM

## 2021-08-26 DIAGNOSIS — E78.1 HYPERLIPIDEMIA TYPE IV: ICD-10-CM

## 2021-08-26 DIAGNOSIS — I25.10 CORONARY ARTERY DISEASE INVOLVING NATIVE CORONARY ARTERY OF NATIVE HEART WITHOUT ANGINA PECTORIS: Primary | Chronic | ICD-10-CM

## 2021-08-26 DIAGNOSIS — I10 ESSENTIAL HYPERTENSION: Chronic | ICD-10-CM

## 2021-08-26 DIAGNOSIS — Z95.1 S/P CABG X 4: ICD-10-CM

## 2021-08-26 DIAGNOSIS — E83.42 HYPOMAGNESEMIA: ICD-10-CM

## 2021-08-26 DIAGNOSIS — E78.49 OTHER HYPERLIPIDEMIA: Chronic | ICD-10-CM

## 2021-08-26 PROCEDURE — 99214 OFFICE O/P EST MOD 30 MIN: CPT | Performed by: INTERNAL MEDICINE

## 2021-08-26 NOTE — PROGRESS NOTES
subjective     Chief Complaint   Patient presents with   • Coronary Artery Disease     follow up    • Shortness of Breath     with exertion      History of Present Illness    Patient is 56 years old white male who is here for follow-up of multiple chronic medical problems.  Patient has known coronary artery disease status post CABG about a year ago.  He states that he is doing very well he denies any chest pain palpitations or shortness of breath.  Patient has prior history of alcohol use but he states that he is not drinking much anymore.    Blood pressure is very well controlled with current medications    Hyperlipidemia on Lipitor therapy however he did not get his lab work done which will be scheduled.  Patient also has history of hypomagnesemia and hypokalemia in the past  He is taking antiplatelet therapy, statin therapy and beta-blocker therapy.  Past Surgical History:   Procedure Laterality Date   • CARDIAC CATHETERIZATION  06/06/2015   • CARDIAC CATHETERIZATION N/A 8/19/2020    Procedure: Left Heart Cath;  Surgeon: Jack Beebe MD;  Location:  [facility];  Service: Cardiology;  Laterality: N/A;   • CARDIOVASCULAR STRESS TEST  06/06/2015   • CORONARY ARTERY BYPASS GRAFT N/A 8/26/2020    Procedure: MEDIAN STERNOTOMY, CORONARY ARTERY BYPASS GRAFTING X  4, UTILIZING THE LEFT INTERNAL MAMMARY ARTERY AND EVH OF THE RIGHT GREATER SAPHENOUS VEIN;  Surgeon: David Kuo MD;  Location: [facility] OR;  Service: Cardiothoracic;  Laterality: N/A;  LEG START - 0740  VEIN OUT - 0820  VEIN READY - 0832   • ECHO - CONVERTED  06/06/2015   • EYE SURGERY       Family History   Problem Relation Age of Onset   • Heart attack Father    • Heart disease Father    • Heart failure Father    • Heart disease Sister    • Heart failure Sister    • Heart disease Brother    • Heart failure Brother    • Heart failure Sister    • Heart disease Sister      Past Medical History:   Diagnosis Date   • Anxiety disorder     • CAD (coronary artery disease)    • Chronic alcoholism (CMS/HCC)    • Hyperlipidemia    • Hypertension    • WPW (Zakiya-Parkinson-White syndrome)      Patient Active Problem List   Diagnosis   • CAD (coronary artery disease)   • Anxiety disorder   • Chronic alcoholism (CMS/[facility])   • Essential hypertension   • Hyperlipidemia type IV,uncontrolled   • Gastroesophageal reflux disease without esophagitis   • Sorethroat   • Hypokalemia   • Hypomagnesemia   • Hypocalcemia   • Chest pain   • Hyperlipidemia   • Grade I diastolic dysfunction   • CARLITOS (acute kidney injury) (CMS/[facility])   • Abnormal nuclear stress test   • S/P CABG x 4 2020   • Hypophosphatemia   • Sinus tachycardia   • Screening for prostate cancer       Social History     Tobacco Use   • Smoking status: Former Smoker     Packs/day: 1.00     Years: 20.00     Pack years: 20.00     Types: Cigarettes     Quit date:      Years since quittin.6   • Smokeless tobacco: Current User     Types: Chew, Snuff   • Tobacco comment: chew 1 can per week   Substance Use Topics   • Alcohol use: Yes     Alcohol/week: 10.0 standard drinks     Types: 6 Cans of beer, 4 Shots of liquor per week     Comment: pint of alcohol every other day    • Drug use: Yes     Types: Hydrocodone     Comment: occ       Allergies   Allergen Reactions   • Niacin Rash     Whelps       Current Outpatient Medications on File Prior to Visit   Medication Sig   • aspirin 81 MG EC tablet Take 1 tablet by mouth Daily.   • atorvastatin (LIPITOR) 40 MG tablet TAKE ONE TABLET BY MOUTH ONE TIME DAILY    • clopidogrel (PLAVIX) 75 MG tablet Take 1 tablet by mouth Daily.   • folic acid (FOLVITE) 1 MG tablet Take 1 tablet by mouth Daily.   • magnesium oxide (MAGOX) 400 (241.3 Mg) MG tablet tablet Take 400 mg by mouth 3 (Three) Times a Day.   • metoprolol tartrate (LOPRESSOR) 100 MG tablet 150 mg in the morning and 100 mg in the evening. (1-1/2 pill in the morning and 1 pill in the evening)   • nitroglycerin  "(NITROSTAT) 0.4 MG SL tablet Place 0.4 mg under the tongue Every 5 (Five) Minutes As Needed for Chest Pain. Take no more than 3 doses in 15 minutes.   • Omega-3 Fatty Acids (fish oil) 1000 MG capsule capsule Take 1 capsule by mouth Daily With Breakfast.   • ondansetron ODT (ZOFRAN-ODT) 4 MG disintegrating tablet Place 4 mg on the tongue Every 12 (Twelve) Hours As Needed for Nausea or Vomiting.   • pantoprazole (PROTONIX) 40 MG EC tablet Take 40 mg by mouth Daily.   • potassium chloride (K-DUR) 10 MEQ CR tablet Take 10 mEq by mouth Daily.   • simethicone (Gas-X) 80 MG chewable tablet Chew 1 tablet Every 6 (Six) Hours As Needed for Flatulence.   • thiamine (VITAMIN B1) 100 MG tablet Take 1 tablet by mouth Daily.     No current facility-administered medications on file prior to visit.         The following portions of the patient's history were reviewed and updated as appropriate: allergies, current medications, past family history, past medical history, past social history, past surgical history and problem list.    Review of Systems   Constitutional: Negative.   HENT: Negative.  Negative for congestion.    Eyes: Negative.    Cardiovascular: Negative.  Negative for chest pain, cyanosis, dyspnea on exertion, irregular heartbeat, leg swelling, near-syncope, orthopnea, palpitations, paroxysmal nocturnal dyspnea and syncope.   Respiratory: Negative.  Negative for shortness of breath.    Hematologic/Lymphatic: Negative.    Musculoskeletal: Negative.    Gastrointestinal: Negative.    Neurological: Negative.  Negative for headaches.          Objective:     /66 (BP Location: Left arm, Patient Position: Sitting, Cuff Size: Adult)   Pulse 77   Temp 97.1 °F (36.2 °C)   Ht 190.5 cm (75\")   Wt 101 kg (222 lb 9.6 oz)   SpO2 94%   BMI 27.82 kg/m²   Pulmonary:      Effort: Pulmonary effort is normal.      Breath sounds: Normal breath sounds. No stridor. No wheezing. No rhonchi. No rales.   Cardiovascular:      PMI at left " midclavicular line. Normal rate. Regular rhythm. Normal S1. Normal S2.      Murmurs: There is no murmur.      No gallop. No click. No rub.   Pulses:     Intact distal pulses.   Edema:     Peripheral edema absent.           Lab Review  Lab Results   Component Value Date     03/19/2021    K 4.7 03/19/2021     03/19/2021    BUN 17 03/19/2021    CREATININE 0.96 03/19/2021    GLUCOSE 100 (H) 03/19/2021    CALCIUM 9.6 03/19/2021    ALT 20 03/19/2021    ALKPHOS 56 03/19/2021    LABIL2 1.7 05/04/2016     Lab Results   Component Value Date    CKTOTAL 34 03/19/2021     Lab Results   Component Value Date    WBC 7.55 03/19/2021    HGB 17.2 03/19/2021    HCT 50.8 03/19/2021     03/19/2021     Lab Results   Component Value Date    INR 1.20 (H) 09/05/2020    INR 1.44 (H) 08/27/2020    INR 1.49 (H) 08/26/2020     Lab Results   Component Value Date    MG 1.7 03/19/2021     Lab Results   Component Value Date    PSA 0.300 03/20/2018    TSH 2.380 09/05/2020     No results found for: BNP  Lab Results   Component Value Date    CHLPL 182 05/04/2016    CHOL 158 03/19/2021    TRIG 415 (H) 03/19/2021    HDL 37 (L) 03/19/2021    VLDL 63 (H) 03/19/2021    LDLHDL 1.03 03/19/2021     Lab Results   Component Value Date    LDL 58 03/19/2021    LDL 91 08/21/2020       Procedures       I personally viewed and interpreted the patient's LAB data         Assessment:     1. Coronary artery disease involving native coronary artery of native heart without angina pectoris    2. Essential hypertension    3. Other hyperlipidemia    4. Hyperlipidemia type IV,uncontrolled    5. S/P CABG x 4 8/26/2020    6. Screening for prostate cancer    7. Hypomagnesemia          Plan:     Patient has known coronary artery disease status post CABG about a year ago he is doing very well and is asymptomatic  He was advised to continue dual antiplatelet therapy statin therapy and beta-blocker therapy.    Healthy lifestyle emphasized.  Blood pressure is very  well controlled  LDL was good last time but triglycerides elevated.  Patient has history of chronic alcoholism but he states that he is not drinking much anymore.  History of severe hypomagnesemia and hypokalemia will need to check lab work again which was scheduled.  Patient will get PSA, CBC, CK, CMP, lipid panel and magnesium level  Follow-up scheduled        No follow-ups on file.

## 2021-08-31 RX ORDER — PANTOPRAZOLE SODIUM 40 MG/1
TABLET, DELAYED RELEASE ORAL
Qty: 30 TABLET | Refills: 3 | Status: SHIPPED | OUTPATIENT
Start: 2021-08-31 | End: 2021-11-30 | Stop reason: SDUPTHER

## 2021-09-14 RX ORDER — ATORVASTATIN CALCIUM 40 MG/1
TABLET, FILM COATED ORAL
Qty: 30 TABLET | Refills: 1 | Status: SHIPPED | OUTPATIENT
Start: 2021-09-14 | End: 2021-11-30 | Stop reason: SDUPTHER

## 2021-10-06 ENCOUNTER — APPOINTMENT (OUTPATIENT)
Dept: CT IMAGING | Facility: HOSPITAL | Age: 56
End: 2021-10-06

## 2021-10-06 ENCOUNTER — HOSPITAL ENCOUNTER (EMERGENCY)
Facility: HOSPITAL | Age: 56
Discharge: HOME | End: 2021-10-06
Attending: STUDENT IN AN ORGANIZED HEALTH CARE EDUCATION/TRAINING PROGRAM | Admitting: STUDENT IN AN ORGANIZED HEALTH CARE EDUCATION/TRAINING PROGRAM

## 2021-10-06 ENCOUNTER — APPOINTMENT (OUTPATIENT)
Dept: ULTRASOUND IMAGING | Facility: HOSPITAL | Age: 56
End: 2021-10-06

## 2021-10-06 VITALS
RESPIRATION RATE: 22 BRPM | SYSTOLIC BLOOD PRESSURE: 120 MMHG | WEIGHT: 230 LBS | DIASTOLIC BLOOD PRESSURE: 84 MMHG | HEART RATE: 72 BPM | OXYGEN SATURATION: 95 % | TEMPERATURE: 97.6 F | HEIGHT: 74 IN | BODY MASS INDEX: 29.52 KG/M2

## 2021-10-06 DIAGNOSIS — K29.70 GASTRITIS WITHOUT BLEEDING, UNSPECIFIED CHRONICITY, UNSPECIFIED GASTRITIS TYPE: Primary | ICD-10-CM

## 2021-10-06 LAB
ALBUMIN SERPL-MCNC: 4.49 G/DL (ref 3.5–5.2)
ALBUMIN/GLOB SERPL: 1.5 G/DL
ALP SERPL-CCNC: 64 U/L (ref 39–117)
ALT SERPL W P-5'-P-CCNC: 27 U/L (ref 1–41)
ANION GAP SERPL CALCULATED.3IONS-SCNC: 13.7 MMOL/L (ref 5–15)
AST SERPL-CCNC: 22 U/L (ref 1–40)
BASOPHILS # BLD AUTO: 0.06 10*3/MM3 (ref 0–0.2)
BASOPHILS NFR BLD AUTO: 0.9 % (ref 0–1.5)
BILIRUB SERPL-MCNC: 0.5 MG/DL (ref 0–1.2)
BILIRUB UR QL STRIP: NEGATIVE
BUN SERPL-MCNC: 19 MG/DL (ref 6–20)
BUN/CREAT SERPL: 16.5 (ref 7–25)
CALCIUM SPEC-SCNC: 9.8 MG/DL (ref 8.6–10.5)
CHLORIDE SERPL-SCNC: 100 MMOL/L (ref 98–107)
CLARITY UR: CLEAR
CO2 SERPL-SCNC: 21.3 MMOL/L (ref 22–29)
COLOR UR: YELLOW
CREAT SERPL-MCNC: 1.15 MG/DL (ref 0.76–1.27)
D-LACTATE SERPL-SCNC: 2.1 MMOL/L (ref 0.5–2)
D-LACTATE SERPL-SCNC: 3.4 MMOL/L (ref 0.5–2)
DEPRECATED RDW RBC AUTO: 46.5 FL (ref 37–54)
EOSINOPHIL # BLD AUTO: 0.17 10*3/MM3 (ref 0–0.4)
EOSINOPHIL NFR BLD AUTO: 2.5 % (ref 0.3–6.2)
ERYTHROCYTE [DISTWIDTH] IN BLOOD BY AUTOMATED COUNT: 13.7 % (ref 12.3–15.4)
GFR SERPL CREATININE-BSD FRML MDRD: 65 ML/MIN/1.73
GLOBULIN UR ELPH-MCNC: 2.9 GM/DL
GLUCOSE SERPL-MCNC: 136 MG/DL (ref 65–99)
GLUCOSE UR STRIP-MCNC: NEGATIVE MG/DL
HCT VFR BLD AUTO: 51.3 % (ref 37.5–51)
HGB BLD-MCNC: 17.1 G/DL (ref 13–17.7)
HGB UR QL STRIP.AUTO: NEGATIVE
HOLD SPECIMEN: NORMAL
HOLD SPECIMEN: NORMAL
IMM GRANULOCYTES # BLD AUTO: 0.02 10*3/MM3 (ref 0–0.05)
IMM GRANULOCYTES NFR BLD AUTO: 0.3 % (ref 0–0.5)
KETONES UR QL STRIP: NEGATIVE
LEUKOCYTE ESTERASE UR QL STRIP.AUTO: NEGATIVE
LIPASE SERPL-CCNC: 80 U/L (ref 13–60)
LYMPHOCYTES # BLD AUTO: 1.72 10*3/MM3 (ref 0.7–3.1)
LYMPHOCYTES NFR BLD AUTO: 24.8 % (ref 19.6–45.3)
MCH RBC QN AUTO: 30.7 PG (ref 26.6–33)
MCHC RBC AUTO-ENTMCNC: 33.3 G/DL (ref 31.5–35.7)
MCV RBC AUTO: 92.1 FL (ref 79–97)
MONOCYTES # BLD AUTO: 0.89 10*3/MM3 (ref 0.1–0.9)
MONOCYTES NFR BLD AUTO: 12.8 % (ref 5–12)
NEUTROPHILS NFR BLD AUTO: 4.07 10*3/MM3 (ref 1.7–7)
NEUTROPHILS NFR BLD AUTO: 58.7 % (ref 42.7–76)
NITRITE UR QL STRIP: NEGATIVE
NRBC BLD AUTO-RTO: 0 /100 WBC (ref 0–0.2)
PH UR STRIP.AUTO: 7 [PH] (ref 5–8)
PLATELET # BLD AUTO: 220 10*3/MM3 (ref 140–450)
PMV BLD AUTO: 9.5 FL (ref 6–12)
POTASSIUM SERPL-SCNC: 3.8 MMOL/L (ref 3.5–5.2)
PROT SERPL-MCNC: 7.4 G/DL (ref 6–8.5)
PROT UR QL STRIP: NEGATIVE
RBC # BLD AUTO: 5.57 10*6/MM3 (ref 4.14–5.8)
SODIUM SERPL-SCNC: 135 MMOL/L (ref 136–145)
SP GR UR STRIP: >1.03 (ref 1–1.03)
UROBILINOGEN UR QL STRIP: ABNORMAL
WBC # BLD AUTO: 6.93 10*3/MM3 (ref 3.4–10.8)
WHOLE BLOOD HOLD SPECIMEN: NORMAL
WHOLE BLOOD HOLD SPECIMEN: NORMAL

## 2021-10-06 PROCEDURE — 25010000002 ONDANSETRON PER 1 MG: Performed by: STUDENT IN AN ORGANIZED HEALTH CARE EDUCATION/TRAINING PROGRAM

## 2021-10-06 PROCEDURE — 83605 ASSAY OF LACTIC ACID: CPT | Performed by: STUDENT IN AN ORGANIZED HEALTH CARE EDUCATION/TRAINING PROGRAM

## 2021-10-06 PROCEDURE — 74177 CT ABD & PELVIS W/CONTRAST: CPT

## 2021-10-06 PROCEDURE — 83690 ASSAY OF LIPASE: CPT | Performed by: STUDENT IN AN ORGANIZED HEALTH CARE EDUCATION/TRAINING PROGRAM

## 2021-10-06 PROCEDURE — 74177 CT ABD & PELVIS W/CONTRAST: CPT | Performed by: RADIOLOGY

## 2021-10-06 PROCEDURE — 25010000002 IOPAMIDOL 61 % SOLUTION: Performed by: STUDENT IN AN ORGANIZED HEALTH CARE EDUCATION/TRAINING PROGRAM

## 2021-10-06 PROCEDURE — 93010 ELECTROCARDIOGRAM REPORT: CPT | Performed by: INTERNAL MEDICINE

## 2021-10-06 PROCEDURE — 25010000002 MORPHINE PER 10 MG: Performed by: STUDENT IN AN ORGANIZED HEALTH CARE EDUCATION/TRAINING PROGRAM

## 2021-10-06 PROCEDURE — 93005 ELECTROCARDIOGRAM TRACING: CPT | Performed by: STUDENT IN AN ORGANIZED HEALTH CARE EDUCATION/TRAINING PROGRAM

## 2021-10-06 PROCEDURE — 96374 THER/PROPH/DIAG INJ IV PUSH: CPT

## 2021-10-06 PROCEDURE — 81003 URINALYSIS AUTO W/O SCOPE: CPT | Performed by: STUDENT IN AN ORGANIZED HEALTH CARE EDUCATION/TRAINING PROGRAM

## 2021-10-06 PROCEDURE — 80053 COMPREHEN METABOLIC PANEL: CPT | Performed by: STUDENT IN AN ORGANIZED HEALTH CARE EDUCATION/TRAINING PROGRAM

## 2021-10-06 PROCEDURE — 76705 ECHO EXAM OF ABDOMEN: CPT

## 2021-10-06 PROCEDURE — 76705 ECHO EXAM OF ABDOMEN: CPT | Performed by: RADIOLOGY

## 2021-10-06 PROCEDURE — 25010000002 KETOROLAC TROMETHAMINE PER 15 MG: Performed by: STUDENT IN AN ORGANIZED HEALTH CARE EDUCATION/TRAINING PROGRAM

## 2021-10-06 PROCEDURE — 99284 EMERGENCY DEPT VISIT MOD MDM: CPT

## 2021-10-06 PROCEDURE — 85025 COMPLETE CBC W/AUTO DIFF WBC: CPT | Performed by: STUDENT IN AN ORGANIZED HEALTH CARE EDUCATION/TRAINING PROGRAM

## 2021-10-06 PROCEDURE — 96375 TX/PRO/DX INJ NEW DRUG ADDON: CPT

## 2021-10-06 RX ORDER — SODIUM CHLORIDE 0.9 % (FLUSH) 0.9 %
10 SYRINGE (ML) INJECTION AS NEEDED
Status: DISCONTINUED | OUTPATIENT
Start: 2021-10-06 | End: 2021-10-06 | Stop reason: HOSPADM

## 2021-10-06 RX ORDER — KETOROLAC TROMETHAMINE 30 MG/ML
30 INJECTION, SOLUTION INTRAMUSCULAR; INTRAVENOUS EVERY 6 HOURS PRN
Status: DISCONTINUED | OUTPATIENT
Start: 2021-10-06 | End: 2021-10-06

## 2021-10-06 RX ORDER — KETOROLAC TROMETHAMINE 30 MG/ML
30 INJECTION, SOLUTION INTRAMUSCULAR; INTRAVENOUS ONCE
Status: COMPLETED | OUTPATIENT
Start: 2021-10-06 | End: 2021-10-06

## 2021-10-06 RX ORDER — ONDANSETRON 2 MG/ML
4 INJECTION INTRAMUSCULAR; INTRAVENOUS ONCE
Status: COMPLETED | OUTPATIENT
Start: 2021-10-06 | End: 2021-10-06

## 2021-10-06 RX ORDER — PANTOPRAZOLE SODIUM 40 MG/1
40 TABLET, DELAYED RELEASE ORAL DAILY
Qty: 20 TABLET | Refills: 0 | Status: SHIPPED | OUTPATIENT
Start: 2021-10-06 | End: 2022-04-28 | Stop reason: SDUPTHER

## 2021-10-06 RX ADMIN — ONDANSETRON 4 MG: 2 INJECTION INTRAMUSCULAR; INTRAVENOUS at 09:48

## 2021-10-06 RX ADMIN — IOPAMIDOL 80 ML: 612 INJECTION, SOLUTION INTRAVENOUS at 11:00

## 2021-10-06 RX ADMIN — SODIUM CHLORIDE 1000 ML: 9 INJECTION, SOLUTION INTRAVENOUS at 09:48

## 2021-10-06 RX ADMIN — MORPHINE SULFATE 4 MG: 4 INJECTION, SOLUTION INTRAMUSCULAR; INTRAVENOUS at 09:50

## 2021-10-06 RX ADMIN — KETOROLAC TROMETHAMINE 30 MG: 30 INJECTION, SOLUTION INTRAMUSCULAR; INTRAVENOUS at 10:15

## 2021-10-06 NOTE — ED PROVIDER NOTES
Subjective   56-year-old male presented to the ER by EMS due to concerns for severe abdominal pain.  Patient noted epigastric pain with radiation throughout the entire abdomen and into his back.  Denied obvious fever or chills.  Noted intermittent nausea with vomiting.  Denied hematemesis or hematochezia.  Denied changes in urinary or bowel habits.  Denied obvious aggravating or alleviating factors.  Patient noted symptoms were sudden onset this morning after eating breakfast.          Review of Systems   Gastrointestinal: Positive for abdominal pain, nausea and vomiting.   All other systems reviewed and are negative.      Past Medical History:   Diagnosis Date   • Anxiety disorder    • CAD (coronary artery disease)    • Chronic alcoholism (CMS/HCC)    • Hyperlipidemia    • Hypertension    • WPW (Zakiya-Parkinson-White syndrome)        Allergies   Allergen Reactions   • Niacin Rash     Whelps       Past Surgical History:   Procedure Laterality Date   • CARDIAC CATHETERIZATION  06/06/2015   • CARDIAC CATHETERIZATION N/A 8/19/2020    Procedure: Left Heart Cath;  Surgeon: Jack Beebe MD;  Location: [facility] CATH INVASIVE LOCATION;  Service: Cardiology;  Laterality: N/A;   • CARDIOVASCULAR STRESS TEST  06/06/2015   • CORONARY ARTERY BYPASS GRAFT N/A 8/26/2020    Procedure: MEDIAN STERNOTOMY, CORONARY ARTERY BYPASS GRAFTING X  4, UTILIZING THE LEFT INTERNAL MAMMARY ARTERY AND EVH OF THE RIGHT GREATER SAPHENOUS VEIN;  Surgeon: David Kuo MD;  Location: [facility] OR;  Service: Cardiothoracic;  Laterality: N/A;  LEG START - 0740  VEIN OUT - 0820  VEIN READY - 0832   • ECHO - CONVERTED  06/06/2015   • EYE SURGERY         Family History   Problem Relation Age of Onset   • Heart attack Father    • Heart disease Father    • Heart failure Father    • Heart disease Sister    • Heart failure Sister    • Heart disease Brother    • Heart failure Brother    • Heart failure Sister    • Heart disease Sister        Social History      Socioeconomic History   • Marital status:      Spouse name: Not on file   • Number of children: Not on file   • Years of education: Not on file   • Highest education level: Not on file   Tobacco Use   • Smoking status: Former Smoker     Packs/day: 1.00     Years: 20.00     Pack years: 20.00     Types: Cigarettes     Quit date:      Years since quittin.7   • Smokeless tobacco: Current User     Types: Chew, Snuff   • Tobacco comment: chew 1 can per week   Substance and Sexual Activity   • Alcohol use: Yes     Alcohol/week: 10.0 standard drinks     Types: 6 Cans of beer, 4 Shots of liquor per week     Comment: pint of alcohol every other day    • Drug use: Yes     Types: Hydrocodone     Comment: occ   • Sexual activity: Defer           Objective   Physical Exam  Constitutional:       General: He is not in acute distress.     Appearance: Normal appearance. He is well-developed. He is not ill-appearing.   HENT:      Head: Normocephalic and atraumatic.      Right Ear: External ear normal.      Left Ear: External ear normal.      Nose: Nose normal.      Mouth/Throat:      Mouth: Mucous membranes are moist.   Eyes:      Extraocular Movements: Extraocular movements intact.      Pupils: Pupils are equal, round, and reactive to light.   Neck:      Vascular: No JVD.   Cardiovascular:      Rate and Rhythm: Normal rate and regular rhythm.      Heart sounds: Normal heart sounds. No murmur heard.     Pulmonary:      Effort: Pulmonary effort is normal. No tachypnea, accessory muscle usage or respiratory distress.      Breath sounds: Normal breath sounds. No stridor. No decreased breath sounds, wheezing, rhonchi or rales.   Chest:      Chest wall: No deformity, tenderness or crepitus.   Abdominal:      General: Bowel sounds are normal.      Palpations: Abdomen is soft.      Tenderness: There is abdominal tenderness in the right upper quadrant, epigastric area and left upper quadrant. There is no guarding or  rebound.   Musculoskeletal:         General: No deformity or signs of injury. Normal range of motion.      Cervical back: Normal range of motion and neck supple.      Right lower leg: No tenderness. No edema.      Left lower leg: No tenderness. No edema.   Lymphadenopathy:      Cervical: No cervical adenopathy.   Skin:     General: Skin is warm and dry.      Coloration: Skin is not cyanotic.      Findings: No ecchymosis or erythema.   Neurological:      General: No focal deficit present.      Mental Status: He is alert and oriented to person, place, and time. Mental status is at baseline.      Cranial Nerves: No cranial nerve deficit.      Motor: No weakness.   Psychiatric:         Mood and Affect: Mood normal. Mood is not anxious.         Behavior: Behavior normal. Behavior is not agitated.         Thought Content: Thought content normal.         Procedures           ED Course  ED Course as of Oct 06 1412   Wed Oct 06, 2021   0949 I directly evaluated the EKG of this patient and noted no acute abnormalities.  Possible left ventricular hypertrophy.  Rate 74 bpm.  Normal sinus rhythm.  QRS 90 ms.   ms.  No acute ST abnormality    [SF]   1410 CBC and CCP unremarkable.  Lipase slightly elevated but patient has a chronic history of elevated lipase.  Urinalysis is still pending.  CT abdomen pelvis with IV contrast noted no acute abnormality.  Cholelithiasis was noted.  Follow-up ultrasound of the gallbladder noted cholelithiasis with no signs of surrounding inflammation.  Gastritis likely.  Cannot rule out gastric ulcers at this point time.  Recommended GI follow-up in the outpatient setting.  Work up and results were discussed throughly with the patient.  The patient will be discharged for further monitoring and management with their PCP.  Red flags, warning signs, worsening symptoms, and when to return to the ER discussed with and understood by the patient.  Patient will follow up with their PCP in a timely  manner.  Vitals stable at discharge.    [SF]      ED Course User Index  [SF] Noel Trejo DO                                           MDM    Final diagnoses:   Gastritis without bleeding, unspecified chronicity, unspecified gastritis type       ED Disposition  ED Disposition     ED Disposition Condition Comment    Discharge Stable           Alex Canales MD  [address] [facility] [address]  [phone]    In 2 days      [facility] Emergency Department  [address] [place] [id number] [phone]    If symptoms worsen         Medication List      Changed    * pantoprazole 40 MG EC tablet  Commonly known as: PROTONIX  TAKE ONE TABLET BY MOUTH ONE TIME DAILY  What changed: Another medication with the same name was added. Make sure you understand how and when to take each.     * pantoprazole 40 MG EC tablet  Commonly known as: PROTONIX  Take 1 tablet by mouth Daily.  What changed: You were already taking a medication with the same name, and this prescription was added. Make sure you understand how and when to take each.         * This list has 2 medication(s) that are the same as other medications prescribed for you. Read the directions carefully, and ask your doctor or other care provider to review them with you.               Where to Get Your Medications      These medications were sent to [facility], [place] - [address] [facility] - [phone]  - [phone] FX  [address] [facility] [address] [place] [id number]    Phone: [phone]   · pantoprazole 40 MG EC tablet          Noel Trejo DO  10/06/21 1411       Noel Trejo DO  10/06/21 1412

## 2021-10-08 ENCOUNTER — OFFICE VISIT (OUTPATIENT)
Dept: CARDIOLOGY | Facility: CLINIC | Age: 56
End: 2021-10-08

## 2021-10-08 VITALS
BODY MASS INDEX: 29.26 KG/M2 | TEMPERATURE: 97.7 F | HEIGHT: 74 IN | SYSTOLIC BLOOD PRESSURE: 129 MMHG | HEART RATE: 115 BPM | WEIGHT: 228 LBS | DIASTOLIC BLOOD PRESSURE: 69 MMHG | OXYGEN SATURATION: 98 %

## 2021-10-08 DIAGNOSIS — R00.0 SINUS TACHYCARDIA: ICD-10-CM

## 2021-10-08 DIAGNOSIS — R10.9 ABDOMINAL PAIN, UNSPECIFIED ABDOMINAL LOCATION: ICD-10-CM

## 2021-10-08 DIAGNOSIS — R10.30 LOWER ABDOMINAL PAIN: Primary | ICD-10-CM

## 2021-10-08 DIAGNOSIS — I25.10 CORONARY ARTERY DISEASE INVOLVING NATIVE CORONARY ARTERY OF NATIVE HEART WITHOUT ANGINA PECTORIS: ICD-10-CM

## 2021-10-08 DIAGNOSIS — F10.20 CHRONIC ALCOHOLISM (HCC): Chronic | ICD-10-CM

## 2021-10-08 PROCEDURE — 93000 ELECTROCARDIOGRAM COMPLETE: CPT | Performed by: INTERNAL MEDICINE

## 2021-10-08 PROCEDURE — 99214 OFFICE O/P EST MOD 30 MIN: CPT | Performed by: INTERNAL MEDICINE

## 2021-10-08 RX ORDER — CIPROFLOXACIN 500 MG/1
500 TABLET, FILM COATED ORAL 2 TIMES DAILY
Qty: 20 TABLET | Refills: 0 | Status: SHIPPED | OUTPATIENT
Start: 2021-10-08 | End: 2022-04-28

## 2021-10-08 RX ORDER — METRONIDAZOLE 500 MG/1
500 TABLET ORAL 3 TIMES DAILY
Qty: 30 TABLET | Refills: 0 | Status: SHIPPED | OUTPATIENT
Start: 2021-10-08 | End: 2022-04-28

## 2021-10-08 RX ORDER — LACTULOSE 10 G/15ML
20 SOLUTION ORAL 2 TIMES DAILY PRN
Qty: 150 ML | Refills: 0 | Status: SHIPPED | OUTPATIENT
Start: 2021-10-08 | End: 2023-01-20 | Stop reason: SDUPTHER

## 2021-10-08 NOTE — PROGRESS NOTES
subjective     Chief Complaint   Patient presents with   • pain in stomach     right side under ribs   • Excessive Sweating     History of Present Illness  Patient is 56 years old white male who is here because of abdominal pain.  Abdominal pain is generalized aches in the right upper quadrant left upper quadrant epigastric area and also intermittently lower abdomen  Patient states that he has not eaten much in few days and has had no bowel movement in 2 to 3 days  He went to the emergency room via EMS work-up was negative for acute abdomen and patient was sent home after 1 Protonix  Patient still complains of lot of abdominal pain  He says he is sweating some.  No fever  Constipated for last 2 to 3 days    .  No nausea or vomiting today.      Past Surgical History:   Procedure Laterality Date   • CARDIAC CATHETERIZATION  06/06/2015   • CARDIAC CATHETERIZATION N/A 8/19/2020    Procedure: Left Heart Cath;  Surgeon: Jack Beebe MD;  [facility] CATH INVASIVE LOC[facility];  Service: Cardiology;  Laterality: N/A;   • CARDIOVASCULAR STRESS TEST  06/06/2015   • CORONARY ARTERY BYPASS GRAFT N/A 8/26/2020    Procedure: MEDIAN STERNOTOMY, CORONARY ARTERY BYPASS GRAFTING X  4, UTILIZING THE LEFT INTERNAL MAMMARY ARTERY AND EVH OF THE RIGHT GREATER SAPHENOUS VEIN;  Surgeon: David Kuo MD;  Location: [facility] OR;  Service: Cardiothoracic;  Laterality: N/A;  LEG START - 0740  VEIN OUT - 0820  VEIN READY - 0832   • ECHO - CONVERTED  06/06/2015   • EYE SURGERY       Family History   Problem Relation Age of Onset   • Heart attack Father    • Heart disease Father    • Heart failure Father    • Heart disease Sister    • Heart failure Sister    • Heart disease Brother    • Heart failure Brother    • Heart failure Sister    • Heart disease Sister      Past Medical History:   Diagnosis Date   • Anxiety disorder    • CAD (coronary artery disease)    • Chronic alcoholism (HCC)    • Hyperlipidemia    • Hypertension    • WPW  (Zakiya-Parkinson-White syndrome)      Patient Active Problem List   Diagnosis   • CAD (coronary artery disease)   • Anxiety disorder   • Chronic alcoholism ([facility])   • Essential hypertension   • Hyperlipidemia type IV,uncontrolled   • Gastroesophageal reflux disease without esophagitis   • Sorethroat   • Hypokalemia   • Hypomagnesemia   • Hypocalcemia   • Chest pain   • Hyperlipidemia   • Grade I diastolic dysfunction   • CARLITOS (acute kidney injury) ([facility])   • Abnormal nuclear stress test   • S/P CABG x 4 2020   • Hypophosphatemia   • Sinus tachycardia   • Screening for prostate cancer   • Lower abdominal pain       Social History     Tobacco Use   • Smoking status: Former Smoker     Packs/day: 1.00     Years: 20.00     Pack years: 20.00     Types: Cigarettes     Quit date:      Years since quittin.7   • Smokeless tobacco: Current User     Types: Chew, Snuff   • Tobacco comment: chew 1 can per week   Substance Use Topics   • Alcohol use: Yes     Alcohol/week: 10.0 standard drinks     Types: 6 Cans of beer, 4 Shots of liquor per week     Comment: pint of alcohol every other day    • Drug use: Yes     Types: Hydrocodone     Comment: occ       Allergies   Allergen Reactions   • Niacin Rash     Whelps       Current Outpatient Medications on File Prior to Visit   Medication Sig   • aspirin 81 MG EC tablet Take 1 tablet by mouth Daily.   • atorvastatin (LIPITOR) 40 MG tablet TAKE ONE TABLET BY MOUTH ONE TIME DAILY    • clopidogrel (PLAVIX) 75 MG tablet Take 1 tablet by mouth Daily.   • folic acid (FOLVITE) 1 MG tablet Take 1 tablet by mouth Daily.   • magnesium oxide (MAGOX) 400 (241.3 Mg) MG tablet tablet Take 400 mg by mouth 3 (Three) Times a Day.   • metoprolol tartrate (LOPRESSOR) 100 MG tablet 150 mg in the morning and 100 mg in the evening. (1-1/2 pill in the morning and 1 pill in the evening)   • nitroglycerin (NITROSTAT) 0.4 MG SL tablet Place 0.4 mg under the tongue Every 5 (Five) Minutes As Needed for  "Chest Pain. Take no more than 3 doses in 15 minutes.   • Omega-3 Fatty Acids (fish oil) 1000 MG capsule capsule Take 1 capsule by mouth Daily With Breakfast.   • ondansetron ODT (ZOFRAN-ODT) 4 MG disintegrating tablet Place 4 mg on the tongue Every 12 (Twelve) Hours As Needed for Nausea or Vomiting.   • pantoprazole (PROTONIX) 40 MG EC tablet TAKE ONE TABLET BY MOUTH ONE TIME DAILY    • pantoprazole (PROTONIX) 40 MG EC tablet Take 1 tablet by mouth Daily.   • potassium chloride (K-DUR) 10 MEQ CR tablet Take 10 mEq by mouth Daily.   • simethicone (Gas-X) 80 MG chewable tablet Chew 1 tablet Every 6 (Six) Hours As Needed for Flatulence.   • thiamine (VITAMIN B1) 100 MG tablet Take 1 tablet by mouth Daily.     No current facility-administered medications on file prior to visit.         The following portions of the patient's history were reviewed and updated as appropriate: allergies, current medications, past family history, past medical history, past social history, past surgical history and problem list.    Review of Systems   Constitutional: Negative.   HENT: Negative.  Negative for congestion.    Eyes: Negative.    Cardiovascular: Negative.  Negative for chest pain, cyanosis, dyspnea on exertion, irregular heartbeat, leg swelling, near-syncope, orthopnea, palpitations, paroxysmal nocturnal dyspnea and syncope.   Respiratory: Negative.  Negative for shortness of breath.    Hematologic/Lymphatic: Negative.    Musculoskeletal: Negative.    Gastrointestinal: Positive for bloating, abdominal pain, anorexia, constipation and nausea.   Neurological: Negative.  Negative for headaches.          Objective:     /69 (BP Location: Left arm, Patient Position: Sitting, Cuff Size: Large Adult)   Pulse 115   Temp 97.7 °F (36.5 °C)   Ht 188 cm (74\")   Wt 103 kg (228 lb)   SpO2 98%   BMI 29.27 kg/m²   Pulmonary:      Effort: Pulmonary effort is normal.      Breath sounds: Normal breath sounds. No stridor. No wheezing. No " rhonchi. No rales.   Cardiovascular:      PMI at left midclavicular line. Normal rate. Regular rhythm. Normal S1. Normal S2.      Murmurs: There is no murmur.      No gallop. No click. No rub.   Pulses:     Intact distal pulses.   Edema:     Peripheral edema absent.   Abdominal:      General: There is no distension or abdominal bruit.      Palpations: There is no hepatomegaly, splenomegaly, abdominal mass or pulsatile abdominal mass.      Tenderness: There is abdominal tenderness.      Hernia: No hernia is present.      Comments: Tenderness right lower quadrant of the abdomen no rebound or rigidity.  Bowel sounds are normal           Lab Review  Lab Results   Component Value Date     (L) 10/06/2021    K 3.8 10/06/2021     10/06/2021    BUN 19 10/06/2021    CREATININE 1.15 10/06/2021    GLUCOSE 136 (H) 10/06/2021    CALCIUM 9.8 10/06/2021    ALT 27 10/06/2021    ALKPHOS 64 10/06/2021    LABIL2 1.7 05/04/2016     Lab Results   Component Value Date    CKTOTAL 34 03/19/2021     Lab Results   Component Value Date    WBC 6.93 10/06/2021    HGB 17.1 10/06/2021    HCT 51.3 (H) 10/06/2021     10/06/2021     Lab Results   Component Value Date    INR 1.20 (H) 09/05/2020    INR 1.44 (H) 08/27/2020    INR 1.49 (H) 08/26/2020     Lab Results   Component Value Date    MG 1.7 03/19/2021     Lab Results   Component Value Date    PSA 0.300 03/20/2018    TSH 2.380 09/05/2020     No results found for: BNP  Lab Results   Component Value Date    CHLPL 182 05/04/2016    CHOL 158 03/19/2021    TRIG 415 (H) 03/19/2021    HDL 37 (L) 03/19/2021    VLDL 63 (H) 03/19/2021    LDLHDL 1.03 03/19/2021     Lab Results   Component Value Date    LDL 58 03/19/2021    LDL 91 08/21/2020     Lactic acid 2.1  Lipase 80  CBC and CMP normal  Magnesium was not checked    CT of the abdomen suggestive of mild sigmoid diverticulitis, cholelithiasis  Ultrasound of the gallbladder showed cholelithiasis but no evidence of inflammation      ECG 12  Lead    Date/Time: 10/8/2021 11:03 AM  Performed by: Alex Canales MD  Authorized by: Alex Canales MD   Comparison: compared with previous ECG from 3/18/2021  Similar to previous ECG  Rhythm: sinus tachycardia  Rate: tachycardic  BPM: 110  Conduction: conduction normal  ST Segments: ST segments normal  T Waves: T waves normal  QRS axis: normal    Clinical impression: non-specific ECG               I personally viewed and interpreted the patient's LAB data         Assessment:     1. Lower abdominal pain    2. Abdominal pain, unspecified abdominal location    3. Coronary artery disease involving native coronary artery of native heart without angina pectoris    4. Chronic alcoholism (HCC)    5. Sinus tachycardia          Plan:        Hospital records reviewed  Patient had mild sigmoid diverticulitis  Lactic acid was elevated with normal CBC and CMP    Patient was advised to return to the emergency room if abdominal pain gets worse.  He was started on Cipro and Flagyl  GI and surgical consult was requested.    Heart rate is chronically fast he is taking Lopressor 250 mg daily was advised to increase it to 300 mg which is 150 twice daily    Follow-up scheduled      No follow-ups on file.

## 2021-10-11 ENCOUNTER — APPOINTMENT (OUTPATIENT)
Dept: GENERAL RADIOLOGY | Facility: HOSPITAL | Age: 56
End: 2021-10-11

## 2021-10-11 ENCOUNTER — HOSPITAL ENCOUNTER (EMERGENCY)
Facility: HOSPITAL | Age: 56
Discharge: HOME | End: 2021-10-11
Attending: EMERGENCY MEDICINE | Admitting: SURGERY

## 2021-10-11 ENCOUNTER — ANESTHESIA EVENT (OUTPATIENT)
Dept: PERIOP | Facility: HOSPITAL | Age: 56
End: 2021-10-11

## 2021-10-11 ENCOUNTER — APPOINTMENT (OUTPATIENT)
Dept: CT IMAGING | Facility: HOSPITAL | Age: 56
End: 2021-10-11

## 2021-10-11 ENCOUNTER — ANESTHESIA (OUTPATIENT)
Dept: PERIOP | Facility: HOSPITAL | Age: 56
End: 2021-10-11

## 2021-10-11 VITALS
HEIGHT: 74 IN | DIASTOLIC BLOOD PRESSURE: 72 MMHG | BODY MASS INDEX: 29.52 KG/M2 | RESPIRATION RATE: 18 BRPM | SYSTOLIC BLOOD PRESSURE: 99 MMHG | OXYGEN SATURATION: 96 % | HEART RATE: 84 BPM | TEMPERATURE: 97.7 F | WEIGHT: 230 LBS

## 2021-10-11 DIAGNOSIS — K81.0 ACUTE CHOLECYSTITIS: Primary | ICD-10-CM

## 2021-10-11 LAB
ALBUMIN SERPL-MCNC: 3.64 G/DL (ref 3.5–5.2)
ALBUMIN/GLOB SERPL: 0.9 G/DL
ALP SERPL-CCNC: 88 U/L (ref 39–117)
ALT SERPL W P-5'-P-CCNC: 35 U/L (ref 1–41)
AMYLASE SERPL-CCNC: 39 U/L (ref 28–100)
ANION GAP SERPL CALCULATED.3IONS-SCNC: 17 MMOL/L (ref 5–15)
APTT PPP: 32 SECONDS (ref 25.5–35.4)
AST SERPL-CCNC: 18 U/L (ref 1–40)
BACTERIA UR QL AUTO: ABNORMAL /HPF
BASOPHILS # BLD AUTO: 0.08 10*3/MM3 (ref 0–0.2)
BASOPHILS NFR BLD AUTO: 0.6 % (ref 0–1.5)
BILIRUB SERPL-MCNC: 0.8 MG/DL (ref 0–1.2)
BILIRUB UR QL STRIP: ABNORMAL
BUN SERPL-MCNC: 12 MG/DL (ref 6–20)
BUN/CREAT SERPL: 9.1 (ref 7–25)
CALCIUM SPEC-SCNC: 9.3 MG/DL (ref 8.6–10.5)
CHLORIDE SERPL-SCNC: 96 MMOL/L (ref 98–107)
CLARITY UR: CLEAR
CO2 SERPL-SCNC: 18 MMOL/L (ref 22–29)
COLOR UR: ABNORMAL
CREAT SERPL-MCNC: 1.32 MG/DL (ref 0.76–1.27)
CRP SERPL-MCNC: 14.21 MG/DL (ref 0–0.5)
D-LACTATE SERPL-SCNC: 1.5 MMOL/L (ref 0.5–2)
DEPRECATED RDW RBC AUTO: 45.2 FL (ref 37–54)
EOSINOPHIL # BLD AUTO: 0.33 10*3/MM3 (ref 0–0.4)
EOSINOPHIL NFR BLD AUTO: 2.3 % (ref 0.3–6.2)
ERYTHROCYTE [DISTWIDTH] IN BLOOD BY AUTOMATED COUNT: 13.5 % (ref 12.3–15.4)
FLUAV RNA RESP QL NAA+PROBE: NOT DETECTED
FLUBV RNA RESP QL NAA+PROBE: NOT DETECTED
GFR SERPL CREATININE-BSD FRML MDRD: 56 ML/MIN/1.73
GLOBULIN UR ELPH-MCNC: 4.2 GM/DL
GLUCOSE SERPL-MCNC: 102 MG/DL (ref 65–99)
GLUCOSE UR STRIP-MCNC: NEGATIVE MG/DL
HCT VFR BLD AUTO: 49.2 % (ref 37.5–51)
HGB BLD-MCNC: 16.4 G/DL (ref 13–17.7)
HGB UR QL STRIP.AUTO: NEGATIVE
HOLD SPECIMEN: NORMAL
HOLD SPECIMEN: NORMAL
HYALINE CASTS UR QL AUTO: ABNORMAL /LPF
IMM GRANULOCYTES # BLD AUTO: 0.1 10*3/MM3 (ref 0–0.05)
IMM GRANULOCYTES NFR BLD AUTO: 0.7 % (ref 0–0.5)
INR PPP: 1.14 (ref 0.9–1.1)
KETONES UR QL STRIP: ABNORMAL
LEUKOCYTE ESTERASE UR QL STRIP.AUTO: ABNORMAL
LIPASE SERPL-CCNC: 55 U/L (ref 13–60)
LYMPHOCYTES # BLD AUTO: 3.33 10*3/MM3 (ref 0.7–3.1)
LYMPHOCYTES NFR BLD AUTO: 23.5 % (ref 19.6–45.3)
MCH RBC QN AUTO: 30.2 PG (ref 26.6–33)
MCHC RBC AUTO-ENTMCNC: 33.3 G/DL (ref 31.5–35.7)
MCV RBC AUTO: 90.6 FL (ref 79–97)
MONOCYTES # BLD AUTO: 2.11 10*3/MM3 (ref 0.1–0.9)
MONOCYTES NFR BLD AUTO: 14.9 % (ref 5–12)
NEUTROPHILS NFR BLD AUTO: 58 % (ref 42.7–76)
NEUTROPHILS NFR BLD AUTO: 8.25 10*3/MM3 (ref 1.7–7)
NITRITE UR QL STRIP: POSITIVE
NRBC BLD AUTO-RTO: 0 /100 WBC (ref 0–0.2)
PH UR STRIP.AUTO: 7.5 [PH] (ref 5–8)
PLATELET # BLD AUTO: 319 10*3/MM3 (ref 140–450)
PMV BLD AUTO: 9.4 FL (ref 6–12)
POTASSIUM SERPL-SCNC: 3.9 MMOL/L (ref 3.5–5.2)
PROT SERPL-MCNC: 7.8 G/DL (ref 6–8.5)
PROT UR QL STRIP: ABNORMAL
PROTHROMBIN TIME: 15.1 SECONDS (ref 12.8–14.5)
QT INTERVAL: 400 MS
QTC INTERVAL: 444 MS
RBC # BLD AUTO: 5.43 10*6/MM3 (ref 4.14–5.8)
RBC # UR: ABNORMAL /HPF
REF LAB TEST METHOD: ABNORMAL
SARS-COV-2 RNA RESP QL NAA+PROBE: NOT DETECTED
SODIUM SERPL-SCNC: 131 MMOL/L (ref 136–145)
SP GR UR STRIP: >1.03 (ref 1–1.03)
SQUAMOUS #/AREA URNS HPF: ABNORMAL /HPF
TROPONIN T SERPL-MCNC: <0.01 NG/ML (ref 0–0.03)
UROBILINOGEN UR QL STRIP: ABNORMAL
WBC # BLD AUTO: 14.2 10*3/MM3 (ref 3.4–10.8)
WBC UR QL AUTO: ABNORMAL /HPF
WHOLE BLOOD HOLD SPECIMEN: NORMAL

## 2021-10-11 PROCEDURE — 25010000002 MIDAZOLAM PER 1 MG: Performed by: NURSE ANESTHETIST, CERTIFIED REGISTERED

## 2021-10-11 PROCEDURE — 85610 PROTHROMBIN TIME: CPT | Performed by: EMERGENCY MEDICINE

## 2021-10-11 PROCEDURE — C1889 IMPLANT/INSERT DEVICE, NOC: HCPCS | Performed by: SURGERY

## 2021-10-11 PROCEDURE — 81001 URINALYSIS AUTO W/SCOPE: CPT | Performed by: EMERGENCY MEDICINE

## 2021-10-11 PROCEDURE — 25010000002 FENTANYL CITRATE (PF) 50 MCG/ML SOLUTION: Performed by: NURSE ANESTHETIST, CERTIFIED REGISTERED

## 2021-10-11 PROCEDURE — 93010 ELECTROCARDIOGRAM REPORT: CPT | Performed by: INTERNAL MEDICINE

## 2021-10-11 PROCEDURE — 99284 EMERGENCY DEPT VISIT MOD MDM: CPT | Performed by: SURGERY

## 2021-10-11 PROCEDURE — 96365 THER/PROPH/DIAG IV INF INIT: CPT

## 2021-10-11 PROCEDURE — 25010000002 ONDANSETRON PER 1 MG: Performed by: EMERGENCY MEDICINE

## 2021-10-11 PROCEDURE — 84484 ASSAY OF TROPONIN QUANT: CPT | Performed by: EMERGENCY MEDICINE

## 2021-10-11 PROCEDURE — 25010000002 NEOSTIGMINE 10 MG/10ML SOLUTION: Performed by: NURSE ANESTHETIST, CERTIFIED REGISTERED

## 2021-10-11 PROCEDURE — 93005 ELECTROCARDIOGRAM TRACING: CPT | Performed by: EMERGENCY MEDICINE

## 2021-10-11 PROCEDURE — 85025 COMPLETE CBC W/AUTO DIFF WBC: CPT | Performed by: EMERGENCY MEDICINE

## 2021-10-11 PROCEDURE — 82150 ASSAY OF AMYLASE: CPT | Performed by: EMERGENCY MEDICINE

## 2021-10-11 PROCEDURE — 25010000003 MEPERIDINE PER 100 MG: Performed by: NURSE ANESTHETIST, CERTIFIED REGISTERED

## 2021-10-11 PROCEDURE — 25010000002 PROPOFOL 10 MG/ML EMULSION: Performed by: NURSE ANESTHETIST, CERTIFIED REGISTERED

## 2021-10-11 PROCEDURE — C9803 HOPD COVID-19 SPEC COLLECT: HCPCS

## 2021-10-11 PROCEDURE — 74177 CT ABD & PELVIS W/CONTRAST: CPT

## 2021-10-11 PROCEDURE — 96376 TX/PRO/DX INJ SAME DRUG ADON: CPT

## 2021-10-11 PROCEDURE — 83605 ASSAY OF LACTIC ACID: CPT | Performed by: EMERGENCY MEDICINE

## 2021-10-11 PROCEDURE — 47562 LAPAROSCOPIC CHOLECYSTECTOMY: CPT | Performed by: SURGERY

## 2021-10-11 PROCEDURE — 83690 ASSAY OF LIPASE: CPT | Performed by: EMERGENCY MEDICINE

## 2021-10-11 PROCEDURE — 85730 THROMBOPLASTIN TIME PARTIAL: CPT | Performed by: EMERGENCY MEDICINE

## 2021-10-11 PROCEDURE — 25010000002 HYDROMORPHONE 1 MG/ML SOLUTION: Performed by: EMERGENCY MEDICINE

## 2021-10-11 PROCEDURE — 80053 COMPREHEN METABOLIC PANEL: CPT | Performed by: EMERGENCY MEDICINE

## 2021-10-11 PROCEDURE — 71045 X-RAY EXAM CHEST 1 VIEW: CPT

## 2021-10-11 PROCEDURE — 25010000002 ONDANSETRON PER 1 MG: Performed by: NURSE ANESTHETIST, CERTIFIED REGISTERED

## 2021-10-11 PROCEDURE — 71045 X-RAY EXAM CHEST 1 VIEW: CPT | Performed by: RADIOLOGY

## 2021-10-11 PROCEDURE — 87040 BLOOD CULTURE FOR BACTERIA: CPT | Performed by: EMERGENCY MEDICINE

## 2021-10-11 PROCEDURE — 25010000002 PIPERACILLIN SOD-TAZOBACTAM PER 1 G: Performed by: EMERGENCY MEDICINE

## 2021-10-11 PROCEDURE — 74177 CT ABD & PELVIS W/CONTRAST: CPT | Performed by: RADIOLOGY

## 2021-10-11 PROCEDURE — 96375 TX/PRO/DX INJ NEW DRUG ADDON: CPT

## 2021-10-11 PROCEDURE — 99285 EMERGENCY DEPT VISIT HI MDM: CPT

## 2021-10-11 PROCEDURE — 25010000002 HYDROMORPHONE PER 4 MG: Performed by: ANESTHESIOLOGY

## 2021-10-11 PROCEDURE — 87636 SARSCOV2 & INF A&B AMP PRB: CPT | Performed by: EMERGENCY MEDICINE

## 2021-10-11 PROCEDURE — 86140 C-REACTIVE PROTEIN: CPT | Performed by: EMERGENCY MEDICINE

## 2021-10-11 PROCEDURE — 25010000002 IOPAMIDOL 61 % SOLUTION: Performed by: EMERGENCY MEDICINE

## 2021-10-11 DEVICE — LIGACLIP 10-M/L, 10MM ENDOSCOPIC ROTATING MULTIPLE CLIP APPLIERS
Type: IMPLANTABLE DEVICE | Site: ABDOMEN | Status: FUNCTIONAL
Brand: LIGACLIP

## 2021-10-11 DEVICE — ABSORBABLE HEMOSTAT (OXIDIZED REGENERATED CELLULOSE, U.S.P.)
Type: IMPLANTABLE DEVICE | Site: ABDOMEN | Status: FUNCTIONAL
Brand: SURGICEL

## 2021-10-11 RX ORDER — HYDROCODONE BITARTRATE AND ACETAMINOPHEN 5; 325 MG/1; MG/1
1 TABLET ORAL EVERY 4 HOURS PRN
Qty: 15 TABLET | Refills: 0 | Status: SHIPPED | OUTPATIENT
Start: 2021-10-11 | End: 2021-10-21

## 2021-10-11 RX ORDER — ONDANSETRON 2 MG/ML
4 INJECTION INTRAMUSCULAR; INTRAVENOUS AS NEEDED
Status: DISCONTINUED | OUTPATIENT
Start: 2021-10-11 | End: 2021-10-11 | Stop reason: HOSPADM

## 2021-10-11 RX ORDER — PANTOPRAZOLE SODIUM 40 MG/10ML
40 INJECTION, POWDER, LYOPHILIZED, FOR SOLUTION INTRAVENOUS ONCE
Status: COMPLETED | OUTPATIENT
Start: 2021-10-11 | End: 2021-10-11

## 2021-10-11 RX ORDER — FAMOTIDINE 10 MG/ML
INJECTION, SOLUTION INTRAVENOUS AS NEEDED
Status: DISCONTINUED | OUTPATIENT
Start: 2021-10-11 | End: 2021-10-11 | Stop reason: SURG

## 2021-10-11 RX ORDER — FENTANYL CITRATE 50 UG/ML
50 INJECTION, SOLUTION INTRAMUSCULAR; INTRAVENOUS
Status: DISCONTINUED | OUTPATIENT
Start: 2021-10-11 | End: 2021-10-11 | Stop reason: HOSPADM

## 2021-10-11 RX ORDER — SODIUM CHLORIDE, SODIUM LACTATE, POTASSIUM CHLORIDE, CALCIUM CHLORIDE 600; 310; 30; 20 MG/100ML; MG/100ML; MG/100ML; MG/100ML
125 INJECTION, SOLUTION INTRAVENOUS ONCE
Status: COMPLETED | OUTPATIENT
Start: 2021-10-11 | End: 2021-10-11

## 2021-10-11 RX ORDER — GLYCOPYRROLATE 0.2 MG/ML
INJECTION INTRAMUSCULAR; INTRAVENOUS AS NEEDED
Status: DISCONTINUED | OUTPATIENT
Start: 2021-10-11 | End: 2021-10-11 | Stop reason: SURG

## 2021-10-11 RX ORDER — PROPOFOL 10 MG/ML
VIAL (ML) INTRAVENOUS AS NEEDED
Status: DISCONTINUED | OUTPATIENT
Start: 2021-10-11 | End: 2021-10-11 | Stop reason: SURG

## 2021-10-11 RX ORDER — BUPIVACAINE HYDROCHLORIDE AND EPINEPHRINE 5; 5 MG/ML; UG/ML
INJECTION, SOLUTION PERINEURAL AS NEEDED
Status: DISCONTINUED | OUTPATIENT
Start: 2021-10-11 | End: 2021-10-11 | Stop reason: HOSPADM

## 2021-10-11 RX ORDER — SODIUM CHLORIDE 0.9 % (FLUSH) 0.9 %
10 SYRINGE (ML) INJECTION AS NEEDED
Status: DISCONTINUED | OUTPATIENT
Start: 2021-10-11 | End: 2021-10-11 | Stop reason: HOSPADM

## 2021-10-11 RX ORDER — SODIUM CHLORIDE 0.9 % (FLUSH) 0.9 %
10 SYRINGE (ML) INJECTION EVERY 12 HOURS SCHEDULED
Status: DISCONTINUED | OUTPATIENT
Start: 2021-10-11 | End: 2021-10-11 | Stop reason: HOSPADM

## 2021-10-11 RX ORDER — SODIUM CHLORIDE 9 MG/ML
125 INJECTION, SOLUTION INTRAVENOUS CONTINUOUS
Status: DISCONTINUED | OUTPATIENT
Start: 2021-10-11 | End: 2021-10-11 | Stop reason: HOSPADM

## 2021-10-11 RX ORDER — MEPERIDINE HYDROCHLORIDE 25 MG/ML
12.5 INJECTION INTRAMUSCULAR; INTRAVENOUS; SUBCUTANEOUS
Status: COMPLETED | OUTPATIENT
Start: 2021-10-11 | End: 2021-10-11

## 2021-10-11 RX ORDER — MAGNESIUM HYDROXIDE 1200 MG/15ML
LIQUID ORAL AS NEEDED
Status: DISCONTINUED | OUTPATIENT
Start: 2021-10-11 | End: 2021-10-11 | Stop reason: HOSPADM

## 2021-10-11 RX ORDER — ONDANSETRON 2 MG/ML
4 INJECTION INTRAMUSCULAR; INTRAVENOUS ONCE
Status: COMPLETED | OUTPATIENT
Start: 2021-10-11 | End: 2021-10-11

## 2021-10-11 RX ORDER — NEOSTIGMINE METHYLSULFATE 1 MG/ML
INJECTION, SOLUTION INTRAVENOUS AS NEEDED
Status: DISCONTINUED | OUTPATIENT
Start: 2021-10-11 | End: 2021-10-11 | Stop reason: SURG

## 2021-10-11 RX ORDER — SODIUM CHLORIDE, SODIUM LACTATE, POTASSIUM CHLORIDE, CALCIUM CHLORIDE 600; 310; 30; 20 MG/100ML; MG/100ML; MG/100ML; MG/100ML
125 INJECTION, SOLUTION INTRAVENOUS ONCE
Status: DISCONTINUED | OUTPATIENT
Start: 2021-10-11 | End: 2021-10-11 | Stop reason: HOSPADM

## 2021-10-11 RX ORDER — MIDAZOLAM HYDROCHLORIDE 1 MG/ML
INJECTION INTRAMUSCULAR; INTRAVENOUS AS NEEDED
Status: DISCONTINUED | OUTPATIENT
Start: 2021-10-11 | End: 2021-10-11 | Stop reason: SURG

## 2021-10-11 RX ORDER — ONDANSETRON 2 MG/ML
INJECTION INTRAMUSCULAR; INTRAVENOUS AS NEEDED
Status: DISCONTINUED | OUTPATIENT
Start: 2021-10-11 | End: 2021-10-11 | Stop reason: SURG

## 2021-10-11 RX ORDER — MIDAZOLAM HYDROCHLORIDE 1 MG/ML
1 INJECTION INTRAMUSCULAR; INTRAVENOUS
Status: DISCONTINUED | OUTPATIENT
Start: 2021-10-11 | End: 2021-10-11 | Stop reason: HOSPADM

## 2021-10-11 RX ORDER — ROCURONIUM BROMIDE 10 MG/ML
INJECTION, SOLUTION INTRAVENOUS AS NEEDED
Status: DISCONTINUED | OUTPATIENT
Start: 2021-10-11 | End: 2021-10-11 | Stop reason: SURG

## 2021-10-11 RX ORDER — HYDROCODONE BITARTRATE AND ACETAMINOPHEN 5; 325 MG/1; MG/1
1 TABLET ORAL EVERY 4 HOURS PRN
Status: DISCONTINUED | OUTPATIENT
Start: 2021-10-11 | End: 2021-10-11 | Stop reason: HOSPADM

## 2021-10-11 RX ORDER — FENTANYL CITRATE 50 UG/ML
INJECTION, SOLUTION INTRAMUSCULAR; INTRAVENOUS AS NEEDED
Status: DISCONTINUED | OUTPATIENT
Start: 2021-10-11 | End: 2021-10-11 | Stop reason: SURG

## 2021-10-11 RX ORDER — SODIUM CHLORIDE 9 MG/ML
INJECTION, SOLUTION INTRAVENOUS AS NEEDED
Status: DISCONTINUED | OUTPATIENT
Start: 2021-10-11 | End: 2021-10-11 | Stop reason: HOSPADM

## 2021-10-11 RX ORDER — IPRATROPIUM BROMIDE AND ALBUTEROL SULFATE 2.5; .5 MG/3ML; MG/3ML
3 SOLUTION RESPIRATORY (INHALATION) ONCE AS NEEDED
Status: DISCONTINUED | OUTPATIENT
Start: 2021-10-11 | End: 2021-10-11 | Stop reason: HOSPADM

## 2021-10-11 RX ORDER — HYDROCODONE BITARTRATE AND ACETAMINOPHEN 5; 325 MG/1; MG/1
1 TABLET ORAL ONCE AS NEEDED
Status: DISCONTINUED | OUTPATIENT
Start: 2021-10-11 | End: 2021-10-11 | Stop reason: HOSPADM

## 2021-10-11 RX ORDER — HYDROMORPHONE HYDROCHLORIDE 1 MG/ML
0.25 INJECTION, SOLUTION INTRAMUSCULAR; INTRAVENOUS; SUBCUTANEOUS
Status: ACTIVE | OUTPATIENT
Start: 2021-10-11 | End: 2021-10-11

## 2021-10-11 RX ADMIN — ONDANSETRON 4 MG: 2 INJECTION INTRAMUSCULAR; INTRAVENOUS at 08:00

## 2021-10-11 RX ADMIN — GLYCOPYRROLATE 0.8 MG: 0.2 INJECTION INTRAMUSCULAR; INTRAVENOUS at 15:35

## 2021-10-11 RX ADMIN — SODIUM CHLORIDE 500 ML: 9 INJECTION, SOLUTION INTRAVENOUS at 07:58

## 2021-10-11 RX ADMIN — MIDAZOLAM 2 MG: 1 INJECTION INTRAMUSCULAR; INTRAVENOUS at 14:14

## 2021-10-11 RX ADMIN — HYDROMORPHONE HYDROCHLORIDE 0.25 MG: 1 INJECTION, SOLUTION INTRAMUSCULAR; INTRAVENOUS; SUBCUTANEOUS at 16:44

## 2021-10-11 RX ADMIN — MEPERIDINE HYDROCHLORIDE 12.5 MG: 25 INJECTION INTRAMUSCULAR; INTRAVENOUS; SUBCUTANEOUS at 15:54

## 2021-10-11 RX ADMIN — PANTOPRAZOLE SODIUM 40 MG: 40 INJECTION, POWDER, FOR SOLUTION INTRAVENOUS at 08:00

## 2021-10-11 RX ADMIN — ROCURONIUM BROMIDE 30 MG: 10 SOLUTION INTRAVENOUS at 14:18

## 2021-10-11 RX ADMIN — MEPERIDINE HYDROCHLORIDE 12.5 MG: 25 INJECTION INTRAMUSCULAR; INTRAVENOUS; SUBCUTANEOUS at 16:01

## 2021-10-11 RX ADMIN — FAMOTIDINE 20 MG: 10 INJECTION INTRAVENOUS at 14:14

## 2021-10-11 RX ADMIN — FENTANYL CITRATE 100 MCG: 50 INJECTION INTRAMUSCULAR; INTRAVENOUS at 14:14

## 2021-10-11 RX ADMIN — IOPAMIDOL 80 ML: 612 INJECTION, SOLUTION INTRAVENOUS at 08:38

## 2021-10-11 RX ADMIN — PROPOFOL 120 MG: 10 INJECTION, EMULSION INTRAVENOUS at 14:18

## 2021-10-11 RX ADMIN — FENTANYL CITRATE 50 MCG: 50 INJECTION INTRAMUSCULAR; INTRAVENOUS at 16:08

## 2021-10-11 RX ADMIN — NEOSTIGMINE 5 MG: 1 INJECTION INTRAVENOUS at 15:35

## 2021-10-11 RX ADMIN — SODIUM CHLORIDE, POTASSIUM CHLORIDE, SODIUM LACTATE AND CALCIUM CHLORIDE: 600; 310; 30; 20 INJECTION, SOLUTION INTRAVENOUS at 15:19

## 2021-10-11 RX ADMIN — HYDROMORPHONE HYDROCHLORIDE 1 MG: 1 INJECTION, SOLUTION INTRAMUSCULAR; INTRAVENOUS; SUBCUTANEOUS at 09:02

## 2021-10-11 RX ADMIN — SODIUM CHLORIDE, POTASSIUM CHLORIDE, SODIUM LACTATE AND CALCIUM CHLORIDE: 600; 310; 30; 20 INJECTION, SOLUTION INTRAVENOUS at 14:14

## 2021-10-11 RX ADMIN — FENTANYL CITRATE 50 MCG: 50 INJECTION INTRAMUSCULAR; INTRAVENOUS at 15:57

## 2021-10-11 RX ADMIN — ONDANSETRON 4 MG: 2 INJECTION INTRAMUSCULAR; INTRAVENOUS at 14:14

## 2021-10-11 RX ADMIN — HYDROMORPHONE HYDROCHLORIDE 1 MG: 1 INJECTION, SOLUTION INTRAMUSCULAR; INTRAVENOUS; SUBCUTANEOUS at 07:58

## 2021-10-11 RX ADMIN — PIPERACILLIN SODIUM AND TAZOBACTAM SODIUM 3.38 G: 3; .375 INJECTION, POWDER, LYOPHILIZED, FOR SOLUTION INTRAVENOUS at 11:41

## 2021-10-11 RX ADMIN — SODIUM CHLORIDE 125 ML/HR: 9 INJECTION, SOLUTION INTRAVENOUS at 09:03

## 2021-10-11 NOTE — ED PROVIDER NOTES
Subjective   56-year-old male presents complaining of 1 week history of generalized abdominal pain, was seen here for the same symptoms on 10/6/2021; work-up at that time showed gallstones, patient was felt to have gastritis or possibly an ulcer.  He denies nausea, vomiting, diarrhea.  He states that he has been constipated and has had to take laxatives to have a bowel movement.  He denies fever, chills, chest pain, shortness of breath, hematemesis, hematochezia or melena, syncope or near syncope, focal numbness or weakness, other symptoms or complaints.  Patient reports that he is an alcoholic, in general drinks about 1/2 pint of liquor every other day.  He states that he has not had any alcohol during this whole time that he has been sick, and that he has not had any withdrawal symptoms.          Review of Systems   Constitutional: Negative for chills, diaphoresis and fever.   HENT: Negative for ear pain, sore throat and trouble swallowing.    Eyes: Negative for photophobia and pain.   Respiratory: Negative for shortness of breath, wheezing and stridor.    Cardiovascular: Negative for chest pain and palpitations.   Gastrointestinal: Positive for abdominal pain and constipation. Negative for abdominal distention, blood in stool, diarrhea, nausea and vomiting.   Endocrine: Negative for polydipsia and polyphagia.   Genitourinary: Negative for difficulty urinating, dysuria, flank pain, penile discharge, penile pain, penile swelling, scrotal swelling and testicular pain.   Musculoskeletal: Negative for back pain, neck pain and neck stiffness.   Skin: Negative for color change and pallor.   Neurological: Negative for seizures, syncope and speech difficulty.   Psychiatric/Behavioral: Negative for confusion.   All other systems reviewed and are negative.      Past Medical History:   Diagnosis Date   • Anxiety disorder    • CAD (coronary artery disease)    • Chronic alcoholism (HCC)    • Hyperlipidemia    • Hypertension     • WPW (Zakiya-Parkinson-White syndrome)        Allergies   Allergen Reactions   • Niacin Rash     Whelps       Past Surgical History:   Procedure Laterality Date   • CARDIAC CATHETERIZATION  2015   • CARDIAC CATHETERIZATION N/A 2020    Procedure: Left Heart Cath;  Surgeon: Jack Beebe MD;  Location: [facility] CATH INVASIVE LOCATION;  Service: Cardiology;  Laterality: N/A;   • CARDIOVASCULAR STRESS TEST  2015   • CHOLECYSTECTOMY N/A 10/11/2021    Procedure: CHOLECYSTECTOMY LAPAROSCOPIC;  Surgeon: Ghulam Pillai MD;  Location: [facility] OR;  Service: General;  Laterality: N/A;   • CORONARY ARTERY BYPASS GRAFT N/A 2020    Procedure: MEDIAN STERNOTOMY, CORONARY ARTERY BYPASS GRAFTING X  4, UTILIZING THE LEFT INTERNAL MAMMARY ARTERY AND EVH OF THE RIGHT GREATER SAPHENOUS VEIN;  Surgeon: David Kuo MD;  Location: [facility] OR;  Service: Cardiothoracic;  Laterality: N/A;  LEG START - 0740  VEIN OUT - 0820  VEIN READY - 0832   • ECHO - CONVERTED  2015   • EYE SURGERY      x3 eye surgeries as a child       Family History   Problem Relation Age of Onset   • Heart attack Father    • Heart disease Father    • Heart failure Father    • Heart disease Sister    • Heart failure Sister    • Heart disease Brother    • Heart failure Brother    • Heart failure Sister    • Heart disease Sister        Social History     Socioeconomic History   • Marital status:    Tobacco Use   • Smoking status: Former Smoker     Packs/day: 1.00     Years: 20.00     Pack years: 20.00     Types: Cigarettes     Quit date:      Years since quittin.7   • Smokeless tobacco: Current User     Types: Chew, Snuff   • Tobacco comment: chew 1 can per week   Substance and Sexual Activity   • Alcohol use: Yes     Alcohol/week: 10.0 standard drinks     Types: 6 Cans of beer, 4 Shots of liquor per week     Comment: pint of alcohol every other day    • Drug use: Never   • Sexual activity: Defer           Objective    Physical Exam  Vitals and nursing note reviewed.   Constitutional:       General: He is in acute distress.      Appearance: Normal appearance. He is well-developed. He is not toxic-appearing or diaphoretic.      Comments: Well-developed well-nourished male, appears to be in pain.  He is seated in a chair, states unable to lie down secondary to his pain.   HENT:      Head: Normocephalic and atraumatic.   Eyes:      General: No scleral icterus.     Pupils: Pupils are equal, round, and reactive to light.   Neck:      Trachea: No tracheal deviation.   Cardiovascular:      Rate and Rhythm: Normal rate and regular rhythm.   Pulmonary:      Effort: Pulmonary effort is normal. No respiratory distress.      Breath sounds: Normal breath sounds.   Chest:      Chest wall: No tenderness.   Abdominal:      General: Bowel sounds are normal.      Palpations: Abdomen is soft.      Tenderness: There is abdominal tenderness (Moderate generalized tenderness, seems to be somewhat more pronounced in the right upper quadrant.  Not otherwise focal.  No acute peritoneal signs.). There is no right CVA tenderness, left CVA tenderness, guarding or rebound.   Musculoskeletal:         General: No tenderness. Normal range of motion.      Cervical back: Normal range of motion and neck supple. No rigidity or tenderness.      Right lower leg: No edema.      Left lower leg: No edema.   Skin:     General: Skin is warm and dry.      Capillary Refill: Capillary refill takes less than 2 seconds.      Coloration: Skin is not pale.   Neurological:      General: No focal deficit present.      Mental Status: He is alert and oriented to person, place, and time.      GCS: GCS eye subscore is 4. GCS verbal subscore is 5. GCS motor subscore is 6.      Motor: No abnormal muscle tone.   Psychiatric:         Behavior: Behavior normal.         Procedures  EKG at 0756 shows sinus rhythm, rate 78.  NH interval 176, QRS duration 100, QTc 487 ms.  Baseline artifact.   Evidence for LVH.  No evidence for STEMI.  Prolonged QT.  FL nirav endo (surgery)   Final Result      CT Abdomen Pelvis With Contrast   Final Result     The gallbladder wall is thickened and there is small amount of fluid   surrounding the gallbladder suggestive of cholecystitis.       This report was finalized on 10/11/2021 8:50 AM by Dr. To Jeffers MD.          XR Chest 1 View   Final Result     Minimal peribronchial thickening noted in the left infrahilar region   may represent chronic bronchitis.       This report was finalized on 10/11/2021 8:49 AM by Dr. To Jeffers MD.            Results for orders placed or performed during the hospital encounter of 10/11/21   Blood Culture - Blood, Arm, Right    Specimen: Arm, Right; Blood   Result Value Ref Range    Blood Culture No growth at 24 hours    Blood Culture - Blood, Arm, Right    Specimen: Arm, Right; Blood   Result Value Ref Range    Blood Culture No growth at 24 hours    COVID-19 and FLU A/B PCR - Swab, Nasopharynx    Specimen: Nasopharynx; Swab   Result Value Ref Range    COVID19 Not Detected Not Detected - Ref. Range    Influenza A PCR Not Detected Not Detected    Influenza B PCR Not Detected Not Detected   Comprehensive Metabolic Panel    Specimen: Blood   Result Value Ref Range    Glucose 102 (H) 65 - 99 mg/dL    BUN 12 6 - 20 mg/dL    Creatinine 1.32 (H) 0.76 - 1.27 mg/dL    Sodium 131 (L) 136 - 145 mmol/L    Potassium 3.9 3.5 - 5.2 mmol/L    Chloride 96 (L) 98 - 107 mmol/L    CO2 18.0 (L) 22.0 - 29.0 mmol/L    Calcium 9.3 8.6 - 10.5 mg/dL    Total Protein 7.8 6.0 - 8.5 g/dL    Albumin 3.64 3.50 - 5.20 g/dL    ALT (SGPT) 35 1 - 41 U/L    AST (SGOT) 18 1 - 40 U/L    Alkaline Phosphatase 88 39 - 117 U/L    Total Bilirubin 0.8 0.0 - 1.2 mg/dL    eGFR Non African Amer 56 (L) >60 mL/min/1.73    Globulin 4.2 gm/dL    A/G Ratio 0.9 g/dL    BUN/Creatinine Ratio 9.1 7.0 - 25.0    Anion Gap 17.0 (H) 5.0 - 15.0 mmol/L   Lipase    Specimen: Blood   Result  Value Ref Range    Lipase 55 13 - 60 U/L   Amylase    Specimen: Blood   Result Value Ref Range    Amylase 39 28 - 100 U/L   Urinalysis With Microscopic If Indicated (No Culture) - Urine, Clean Catch    Specimen: Urine, Clean Catch   Result Value Ref Range    Color, UA Dark Yellow (A) Yellow, Straw    Appearance, UA Clear Clear    pH, UA 7.5 5.0 - 8.0    Specific Gravity, UA >1.030 (H) 1.005 - 1.030    Glucose, UA Negative Negative    Ketones, UA Trace (A) Negative    Bilirubin, UA Small (1+) (A) Negative    Blood, UA Negative Negative    Protein, UA 30 mg/dL (1+) (A) Negative    Leuk Esterase, UA Small (1+) (A) Negative    Nitrite, UA Positive (A) Negative    Urobilinogen, UA 1.0 E.U./dL 0.2 - 1.0 E.U./dL   Troponin    Specimen: Blood   Result Value Ref Range    Troponin T <0.010 0.000 - 0.030 ng/mL   C-reactive Protein    Specimen: Blood   Result Value Ref Range    C-Reactive Protein 14.21 (H) 0.00 - 0.50 mg/dL   CBC Auto Differential    Specimen: Blood   Result Value Ref Range    WBC 14.20 (H) 3.40 - 10.80 10*3/mm3    RBC 5.43 4.14 - 5.80 10*6/mm3    Hemoglobin 16.4 13.0 - 17.7 g/dL    Hematocrit 49.2 37.5 - 51.0 %    MCV 90.6 79.0 - 97.0 fL    MCH 30.2 26.6 - 33.0 pg    MCHC 33.3 31.5 - 35.7 g/dL    RDW 13.5 12.3 - 15.4 %    RDW-SD 45.2 37.0 - 54.0 fl    MPV 9.4 6.0 - 12.0 fL    Platelets 319 140 - 450 10*3/mm3    Neutrophil % 58.0 42.7 - 76.0 %    Lymphocyte % 23.5 19.6 - 45.3 %    Monocyte % 14.9 (H) 5.0 - 12.0 %    Eosinophil % 2.3 0.3 - 6.2 %    Basophil % 0.6 0.0 - 1.5 %    Immature Grans % 0.7 (H) 0.0 - 0.5 %    Neutrophils, Absolute 8.25 (H) 1.70 - 7.00 10*3/mm3    Lymphocytes, Absolute 3.33 (H) 0.70 - 3.10 10*3/mm3    Monocytes, Absolute 2.11 (H) 0.10 - 0.90 10*3/mm3    Eosinophils, Absolute 0.33 0.00 - 0.40 10*3/mm3    Basophils, Absolute 0.08 0.00 - 0.20 10*3/mm3    Immature Grans, Absolute 0.10 (H) 0.00 - 0.05 10*3/mm3    nRBC 0.0 0.0 - 0.2 /100 WBC   Lactic Acid, Plasma    Specimen: Arm, Right; Blood    Result Value Ref Range    Lactate 1.5 0.5 - 2.0 mmol/L   Urinalysis, Microscopic Only - Urine, Clean Catch    Specimen: Urine, Clean Catch   Result Value Ref Range    RBC, UA 3-5 (A) None Seen, 0-2 /HPF    WBC, UA 0-2 None Seen, 0-2 /HPF    Bacteria, UA None Seen None Seen /HPF    Squamous Epithelial Cells, UA 0-2 None Seen, 0-2 /HPF    Hyaline Casts, UA 0-2 None Seen /LPF    Methodology Automated Microscopy    Protime-INR    Specimen: Arm, Right; Blood   Result Value Ref Range    Protime 15.1 (H) 12.8 - 14.5 Seconds    INR 1.14 (H) 0.90 - 1.10   aPTT    Specimen: Arm, Right; Blood   Result Value Ref Range    PTT 32.0 25.5 - 35.4 seconds   Green Top (Gel)   Result Value Ref Range    Extra Tube Hold for add-ons.    Lavender Top   Result Value Ref Range    Extra Tube hold for add-on    Gold Top - SST   Result Value Ref Range    Extra Tube Hold for add-ons.                 ED Course  ED Course as of 10/13/21 0956   Mon Oct 11, 2021   0834 Patient voices improvement in his pain with IV Dilaudid.  He is much more relaxed now, appears much more comfortable.  At reexam his abdominal tenderness appears much more localized to the right upper quadrant and to a lesser extent the epigastrium now.  No acute peritoneal signs.  Radiology here to take him to CT. [CM]   1110 Case discussed with Dr. Pillai. He will take patient to the operating room today, advises keep him n.p.o., he will see him in the ED as soon as possible. Patient reports he has had nothing to eat or drink today. [CM]   1322 Dr. Pillai has seen the patient in the ED.  Surgery is here to take the patient to the OR. [CM]      ED Course User Index  [CM] Grant Mar MD                                           [facility]    Final diagnoses:   Acute cholecystitis       ED Disposition  ED Disposition     ED Disposition Condition Comment    Send to OR              Please note that portions of this note were completed with a voice recognition program.           Grant Mar MD  10/13/21 0984

## 2021-10-11 NOTE — ED NOTES
Patient is NPO, notified patient that he couldn't eat or drink. Patient verbalized understanding.      Emilee Jimenez RN  10/11/21 1111

## 2021-10-11 NOTE — ED NOTES
Patient to surgery via stretcher. NADN. Alert and oreinted x 3.      Emilee Jimenez, RN  10/11/21 8236

## 2021-10-11 NOTE — OP NOTE
CHOLECYSTECTOMY LAPAROSCOPIC  Procedure Note    nOel Jimenez  10/11/2021    Pre-op Diagnosis:   Acute cholecystitis [K81.0]    Post-op Diagnosis:  Same, enlarged, inflamed fatty liver       Procedure(s):  CHOLECYSTECTOMY LAPAROSCOPIC    Surgeon(s):  Ghulam Pillai MD    Anesthesia: General    Staff:   Circulator: Medina Duffy RN; Zoe Dan RN  Scrub Person: NildaLoretta  Documenter: Kenji Croft RN  Assistant: Renato Spain    Estimated Blood Loss: <500ml    Specimens:                Order Name Source Comment Collection Info Order Time   SURGICAL PATHOLOGY EXAM Gallbladder  Collected By: Ghulam Pillai MD 10/11/2021  2:38 PM     Collection Date   10/11/2021          Collection Time    2:38 PM          Release to patient   Immediate              Drains:   Closed/Suction Drain RUQ Bulb 10 Fr. (Active)       Procedure: the abdomen was prepped and draped. Two 5 mm and one 11 mm port placed. The gallbladder was thick walled and acutely inflamed with omentum adherent over it. The liver was enlarged, inflamed and friable. The omentum was pealed off the gallbladder and the cystic duct dissected out. Once the neck, duct and cystic artery were all cleared off and identified. They were clipped and divided. Everything was inflamed and bled easily, including the liver. The gallbladder was about 3/4 embedded in the enlarged liver. It oozed a lot from freeing the gallbladder out of it. A piece of surgicel was placed there while the gallbladder was removed. It was friable and came out in pieces. The liver bed then required extensive cauterization to stop the oozing. Eventually it appeared controlled and a piece of surgicel was placed over it. A 10 mm JOHN drain was placed out the RUQ port site. It was sutured in with a silk suture. The other port sites were closed with vicryl and local injected.     Findings: enflamed, enlarged, fatty liver, acute cholecystitis     Complications: none    Grafts / Implants N/A    Ghulam Pillai MD     Date: 10/11/2021  Time: 15:34 EDT

## 2021-10-11 NOTE — ANESTHESIA PREPROCEDURE EVALUATION
Anesthesia Evaluation     Patient summary reviewed and Nursing notes reviewed   no history of anesthetic complications:  NPO Solid Status: > 8 hours  NPO Liquid Status: > 8 hours           Airway   Mallampati: III  TM distance: <3 FB  Neck ROM: full  Dental    (+) poor dentition    Pulmonary - normal exam   (+) a smoker Former,   Cardiovascular   Exercise tolerance: good (4-7 METS)    ECG reviewed  PT is on anticoagulation therapy  Rhythm: regular  Rate: normal    (+) hypertension, CAD, CABG >6 Months, dysrhythmias ([facility]ck[facility] Parkinson White - states follows cardiology regularly - asymptomatic currently), hyperlipidemia,     ROS comment: Last dose plavix yesterday    Neuro/Psych  (+) psychiatric history Anxiety,     GI/Hepatic/Renal/Endo    (+) obesity,  GERD,  renal disease CRI,     Musculoskeletal     (+) neck pain,   Abdominal  - normal exam   Substance History   (+) alcohol use,       Comment: Last drink 4 days ago/ chronic heavy ETOH use   OB/GYN negative ob/gyn ROS         Other                        Anesthesia Plan    ASA 4     general     intravenous induction     Anesthetic plan, all risks, benefits, and alternatives have been provided, discussed and informed consent has been obtained with: patient.        Lab Results   Component Value Date    WBC 14.20 (H) 10/11/2021    HGB 16.4 10/11/2021    HCT 49.2 10/11/2021    MCV 90.6 10/11/2021     10/11/2021       Lab Results   Component Value Date    GLUCOSE 102 (H) 10/11/2021    BUN 12 10/11/2021    CREATININE 1.32 (H) 10/11/2021    EGFRIFNONA 56 (L) 10/11/2021    BCR 9.1 10/11/2021    K 3.9 10/11/2021    CO2 18.0 (L) 10/11/2021    CALCIUM 9.3 10/11/2021    ALBUMIN 3.64 10/11/2021    LABIL2 1.7 05/04/2016    AST 18 10/11/2021    ALT 35 10/11/2021

## 2021-10-11 NOTE — ANESTHESIA PROCEDURE NOTES
Airway  Urgency: elective    Date/Time: 10/11/2021 2:20 PM  Airway not difficult    General Information and Staff    Patient location during procedure: OR  CRNA: Briana Rivera CRNA    Indications and Patient Condition  Indications for airway management: airway protection    Preoxygenated: yes  MILS maintained throughout  Mask difficulty assessment: 0 - not attempted    Final Airway Details  Final airway type: endotracheal airway      Successful airway: ETT  Cuffed: yes   Successful intubation technique: direct laryngoscopy  Endotracheal tube insertion site: oral  Blade: Patel  Blade size: 2  ETT size (mm): 7.5  Cormack-Lehane Classification: grade I - full view of glottis  Placement verified by: chest auscultation   Measured from: lips  ETT/EBT  to lips (cm): 21  Number of attempts at approach: 1  Assessment: lips, teeth, and gum same as pre-op and atraumatic intubation

## 2021-10-11 NOTE — H&P
[facility]   HISTORY AND PHYSICAL    Patient Name: Onel Jimenez  : 1965  MRN: [id number]  Primary Care Physician:  Alex Canales MD  Date of admission: 10/11/2021    Subjective   Subjective     Chief Complaint: cholecystitis, alcoholism    History of Present Illness He is a 57 yo obese male who regularly drinks alcohol, who has had abdominal pain for two weeks that is steadily getting worse. He has gallstones and thickened gallbladder on US. He also has a UTI. He is not aware of any liver disease and LFTs are normal. WBC 14. He does have heart disease and had a CABG last year.     Review of Systems   Constitutional: Negative for activity change, appetite change, chills, fever and unexpected weight change.   HENT: Negative for congestion, facial swelling and sore throat.    Eyes: Negative for photophobia and visual disturbance.   Respiratory: Negative for chest tightness, shortness of breath and wheezing.    Cardiovascular: Negative for chest pain, palpitations and leg swelling.   Gastrointestinal: Positive for abdominal pain and nausea. Negative for abdominal distention, anal bleeding, blood in stool, constipation, diarrhea, rectal pain and vomiting.   Endocrine: Negative for cold intolerance, heat intolerance, polydipsia and polyuria.   Genitourinary: Negative for difficulty urinating, dysuria, flank pain and urgency.   Musculoskeletal: Negative for back pain and myalgias.   Skin: Negative for rash and wound.   Allergic/Immunologic: Negative for immunocompromised state.   Neurological: Negative for dizziness, seizures, syncope, light-headedness, numbness and headaches.   Hematological: Negative for adenopathy. Does not bruise/bleed easily.   Psychiatric/Behavioral: Negative for behavioral problems and confusion. The patient is not nervous/anxious.         Personal History     Past Medical History:   Diagnosis Date   • Anxiety disorder    • CAD (coronary artery disease)    • Chronic alcoholism  ([facility])    • Hyperlipidemia    • Hypertension    • WPW (Zakiya-Parkinson-White syndrome)        Past Surgical History:   Procedure Laterality Date   • CARDIAC CATHETERIZATION  06/06/2015   • CARDIAC CATHETERIZATION N/A 8/19/2020    Procedure: Left Heart Cath;  Surgeon: Jack Beebe MD;  Location:  [facility] CATH INVASIVE LOCATION;  Service: Cardiology;  Laterality: N/A;   • CARDIOVASCULAR STRESS TEST  06/06/2015   • CORONARY ARTERY BYPASS GRAFT N/A 8/26/2020    Procedure: MEDIAN STERNOTOMY, CORONARY ARTERY BYPASS GRAFTING X  4, UTILIZING THE LEFT INTERNAL MAMMARY ARTERY AND EVH OF THE RIGHT GREATER SAPHENOUS VEIN;  Surgeon: David Kuo MD;  Location:  [facility] OR;  Service: Cardiothoracic;  Laterality: N/A;  LEG START - 0740  VEIN OUT - 0820  VEIN READY - 0832   • ECHO - CONVERTED  06/06/2015   • EYE SURGERY         Family History: family history includes Heart attack in his father; Heart disease in his brother, father, sister, and sister; Heart failure in his brother, father, sister, and sister. Otherwise pertinent FHx was reviewed and not pertinent to current issue.    Social History:  reports that he quit smoking about 18 years ago. His smoking use included cigarettes. He has a 20.00 pack-year smoking history. His smokeless tobacco use includes chew and snuff. He reports current alcohol use of about 10.0 standard drinks of alcohol per week. He reports current drug use. Drug: Hydrocodone.    Home Medications:  aspirin, atorvastatin, ciprofloxacin, clopidogrel, fish oil, folic acid, lactulose, magnesium oxide, metoprolol tartrate, metroNIDAZOLE, nitroglycerin, pantoprazole, and thiamine    Allergies:  Allergies   Allergen Reactions   • Niacin Rash     Whelps       Objective    Objective     Vitals:   Temp:  [98.2 °F (36.8 °C)] 98.2 °F (36.8 °C)  Heart Rate:  [82-88] 88  Resp:  [18-20] 18  BP: (106-118)/(67-76) 106/67    Physical Exam  Vitals reviewed.   Constitutional:       General: He is not in acute distress.      Appearance: He is well-developed. He is not ill-appearing.   HENT:      Head: Normocephalic. No laceration. Hair is normal.      Right Ear: Hearing and ear canal normal.      Left Ear: Hearing and ear canal normal.      Nose: Nose normal.      Right Sinus: No maxillary sinus tenderness or frontal sinus tenderness.      Left Sinus: No maxillary sinus tenderness or frontal sinus tenderness.   Eyes:      General: Lids are normal.      Conjunctiva/sclera: Conjunctivae normal.      Pupils: Pupils are equal, round, and reactive to light.   Neck:      Thyroid: No thyroid mass or thyromegaly.      Vascular: No JVD.      Trachea: No tracheal tenderness or tracheal deviation.   Cardiovascular:      Rate and Rhythm: Normal rate and regular rhythm.      Heart sounds: No murmur heard.  No gallop.    Pulmonary:      Effort: Pulmonary effort is normal.      Breath sounds: Normal breath sounds. No stridor. No wheezing.   Chest:      Chest wall: No tenderness.   Breasts:      Right: No supraclavicular adenopathy.      Left: No supraclavicular adenopathy.       Abdominal:      General: Bowel sounds are normal. There is no distension.      Palpations: Abdomen is soft. There is no mass.      Tenderness: There is abdominal tenderness. There is guarding. There is no rebound.      Hernia: No hernia is present.   Musculoskeletal:         General: No deformity.      Cervical back: Normal range of motion.   Lymphadenopathy:      Cervical: No cervical adenopathy.      Upper Body:      Right upper body: No supraclavicular adenopathy.      Left upper body: No supraclavicular adenopathy.   Skin:     General: Skin is warm and dry.      Coloration: Skin is not pale.      Findings: No erythema or rash.   Neurological:      Mental Status: He is alert and oriented to person, place, and time.      Motor: No abnormal muscle tone.   Psychiatric:         Behavior: Behavior normal.         Thought Content: Thought content normal.         Result Review     Result Review:  I have personally reviewed the results from the time of this admission to 10/11/2021 13:01 EDT and agree with these findings:  []  Laboratory  []  Microbiology  []  Radiology  []  EKG/Telemetry   []  Cardiology/Vascular   []  Pathology  []  Old records  []  Other:  Most notable findings include:     Assessment/Plan   Assessment / Plan     Brief Patient Summary:  Onel Jimenez is a 56 y.o. male who has cholecystitis.     Active Hospital Problems:  There are no active hospital problems to display for this patient.    Plan: laparoscopic cholecystectomy      DVT prophylaxis:  No DVT prophylaxis order currently exists.    CODE STATUS:       Admission Status:  I believe this patient meets outpatient status.    Electronically signed by Ghulam Pillai MD, 10/11/21, 1:01 PM EDT.

## 2021-10-11 NOTE — ANESTHESIA POSTPROCEDURE EVALUATION
Patient: Onel Jimenez    Procedure Summary     Date: 10/11/21 Room / Location: [facility] OR 01 /  [facility] OR    Anesthesia Start: 1415 Anesthesia Stop: 1540    Procedure: CHOLECYSTECTOMY LAPAROSCOPIC (N/A Abdomen) Diagnosis:       Acute cholecystitis      (Acute cholecystitis [K81.0])    Surgeons: Ghulam Pillai MD Provider: Cruz Nevarez MD    Anesthesia Type: general ASA Status: 4          Anesthesia Type: general    Vitals  Vitals Value Taken Time   BP 88/58 10/11/21 1540   Temp 97.6 °F (36.4 °C) 10/11/21 1540   Pulse 98 10/11/21 1540   Resp 25 10/11/21 1540   SpO2 93 % 10/11/21 1540           Post Anesthesia Care and Evaluation    Patient location during evaluation: PACU  Patient participation: complete - patient participated  Level of consciousness: awake  Pain score: 0  Pain management: adequate  Airway patency: patent  Anesthetic complications: No anesthetic complications  PONV Status: none  Cardiovascular status: acceptable  Respiratory status: acceptable  Hydration status: acceptable

## 2021-10-13 LAB
LAB AP CASE REPORT: NORMAL
QT INTERVAL: 428 MS
QTC INTERVAL: 487 MS

## 2021-10-16 LAB
BACTERIA SPEC AEROBE CULT: NORMAL
BACTERIA SPEC AEROBE CULT: NORMAL

## 2021-10-18 ENCOUNTER — TELEPHONE (OUTPATIENT)
Dept: SURGERY | Facility: CLINIC | Age: 56
End: 2021-10-18

## 2021-10-18 DIAGNOSIS — K81.0 ACUTE CHOLECYSTITIS: Primary | ICD-10-CM

## 2021-10-18 RX ORDER — ONDANSETRON 4 MG/1
4 TABLET, FILM COATED ORAL DAILY PRN
Qty: 30 TABLET | Refills: 1 | Status: SHIPPED | OUTPATIENT
Start: 2021-10-18 | End: 2022-04-28 | Stop reason: SDUPTHER

## 2021-10-18 NOTE — TELEPHONE ENCOUNTER
Patient called s/p radha rodriges with complaints of severe nausea. Patient had to RS his post-op. Patient sent in Zofran 4 mg per Dr. Pillai.     ondansetron (Zofran) 4 MG tablet [[id number]]     Order Details  Dose: 4 mg Route: Oral Frequency: Daily PRN for Nausea, Vomiting   Dispense Quantity: 30 tablet Refills: 1          Sig: Take 1 tablet by mouth Daily As Needed for Nausea or Vomiting.         Start Date: 10/18/21 End Date: 10/18/22   Written Date: 10/18/21 Expiration Date: 10/18/22       Diagnosis Association: Acute cholecystitis (K81.0)

## 2021-10-19 RX ORDER — CLOPIDOGREL BISULFATE 75 MG/1
75 TABLET ORAL DAILY
Qty: 30 TABLET | Refills: 1 | Status: SHIPPED | OUTPATIENT
Start: 2021-10-19 | End: 2022-01-04

## 2021-10-20 ENCOUNTER — HOSPITAL ENCOUNTER (EMERGENCY)
Facility: HOSPITAL | Age: 56
Discharge: HOME | End: 2021-10-20
Attending: EMERGENCY MEDICINE | Admitting: EMERGENCY MEDICINE

## 2021-10-20 ENCOUNTER — APPOINTMENT (OUTPATIENT)
Dept: CT IMAGING | Facility: HOSPITAL | Age: 56
End: 2021-10-20

## 2021-10-20 VITALS
BODY MASS INDEX: 29.52 KG/M2 | SYSTOLIC BLOOD PRESSURE: 130 MMHG | DIASTOLIC BLOOD PRESSURE: 82 MMHG | HEIGHT: 74 IN | TEMPERATURE: 94.8 F | OXYGEN SATURATION: 96 % | RESPIRATION RATE: 20 BRPM | WEIGHT: 230 LBS | HEART RATE: 77 BPM

## 2021-10-20 DIAGNOSIS — R10.10 PAIN OF UPPER ABDOMEN: Primary | ICD-10-CM

## 2021-10-20 LAB
ALBUMIN SERPL-MCNC: 3.85 G/DL (ref 3.5–5.2)
ALBUMIN/GLOB SERPL: 1.3 G/DL
ALP SERPL-CCNC: 106 U/L (ref 39–117)
ALT SERPL W P-5'-P-CCNC: 84 U/L (ref 1–41)
AMPHET+METHAMPHET UR QL: NEGATIVE
AMPHETAMINES UR QL: NEGATIVE
AMYLASE SERPL-CCNC: 72 U/L (ref 28–100)
ANION GAP SERPL CALCULATED.3IONS-SCNC: 12.5 MMOL/L (ref 5–15)
APTT PPP: 32.2 SECONDS (ref 25.5–35.4)
AST SERPL-CCNC: 49 U/L (ref 1–40)
BARBITURATES UR QL SCN: NEGATIVE
BASOPHILS # BLD AUTO: 0.11 10*3/MM3 (ref 0–0.2)
BASOPHILS NFR BLD AUTO: 1.2 % (ref 0–1.5)
BENZODIAZ UR QL SCN: NEGATIVE
BILIRUB SERPL-MCNC: 0.3 MG/DL (ref 0–1.2)
BILIRUB UR QL STRIP: NEGATIVE
BUN SERPL-MCNC: 11 MG/DL (ref 6–20)
BUN/CREAT SERPL: 11.8 (ref 7–25)
BUPRENORPHINE SERPL-MCNC: NEGATIVE NG/ML
CALCIUM SPEC-SCNC: 8.5 MG/DL (ref 8.6–10.5)
CANNABINOIDS SERPL QL: NEGATIVE
CHLORIDE SERPL-SCNC: 107 MMOL/L (ref 98–107)
CLARITY UR: CLEAR
CO2 SERPL-SCNC: 21.5 MMOL/L (ref 22–29)
COCAINE UR QL: NEGATIVE
COLOR UR: ABNORMAL
CREAT SERPL-MCNC: 0.93 MG/DL (ref 0.76–1.27)
CRP SERPL-MCNC: 5.58 MG/DL (ref 0–0.5)
D-LACTATE SERPL-SCNC: 1.7 MMOL/L (ref 0.5–2)
DEPRECATED RDW RBC AUTO: 48.1 FL (ref 37–54)
EOSINOPHIL # BLD AUTO: 0.46 10*3/MM3 (ref 0–0.4)
EOSINOPHIL NFR BLD AUTO: 4.9 % (ref 0.3–6.2)
ERYTHROCYTE [DISTWIDTH] IN BLOOD BY AUTOMATED COUNT: 14.3 % (ref 12.3–15.4)
ERYTHROCYTE [SEDIMENTATION RATE] IN BLOOD: 69 MM/HR (ref 0–20)
ETHANOL BLD-MCNC: 44 MG/DL (ref 0–10)
ETHANOL UR QL: 0.04 %
FLUAV RNA RESP QL NAA+PROBE: NOT DETECTED
FLUBV RNA RESP QL NAA+PROBE: NOT DETECTED
GFR SERPL CREATININE-BSD FRML MDRD: 83 ML/MIN/1.73
GLOBULIN UR ELPH-MCNC: 3 GM/DL
GLUCOSE SERPL-MCNC: 113 MG/DL (ref 65–99)
GLUCOSE UR STRIP-MCNC: NEGATIVE MG/DL
HCT VFR BLD AUTO: 37.2 % (ref 37.5–51)
HGB BLD-MCNC: 11.8 G/DL (ref 13–17.7)
HGB UR QL STRIP.AUTO: NEGATIVE
IMM GRANULOCYTES # BLD AUTO: 0.15 10*3/MM3 (ref 0–0.05)
IMM GRANULOCYTES NFR BLD AUTO: 1.6 % (ref 0–0.5)
INR PPP: 1.05 (ref 0.9–1.1)
KETONES UR QL STRIP: ABNORMAL
LEUKOCYTE ESTERASE UR QL STRIP.AUTO: NEGATIVE
LIPASE SERPL-CCNC: 109 U/L (ref 13–60)
LYMPHOCYTES # BLD AUTO: 1.54 10*3/MM3 (ref 0.7–3.1)
LYMPHOCYTES NFR BLD AUTO: 16.3 % (ref 19.6–45.3)
MCH RBC QN AUTO: 29.5 PG (ref 26.6–33)
MCHC RBC AUTO-ENTMCNC: 31.7 G/DL (ref 31.5–35.7)
MCV RBC AUTO: 93 FL (ref 79–97)
METHADONE UR QL SCN: NEGATIVE
MONOCYTES # BLD AUTO: 1.06 10*3/MM3 (ref 0.1–0.9)
MONOCYTES NFR BLD AUTO: 11.2 % (ref 5–12)
NEUTROPHILS NFR BLD AUTO: 6.14 10*3/MM3 (ref 1.7–7)
NEUTROPHILS NFR BLD AUTO: 64.8 % (ref 42.7–76)
NITRITE UR QL STRIP: NEGATIVE
NRBC BLD AUTO-RTO: 0 /100 WBC (ref 0–0.2)
OPIATES UR QL: POSITIVE
OXYCODONE UR QL SCN: NEGATIVE
PCP UR QL SCN: NEGATIVE
PH UR STRIP.AUTO: 5.5 [PH] (ref 5–8)
PLATELET # BLD AUTO: 461 10*3/MM3 (ref 140–450)
PMV BLD AUTO: 8.6 FL (ref 6–12)
POTASSIUM SERPL-SCNC: 3.5 MMOL/L (ref 3.5–5.2)
PROCALCITONIN SERPL-MCNC: 0.12 NG/ML (ref 0–0.25)
PROPOXYPH UR QL: NEGATIVE
PROT SERPL-MCNC: 6.8 G/DL (ref 6–8.5)
PROT UR QL STRIP: ABNORMAL
PROTHROMBIN TIME: 14.1 SECONDS (ref 12.8–14.5)
QT INTERVAL: 386 MS
QTC INTERVAL: 436 MS
RBC # BLD AUTO: 4 10*6/MM3 (ref 4.14–5.8)
SARS-COV-2 RNA RESP QL NAA+PROBE: NOT DETECTED
SODIUM SERPL-SCNC: 141 MMOL/L (ref 136–145)
SP GR UR STRIP: 1.02 (ref 1–1.03)
TRICYCLICS UR QL SCN: NEGATIVE
TROPONIN T SERPL-MCNC: <0.01 NG/ML (ref 0–0.03)
UROBILINOGEN UR QL STRIP: ABNORMAL
WBC # BLD AUTO: 9.46 10*3/MM3 (ref 3.4–10.8)

## 2021-10-20 PROCEDURE — 87636 SARSCOV2 & INF A&B AMP PRB: CPT | Performed by: EMERGENCY MEDICINE

## 2021-10-20 PROCEDURE — 82077 ASSAY SPEC XCP UR&BREATH IA: CPT | Performed by: EMERGENCY MEDICINE

## 2021-10-20 PROCEDURE — 83690 ASSAY OF LIPASE: CPT | Performed by: EMERGENCY MEDICINE

## 2021-10-20 PROCEDURE — 84145 PROCALCITONIN (PCT): CPT | Performed by: EMERGENCY MEDICINE

## 2021-10-20 PROCEDURE — 85730 THROMBOPLASTIN TIME PARTIAL: CPT | Performed by: EMERGENCY MEDICINE

## 2021-10-20 PROCEDURE — 93005 ELECTROCARDIOGRAM TRACING: CPT | Performed by: EMERGENCY MEDICINE

## 2021-10-20 PROCEDURE — 85610 PROTHROMBIN TIME: CPT | Performed by: EMERGENCY MEDICINE

## 2021-10-20 PROCEDURE — 80306 DRUG TEST PRSMV INSTRMNT: CPT | Performed by: EMERGENCY MEDICINE

## 2021-10-20 PROCEDURE — 81003 URINALYSIS AUTO W/O SCOPE: CPT | Performed by: EMERGENCY MEDICINE

## 2021-10-20 PROCEDURE — 25010000002 HYDROMORPHONE 1 MG/ML SOLUTION: Performed by: EMERGENCY MEDICINE

## 2021-10-20 PROCEDURE — 25010000002 ONDANSETRON PER 1 MG: Performed by: EMERGENCY MEDICINE

## 2021-10-20 PROCEDURE — 85652 RBC SED RATE AUTOMATED: CPT | Performed by: EMERGENCY MEDICINE

## 2021-10-20 PROCEDURE — 87040 BLOOD CULTURE FOR BACTERIA: CPT | Performed by: EMERGENCY MEDICINE

## 2021-10-20 PROCEDURE — 80053 COMPREHEN METABOLIC PANEL: CPT | Performed by: EMERGENCY MEDICINE

## 2021-10-20 PROCEDURE — 82150 ASSAY OF AMYLASE: CPT | Performed by: EMERGENCY MEDICINE

## 2021-10-20 PROCEDURE — 36415 COLL VENOUS BLD VENIPUNCTURE: CPT

## 2021-10-20 PROCEDURE — 83605 ASSAY OF LACTIC ACID: CPT | Performed by: EMERGENCY MEDICINE

## 2021-10-20 PROCEDURE — 99284 EMERGENCY DEPT VISIT MOD MDM: CPT

## 2021-10-20 PROCEDURE — 74176 CT ABD & PELVIS W/O CONTRAST: CPT

## 2021-10-20 PROCEDURE — 96374 THER/PROPH/DIAG INJ IV PUSH: CPT

## 2021-10-20 PROCEDURE — 86140 C-REACTIVE PROTEIN: CPT | Performed by: EMERGENCY MEDICINE

## 2021-10-20 PROCEDURE — 85025 COMPLETE CBC W/AUTO DIFF WBC: CPT | Performed by: EMERGENCY MEDICINE

## 2021-10-20 PROCEDURE — 96375 TX/PRO/DX INJ NEW DRUG ADDON: CPT

## 2021-10-20 PROCEDURE — 84484 ASSAY OF TROPONIN QUANT: CPT | Performed by: EMERGENCY MEDICINE

## 2021-10-20 RX ORDER — PROCHLORPERAZINE EDISYLATE 5 MG/ML
10 INJECTION INTRAMUSCULAR; INTRAVENOUS EVERY 6 HOURS PRN
Status: DISCONTINUED | OUTPATIENT
Start: 2021-10-20 | End: 2021-10-20 | Stop reason: HOSPADM

## 2021-10-20 RX ORDER — SODIUM CHLORIDE 0.9 % (FLUSH) 0.9 %
10 SYRINGE (ML) INJECTION AS NEEDED
Status: DISCONTINUED | OUTPATIENT
Start: 2021-10-20 | End: 2021-10-20 | Stop reason: HOSPADM

## 2021-10-20 RX ORDER — ONDANSETRON 2 MG/ML
4 INJECTION INTRAMUSCULAR; INTRAVENOUS ONCE
Status: COMPLETED | OUTPATIENT
Start: 2021-10-20 | End: 2021-10-20

## 2021-10-20 RX ORDER — HYDROCODONE BITARTRATE AND ACETAMINOPHEN 7.5; 325 MG/1; MG/1
1 TABLET ORAL EVERY 8 HOURS PRN
Qty: 9 TABLET | Refills: 0 | Status: SHIPPED | OUTPATIENT
Start: 2021-10-20 | End: 2022-04-28 | Stop reason: ALTCHOICE

## 2021-10-20 RX ADMIN — SODIUM CHLORIDE 1000 ML: 9 INJECTION, SOLUTION INTRAVENOUS at 05:22

## 2021-10-20 RX ADMIN — LIDOCAINE HYDROCHLORIDE 150 MG: 10 INJECTION, SOLUTION INFILTRATION; PERINEURAL at 06:50

## 2021-10-20 RX ADMIN — ONDANSETRON 4 MG: 2 INJECTION INTRAMUSCULAR; INTRAVENOUS at 05:23

## 2021-10-20 RX ADMIN — HYDROMORPHONE HYDROCHLORIDE 1 MG: 1 INJECTION, SOLUTION INTRAMUSCULAR; INTRAVENOUS; SUBCUTANEOUS at 05:23

## 2021-10-20 NOTE — ED PROVIDER NOTES
Subjective     History provided by:  Patient   used: No    Abdominal Pain  Pain location:  Generalized  Pain quality: sharp and throbbing    Pain quality comment:  Patient rates his pain is a 5/10 on the pain severity scale that is constant in nature.  Pain radiates to:  Does not radiate  Pain severity:  Moderate  Onset quality:  Gradual  Timing:  Constant  Progression:  Worsening  Chronicity:  New  Context: not alcohol use, not awakening from sleep, not diet changes, not eating, not laxative use, not medication withdrawal, not previous surgeries, not recent illness, not recent sexual activity, not recent travel, not retching, not sick contacts, not suspicious food intake and not trauma    Relieved by:  Nothing  Worsened by:  Nothing  Ineffective treatments:  None tried  Associated symptoms: no anorexia, no belching, no chest pain, no chills, no constipation, no cough, no diarrhea, no dysuria, no fatigue, no fever, no flatus, no hematemesis, no hematochezia, no hematuria, no melena, no nausea, no shortness of breath, no sore throat and no vomiting    Risk factors: no alcohol abuse, no aspirin use, not elderly, has not had multiple surgeries, no NSAID use, not obese and no recent hospitalization        Review of Systems   Constitutional: Negative for activity change, appetite change, chills, diaphoresis, fatigue and fever.   HENT: Negative for congestion, ear pain and sore throat.    Eyes: Negative for redness.   Respiratory: Negative for cough, chest tightness, shortness of breath and wheezing.    Cardiovascular: Negative for chest pain, palpitations and leg swelling.   Gastrointestinal: Positive for abdominal pain. Negative for anorexia, constipation, diarrhea, flatus, hematemesis, hematochezia, melena, nausea and vomiting.   Genitourinary: Negative for dysuria, hematuria and urgency.   Musculoskeletal: Negative for arthralgias, back pain, myalgias and neck pain.   Skin: Negative for pallor,  rash and wound.   Neurological: Negative for dizziness, speech difficulty, weakness and headaches.   Psychiatric/Behavioral: Negative for agitation, behavioral problems, confusion and decreased concentration.   All other systems reviewed and are negative.      Past Medical History:   Diagnosis Date   • Anxiety disorder    • CAD (coronary artery disease)    • Chronic alcoholism (HCC)    • Hyperlipidemia    • Hypertension    • WPW (Zakiya-Parkinson-White syndrome)        Allergies   Allergen Reactions   • Niacin Rash     Whelps       Past Surgical History:   Procedure Laterality Date   • CARDIAC CATHETERIZATION  06/06/2015   • CARDIAC CATHETERIZATION N/A 8/19/2020    Procedure: Left Heart Cath;  Surgeon: Jack Beebe MD;  Location: [facility] CATH INVASIVE LOCATION;  Service: Cardiology;  Laterality: N/A;   • CARDIOVASCULAR STRESS TEST  06/06/2015   • CHOLECYSTECTOMY N/A 10/11/2021    Procedure: CHOLECYSTECTOMY LAPAROSCOPIC;  Surgeon: Ghulam Pillai MD;  Location: [facility] OR;  Service: General;  Laterality: N/A;   • CORONARY ARTERY BYPASS GRAFT N/A 8/26/2020    Procedure: MEDIAN STERNOTOMY, CORONARY ARTERY BYPASS GRAFTING X  4, UTILIZING THE LEFT INTERNAL MAMMARY ARTERY AND EVH OF THE RIGHT GREATER SAPHENOUS VEIN;  Surgeon: David Kuo MD;  Location: [facility] OR;  Service: Cardiothoracic;  Laterality: N/A;  LEG START - 0740  VEIN OUT - 0820  VEIN READY - 0832   • ECHO - CONVERTED  06/06/2015   • EYE SURGERY      x3 eye surgeries as a child       Family History   Problem Relation Age of Onset   • Heart attack Father    • Heart disease Father    • Heart failure Father    • Heart disease Sister    • Heart failure Sister    • Heart disease Brother    • Heart failure Brother    • Heart failure Sister    • Heart disease Sister        Social History     Socioeconomic History   • Marital status:    Tobacco Use   • Smoking status: Former Smoker     Packs/day: 1.00     Years: 20.00     Pack years: 20.00     Types:  Cigarettes     Quit date:      Years since quittin.8   • Smokeless tobacco: Current User     Types: Chew, Snuff   • Tobacco comment: chew 1 can per week   Substance and Sexual Activity   • Alcohol use: Yes     Alcohol/week: 10.0 standard drinks     Types: 6 Cans of beer, 4 Shots of liquor per week     Comment: pint of alcohol every other day    • Drug use: Never   • Sexual activity: Defer           Objective   Physical Exam  Vitals and nursing note reviewed.   Constitutional:       General: He is not in acute distress.     Appearance: Normal appearance. He is well-developed. He is not toxic-appearing or diaphoretic.   HENT:      Head: Normocephalic and atraumatic.      Right Ear: External ear normal.      Left Ear: External ear normal.      Nose: Nose normal.      Mouth/Throat:      Pharynx: No oropharyngeal exudate.      Tonsils: No tonsillar exudate.   Eyes:      General: Lids are normal.      Conjunctiva/sclera: Conjunctivae normal.      Pupils: Pupils are equal, round, and reactive to light.   Neck:      Thyroid: No thyromegaly.   Cardiovascular:      Rate and Rhythm: Normal rate and regular rhythm.      Pulses: Normal pulses.      Heart sounds: Normal heart sounds, S1 normal and S2 normal.   Pulmonary:      Effort: Pulmonary effort is normal. No tachypnea or respiratory distress.      Breath sounds: Normal breath sounds. No decreased breath sounds, wheezing or rales.   Chest:      Chest wall: No tenderness.   Abdominal:      General: Bowel sounds are normal. There is no distension.      Palpations: Abdomen is soft.      Tenderness: There is generalized abdominal tenderness. There is no guarding or rebound.   Musculoskeletal:         General: No tenderness or deformity. Normal range of motion.      Cervical back: Full passive range of motion without pain, normal range of motion and neck supple.   Lymphadenopathy:      Cervical: No cervical adenopathy.   Skin:     General: Skin is warm and dry.       Coloration: Skin is not pale.      Findings: No erythema or rash.   Neurological:      Mental Status: He is alert and oriented to person, place, and time.      GCS: GCS eye subscore is 4. GCS verbal subscore is 5. GCS motor subscore is 6.      Cranial Nerves: No cranial nerve deficit.      Sensory: No sensory deficit.   Psychiatric:         Speech: Speech normal.         Behavior: Behavior normal.         Thought Content: Thought content normal.         Judgment: Judgment normal.         Procedures           ED Course                                           MDM  Number of Diagnoses or Management Options     Amount and/or Complexity of Data Reviewed  Clinical lab tests: ordered and reviewed  Tests in the radiology section of CPT®: ordered and reviewed  Tests in the medicine section of CPT®: reviewed and ordered  Review and summarize past medical records: yes  Independent visualization of images, tracings, or specimens: yes    Risk of Complications, Morbidity, and/or Mortality  Presenting problems: moderate  Diagnostic procedures: moderate  Management options: moderate    Patient Progress  Patient progress: stable      Final diagnoses:   Pain of upper abdomen       ED Disposition  ED Disposition     ED Disposition Condition Comment    Discharge Stable           Alex Canales MD  [address] [place] [address]  [phone]          Ghulam Pillai MD  [address] [place] [address]  [phone]    Schedule an appointment as soon as possible for a visit            Medication List      Changed    * HYDROcodone-acetaminophen 5-325 MG per tablet  Commonly known as: NORCO  Take 1 tablet by mouth Every 4 (Four) Hours As Needed for Moderate Pain  for up to 10 days.  What changed: Another medication with the same name was added. Make sure you understand how and when to take each.     * HYDROcodone-acetaminophen 7.5-325 MG per tablet  Commonly known as: NORCO  Take 1 tablet by mouth Every 8 (Eight) Hours As  Needed for Moderate Pain .  What changed: You were already taking a medication with the same name, and this prescription was added. Make sure you understand how and when to take each.         * This list has 2 medication(s) that are the same as other medications prescribed for you. Read the directions carefully, and ask your doctor or other care provider to review them with you.               Where to Get Your Medications      These medications were sent to [facility] [place], [place] - [address] [phone] [facility] [phone]   [address] [address]    Phone: [phone]   · HYDROcodone-acetaminophen 7.5-325 MG per tablet          Yosef Chapa MD  10/20/21 5910       Eric Llanes MD  10/21/21 [phone]

## 2021-10-21 ENCOUNTER — OFFICE VISIT (OUTPATIENT)
Dept: SURGERY | Facility: CLINIC | Age: 56
End: 2021-10-21

## 2021-10-21 VITALS — BODY MASS INDEX: 29.52 KG/M2 | WEIGHT: 230 LBS | HEIGHT: 74 IN

## 2021-10-21 DIAGNOSIS — Z90.49 STATUS POST LAPAROSCOPIC CHOLECYSTECTOMY: Primary | ICD-10-CM

## 2021-10-21 PROCEDURE — 99024 POSTOP FOLLOW-UP VISIT: CPT | Performed by: SURGERY

## 2021-10-21 RX ORDER — ONDANSETRON 4 MG/1
4 TABLET, FILM COATED ORAL DAILY PRN
Qty: 30 TABLET | Refills: 1 | Status: SHIPPED | OUTPATIENT
Start: 2021-10-21 | End: 2022-06-30 | Stop reason: ALTCHOICE

## 2021-10-25 LAB
BACTERIA SPEC AEROBE CULT: NORMAL
BACTERIA SPEC AEROBE CULT: NORMAL

## 2021-11-30 ENCOUNTER — TELEPHONE (OUTPATIENT)
Dept: CARDIOLOGY | Facility: CLINIC | Age: 56
End: 2021-11-30

## 2021-11-30 RX ORDER — MAGNESIUM OXIDE 400 MG/1
400 TABLET ORAL 3 TIMES DAILY
Qty: 90 TABLET | Refills: 3 | Status: SHIPPED | OUTPATIENT
Start: 2021-11-30

## 2021-11-30 RX ORDER — ATORVASTATIN CALCIUM 40 MG/1
40 TABLET, FILM COATED ORAL DAILY
Qty: 90 TABLET | Refills: 0 | Status: SHIPPED | OUTPATIENT
Start: 2021-11-30 | End: 2022-04-28 | Stop reason: SDUPTHER

## 2021-11-30 RX ORDER — PANTOPRAZOLE SODIUM 40 MG/1
40 TABLET, DELAYED RELEASE ORAL DAILY
Qty: 90 TABLET | Refills: 0 | Status: SHIPPED | OUTPATIENT
Start: 2021-11-30 | End: 2022-04-28 | Stop reason: SDUPTHER

## 2021-11-30 NOTE — TELEPHONE ENCOUNTER
Patient called and is wanting a refill on magnesium 400mg. Informed patient that he could get medication OTC. Patient then stated he doesn't get medication OTC and that he receives prescription due to him being an alcoholic. I did not see were medication was prescribed to him. Last entry was from 08/18/2020 by an unknown provider. Please advise.

## 2022-01-04 RX ORDER — CLOPIDOGREL BISULFATE 75 MG/1
TABLET ORAL
Qty: 30 TABLET | Refills: 1 | Status: SHIPPED | OUTPATIENT
Start: 2022-01-04 | End: 2022-04-28 | Stop reason: SDUPTHER

## 2022-03-02 RX ORDER — OMEGA-3-ACID ETHYL ESTERS 1 G/1
CAPSULE, LIQUID FILLED ORAL
Qty: 30 CAPSULE | Refills: 3 | Status: SHIPPED | OUTPATIENT
Start: 2022-03-02 | End: 2022-07-28

## 2022-04-26 ENCOUNTER — TELEPHONE (OUTPATIENT)
Dept: CARDIOLOGY | Facility: CLINIC | Age: 57
End: 2022-04-26

## 2022-04-26 RX ORDER — METOPROLOL TARTRATE 100 MG/1
TABLET ORAL
Qty: 8 TABLET | Refills: 0 | Status: SHIPPED | OUTPATIENT
Start: 2022-04-26 | End: 2022-04-28 | Stop reason: SDUPTHER

## 2022-04-26 NOTE — TELEPHONE ENCOUNTER
Patient made appointment for Thursday wants enough metoprolol to do until then send to [place] pharmacy

## 2022-04-28 ENCOUNTER — LAB (OUTPATIENT)
Dept: LAB | Facility: HOSPITAL | Age: 57
End: 2022-04-28

## 2022-04-28 ENCOUNTER — OFFICE VISIT (OUTPATIENT)
Dept: CARDIOLOGY | Facility: CLINIC | Age: 57
End: 2022-04-28

## 2022-04-28 VITALS
WEIGHT: 227 LBS | OXYGEN SATURATION: 98 % | HEIGHT: 74 IN | SYSTOLIC BLOOD PRESSURE: 156 MMHG | TEMPERATURE: 96.9 F | BODY MASS INDEX: 29.13 KG/M2 | HEART RATE: 130 BPM | DIASTOLIC BLOOD PRESSURE: 106 MMHG

## 2022-04-28 DIAGNOSIS — Z95.1 S/P CABG X 4: Primary | ICD-10-CM

## 2022-04-28 DIAGNOSIS — I51.89 GRADE I DIASTOLIC DYSFUNCTION: Chronic | ICD-10-CM

## 2022-04-28 DIAGNOSIS — E78.49 OTHER HYPERLIPIDEMIA: ICD-10-CM

## 2022-04-28 DIAGNOSIS — E78.49 OTHER HYPERLIPIDEMIA: Chronic | ICD-10-CM

## 2022-04-28 DIAGNOSIS — I10 ESSENTIAL HYPERTENSION: ICD-10-CM

## 2022-04-28 DIAGNOSIS — Z12.11 SCREENING FOR COLON CANCER: ICD-10-CM

## 2022-04-28 DIAGNOSIS — Z12.5 SCREENING FOR PROSTATE CANCER: ICD-10-CM

## 2022-04-28 DIAGNOSIS — E83.42 HYPOMAGNESEMIA: ICD-10-CM

## 2022-04-28 DIAGNOSIS — R00.0 SINUS TACHYCARDIA: ICD-10-CM

## 2022-04-28 DIAGNOSIS — I10 ESSENTIAL HYPERTENSION: Chronic | ICD-10-CM

## 2022-04-28 DIAGNOSIS — I25.10 CORONARY ARTERY DISEASE INVOLVING NATIVE CORONARY ARTERY OF NATIVE HEART WITHOUT ANGINA PECTORIS: Chronic | ICD-10-CM

## 2022-04-28 LAB
ALBUMIN SERPL-MCNC: 4.5 G/DL (ref 3.5–5.2)
ALBUMIN/GLOB SERPL: 1.5 G/DL
ALP SERPL-CCNC: 67 U/L (ref 39–117)
ALT SERPL W P-5'-P-CCNC: 31 U/L (ref 1–41)
ANION GAP SERPL CALCULATED.3IONS-SCNC: 14 MMOL/L (ref 5–15)
AST SERPL-CCNC: 20 U/L (ref 1–40)
BASOPHILS # BLD AUTO: 0.07 10*3/MM3 (ref 0–0.2)
BASOPHILS NFR BLD AUTO: 0.9 % (ref 0–1.5)
BILIRUB SERPL-MCNC: 0.5 MG/DL (ref 0–1.2)
BUN SERPL-MCNC: 17 MG/DL (ref 6–20)
BUN/CREAT SERPL: 12.8 (ref 7–25)
CALCIUM SPEC-SCNC: 9.8 MG/DL (ref 8.6–10.5)
CHLORIDE SERPL-SCNC: 106 MMOL/L (ref 98–107)
CHOLEST SERPL-MCNC: 258 MG/DL (ref 0–200)
CK SERPL-CCNC: 54 U/L (ref 20–200)
CO2 SERPL-SCNC: 20 MMOL/L (ref 22–29)
CREAT SERPL-MCNC: 1.33 MG/DL (ref 0.76–1.27)
DEPRECATED RDW RBC AUTO: 47.8 FL (ref 37–54)
EGFRCR SERPLBLD CKD-EPI 2021: 61.6 ML/MIN/1.73
EOSINOPHIL # BLD AUTO: 0.14 10*3/MM3 (ref 0–0.4)
EOSINOPHIL NFR BLD AUTO: 1.9 % (ref 0.3–6.2)
ERYTHROCYTE [DISTWIDTH] IN BLOOD BY AUTOMATED COUNT: 17 % (ref 12.3–15.4)
GLOBULIN UR ELPH-MCNC: 3.1 GM/DL
GLUCOSE SERPL-MCNC: 131 MG/DL (ref 65–99)
HCT VFR BLD AUTO: 56.8 % (ref 37.5–51)
HDLC SERPL-MCNC: 35 MG/DL (ref 40–60)
HGB BLD-MCNC: 18.6 G/DL (ref 13–17.7)
IMM GRANULOCYTES # BLD AUTO: 0.02 10*3/MM3 (ref 0–0.05)
IMM GRANULOCYTES NFR BLD AUTO: 0.3 % (ref 0–0.5)
LDLC SERPL CALC-MCNC: 177 MG/DL (ref 0–100)
LDLC/HDLC SERPL: 4.99 {RATIO}
LYMPHOCYTES # BLD AUTO: 2.15 10*3/MM3 (ref 0.7–3.1)
LYMPHOCYTES NFR BLD AUTO: 28.7 % (ref 19.6–45.3)
MAGNESIUM SERPL-MCNC: 1.7 MG/DL (ref 1.6–2.6)
MCH RBC QN AUTO: 28.1 PG (ref 26.6–33)
MCHC RBC AUTO-ENTMCNC: 32.7 G/DL (ref 31.5–35.7)
MCV RBC AUTO: 85.8 FL (ref 79–97)
MONOCYTES # BLD AUTO: 0.88 10*3/MM3 (ref 0.1–0.9)
MONOCYTES NFR BLD AUTO: 11.7 % (ref 5–12)
NEUTROPHILS NFR BLD AUTO: 4.24 10*3/MM3 (ref 1.7–7)
NEUTROPHILS NFR BLD AUTO: 56.5 % (ref 42.7–76)
NRBC BLD AUTO-RTO: 0 /100 WBC (ref 0–0.2)
PLATELET # BLD AUTO: 227 10*3/MM3 (ref 140–450)
PMV BLD AUTO: 10 FL (ref 6–12)
POTASSIUM SERPL-SCNC: 4.7 MMOL/L (ref 3.5–5.2)
PROT SERPL-MCNC: 7.6 G/DL (ref 6–8.5)
PSA SERPL-MCNC: 0.49 NG/ML (ref 0–4)
RBC # BLD AUTO: 6.62 10*6/MM3 (ref 4.14–5.8)
SODIUM SERPL-SCNC: 140 MMOL/L (ref 136–145)
T4 FREE SERPL-MCNC: 1.42 NG/DL (ref 0.93–1.7)
TRIGL SERPL-MCNC: 242 MG/DL (ref 0–150)
TSH SERPL DL<=0.05 MIU/L-ACNC: 1.31 UIU/ML (ref 0.27–4.2)
VLDLC SERPL-MCNC: 46 MG/DL (ref 5–40)
WBC NRBC COR # BLD: 7.5 10*3/MM3 (ref 3.4–10.8)

## 2022-04-28 PROCEDURE — 83735 ASSAY OF MAGNESIUM: CPT

## 2022-04-28 PROCEDURE — 99214 OFFICE O/P EST MOD 30 MIN: CPT | Performed by: INTERNAL MEDICINE

## 2022-04-28 PROCEDURE — 93000 ELECTROCARDIOGRAM COMPLETE: CPT | Performed by: INTERNAL MEDICINE

## 2022-04-28 PROCEDURE — 80050 GENERAL HEALTH PANEL: CPT

## 2022-04-28 PROCEDURE — 36415 COLL VENOUS BLD VENIPUNCTURE: CPT

## 2022-04-28 PROCEDURE — 82550 ASSAY OF CK (CPK): CPT

## 2022-04-28 PROCEDURE — 84439 ASSAY OF FREE THYROXINE: CPT

## 2022-04-28 PROCEDURE — 80061 LIPID PANEL: CPT

## 2022-04-28 PROCEDURE — G0103 PSA SCREENING: HCPCS

## 2022-04-28 RX ORDER — PANTOPRAZOLE SODIUM 40 MG/1
40 TABLET, DELAYED RELEASE ORAL DAILY
Qty: 90 TABLET | Refills: 0 | Status: SHIPPED | OUTPATIENT
Start: 2022-04-28 | End: 2022-06-30 | Stop reason: ALTCHOICE

## 2022-04-28 RX ORDER — CHLORAL HYDRATE 500 MG
1000 CAPSULE ORAL
Qty: 90 CAPSULE | Refills: 0 | Status: SHIPPED | OUTPATIENT
Start: 2022-04-28 | End: 2023-01-20 | Stop reason: SDUPTHER

## 2022-04-28 RX ORDER — METOPROLOL TARTRATE 100 MG/1
TABLET ORAL
Qty: 90 TABLET | Refills: 0 | Status: SHIPPED | OUTPATIENT
Start: 2022-04-28 | End: 2022-06-28

## 2022-04-28 RX ORDER — ATORVASTATIN CALCIUM 40 MG/1
40 TABLET, FILM COATED ORAL DAILY
Qty: 90 TABLET | Refills: 0 | Status: SHIPPED | OUTPATIENT
Start: 2022-04-28 | End: 2022-07-28

## 2022-04-28 RX ORDER — CLOPIDOGREL BISULFATE 75 MG/1
75 TABLET ORAL DAILY
Qty: 90 TABLET | Refills: 0 | Status: SHIPPED | OUTPATIENT
Start: 2022-04-28 | End: 2022-07-28

## 2022-04-28 NOTE — PROGRESS NOTES
subjective     Chief Complaint   Patient presents with   • Rapid Heart Rate     Today   pt has not had meds x 1 week and no transportation   • Numbness     Tingling and pain in bilat feet   • Hypertension     today   • Med Refill     pending     History of Present Illness  Patient is 57 years old white male who ran out of all his medications at least a week ago.  He states that he lives by himself and has no way to get out and no way to get medications.  Pharmacy does not wish to deliver medicines to him regularly.  His son lives out of town has come to visit him and take him to the doctor and drugstore.    He denies any chest pain but complains of palpitations and also states that his feet and toes are tingling.    He still drinking a lot but states that he had cut down quite a bit    Patient has coronary artery disease status post CABG 2020, currently denies any chest pain but complains of palpitations.  He has not taken any medications for last 1 week including his beta-blocker therapy.  Supposed to be taking dual antiplatelet therapy, statin therapy and beta-blocker therapy.  He also taking magnesium.  He has recurrent hypomagnesemia from chronic alcoholism.    Past Surgical History:   Procedure Laterality Date   • CARDIAC CATHETERIZATION  06/06/2015   • CARDIAC CATHETERIZATION N/A 8/19/2020    Procedure: Left Heart Cath;  Surgeon: Jack Beebe MD;  Location: [facility] CATH INVASIVE LOCATION;  Service: Cardiology;  Laterality: N/A;   • CARDIOVASCULAR STRESS TEST  06/06/2015   • CHOLECYSTECTOMY N/A 10/11/2021    Procedure: CHOLECYSTECTOMY LAPAROSCOPIC;  Surgeon: Ghulam Pillai MD;  Location: [facility] OR;  Service: General;  Laterality: N/A;   • CORONARY ARTERY BYPASS GRAFT N/A 8/26/2020    Procedure: MEDIAN STERNOTOMY, CORONARY ARTERY BYPASS GRAFTING X  4, UTILIZING THE LEFT INTERNAL MAMMARY ARTERY AND EVH OF THE RIGHT GREATER SAPHENOUS VEIN;  Surgeon: David Kuo MD;  Location: [facility] OR;  Service:  Cardiothoracic;  Laterality: N/A;  LEG START - 0740  VEIN OUT - 0820  VEIN READY - 0832   • ECHO - CONVERTED  2015   • EYE SURGERY      x3 eye surgeries as a child     Family History   Problem Relation Age of Onset   • Heart attack Father    • Heart disease Father    • Heart failure Father    • Heart disease Sister    • Heart failure Sister    • Heart disease Brother    • Heart failure Brother    • Heart failure Sister    • Heart disease Sister      Past Medical History:   Diagnosis Date   • Anxiety disorder    • CAD (coronary artery disease)    • Chronic alcoholism (HCC)    • Hyperlipidemia    • Hypertension    • WPW (Zakiya-Parkinson-White syndrome)      Patient Active Problem List   Diagnosis   • CAD (coronary artery disease) status post CABG    • Anxiety disorder   • Chronic alcoholism (HCC)   • Essential hypertension   • Hyperlipidemia type IV,uncontrolled   • Gastroesophageal reflux disease without esophagitis   • Sorethroat   • Hypokalemia   • Hypomagnesemia   • Hypocalcemia   • Chest pain   • Hyperlipidemia   • Grade I diastolic dysfunction   • CARLITOS (acute kidney injury) (HCC)   • Abnormal nuclear stress test   • S/P CABG x 4 2020   • Hypophosphatemia   • Sinus tachycardia   • Screening for prostate cancer   • Lower abdominal pain   • Status post laparoscopic cholecystectomy       Social History     Tobacco Use   • Smoking status: Former Smoker     Packs/day: 1.00     Years: 20.00     Pack years: 20.00     Types: Cigarettes     Quit date:      Years since quittin.3   • Smokeless tobacco: Current User     Types: Chew, Snuff   • Tobacco comment: chew 1 can per week   Substance Use Topics   • Alcohol use: Yes     Alcohol/week: 10.0 standard drinks     Types: 6 Cans of beer, 4 Shots of liquor per week     Comment: pint of alcohol every other day    • Drug use: Never       Allergies   Allergen Reactions   • Niacin Rash     Whelps       Current Outpatient Medications on File Prior to Visit    Medication Sig   • aspirin 81 MG EC tablet Take 1 tablet by mouth Daily.   • folic acid (FOLVITE) 1 MG tablet Take 1 tablet by mouth Daily.   • lactulose (CHRONULAC) 10 GM/15ML solution Take 30 mL by mouth 2 (Two) Times a Day As Needed (constipation).   • magnesium oxide (MAG-OX) 400 MG tablet Take 1 tablet by mouth 3 (Three) Times a Day.   • nitroglycerin (NITROSTAT) 0.4 MG SL tablet Place 0.4 mg under the tongue Every 5 (Five) Minutes As Needed for Chest Pain. Take no more than 3 doses in 15 minutes.   • omega-3 acid ethyl esters (LOVAZA) 1 g capsule TAKE 1 CAPSULE BY MOUTH ONCE DAILY WITH BREAKFAST.   • ondansetron (Zofran) 4 MG tablet Take 1 tablet by mouth Daily As Needed for Nausea or Vomiting.   • thiamine (VITAMIN B1) 100 MG tablet Take 1 tablet by mouth Daily.   • [DISCONTINUED] atorvastatin (LIPITOR) 40 MG tablet Take 1 tablet by mouth Daily.   • [DISCONTINUED] ciprofloxacin (Cipro) 500 MG tablet Take 1 tablet by mouth 2 (Two) Times a Day.   • [DISCONTINUED] clopidogrel (PLAVIX) 75 MG tablet TAKE ONE TABLET BY MOUTH ONE TIME DAILY   • [DISCONTINUED] magnesium oxide (MAGOX) 400 (241.3 Mg) MG tablet tablet Take 400 mg by mouth 3 (Three) Times a Day.   • [DISCONTINUED] metoprolol tartrate (LOPRESSOR) 100 MG tablet 150 mg in the morning and 100 mg in the evening. (1-1/2 pill in the morning and 1 pill in the evening)   • [DISCONTINUED] Omega-3 Fatty Acids (fish oil) 1000 MG capsule capsule Take 1 capsule by mouth Daily With Breakfast.   • [DISCONTINUED] pantoprazole (PROTONIX) 40 MG EC tablet Take 1 tablet by mouth Daily.   • [DISCONTINUED] HYDROcodone-acetaminophen (NORCO) 7.5-325 MG per tablet Take 1 tablet by mouth Every 8 (Eight) Hours As Needed for Moderate Pain .   • [DISCONTINUED] metroNIDAZOLE (Flagyl) 500 MG tablet Take 1 tablet by mouth 3 (Three) Times a Day.   • [DISCONTINUED] ondansetron (Zofran) 4 MG tablet Take 1 tablet by mouth Daily As Needed for Nausea or Vomiting.   • [DISCONTINUED]  "pantoprazole (PROTONIX) 40 MG EC tablet Take 1 tablet by mouth Daily.     No current facility-administered medications on file prior to visit.         The following portions of the patient's history were reviewed and updated as appropriate: allergies, current medications, past family history, past medical history, past social history, past surgical history and problem list.    Review of Systems   Constitutional: Negative.   HENT: Negative.  Negative for congestion.    Eyes: Negative.    Cardiovascular: Negative.  Negative for chest pain, cyanosis, dyspnea on exertion, irregular heartbeat, leg swelling, near-syncope, orthopnea, palpitations, paroxysmal nocturnal dyspnea and syncope.        Heart rate and blood pressure elevated   Respiratory: Negative.  Negative for shortness of breath.    Hematologic/Lymphatic: Negative.    Musculoskeletal: Negative.    Gastrointestinal: Negative.    Neurological: Negative.  Negative for headaches.          Objective:     BP (!) 156/106 (BP Location: Left arm, Patient Position: Sitting, Cuff Size: Adult)   Pulse (!) 130   Temp 96.9 °F (36.1 °C)   Ht 188 cm (74\")   Wt 103 kg (227 lb)   SpO2 98%   BMI 29.15 kg/m²   Cardiovascular:      PMI at left midclavicular line. Tachycardia present. Regular rhythm. Normal S1. Normal S2.      Murmurs: There is no murmur.      No gallop. No click. No rub.      Comments: Heart rate and blood pressure are both elevated.  Pulses:     Intact distal pulses.   Edema:     Peripheral edema absent.           Lab Review  Lab Results   Component Value Date     10/20/2021    K 3.5 10/20/2021     10/20/2021    BUN 11 10/20/2021    CREATININE 0.93 10/20/2021    GLUCOSE 113 (H) 10/20/2021    CALCIUM 8.5 (L) 10/20/2021    ALT 84 (H) 10/20/2021    ALKPHOS 106 10/20/2021    LABIL2 1.7 05/04/2016     Lab Results   Component Value Date    CKTOTAL 34 03/19/2021     Lab Results   Component Value Date    WBC 9.46 10/20/2021    HGB 11.8 (L) 10/20/2021    " HCT 37.2 (L) 10/20/2021     (H) 10/20/2021     Lab Results   Component Value Date    INR 1.05 10/20/2021    INR 1.14 (H) 10/11/2021    INR 1.20 (H) 09/05/2020     Lab Results   Component Value Date    MG 1.7 03/19/2021     Lab Results   Component Value Date    PSA 0.300 03/20/2018    TSH 2.380 09/05/2020     No results found for: BNP  Lab Results   Component Value Date    CHLPL 182 05/04/2016    CHOL 158 03/19/2021    TRIG 415 (H) 03/19/2021    HDL 37 (L) 03/19/2021    VLDL 63 (H) 03/19/2021    LDLHDL 1.03 03/19/2021     Lab Results   Component Value Date    LDL 58 03/19/2021    LDL 91 08/21/2020         ECG 12 Lead    Date/Time: 4/28/2022 12:39 PM  Performed by: Alex Canales MD  Authorized by: Alex Canales MD   Comparison: compared with previous ECG from 10/20/2021  Comparison to previous ECG: Tachycardia is new  Rhythm: sinus tachycardia  Rate: tachycardic  BPM: 120  QRS axis: normal  Other findings: non-specific ST-T wave changes    Clinical impression: abnormal EKG               I personally viewed and interpreted the patient's LAB data         Assessment:     1. S/P CABG x 4 8/26/2020    2. Screening for colon cancer    3. Essential hypertension    4. Grade I diastolic dysfunction    5. Other hyperlipidemia    6. Sinus tachycardia    7. Hypomagnesemia    8. Screening for prostate cancer    9. Coronary artery disease involving native coronary artery of native heart without angina pectoris          Plan:     Patient is 57 years old white male who ran out of his medications a week ago and is here for follow-up.  He is complaining of tachycardia.  He was advised to resume his metoprolol prescriptions were called in  Blood pressure is also significantly elevated    He was advised to resume all his medications check his blood pressure and let us know by this evening or tomorrow how the blood pressure is doing.  Hyperlipidemia on Lipitor therapy refill was given.  Hypomagnesemia magnesium  was renewed.  Cessation of alcohol use was stressed.  Lab work scheduled  Lab work done today.  Follow-up scheduled    No follow-ups on file.

## 2022-05-06 ENCOUNTER — TELEPHONE (OUTPATIENT)
Dept: CARDIOLOGY | Facility: CLINIC | Age: 57
End: 2022-05-06

## 2022-05-06 DIAGNOSIS — I10 ESSENTIAL HYPERTENSION: ICD-10-CM

## 2022-05-06 DIAGNOSIS — E78.1 HYPERLIPIDEMIA TYPE IV: Primary | ICD-10-CM

## 2022-05-06 DIAGNOSIS — D58.2 ABNORMAL HEMOGLOBIN: ICD-10-CM

## 2022-05-06 NOTE — TELEPHONE ENCOUNTER
----- Message from Alex Canales MD sent at 4/29/2022 10:30 AM EDT -----  Cholesterol and triglycerides are very very high probably because you ran out of Lipitor  Make sure you keep taking Lipitor and check lab work in 2 months    Blood count is very high also, check CBC in 2 months with iron profile

## 2022-05-06 NOTE — TELEPHONE ENCOUNTER
Carlton Feliciano MA   5/4/2022  5:05 PM EDT Back to Top        Tried contacting patient, left     Carlton Feliciano MA   5/2/2022  3:28 PM EDT         Tried contacting patient. Left VM to return call    Carlton Feliciano MA   4/29/2022 11:25 AM EDT         Tried contacting patient. N/A, Left Voicemail to return call     Sent letter to patient

## 2022-06-28 ENCOUNTER — TELEPHONE (OUTPATIENT)
Dept: CARDIOLOGY | Facility: CLINIC | Age: 57
End: 2022-06-28

## 2022-06-28 RX ORDER — METOPROLOL TARTRATE 100 MG/1
TABLET ORAL
Qty: 90 TABLET | Refills: 0 | Status: SHIPPED | OUTPATIENT
Start: 2022-06-28 | End: 2022-07-28

## 2022-06-30 ENCOUNTER — TELEPHONE (OUTPATIENT)
Dept: CARDIOLOGY | Facility: CLINIC | Age: 57
End: 2022-06-30

## 2022-06-30 RX ORDER — ONDANSETRON 4 MG/1
4 TABLET, FILM COATED ORAL EVERY 8 HOURS PRN
Qty: 30 TABLET | Refills: 0 | Status: SHIPPED | OUTPATIENT
Start: 2022-06-30 | End: 2022-07-28

## 2022-06-30 RX ORDER — FAMOTIDINE 40 MG/1
40 TABLET, FILM COATED ORAL DAILY
Qty: 30 TABLET | Refills: 0 | Status: SHIPPED | OUTPATIENT
Start: 2022-06-30 | End: 2022-07-28

## 2022-06-30 NOTE — TELEPHONE ENCOUNTER
Patient called requesting something be sent to [facility] for a sick stomach.  Patient states the medication previously prescribed but isn't working.

## 2022-07-28 RX ORDER — ONDANSETRON 4 MG/1
TABLET, FILM COATED ORAL
Qty: 30 TABLET | Refills: 0 | Status: SHIPPED | OUTPATIENT
Start: 2022-07-28 | End: 2022-10-10

## 2022-07-28 RX ORDER — CLOPIDOGREL BISULFATE 75 MG/1
75 TABLET ORAL DAILY
Qty: 90 TABLET | Refills: 0 | Status: SHIPPED | OUTPATIENT
Start: 2022-07-28 | End: 2022-10-10

## 2022-07-28 RX ORDER — FAMOTIDINE 40 MG/1
40 TABLET, FILM COATED ORAL DAILY
Qty: 30 TABLET | Refills: 0 | Status: SHIPPED | OUTPATIENT
Start: 2022-07-28 | End: 2022-08-25

## 2022-07-28 RX ORDER — METOPROLOL TARTRATE 100 MG/1
TABLET ORAL
Qty: 90 TABLET | Refills: 0 | Status: SHIPPED | OUTPATIENT
Start: 2022-07-28 | End: 2022-10-10

## 2022-07-28 RX ORDER — OMEGA-3-ACID ETHYL ESTERS 1 G/1
CAPSULE, LIQUID FILLED ORAL
Qty: 90 CAPSULE | Refills: 0 | Status: SHIPPED | OUTPATIENT
Start: 2022-07-28 | End: 2022-08-25 | Stop reason: SDUPTHER

## 2022-07-28 RX ORDER — PANTOPRAZOLE SODIUM 40 MG/1
TABLET, DELAYED RELEASE ORAL
Qty: 90 TABLET | Refills: 0 | OUTPATIENT
Start: 2022-07-28

## 2022-07-28 RX ORDER — METOPROLOL TARTRATE 100 MG/1
TABLET ORAL
Qty: 90 TABLET | Refills: 0 | Status: SHIPPED | OUTPATIENT
Start: 2022-07-28 | End: 2022-08-25 | Stop reason: SDUPTHER

## 2022-07-28 RX ORDER — ATORVASTATIN CALCIUM 40 MG/1
40 TABLET, FILM COATED ORAL DAILY
Qty: 90 TABLET | Refills: 0 | Status: SHIPPED | OUTPATIENT
Start: 2022-07-28 | End: 2022-10-10

## 2022-08-25 ENCOUNTER — OFFICE VISIT (OUTPATIENT)
Dept: CARDIOLOGY | Facility: CLINIC | Age: 57
End: 2022-08-25

## 2022-08-25 VITALS
WEIGHT: 232 LBS | OXYGEN SATURATION: 98 % | SYSTOLIC BLOOD PRESSURE: 140 MMHG | TEMPERATURE: 97 F | HEIGHT: 74 IN | HEART RATE: 64 BPM | DIASTOLIC BLOOD PRESSURE: 80 MMHG | BODY MASS INDEX: 29.77 KG/M2

## 2022-08-25 DIAGNOSIS — I25.10 CORONARY ARTERY DISEASE INVOLVING NATIVE CORONARY ARTERY OF NATIVE HEART WITHOUT ANGINA PECTORIS: Chronic | ICD-10-CM

## 2022-08-25 DIAGNOSIS — E83.42 HYPOMAGNESEMIA: ICD-10-CM

## 2022-08-25 DIAGNOSIS — I51.89 GRADE I DIASTOLIC DYSFUNCTION: Chronic | ICD-10-CM

## 2022-08-25 DIAGNOSIS — I10 ESSENTIAL HYPERTENSION: Chronic | ICD-10-CM

## 2022-08-25 DIAGNOSIS — E78.1 HYPERLIPIDEMIA TYPE IV: Primary | ICD-10-CM

## 2022-08-25 PROCEDURE — 99214 OFFICE O/P EST MOD 30 MIN: CPT | Performed by: INTERNAL MEDICINE

## 2022-08-25 RX ORDER — PANTOPRAZOLE SODIUM 40 MG/1
1 TABLET, DELAYED RELEASE ORAL DAILY
COMMUNITY
Start: 2022-08-12 | End: 2022-10-10

## 2022-08-25 NOTE — PROGRESS NOTES
subjective     Chief Complaint   Patient presents with   • Hypertension     Follow up   • Fatigue     Worse than normal      History of Present Illness  Patient is 57 years old white male who is here for follow-up.  He has multiple chronic medical problems including hyperlipidemia, hypertension, coronary artery disease status post CABG, chronic alcoholism and chronic hypomagnesemia.    Patient is doing very well he denies any chest pain palpitations or shortness of breath.    He  complains of more fatigue than usual    He states that he is not drinking much.  Blood pressure according to him has been running normal.    He did not have lab work done but he plans to have it done tomorrow    Past Surgical History:   Procedure Laterality Date   • CARDIAC CATHETERIZATION  06/06/2015   • CARDIAC CATHETERIZATION N/A 8/19/2020    Procedure: Left Heart Cath;  Surgeon: Jack Beebe MD;  Location: [facility] CATH INVASIVE LOCATION;  Service: Cardiology;  Laterality: N/A;   • CARDIOVASCULAR STRESS TEST  06/06/2015   • CHOLECYSTECTOMY N/A 10/11/2021    Procedure: CHOLECYSTECTOMY LAPAROSCOPIC;  Surgeon: Ghulam Pillai MD;  Location: [facility] OR;  Service: General;  Laterality: N/A;   • CORONARY ARTERY BYPASS GRAFT N/A 8/26/2020    Procedure: MEDIAN STERNOTOMY, CORONARY ARTERY BYPASS GRAFTING X  4, UTILIZING THE LEFT INTERNAL MAMMARY ARTERY AND EVH OF THE RIGHT GREATER SAPHENOUS VEIN;  Surgeon: David Kuo MD;  Location: [facility];  Service: Cardiothoracic;  Laterality: N/A;  LEG START - 0740  VEIN OUT - 0820  VEIN READY - 0832   • ECHO - CONVERTED  06/06/2015   • EYE SURGERY      x3 eye surgeries as a child     Family History   Problem Relation Age of Onset   • Heart attack Father    • Heart disease Father    • Heart failure Father    • Heart disease Sister    • Heart failure Sister    • Heart disease Brother    • Heart failure Brother    • Heart failure Sister    • Heart disease Sister      Past Medical History:    Diagnosis Date   • Anxiety disorder    • CAD (coronary artery disease)    • Chronic alcoholism (HCC)    • Hyperlipidemia    • Hypertension    • WPW (Zakiya-Parkinson-White syndrome)      Patient Active Problem List   Diagnosis   • CAD (coronary artery disease) status post CABG    • Anxiety disorder   • Chronic alcoholism (HCC)   • Essential hypertension   • Hyperlipidemia type IV,uncontrolled   • Gastroesophageal reflux disease without esophagitis   • Sorethroat   • Hypokalemia   • Hypomagnesemia   • Hypocalcemia   • Chest pain   • Hyperlipidemia   • Grade I diastolic dysfunction   • CARLITOS (acute kidney injury) (HCC)   • Abnormal nuclear stress test   • S/P CABG x 4 2020   • Hypophosphatemia   • Sinus tachycardia   • Screening for prostate cancer   • Lower abdominal pain   • Status post laparoscopic cholecystectomy       Social History     Tobacco Use   • Smoking status: Former Smoker     Packs/day: 1.00     Years: 20.00     Pack years: 20.00     Types: Cigarettes     Quit date:      Years since quittin.6   • Smokeless tobacco: Current User     Types: Chew, Snuff   • Tobacco comment: chew 1 can per week   Vaping Use   • Vaping Use: Never used   Substance Use Topics   • Alcohol use: Yes     Alcohol/week: 10.0 standard drinks     Types: 6 Cans of beer, 4 Shots of liquor per week     Comment: pint of alcohol every other day    • Drug use: Never       Allergies   Allergen Reactions   • Niacin Rash     Whelps       Current Outpatient Medications on File Prior to Visit   Medication Sig   • aspirin 81 MG EC tablet Take 1 tablet by mouth Daily.   • atorvastatin (LIPITOR) 40 MG tablet TAKE 1 TABLET BY MOUTH DAILY.   • clopidogrel (PLAVIX) 75 MG tablet TAKE 1 TABLET BY MOUTH DAILY.   • folic acid (FOLVITE) 1 MG tablet Take 1 tablet by mouth Daily.   • lactulose (CHRONULAC) 10 GM/15ML solution Take 30 mL by mouth 2 (Two) Times a Day As Needed (constipation).   • magnesium oxide (MAG-OX) 400 MG tablet Take 1  "tablet by mouth 3 (Three) Times a Day.   • metoprolol tartrate (LOPRESSOR) 100 MG tablet TAKE 1.5 TABLET BY MOUTH IN THE MORNING AND 1 TABLET IN THE EVENING   • nitroglycerin (NITROSTAT) 0.4 MG SL tablet Place 0.4 mg under the tongue Every 5 (Five) Minutes As Needed for Chest Pain. Take no more than 3 doses in 15 minutes.   • Omega-3 Fatty Acids (fish oil) 1000 MG capsule capsule Take 1 capsule by mouth Daily With Breakfast.   • ondansetron (ZOFRAN) 4 MG tablet TAKE ONE TABLET BY MOUTH EVERY 8 HOURS AS NEEDED FOR NAUSEA AND VOMITING.   • pantoprazole (PROTONIX) 40 MG EC tablet Take 1 tablet by mouth Daily.   • thiamine (VITAMIN B1) 100 MG tablet Take 1 tablet by mouth Daily.     No current facility-administered medications on file prior to visit.         The following portions of the patient's history were reviewed and updated as appropriate: allergies, current medications, past family history, past medical history, past social history, past surgical history and problem list.    Review of Systems   Constitutional: Positive for malaise/fatigue.   HENT: Negative.  Negative for congestion.    Eyes: Negative.    Cardiovascular: Negative.  Negative for chest pain, cyanosis, dyspnea on exertion, irregular heartbeat, leg swelling, near-syncope, orthopnea, palpitations, paroxysmal nocturnal dyspnea and syncope.   Respiratory: Negative.  Negative for shortness of breath.    Hematologic/Lymphatic: Negative.    Musculoskeletal: Negative.    Gastrointestinal: Negative.    Neurological: Negative.  Negative for headaches.          Objective:     /80   Pulse 64   Temp 97 °F (36.1 °C)   Ht 188 cm (74\")   Wt 105 kg (232 lb)   SpO2 98%   BMI 29.79 kg/m²   Pulmonary:      Effort: Pulmonary effort is normal.      Breath sounds: Normal breath sounds. No stridor. No wheezing. No rhonchi. No rales.   Cardiovascular:      PMI at left midclavicular line. Normal rate. Regular rhythm. Normal S1. Normal S2.      Murmurs: There is " no murmur.      No gallop. No click. No rub.   Pulses:     Intact distal pulses.   Edema:     Peripheral edema absent.           Lab Review  Lab Results   Component Value Date     04/28/2022    K 4.7 04/28/2022     04/28/2022    BUN 17 04/28/2022    CREATININE 1.33 (H) 04/28/2022    GLUCOSE 131 (H) 04/28/2022    CALCIUM 9.8 04/28/2022    ALT 31 04/28/2022    ALKPHOS 67 04/28/2022    LABIL2 1.7 05/04/2016     Lab Results   Component Value Date    CKTOTAL 54 04/28/2022     Lab Results   Component Value Date    WBC 7.50 04/28/2022    HGB 18.6 (H) 04/28/2022    HCT 56.8 (H) 04/28/2022     04/28/2022     Lab Results   Component Value Date    INR 1.05 10/20/2021    INR 1.14 (H) 10/11/2021    INR 1.20 (H) 09/05/2020     Lab Results   Component Value Date    MG 1.7 04/28/2022     Lab Results   Component Value Date    PSA 0.490 04/28/2022    TSH 1.310 04/28/2022     No results found for: BNP  Lab Results   Component Value Date    CHLPL 182 05/04/2016    CHOL 258 (H) 04/28/2022    TRIG 242 (H) 04/28/2022    HDL 35 (L) 04/28/2022    VLDL 46 (H) 04/28/2022    LDLHDL 4.99 04/28/2022     Lab Results   Component Value Date     (H) 04/28/2022    LDL 58 03/19/2021       Procedures       I personally viewed and interpreted the patient's LAB data         Assessment:     1. Hyperlipidemia type IV,uncontrolled    2. Grade I diastolic dysfunction    3. Essential hypertension    4. Coronary artery disease involving native coronary artery of native heart without angina pectoris    5. Hypomagnesemia          Plan:     Patient is 57 years old white male with known coronary artery disease status post CABG is here for follow-up.  He states that he is doing very well he denies any chest pain palpitations or shortness of breath however he complains of fatigue and tiredness.  He has history of chronic alcoholism but states that he has cut down significantly.  Patient has not had lab work done including magnesium check he  was asked to have lab work done tomorrow.  He will continue aspirin 81 daily along with Plavix.  Lopressor and Lipitor was also continued.  Magnesium will be checked and he will continue magnesium for now.    Cessation of alcohol strongly recommended  Cessation of tobacco use stressed although he already has quit smoking but is still using tobacco  And says that his blood pressure has been normal .  Advised to check blood pressure daily    No follow-ups on file.

## 2022-10-10 RX ORDER — ONDANSETRON 4 MG/1
TABLET, FILM COATED ORAL
Qty: 30 TABLET | Refills: 0 | Status: SHIPPED | OUTPATIENT
Start: 2022-10-10 | End: 2023-01-20 | Stop reason: SDUPTHER

## 2022-10-10 RX ORDER — PANTOPRAZOLE SODIUM 40 MG/1
TABLET, DELAYED RELEASE ORAL
Qty: 90 TABLET | Refills: 4 | Status: SHIPPED | OUTPATIENT
Start: 2022-10-10 | End: 2023-03-27 | Stop reason: ALTCHOICE

## 2022-10-10 RX ORDER — OMEGA-3-ACID ETHYL ESTERS 1 G/1
CAPSULE, LIQUID FILLED ORAL
Qty: 90 CAPSULE | Refills: 0 | Status: SHIPPED | OUTPATIENT
Start: 2022-10-10 | End: 2023-01-20 | Stop reason: SDUPTHER

## 2022-10-10 RX ORDER — METOPROLOL TARTRATE 100 MG/1
TABLET ORAL
Qty: 90 TABLET | Refills: 0 | Status: SHIPPED | OUTPATIENT
Start: 2022-10-10 | End: 2022-11-29 | Stop reason: SDUPTHER

## 2022-10-10 RX ORDER — FAMOTIDINE 40 MG/1
TABLET, FILM COATED ORAL
Qty: 30 TABLET | Refills: 0 | Status: SHIPPED | OUTPATIENT
Start: 2022-10-10 | End: 2023-01-20 | Stop reason: SDUPTHER

## 2022-10-10 RX ORDER — ATORVASTATIN CALCIUM 40 MG/1
TABLET, FILM COATED ORAL
Qty: 90 TABLET | Refills: 0 | Status: SHIPPED | OUTPATIENT
Start: 2022-10-10 | End: 2023-01-20 | Stop reason: SDUPTHER

## 2022-10-10 RX ORDER — CLOPIDOGREL BISULFATE 75 MG/1
TABLET ORAL
Qty: 90 TABLET | Refills: 0 | Status: SHIPPED | OUTPATIENT
Start: 2022-10-10 | End: 2023-01-20 | Stop reason: SDUPTHER

## 2022-11-29 RX ORDER — METOPROLOL TARTRATE 100 MG/1
TABLET ORAL
Qty: 90 TABLET | Refills: 2 | Status: SHIPPED | OUTPATIENT
Start: 2022-11-29

## 2022-11-29 NOTE — TELEPHONE ENCOUNTER
Caller: Barbara Onel L    Relationship: Self    Best call back number: [phone]    Requested Prescriptions:   Requested Prescriptions     Pending Prescriptions Disp Refills   • metoprolol tartrate (LOPRESSOR) 100 MG tablet 90 tablet 0        Pharmacy where request should be sent: [place] [address] [address] [facility] [phone]  - [phone] FX         Does the patient have less than a 3 day supply:  [x] Yes  [] No    Would you like a call back once the refill request has been completed: [x] Yes [] No    If the office needs to give you a call back, can they leave a voicemail: [x] Yes [] No    Natan Villa Rep   11/29/22 15:46 EST

## 2023-01-20 ENCOUNTER — OFFICE VISIT (OUTPATIENT)
Dept: CARDIOLOGY | Facility: CLINIC | Age: 58
End: 2023-01-20
Payer: MEDICAID

## 2023-01-20 VITALS
OXYGEN SATURATION: 98 % | WEIGHT: 235 LBS | HEIGHT: 74 IN | DIASTOLIC BLOOD PRESSURE: 96 MMHG | BODY MASS INDEX: 30.16 KG/M2 | SYSTOLIC BLOOD PRESSURE: 138 MMHG | HEART RATE: 70 BPM

## 2023-01-20 DIAGNOSIS — E83.42 HYPOMAGNESEMIA: ICD-10-CM

## 2023-01-20 DIAGNOSIS — F10.20 CHRONIC ALCOHOLISM: Chronic | ICD-10-CM

## 2023-01-20 DIAGNOSIS — I10 ESSENTIAL HYPERTENSION: Chronic | ICD-10-CM

## 2023-01-20 DIAGNOSIS — M79.672 PAIN IN BOTH FEET: ICD-10-CM

## 2023-01-20 DIAGNOSIS — M79.671 PAIN IN BOTH FEET: ICD-10-CM

## 2023-01-20 DIAGNOSIS — Z23 NEED FOR PROPHYLACTIC VACCINATION AND INOCULATION AGAINST INFLUENZA: ICD-10-CM

## 2023-01-20 DIAGNOSIS — Z95.1 S/P CABG X 4: ICD-10-CM

## 2023-01-20 DIAGNOSIS — I25.10 CORONARY ARTERY DISEASE INVOLVING NATIVE CORONARY ARTERY OF NATIVE HEART WITHOUT ANGINA PECTORIS: Primary | Chronic | ICD-10-CM

## 2023-01-20 DIAGNOSIS — E78.1 HYPERLIPIDEMIA TYPE IV: ICD-10-CM

## 2023-01-20 DIAGNOSIS — Z12.11 SCREENING FOR COLON CANCER: ICD-10-CM

## 2023-01-20 PROCEDURE — 90471 IMMUNIZATION ADMIN: CPT | Performed by: INTERNAL MEDICINE

## 2023-01-20 PROCEDURE — 99214 OFFICE O/P EST MOD 30 MIN: CPT | Performed by: INTERNAL MEDICINE

## 2023-01-20 PROCEDURE — 90686 IIV4 VACC NO PRSV 0.5 ML IM: CPT | Performed by: INTERNAL MEDICINE

## 2023-01-20 PROCEDURE — 93000 ELECTROCARDIOGRAM COMPLETE: CPT | Performed by: INTERNAL MEDICINE

## 2023-01-20 NOTE — TELEPHONE ENCOUNTER
Caller: Onel Jimenez SIN    Relationship: Self    Best call back number: [phone]    Requested Prescriptions:   Requested Prescriptions     Pending Prescriptions Disp Refills   • atorvastatin (LIPITOR) 40 MG tablet 90 tablet 0     Sig: Take 1 tablet by mouth Daily.   • clopidogrel (PLAVIX) 75 MG tablet 90 tablet 0     Sig: Take 1 tablet by mouth Daily.   • famotidine (PEPCID) 40 MG tablet 30 tablet 0     Sig: Take 1 tablet by mouth Daily.   • lactulose (CHRONULAC) 10 GM/15ML solution 150 mL 0     Sig: Take 30 mL by mouth 2 (Two) Times a Day As Needed (constipation).   • omega-3 acid ethyl esters (LOVAZA) 1 g capsule 90 capsule 0     Sig: Take 1 capsule by mouth Daily With Breakfast.   • Omega-3 Fatty Acids (fish oil) 1000 MG capsule capsule 90 capsule 0     Sig: Take 1 capsule by mouth Daily With Breakfast.   • ondansetron (ZOFRAN) 4 MG tablet 30 tablet 0     Sig: Take 1 tablet by mouth Every 8 (Eight) Hours As Needed for Nausea or Vomiting.        Pharmacy where request should be sent: [facility] - [phone]  - [phone] FX     Additional details provided by patient: PT IS COMPLETELY OUT OF MEDICATIONS LISTED. PT ALSO WANTED TO KNOW IF HE COULD GET THE DOSAGE OF HIS STOMACH MEDICATION RAISED DUE TO STILL BEING IN LOTS OF PAIN    Does the patient have less than a 3 day supply:  [x] Yes  [] No    Would you like a call back once the refill request has been completed: [x] Yes [] No    If the office needs to give you a call back, can they leave a voicemail: [x] Yes [] No    Natan Cesar Rep   01/20/23 14:56 EST

## 2023-01-20 NOTE — PROGRESS NOTES
subjective     Chief Complaint   Patient presents with   • Foot Pain     Bilateral numbness and pain   • Hearing Loss     History of Present Illness  Onel is 57 years old white male who is here for follow-up.  He has known coronary artery disease status post CABG in 2020 along with hypertension, hyperlipidemia and chronic alcoholism.  Patient is very hard of hearing.  He complains of bilateral numbness of feet and pain but no claudications.    He denies any chest tightness, palpitations or shortness of breath.  He is taking his medications regularly.  Blood pressure is relatively well controlled and  Because of hyperlipidemia will arrange lipid panel next visit.  He also has history of chronic alcoholism and has history of low magnesium in the past we will check lab work.    Patient has not lost any weight, he continues to gain weight.    Past Surgical History:   Procedure Laterality Date   • CARDIAC CATHETERIZATION  06/06/2015   • CARDIAC CATHETERIZATION N/A 8/19/2020    Procedure: Left Heart Cath;  Surgeon: Jack Beebe MD;  Location: [facility] CATH INVASIVE LOCATION;  Service: Cardiology;  Laterality: N/A;   • CARDIOVASCULAR STRESS TEST  06/06/2015   • CHOLECYSTECTOMY N/A 10/11/2021    Procedure: CHOLECYSTECTOMY LAPAROSCOPIC;  Surgeon: Ghulam Pillai MD;  Location: [facility] OR;  Service: General;  Laterality: N/A;   • CORONARY ARTERY BYPASS GRAFT N/A 8/26/2020    Procedure: MEDIAN STERNOTOMY, CORONARY ARTERY BYPASS GRAFTING X  4, UTILIZING THE LEFT INTERNAL MAMMARY ARTERY AND EVH OF THE RIGHT GREATER SAPHENOUS VEIN;  Surgeon: David Kuo MD;  Location: [facility] OR;  Service: Cardiothoracic;  Laterality: N/A;  LEG START - 0740  VEIN OUT - 0820  VEIN READY - 0832   • ECHO - CONVERTED  06/06/2015   • EYE SURGERY      x3 eye surgeries as a child     Family History   Problem Relation Age of Onset   • Heart attack Father    • Heart disease Father    • Heart failure Father    • Heart disease Sister    • Heart  failure Sister    • Heart disease Brother    • Heart failure Brother    • Heart failure Sister    • Heart disease Sister      Past Medical History:   Diagnosis Date   • Anxiety disorder    • CAD (coronary artery disease)    • Chronic alcoholism (HCC)    • Hyperlipidemia    • Hypertension    • WPW (Zakiya-Parkinson-White syndrome)      Patient Active Problem List   Diagnosis   • CAD (coronary artery disease) status post CABG    • Anxiety disorder   • Chronic alcoholism (HCC)   • Essential hypertension   • Hyperlipidemia type IV,uncontrolled   • Gastroesophageal reflux disease without esophagitis   • Sorethroat   • Hypokalemia   • Hypomagnesemia   • Hypocalcemia   • Chest pain   • Hyperlipidemia   • Grade I diastolic dysfunction   • CARLITOS (acute kidney injury) (HCC)   • Abnormal nuclear stress test   • S/P CABG x 4 2020   • Hypophosphatemia   • Sinus tachycardia   • Screening for prostate cancer   • Lower abdominal pain   • Status post laparoscopic cholecystectomy   • Pain in both feet       Social History     Tobacco Use   • Smoking status: Former     Packs/day: 1.00     Years: 20.00     Pack years: 20.00     Types: Cigarettes     Quit date:      Years since quittin.0   • Smokeless tobacco: Current     Types: Chew, Snuff   • Tobacco comments:     chew 1 can per week   Vaping Use   • Vaping Use: Never used   Substance Use Topics   • Alcohol use: Yes     Alcohol/week: 10.0 standard drinks     Types: 6 Cans of beer, 4 Shots of liquor per week     Comment: pint of alcohol every other day    • Drug use: Never       Allergies   Allergen Reactions   • Niacin Rash     Whelps       Current Outpatient Medications on File Prior to Visit   Medication Sig   • aspirin 81 MG EC tablet Take 1 tablet by mouth Daily.   • atorvastatin (LIPITOR) 40 MG tablet TAKE ONE TABLET BY MOUTH ONCE DAILY   • clopidogrel (PLAVIX) 75 MG tablet TAKE ONE TABLET BY MOUTH ONCE DAILY   • famotidine (PEPCID) 40 MG tablet TAKE ONE TABLET BY  MOUTH ONCE DAILY   • folic acid (FOLVITE) 1 MG tablet Take 1 tablet by mouth Daily.   • lactulose (CHRONULAC) 10 GM/15ML solution Take 30 mL by mouth 2 (Two) Times a Day As Needed (constipation).   • magnesium oxide (MAG-OX) 400 MG tablet Take 1 tablet by mouth 3 (Three) Times a Day.   • metoprolol tartrate (LOPRESSOR) 100 MG tablet 1 1/2 tab in the am and 1 tab in the pm   • nitroglycerin (NITROSTAT) 0.4 MG SL tablet Place 0.4 mg under the tongue Every 5 (Five) Minutes As Needed for Chest Pain. Take no more than 3 doses in 15 minutes.   • omega-3 acid ethyl esters (LOVAZA) 1 g capsule TAKE 1 CAPSULE BY MOUTH DAILY WITH BREAKFAST.   • Omega-3 Fatty Acids (fish oil) 1000 MG capsule capsule Take 1 capsule by mouth Daily With Breakfast.   • ondansetron (ZOFRAN) 4 MG tablet TAKE ONE TABLET BY MOUTH EVERY 8 HOURS AS NEEDED FOR NAUSEA AND VOMITING.   • pantoprazole (PROTONIX) 40 MG EC tablet TAKE ONE TABLET BY MOUTH ONCE DAILY   • thiamine (VITAMIN B1) 100 MG tablet Take 1 tablet by mouth Daily.     No current facility-administered medications on file prior to visit.         The following portions of the patient's history were reviewed and updated as appropriate: allergies, current medications, past family history, past medical history, past social history, past surgical history and problem list.    Review of Systems   Constitutional: Negative.   HENT: Negative.  Negative for congestion.    Eyes: Negative.    Cardiovascular: Negative.  Negative for chest pain, cyanosis, dyspnea on exertion, irregular heartbeat, leg swelling, near-syncope, orthopnea, palpitations, paroxysmal nocturnal dyspnea and syncope.   Respiratory: Negative.  Negative for shortness of breath.    Hematologic/Lymphatic: Negative.    Musculoskeletal: Negative.         Bilateral foot pain and numbness   Gastrointestinal: Negative.    Neurological: Positive for numbness. Negative for headaches.          Objective:     /96 (BP Location: Left arm,  "Patient Position: Sitting, Cuff Size: Adult)   Pulse 70   Ht 188 cm (74\")   Wt 107 kg (235 lb)   SpO2 98%   BMI 30.17 kg/m²   Pulmonary:      Effort: Pulmonary effort is normal.      Breath sounds: Normal breath sounds. No stridor. No wheezing. No rhonchi. No rales.   Cardiovascular:      PMI at left midclavicular line. Normal rate. Regular rhythm. Normal S1. Normal S2.      Murmurs: There is no murmur.      No gallop. No click. No rub.   Pulses:     Intact distal pulses.   Edema:     Peripheral edema absent.           Lab Review  Lab Results   Component Value Date     04/28/2022    K 4.7 04/28/2022     04/28/2022    BUN 17 04/28/2022    CREATININE 1.33 (H) 04/28/2022    GLUCOSE 131 (H) 04/28/2022    CALCIUM 9.8 04/28/2022    ALT 31 04/28/2022    ALKPHOS 67 04/28/2022    LABIL2 1.7 05/04/2016     Lab Results   Component Value Date    CKTOTAL 54 04/28/2022     Lab Results   Component Value Date    WBC 7.50 04/28/2022    HGB 18.6 (H) 04/28/2022    HCT 56.8 (H) 04/28/2022     04/28/2022     Lab Results   Component Value Date    INR 1.05 10/20/2021    INR 1.14 (H) 10/11/2021    INR 1.20 (H) 09/05/2020     Lab Results   Component Value Date    MG 1.7 04/28/2022     Lab Results   Component Value Date    PSA 0.490 04/28/2022    TSH 1.310 04/28/2022     No results found for: BNP  Lab Results   Component Value Date    CHLPL 182 05/04/2016    CHOL 258 (H) 04/28/2022    TRIG 242 (H) 04/28/2022    HDL 35 (L) 04/28/2022    VLDL 46 (H) 04/28/2022    LDLHDL 4.99 04/28/2022     Lab Results   Component Value Date     (H) 04/28/2022    LDL 58 03/19/2021     Lab Results   Component Value Date    PROBNP 194.0 03/19/2021     CARDIAC LABS:            ECG 12 Lead    Date/Time: 1/22/2023 9:53 AM  Performed by: Alex Canales MD  Authorized by: Alex Canales MD   Comparison: compared with previous ECG from 4/28/2022  Comparison to previous ECG: Tachycardia is no longer noted  OH is slightly " more prolonged  Nonspecific ST and T wave changes more prominent.  Rhythm: sinus rhythm  Rate: normal  BPM: 69  Conduction: conduction normal  QRS axis: normal  Other findings: non-specific ST-T wave changes and left ventricular hypertrophy with strain    Clinical impression: abnormal EKG  Comments: Borderline SD prolongation               I personally viewed and interpreted the patient's LAB data         Assessment:     1. Coronary artery disease involving native coronary artery of native heart without angina pectoris    2. Screening for colon cancer    3. Need for prophylactic vaccination and inoculation against influenza    4. Hyperlipidemia type IV,uncontrolled    5. Essential hypertension    6. Chronic alcoholism (HCC)    7. S/P CABG x 4 8/26/2020    8. Hypomagnesemia    9. Pain in both feet          Plan:        Patient is 57 years old white male with multiple chronic medical problems, coronary artery disease status post CABG around 25 years ago.  He seems to be doing very well however he is noncompliant.  He missed last visit and has not had lab work done in almost a year.  Lab work was scheduled including CBC CMP lipid panel B12 folate and magnesium level.    Screening for colon cancer was discussed will get GI consult  Cessation of alcohol was again strongly recommended.    Podiatry referral was made because of bilateral foot pain and numbness.    Flu shot was given.  Hyperlipidemia Lipitor was continued.    Coronary artery disease asymptomatic he will continue antiplatelet therapy, statin therapy with Lipitor and beta-blocker therapy with Lopressor.    We will also check B12 level folate level and magnesium level, cessation of alcoholism was again stressed.  Follow-up scheduled          No follow-ups on file.

## 2023-01-23 RX ORDER — ATORVASTATIN CALCIUM 40 MG/1
40 TABLET, FILM COATED ORAL DAILY
Qty: 90 TABLET | Refills: 0 | Status: SHIPPED | OUTPATIENT
Start: 2023-01-23 | End: 2023-03-27

## 2023-01-23 RX ORDER — CLOPIDOGREL BISULFATE 75 MG/1
75 TABLET ORAL DAILY
Qty: 90 TABLET | Refills: 0 | Status: SHIPPED | OUTPATIENT
Start: 2023-01-23 | End: 2023-03-27

## 2023-01-23 RX ORDER — LACTULOSE 10 G/15ML
20 SOLUTION ORAL 2 TIMES DAILY PRN
Qty: 150 ML | Refills: 0 | Status: SHIPPED | OUTPATIENT
Start: 2023-01-23

## 2023-01-23 RX ORDER — FAMOTIDINE 40 MG/1
40 TABLET, FILM COATED ORAL DAILY
Qty: 30 TABLET | Refills: 0 | Status: SHIPPED | OUTPATIENT
Start: 2023-01-23 | End: 2023-03-27

## 2023-01-23 RX ORDER — ONDANSETRON 4 MG/1
4 TABLET, FILM COATED ORAL EVERY 8 HOURS PRN
Qty: 30 TABLET | Refills: 0 | Status: SHIPPED | OUTPATIENT
Start: 2023-01-23

## 2023-01-23 RX ORDER — CHLORAL HYDRATE 500 MG
1000 CAPSULE ORAL
Qty: 90 CAPSULE | Refills: 0 | Status: SHIPPED | OUTPATIENT
Start: 2023-01-23

## 2023-01-23 RX ORDER — OMEGA-3-ACID ETHYL ESTERS 1 G/1
1 CAPSULE, LIQUID FILLED ORAL
Qty: 90 CAPSULE | Refills: 0 | Status: SHIPPED | OUTPATIENT
Start: 2023-01-23 | End: 2023-03-27

## 2023-03-27 RX ORDER — ATORVASTATIN CALCIUM 40 MG/1
TABLET, FILM COATED ORAL
Qty: 90 TABLET | Refills: 0 | Status: SHIPPED | OUTPATIENT
Start: 2023-03-27

## 2023-03-27 RX ORDER — OMEGA-3-ACID ETHYL ESTERS 1 G/1
CAPSULE, LIQUID FILLED ORAL
Qty: 90 CAPSULE | Refills: 0 | Status: SHIPPED | OUTPATIENT
Start: 2023-03-27

## 2023-03-27 RX ORDER — CLOPIDOGREL BISULFATE 75 MG/1
TABLET ORAL
Qty: 90 TABLET | Refills: 0 | Status: SHIPPED | OUTPATIENT
Start: 2023-03-27

## 2023-03-27 RX ORDER — FAMOTIDINE 40 MG/1
TABLET, FILM COATED ORAL
Qty: 30 TABLET | Refills: 0 | Status: SHIPPED | OUTPATIENT
Start: 2023-03-27

## 2023-05-22 ENCOUNTER — TELEPHONE (OUTPATIENT)
Dept: CARDIOLOGY | Facility: CLINIC | Age: 58
End: 2023-05-22

## 2023-05-22 NOTE — TELEPHONE ENCOUNTER
Caller: Onel Jimenez    Relationship to patient: Self    Best call back number: [phone]       Type of visit: 3 MONTH FOLLOW UP   Requested date:ANYTIME    If rescheduling, when is the original appointment: 5-24-23    Additional notes:PATIENT HAS BEEN SICK OVER A MONTH AND HAS HAD TO RESCHEDULE SEVERAL TIMES PLEASE REACH OUT TO PATIENT TO SCHEDULE.

## 2023-06-08 RX ORDER — METOPROLOL TARTRATE 100 MG/1
TABLET ORAL
Qty: 30 TABLET | Refills: 2 | Status: SHIPPED | OUTPATIENT
Start: 2023-06-08

## 2023-07-20 ENCOUNTER — LAB (OUTPATIENT)
Dept: LAB | Facility: HOSPITAL | Age: 58
End: 2023-07-20
Payer: MEDICAID

## 2023-07-20 DIAGNOSIS — Z12.5 SCREENING FOR PROSTATE CANCER: ICD-10-CM

## 2023-07-20 DIAGNOSIS — I50.32 CHRONIC DIASTOLIC HEART FAILURE: ICD-10-CM

## 2023-07-20 DIAGNOSIS — E83.42 HYPOMAGNESEMIA: ICD-10-CM

## 2023-07-20 DIAGNOSIS — E83.39 HYPOPHOSPHATEMIA: ICD-10-CM

## 2023-07-20 DIAGNOSIS — R53.82 CHRONIC FATIGUE: ICD-10-CM

## 2023-07-20 DIAGNOSIS — E78.49 OTHER HYPERLIPIDEMIA: ICD-10-CM

## 2023-07-20 PROCEDURE — 83880 ASSAY OF NATRIURETIC PEPTIDE: CPT

## 2023-07-20 PROCEDURE — G0103 PSA SCREENING: HCPCS

## 2023-07-20 PROCEDURE — 82607 VITAMIN B-12: CPT

## 2023-07-20 PROCEDURE — 83735 ASSAY OF MAGNESIUM: CPT

## 2023-07-20 PROCEDURE — 80050 GENERAL HEALTH PANEL: CPT

## 2023-07-20 PROCEDURE — 80061 LIPID PANEL: CPT

## 2023-07-20 PROCEDURE — 82306 VITAMIN D 25 HYDROXY: CPT

## 2023-07-20 PROCEDURE — 82550 ASSAY OF CK (CPK): CPT

## 2023-07-20 PROCEDURE — 36415 COLL VENOUS BLD VENIPUNCTURE: CPT

## 2023-07-21 LAB
25(OH)D3 SERPL-MCNC: 31.7 NG/ML (ref 30–100)
ALBUMIN SERPL-MCNC: 4.3 G/DL (ref 3.5–5.2)
ALBUMIN/GLOB SERPL: 2 G/DL
ALP SERPL-CCNC: 58 U/L (ref 39–117)
ALT SERPL W P-5'-P-CCNC: 26 U/L (ref 1–41)
ANION GAP SERPL CALCULATED.3IONS-SCNC: 13.1 MMOL/L (ref 5–15)
AST SERPL-CCNC: 20 U/L (ref 1–40)
BASOPHILS # BLD AUTO: 0.07 10*3/MM3 (ref 0–0.2)
BASOPHILS NFR BLD AUTO: 1 % (ref 0–1.5)
BILIRUB SERPL-MCNC: 0.5 MG/DL (ref 0–1.2)
BUN SERPL-MCNC: 14 MG/DL (ref 8–23)
BUN/CREAT SERPL: 10.9 (ref 7–25)
CALCIUM SPEC-SCNC: 8.9 MG/DL (ref 8.6–10.5)
CHLORIDE SERPL-SCNC: 107 MMOL/L (ref 98–107)
CHOLEST SERPL-MCNC: 137 MG/DL (ref 0–200)
CK SERPL-CCNC: 62 U/L (ref 20–200)
CO2 SERPL-SCNC: 21.9 MMOL/L (ref 22–29)
CREAT SERPL-MCNC: 1.29 MG/DL (ref 0.76–1.27)
DEPRECATED RDW RBC AUTO: 43.6 FL (ref 37–54)
EGFRCR SERPLBLD CKD-EPI 2021: 63.5 ML/MIN/1.73
EOSINOPHIL # BLD AUTO: 0.21 10*3/MM3 (ref 0–0.4)
EOSINOPHIL NFR BLD AUTO: 2.9 % (ref 0.3–6.2)
ERYTHROCYTE [DISTWIDTH] IN BLOOD BY AUTOMATED COUNT: 13.4 % (ref 12.3–15.4)
GLOBULIN UR ELPH-MCNC: 2.2 GM/DL
GLUCOSE SERPL-MCNC: 107 MG/DL (ref 65–99)
HCT VFR BLD AUTO: 50 % (ref 37.5–51)
HDLC SERPL-MCNC: 37 MG/DL (ref 40–60)
HGB BLD-MCNC: 17 G/DL (ref 13–17.7)
IMM GRANULOCYTES # BLD AUTO: 0.02 10*3/MM3 (ref 0–0.05)
IMM GRANULOCYTES NFR BLD AUTO: 0.3 % (ref 0–0.5)
LDLC SERPL CALC-MCNC: 78 MG/DL (ref 0–100)
LDLC/HDLC SERPL: 2.04 {RATIO}
LYMPHOCYTES # BLD AUTO: 1.94 10*3/MM3 (ref 0.7–3.1)
LYMPHOCYTES NFR BLD AUTO: 26.6 % (ref 19.6–45.3)
MAGNESIUM SERPL-MCNC: 1.4 MG/DL (ref 1.6–2.4)
MCH RBC QN AUTO: 30.1 PG (ref 26.6–33)
MCHC RBC AUTO-ENTMCNC: 34 G/DL (ref 31.5–35.7)
MCV RBC AUTO: 88.5 FL (ref 79–97)
MONOCYTES # BLD AUTO: 0.67 10*3/MM3 (ref 0.1–0.9)
MONOCYTES NFR BLD AUTO: 9.2 % (ref 5–12)
NEUTROPHILS NFR BLD AUTO: 4.37 10*3/MM3 (ref 1.7–7)
NEUTROPHILS NFR BLD AUTO: 60 % (ref 42.7–76)
NRBC BLD AUTO-RTO: 0 /100 WBC (ref 0–0.2)
NT-PROBNP SERPL-MCNC: 68.6 PG/ML (ref 0–900)
PLATELET # BLD AUTO: 218 10*3/MM3 (ref 140–450)
PMV BLD AUTO: 10.4 FL (ref 6–12)
POTASSIUM SERPL-SCNC: 4.4 MMOL/L (ref 3.5–5.2)
PROT SERPL-MCNC: 6.5 G/DL (ref 6–8.5)
PSA SERPL-MCNC: 0.41 NG/ML (ref 0–4)
RBC # BLD AUTO: 5.65 10*6/MM3 (ref 4.14–5.8)
SODIUM SERPL-SCNC: 142 MMOL/L (ref 136–145)
TRIGL SERPL-MCNC: 123 MG/DL (ref 0–150)
TSH SERPL DL<=0.05 MIU/L-ACNC: 1.5 UIU/ML (ref 0.27–4.2)
VIT B12 BLD-MCNC: 465 PG/ML (ref 211–946)
VLDLC SERPL-MCNC: 22 MG/DL (ref 5–40)
WBC NRBC COR # BLD: 7.28 10*3/MM3 (ref 3.4–10.8)

## 2023-08-04 ENCOUNTER — HOSPITAL ENCOUNTER (EMERGENCY)
Facility: HOSPITAL | Age: 58
Discharge: OTHER INSTITUTION | End: 2023-08-04
Attending: STUDENT IN AN ORGANIZED HEALTH CARE EDUCATION/TRAINING PROGRAM
Payer: MEDICAID

## 2023-08-04 ENCOUNTER — APPOINTMENT (OUTPATIENT)
Dept: CT IMAGING | Facility: HOSPITAL | Age: 58
End: 2023-08-04
Payer: MEDICAID

## 2023-08-04 VITALS
SYSTOLIC BLOOD PRESSURE: 105 MMHG | RESPIRATION RATE: 20 BRPM | OXYGEN SATURATION: 94 % | TEMPERATURE: 98.2 F | WEIGHT: 240 LBS | HEART RATE: 90 BPM | HEIGHT: 74 IN | DIASTOLIC BLOOD PRESSURE: 78 MMHG | BODY MASS INDEX: 30.8 KG/M2

## 2023-08-04 DIAGNOSIS — K92.2 UPPER GI BLEED: Primary | ICD-10-CM

## 2023-08-04 DIAGNOSIS — K92.0 HEMATEMESIS WITH NAUSEA: ICD-10-CM

## 2023-08-04 DIAGNOSIS — Z79.02 ANTIPLATELET OR ANTITHROMBOTIC LONG-TERM USE: ICD-10-CM

## 2023-08-04 DIAGNOSIS — F10.11 HISTORY OF ALCOHOL ABUSE: ICD-10-CM

## 2023-08-04 LAB
ABO GROUP BLD: NORMAL
ABO GROUP BLD: NORMAL
ALBUMIN SERPL-MCNC: 4.2 G/DL (ref 3.5–5.2)
ALBUMIN/GLOB SERPL: 1.8 G/DL
ALP SERPL-CCNC: 56 U/L (ref 39–117)
ALT SERPL W P-5'-P-CCNC: 19 U/L (ref 1–41)
ANION GAP SERPL CALCULATED.3IONS-SCNC: 10.6 MMOL/L (ref 5–15)
APTT PPP: 27.3 SECONDS (ref 26.5–34.5)
AST SERPL-CCNC: 15 U/L (ref 1–40)
BASOPHILS # BLD AUTO: 0.1 10*3/MM3 (ref 0–0.2)
BASOPHILS NFR BLD AUTO: 1 % (ref 0–1.5)
BILIRUB SERPL-MCNC: 1 MG/DL (ref 0–1.2)
BLD GP AB SCN SERPL QL: NEGATIVE
BUN SERPL-MCNC: 38 MG/DL (ref 8–23)
BUN/CREAT SERPL: 29.5 (ref 7–25)
CALCIUM SPEC-SCNC: 9.6 MG/DL (ref 8.6–10.5)
CHLORIDE SERPL-SCNC: 106 MMOL/L (ref 98–107)
CO2 SERPL-SCNC: 24.4 MMOL/L (ref 22–29)
CREAT SERPL-MCNC: 1.29 MG/DL (ref 0.76–1.27)
CRP SERPL-MCNC: 0.45 MG/DL (ref 0–0.5)
D-LACTATE SERPL-SCNC: 1.4 MMOL/L (ref 0.5–2)
DEPRECATED RDW RBC AUTO: 49.2 FL (ref 37–54)
EGFRCR SERPLBLD CKD-EPI 2021: 63.5 ML/MIN/1.73
EOSINOPHIL # BLD AUTO: 0.15 10*3/MM3 (ref 0–0.4)
EOSINOPHIL NFR BLD AUTO: 1.6 % (ref 0.3–6.2)
ERYTHROCYTE [DISTWIDTH] IN BLOOD BY AUTOMATED COUNT: 14.3 % (ref 12.3–15.4)
ERYTHROCYTE [SEDIMENTATION RATE] IN BLOOD: <1 MM/HR (ref 0–20)
ETHANOL BLD-MCNC: <10 MG/DL (ref 0–10)
ETHANOL UR QL: <0.01 %
FLUAV RNA RESP QL NAA+PROBE: NOT DETECTED
FLUBV RNA ISLT QL NAA+PROBE: NOT DETECTED
GLOBULIN UR ELPH-MCNC: 2.3 GM/DL
GLUCOSE SERPL-MCNC: 99 MG/DL (ref 65–99)
HCT VFR BLD AUTO: 49.4 % (ref 37.5–51)
HGB BLD-MCNC: 15.8 G/DL (ref 13–17.7)
HOLD SPECIMEN: NORMAL
HOLD SPECIMEN: NORMAL
IMM GRANULOCYTES # BLD AUTO: 0.03 10*3/MM3 (ref 0–0.05)
IMM GRANULOCYTES NFR BLD AUTO: 0.3 % (ref 0–0.5)
INR PPP: 1.02 (ref 0.9–1.1)
LYMPHOCYTES # BLD AUTO: 2.48 10*3/MM3 (ref 0.7–3.1)
LYMPHOCYTES NFR BLD AUTO: 25.9 % (ref 19.6–45.3)
MCH RBC QN AUTO: 29.8 PG (ref 26.6–33)
MCHC RBC AUTO-ENTMCNC: 32 G/DL (ref 31.5–35.7)
MCV RBC AUTO: 93 FL (ref 79–97)
MONOCYTES # BLD AUTO: 1.22 10*3/MM3 (ref 0.1–0.9)
MONOCYTES NFR BLD AUTO: 12.8 % (ref 5–12)
NEUTROPHILS NFR BLD AUTO: 5.58 10*3/MM3 (ref 1.7–7)
NEUTROPHILS NFR BLD AUTO: 58.4 % (ref 42.7–76)
NRBC BLD AUTO-RTO: 0 /100 WBC (ref 0–0.2)
PLATELET # BLD AUTO: 193 10*3/MM3 (ref 140–450)
PMV BLD AUTO: 9.8 FL (ref 6–12)
POTASSIUM SERPL-SCNC: 4.6 MMOL/L (ref 3.5–5.2)
PROT SERPL-MCNC: 6.5 G/DL (ref 6–8.5)
PROTHROMBIN TIME: 13.9 SECONDS (ref 12.1–14.7)
QT INTERVAL: 354 MS
QTC INTERVAL: 442 MS
RBC # BLD AUTO: 5.31 10*6/MM3 (ref 4.14–5.8)
RH BLD: POSITIVE
RH BLD: POSITIVE
SARS-COV-2 RNA RESP QL NAA+PROBE: NOT DETECTED
SODIUM SERPL-SCNC: 141 MMOL/L (ref 136–145)
T&S EXPIRATION DATE: NORMAL
WBC NRBC COR # BLD: 9.56 10*3/MM3 (ref 3.4–10.8)
WHOLE BLOOD HOLD COAG: NORMAL
WHOLE BLOOD HOLD SPECIMEN: NORMAL

## 2023-08-04 PROCEDURE — 86140 C-REACTIVE PROTEIN: CPT | Performed by: STUDENT IN AN ORGANIZED HEALTH CARE EDUCATION/TRAINING PROGRAM

## 2023-08-04 PROCEDURE — 85610 PROTHROMBIN TIME: CPT | Performed by: STUDENT IN AN ORGANIZED HEALTH CARE EDUCATION/TRAINING PROGRAM

## 2023-08-04 PROCEDURE — 85730 THROMBOPLASTIN TIME PARTIAL: CPT | Performed by: STUDENT IN AN ORGANIZED HEALTH CARE EDUCATION/TRAINING PROGRAM

## 2023-08-04 PROCEDURE — 86901 BLOOD TYPING SEROLOGIC RH(D): CPT

## 2023-08-04 PROCEDURE — 86850 RBC ANTIBODY SCREEN: CPT | Performed by: STUDENT IN AN ORGANIZED HEALTH CARE EDUCATION/TRAINING PROGRAM

## 2023-08-04 PROCEDURE — 74176 CT ABD & PELVIS W/O CONTRAST: CPT

## 2023-08-04 PROCEDURE — 93010 ELECTROCARDIOGRAM REPORT: CPT | Performed by: SPECIALIST

## 2023-08-04 PROCEDURE — 74176 CT ABD & PELVIS W/O CONTRAST: CPT | Performed by: RADIOLOGY

## 2023-08-04 PROCEDURE — 96365 THER/PROPH/DIAG IV INF INIT: CPT

## 2023-08-04 PROCEDURE — 86900 BLOOD TYPING SEROLOGIC ABO: CPT

## 2023-08-04 PROCEDURE — 87636 SARSCOV2 & INF A&B AMP PRB: CPT | Performed by: STUDENT IN AN ORGANIZED HEALTH CARE EDUCATION/TRAINING PROGRAM

## 2023-08-04 PROCEDURE — 25010000002 ONDANSETRON PER 1 MG: Performed by: STUDENT IN AN ORGANIZED HEALTH CARE EDUCATION/TRAINING PROGRAM

## 2023-08-04 PROCEDURE — 96375 TX/PRO/DX INJ NEW DRUG ADDON: CPT

## 2023-08-04 PROCEDURE — 83605 ASSAY OF LACTIC ACID: CPT | Performed by: STUDENT IN AN ORGANIZED HEALTH CARE EDUCATION/TRAINING PROGRAM

## 2023-08-04 PROCEDURE — 99284 EMERGENCY DEPT VISIT MOD MDM: CPT

## 2023-08-04 PROCEDURE — 85025 COMPLETE CBC W/AUTO DIFF WBC: CPT | Performed by: STUDENT IN AN ORGANIZED HEALTH CARE EDUCATION/TRAINING PROGRAM

## 2023-08-04 PROCEDURE — 93005 ELECTROCARDIOGRAM TRACING: CPT | Performed by: STUDENT IN AN ORGANIZED HEALTH CARE EDUCATION/TRAINING PROGRAM

## 2023-08-04 PROCEDURE — 80053 COMPREHEN METABOLIC PANEL: CPT | Performed by: STUDENT IN AN ORGANIZED HEALTH CARE EDUCATION/TRAINING PROGRAM

## 2023-08-04 PROCEDURE — 96361 HYDRATE IV INFUSION ADD-ON: CPT

## 2023-08-04 PROCEDURE — 86901 BLOOD TYPING SEROLOGIC RH(D): CPT | Performed by: STUDENT IN AN ORGANIZED HEALTH CARE EDUCATION/TRAINING PROGRAM

## 2023-08-04 PROCEDURE — 96366 THER/PROPH/DIAG IV INF ADDON: CPT

## 2023-08-04 PROCEDURE — 82077 ASSAY SPEC XCP UR&BREATH IA: CPT | Performed by: STUDENT IN AN ORGANIZED HEALTH CARE EDUCATION/TRAINING PROGRAM

## 2023-08-04 PROCEDURE — 85652 RBC SED RATE AUTOMATED: CPT | Performed by: STUDENT IN AN ORGANIZED HEALTH CARE EDUCATION/TRAINING PROGRAM

## 2023-08-04 PROCEDURE — 86900 BLOOD TYPING SEROLOGIC ABO: CPT | Performed by: STUDENT IN AN ORGANIZED HEALTH CARE EDUCATION/TRAINING PROGRAM

## 2023-08-04 RX ORDER — SODIUM CHLORIDE 0.9 % (FLUSH) 0.9 %
10 SYRINGE (ML) INJECTION AS NEEDED
Status: DISCONTINUED | OUTPATIENT
Start: 2023-08-04 | End: 2023-08-04 | Stop reason: HOSPADM

## 2023-08-04 RX ORDER — ONDANSETRON 2 MG/ML
4 INJECTION INTRAMUSCULAR; INTRAVENOUS ONCE
Status: COMPLETED | OUTPATIENT
Start: 2023-08-04 | End: 2023-08-04

## 2023-08-04 RX ORDER — PROCHLORPERAZINE EDISYLATE 5 MG/ML
5 INJECTION INTRAMUSCULAR; INTRAVENOUS EVERY 6 HOURS PRN
Status: DISCONTINUED | OUTPATIENT
Start: 2023-08-04 | End: 2023-08-04 | Stop reason: HOSPADM

## 2023-08-04 RX ORDER — PANTOPRAZOLE SODIUM 40 MG/10ML
80 INJECTION, POWDER, LYOPHILIZED, FOR SOLUTION INTRAVENOUS ONCE
Status: COMPLETED | OUTPATIENT
Start: 2023-08-04 | End: 2023-08-04

## 2023-08-04 RX ADMIN — PANTOPRAZOLE SODIUM 80 MG: 40 INJECTION, POWDER, FOR SOLUTION INTRAVENOUS at 03:34

## 2023-08-04 RX ADMIN — PANTOPRAZOLE SODIUM 8 MG/HR: 40 INJECTION, POWDER, FOR SOLUTION INTRAVENOUS at 04:06

## 2023-08-04 RX ADMIN — ONDANSETRON HYDROCHLORIDE 4 MG: 2 INJECTION, SOLUTION INTRAMUSCULAR; INTRAVENOUS at 03:03

## 2023-08-04 RX ADMIN — SODIUM CHLORIDE 1000 ML: 9 INJECTION, SOLUTION INTRAVENOUS at 03:34

## 2023-08-04 NOTE — ED PROVIDER NOTES
"Subjective   History of Present Illness  Chronically ill-appearing 58-year-old male presents by EMS with reported episodes of \"vomiting blood .\" Patient is hemodynamically stable on arrival.  Patient states that he started to notice nausea yesterday evening that resolved by itself and reported no emesis. He has felt nauseated most of today but started vomiting dark maroon color emesis around 9:00pm. He states that he has never had a history of a GI bleed.  He is on antiplatelet therapy currently due to history of four-vessel CABG in 2020.  Review medical records does show that patient has a history of alcohol abuse patient states that he recently has not drank any alcohol today.    Review of Systems    Past Medical History:   Diagnosis Date    Anxiety disorder     CAD (coronary artery disease)     Chronic alcoholism     Hyperlipidemia     Hypertension     WPW (Zakiya-Parkinson-White syndrome)        Allergies   Allergen Reactions    Niacin Rash     Whelps       Past Surgical History:   Procedure Laterality Date    CARDIAC CATHETERIZATION  06/06/2015    CARDIAC CATHETERIZATION N/A 8/19/2020    Procedure: Left Heart Cath;  Surgeon: Jack Beebe MD;  Location: [facility] CATH INVASIVE LOCATION;  Service: Cardiology;  Laterality: N/A;    CARDIOVASCULAR STRESS TEST  06/06/2015    CHOLECYSTECTOMY N/A 10/11/2021    Procedure: CHOLECYSTECTOMY LAPAROSCOPIC;  Surgeon: Ghulam Pillai MD;  Location: [facility] OR;  Service: General;  Laterality: N/A;    CORONARY ARTERY BYPASS GRAFT N/A 8/26/2020    Procedure: MEDIAN STERNOTOMY, CORONARY ARTERY BYPASS GRAFTING X  4, UTILIZING THE LEFT INTERNAL MAMMARY ARTERY AND EVH OF THE RIGHT GREATER SAPHENOUS VEIN;  Surgeon: David Kuo MD;  Location: [facility] OR;  Service: Cardiothoracic;  Laterality: N/A;  LEG START - 0740  VEIN OUT - 0820  VEIN READY - 0832    ECHO - CONVERTED  06/06/2015    EYE SURGERY      x3 eye surgeries as a child       Family History   Problem Relation Age of " Onset    Heart attack Father     Heart disease Father     Heart failure Father     Heart disease Sister     Heart failure Sister     Heart disease Brother     Heart failure Brother     Heart failure Sister     Heart disease Sister        Social History     Socioeconomic History    Marital status:    Tobacco Use    Smoking status: Former     Packs/day: 1.00     Years: 20.00     Pack years: 20.00     Types: Cigarettes     Quit date:      Years since quittin.6    Smokeless tobacco: Current     Types: Chew, Snuff    Tobacco comments:     chew 1 can per week   Vaping Use    Vaping Use: Never used   Substance and Sexual Activity    Alcohol use: Yes     Alcohol/week: 10.0 standard drinks     Types: 6 Cans of beer, 4 Shots of liquor per week     Comment: pint of alcohol every other day     Drug use: Never    Sexual activity: Defer           Objective   Physical Exam  Vitals and nursing note reviewed.   Constitutional:       Appearance: He is ill-appearing.      Comments: Patient appears acutely ill and overall unkept.   HENT:      Head: Normocephalic and atraumatic.      Mouth/Throat:      Mouth: Mucous membranes are dry.   Eyes:      Extraocular Movements: Extraocular movements intact.      Pupils: Pupils are equal, round, and reactive to light.   Abdominal:      General: There is distension.      Tenderness: There is abdominal tenderness.   Musculoskeletal:         General: Normal range of motion.      Cervical back: Normal range of motion.   Skin:     General: Skin is warm and dry.      Coloration: Skin is pale.   Neurological:      General: No focal deficit present.      Mental Status: He is alert.       Critical Care  Performed by: Rupal Clements DO  Authorized by: Rupal Clements DO     Critical care provider statement:     Critical care time (minutes):  35    Critical care time was exclusive of:  Separately billable procedures and treating other patients and teaching time    Critical care was time  spent personally by me on the following activities:  Obtaining history from patient or surrogate, evaluation of patient's response to treatment, development of treatment plan with patient or surrogate, blood draw for specimens, ordering and performing treatments and interventions, ordering and review of laboratory studies, ordering and review of radiographic studies, pulse oximetry, re-evaluation of patient's condition, review of old charts, vascular access procedures, interpretation of cardiac output measurements and examination of patient    I assumed direction of critical care for this patient from another provider in my specialty: no      Care discussed with: accepting provider at another facility         Results for orders placed or performed during the hospital encounter of 08/04/23   COVID-19 and FLU A/B PCR - Swab, Nasopharynx    Specimen: Nasopharynx; Swab   Result Value Ref Range    COVID19 Not Detected Not Detected - Ref. Range    Influenza A PCR Not Detected Not Detected    Influenza B PCR Not Detected Not Detected   Comprehensive Metabolic Panel    Specimen: Blood   Result Value Ref Range    Glucose 99 65 - 99 mg/dL    BUN 38 (H) 8 - 23 mg/dL    Creatinine 1.29 (H) 0.76 - 1.27 mg/dL    Sodium 141 136 - 145 mmol/L    Potassium 4.6 3.5 - 5.2 mmol/L    Chloride 106 98 - 107 mmol/L    CO2 24.4 22.0 - 29.0 mmol/L    Calcium 9.6 8.6 - 10.5 mg/dL    Total Protein 6.5 6.0 - 8.5 g/dL    Albumin 4.2 3.5 - 5.2 g/dL    ALT (SGPT) 19 1 - 41 U/L    AST (SGOT) 15 1 - 40 U/L    Alkaline Phosphatase 56 39 - 117 U/L    Total Bilirubin 1.0 0.0 - 1.2 mg/dL    Globulin 2.3 gm/dL    A/G Ratio 1.8 g/dL    BUN/Creatinine Ratio 29.5 (H) 7.0 - 25.0    Anion Gap 10.6 5.0 - 15.0 mmol/L    eGFR 63.5 >60.0 mL/min/1.73   Protime-INR    Specimen: Blood   Result Value Ref Range    Protime 13.9 12.1 - 14.7 Seconds    INR 1.02 0.90 - 1.10   aPTT    Specimen: Blood   Result Value Ref Range    PTT 27.3 26.5 - 34.5 seconds   C-reactive  Protein    Specimen: Blood   Result Value Ref Range    C-Reactive Protein 0.45 0.00 - 0.50 mg/dL   Sedimentation Rate    Specimen: Blood   Result Value Ref Range    Sed Rate <1 0 - 20 mm/hr   CBC Auto Differential    Specimen: Blood   Result Value Ref Range    WBC 9.56 3.40 - 10.80 10*3/mm3    RBC 5.31 4.14 - 5.80 10*6/mm3    Hemoglobin 15.8 13.0 - 17.7 g/dL    Hematocrit 49.4 37.5 - 51.0 %    MCV 93.0 79.0 - 97.0 fL    MCH 29.8 26.6 - 33.0 pg    MCHC 32.0 31.5 - 35.7 g/dL    RDW 14.3 12.3 - 15.4 %    RDW-SD 49.2 37.0 - 54.0 fl    MPV 9.8 6.0 - 12.0 fL    Platelets 193 140 - 450 10*3/mm3    Neutrophil % 58.4 42.7 - 76.0 %    Lymphocyte % 25.9 19.6 - 45.3 %    Monocyte % 12.8 (H) 5.0 - 12.0 %    Eosinophil % 1.6 0.3 - 6.2 %    Basophil % 1.0 0.0 - 1.5 %    Immature Grans % 0.3 0.0 - 0.5 %    Neutrophils, Absolute 5.58 1.70 - 7.00 10*3/mm3    Lymphocytes, Absolute 2.48 0.70 - 3.10 10*3/mm3    Monocytes, Absolute 1.22 (H) 0.10 - 0.90 10*3/mm3    Eosinophils, Absolute 0.15 0.00 - 0.40 10*3/mm3    Basophils, Absolute 0.10 0.00 - 0.20 10*3/mm3    Immature Grans, Absolute 0.03 0.00 - 0.05 10*3/mm3    nRBC 0.0 0.0 - 0.2 /100 WBC   Ethanol    Specimen: Blood   Result Value Ref Range    Ethanol <10 0 - 10 mg/dL    Ethanol % <0.010 %   Lactic Acid, Plasma    Specimen: Hand, Left; Blood   Result Value Ref Range    Lactate 1.4 0.5 - 2.0 mmol/L   ECG 12 Lead Electrolyte Imbalance   Result Value Ref Range    QT Interval 354 ms    QTC Interval 442 ms   Type & Screen    Specimen: Blood   Result Value Ref Range    ABO Type A     RH type Positive     Antibody Screen Negative     T&S Expiration Date 8/7/2023 11:59:59 PM    ABO RH Specimen Verification    Specimen: Blood   Result Value Ref Range    ABO Type A     RH type Positive    Green Top (Gel)   Result Value Ref Range    Extra Tube Hold for add-ons.    Lavender Top   Result Value Ref Range    Extra Tube hold for add-on    Gold Top - SST   Result Value Ref Range    Extra Tube Hold  for add-ons.    Light Blue Top   Result Value Ref Range    Extra Tube Hold for add-ons.      CT Abdomen Pelvis Without Contrast   Final Result       DISTENDED STOMACH.  HYPERDENSE MATERIAL IS CENTERED IN THE GASTRIC   ANTRUM AND FIRST DUODENUM, SUSPICIOUS FOR BLOOD PRODUCTS GIVEN THE   HISTORY OF HEMATEMESIS.       This report was finalized on 8/4/2023 4:21 AM by Gina Freed MD.                  ED Course  ED Course as of 08/04/23 0730   Fri Aug 04, 2023   0310 ECG 12 Lead Electrolyte Imbalance  Normal sinus rhythm rate 94  QRS 88 QTc 442.  Nonspecific ST wave repolarization abnormalities   [LK]   0447 Review of CT image shows grossly distended stomach with various areas of hyperdensity concerning for active upper GI bleeding that is extending in from the antrum into the duodenum.    Currently [facility] is unable to get in contact with GI.  Given patient's high risk for decompensation we will attempt to transfer to higher level of care.    Patient was placed on Protonix drip in addition to bolusing IV Protonix.  He also required 4 mg of Zofran and 5 mg of Compazine to stop the vomiting. [LK]   [facility]ing for bed assignment.  Dr. Porras, Accepting MD.  [LK]   0508 Deferred NG tube placement due to unknown known status for possible esophageal varices.  [LK]      ED Course User Index  [LK] Rupal Clements DO                                           Medical Decision Making  Problems Addressed:  Antiplatelet or antithrombotic long-term use: complicated acute illness or injury  History of alcohol abuse: complicated acute illness or injury  Upper GI bleed: complicated acute illness or injury    Amount and/or Complexity of Data Reviewed  Labs: ordered.  Radiology: ordered.  ECG/medicine tests: ordered. Decision-making details documented in ED Course.    Risk  Prescription drug management.        Final diagnoses:   Upper GI bleed   History of alcohol abuse   Antiplatelet or  antithrombotic long-term use   Hematemesis with nausea       ED Disposition  ED Disposition       ED Disposition   Transfer to Another Facility     Condition   --    Comment   --               No follow-up provider specified.       Medication List      No changes were made to your prescriptions during this visit.            Rupal Clements DO  08/04/23 0507       Rupal Clements DO  08/04/23 0790

## 2023-08-04 NOTE — ED NOTES
Called [facility] transfer center requesting possible transfer to [facility]. Awaiting return call at this time.

## 2023-08-04 NOTE — ED NOTES
Called [facility] requesting possible transfer. Dr. Clements speaking with Dr. Garcia at this time.

## 2023-08-04 NOTE — ED NOTES
"Received call from [facility] RONNIE not on call \"but will call back at 0600 to see if the GI doc is working today?\"  "

## 2023-10-19 RX ORDER — CLOPIDOGREL BISULFATE 75 MG/1
75 TABLET ORAL DAILY
Qty: 90 TABLET | Refills: 0 | Status: SHIPPED | OUTPATIENT
Start: 2023-10-19

## 2023-10-19 RX ORDER — MAGNESIUM OXIDE 400 MG/1
400 TABLET ORAL 2 TIMES DAILY
Qty: 180 TABLET | Refills: 1 | Status: SHIPPED | OUTPATIENT
Start: 2023-10-19

## 2023-10-19 RX ORDER — ATORVASTATIN CALCIUM 40 MG/1
40 TABLET, FILM COATED ORAL DAILY
Qty: 90 TABLET | Refills: 0 | Status: SHIPPED | OUTPATIENT
Start: 2023-10-19

## 2023-10-19 NOTE — TELEPHONE ENCOUNTER
Caller: Onel Jimenez    Relationship: Self    Best call back number: [phone]    Requested Prescriptions:   Requested Prescriptions     Pending Prescriptions Disp Refills    magnesium oxide (MAG-OX) 400 MG tablet 180 tablet 1     Sig: Take 1 tablet by mouth 2 (Two) Times a Day.    atorvastatin (LIPITOR) 40 MG tablet 90 tablet 0     Sig: Take 1 tablet by mouth Daily.    clopidogrel (PLAVIX) 75 MG tablet 90 tablet 0     Sig: Take 1 tablet by mouth Daily.        Pharmacy where request should be sent: [address] [phone] [facility] [phone] FX     Last office visit with prescribing clinician: 6/22/2023   Last telemedicine visit with prescribing clinician: Visit date not found   Next office visit with prescribing clinician: 11/20/2023     Additional details provided by patient: PT IS OUT OF THESE MEDICATIONS.     Does the patient have less than a 3 day supply:  [x] Yes  [] No    Would you like a call back once the refill request has been completed: [x] Yes [] No    If the office needs to give you a call back, can they leave a voicemail: [x] Yes [] No    Natan Soler Rep   10/19/23 13:55 EDT

## 2023-12-04 RX ORDER — OMEGA-3-ACID ETHYL ESTERS 1 G/1
1 CAPSULE, LIQUID FILLED ORAL DAILY
Qty: 90 CAPSULE | Refills: 0 | Status: SHIPPED | OUTPATIENT
Start: 2023-12-04

## 2023-12-04 RX ORDER — LACTULOSE 10 G/15ML
20 SOLUTION ORAL 2 TIMES DAILY PRN
Qty: 150 ML | Refills: 0 | Status: SHIPPED | OUTPATIENT
Start: 2023-12-04

## 2023-12-04 RX ORDER — FAMOTIDINE 40 MG/1
40 TABLET, FILM COATED ORAL DAILY
Qty: 90 TABLET | Refills: 0 | Status: SHIPPED | OUTPATIENT
Start: 2023-12-04

## 2023-12-04 RX ORDER — MAGNESIUM OXIDE 400 MG/1
400 TABLET ORAL 2 TIMES DAILY
Qty: 180 TABLET | Refills: 1 | Status: SHIPPED | OUTPATIENT
Start: 2023-12-04

## 2023-12-04 RX ORDER — ONDANSETRON 4 MG/1
4 TABLET, FILM COATED ORAL EVERY 8 HOURS PRN
Qty: 30 TABLET | Refills: 0 | Status: SHIPPED | OUTPATIENT
Start: 2023-12-04

## 2023-12-04 RX ORDER — METOPROLOL TARTRATE 100 MG/1
150 TABLET ORAL 2 TIMES DAILY
Qty: 270 TABLET | Refills: 1 | Status: SHIPPED | OUTPATIENT
Start: 2023-12-04

## 2023-12-04 RX ORDER — ASPIRIN 81 MG/1
81 TABLET ORAL DAILY
Qty: 90 TABLET | Refills: 3 | Status: SHIPPED | OUTPATIENT
Start: 2023-12-04

## 2023-12-04 RX ORDER — NITROGLYCERIN 0.4 MG/1
0.4 TABLET SUBLINGUAL
Qty: 25 TABLET | Refills: 0 | Status: SHIPPED | OUTPATIENT
Start: 2023-12-04

## 2023-12-04 RX ORDER — ATORVASTATIN CALCIUM 40 MG/1
40 TABLET, FILM COATED ORAL DAILY
Qty: 90 TABLET | Refills: 0 | Status: SHIPPED | OUTPATIENT
Start: 2023-12-04

## 2023-12-04 RX ORDER — LANOLIN ALCOHOL/MO/W.PET/CERES
100 CREAM (GRAM) TOPICAL DAILY
Qty: 30 TABLET | Refills: 5 | Status: SHIPPED | OUTPATIENT
Start: 2023-12-04

## 2023-12-04 NOTE — TELEPHONE ENCOUNTER
Caller: Onel Jimenez    Relationship: Self    Best call back number: [phone]    Requested Prescriptions:   Requested Prescriptions     Pending Prescriptions Disp Refills    magnesium oxide (MAG-OX) 400 MG tablet 180 tablet 1     Sig: Take 1 tablet by mouth 2 (Two) Times a Day.    aspirin 81 MG EC tablet 90 tablet 3     Sig: Take 1 tablet by mouth Daily.    atorvastatin (LIPITOR) 40 MG tablet 90 tablet 0     Sig: Take 1 tablet by mouth Daily.    famotidine (PEPCID) 40 MG tablet 90 tablet 0     Sig: Take 1 tablet by mouth Daily.    lactulose (CHRONULAC) 10 GM/15ML solution 150 mL 0     Sig: Take 30 mL by mouth 2 (Two) Times a Day As Needed (constipation).    metoprolol tartrate (LOPRESSOR) 100 MG tablet 270 tablet 1     Sig: Take 1.5 tablets by mouth 2 (Two) Times a Day. TAKE 1.5 TABLET BY MOUTH IN THE MORNING AND 1.5 TABLET IN THE EVENING    nitroglycerin (NITROSTAT) 0.4 MG SL tablet       Sig: Place 1 tablet under the tongue Every 5 (Five) Minutes As Needed for Chest Pain. Take no more than 3 doses in 15 minutes.    ondansetron (ZOFRAN) 4 MG tablet 30 tablet 0     Sig: Take 1 tablet by mouth Every 8 (Eight) Hours As Needed for Nausea or Vomiting.    thiamine (VITAMIN B1) 100 MG tablet 30 tablet 5     Sig: Take 1 tablet by mouth Daily.    omega-3 acid ethyl esters (LOVAZA) 1 g capsule 90 capsule 0     Sig: Take 1 capsule by mouth Daily.        Pharmacy where request should be sent: [place] [address] [facility] [facility] [phone] [facility] [phone] FX     Last office visit with prescribing clinician: 6/22/2023   Last telemedicine visit with prescribing clinician: Visit date not found   Next office visit with prescribing clinician: 12/15/2023         Does the patient have less than a 3 day supply:  [] Yes  [x] No    Would you like a call back once the refill request has been completed: [] Yes [x] No    If the office needs to give you a call back, can they leave a voicemail: [] Yes [x]  Dodie Aguayo, RegSched Rep   12/04/23 13:10 EST

## 2024-01-12 ENCOUNTER — OFFICE VISIT (OUTPATIENT)
Dept: CARDIOLOGY | Facility: CLINIC | Age: 59
End: 2024-01-12
Payer: MEDICAID

## 2024-01-12 VITALS
OXYGEN SATURATION: 98 % | BODY MASS INDEX: 29.52 KG/M2 | RESPIRATION RATE: 18 BRPM | DIASTOLIC BLOOD PRESSURE: 86 MMHG | HEIGHT: 74 IN | HEART RATE: 63 BPM | WEIGHT: 230 LBS | SYSTOLIC BLOOD PRESSURE: 151 MMHG

## 2024-01-12 DIAGNOSIS — F10.20 CHRONIC ALCOHOLISM: Chronic | ICD-10-CM

## 2024-01-12 DIAGNOSIS — I25.10 CORONARY ARTERY DISEASE INVOLVING NATIVE CORONARY ARTERY OF NATIVE HEART WITHOUT ANGINA PECTORIS: Primary | Chronic | ICD-10-CM

## 2024-01-12 DIAGNOSIS — R53.82 CHRONIC FATIGUE: ICD-10-CM

## 2024-01-12 DIAGNOSIS — E83.42 HYPOMAGNESEMIA: ICD-10-CM

## 2024-01-12 DIAGNOSIS — E78.2 MIXED HYPERLIPIDEMIA: Chronic | ICD-10-CM

## 2024-01-12 DIAGNOSIS — F41.1 GENERALIZED ANXIETY DISORDER: Chronic | ICD-10-CM

## 2024-01-12 DIAGNOSIS — Z78.9 STATIN INTOLERANCE: ICD-10-CM

## 2024-01-12 PROCEDURE — 99214 OFFICE O/P EST MOD 30 MIN: CPT | Performed by: INTERNAL MEDICINE

## 2024-01-12 RX ORDER — HYDROXYZINE PAMOATE 25 MG/1
25 CAPSULE ORAL EVERY 6 HOURS PRN
COMMUNITY
Start: 2023-08-11

## 2024-01-12 RX ORDER — AMLODIPINE BESYLATE 5 MG/1
5 TABLET ORAL DAILY
Qty: 90 TABLET | Refills: 1 | Status: SHIPPED | OUTPATIENT
Start: 2024-01-12

## 2024-01-12 RX ORDER — PANTOPRAZOLE SODIUM 40 MG/1
40 TABLET, DELAYED RELEASE ORAL DAILY
COMMUNITY

## 2024-01-12 RX ORDER — METOPROLOL TARTRATE 100 MG/1
150 TABLET ORAL 2 TIMES DAILY
Qty: 270 TABLET | Refills: 1 | Status: SHIPPED | OUTPATIENT
Start: 2024-01-12

## 2024-01-12 NOTE — PROGRESS NOTES
subjective     Chief Complaint   Patient presents with    Blurred Vision       Problems Addressed This Visit  1. Coronary artery disease involving native coronary artery of native heart without angina pectoris    2. Mixed hyperlipidemia    3. Chronic alcoholism    4. Generalized anxiety disorder    5. Hypomagnesemia    6. Chronic fatigue    7. Statin intolerance        History of Present Illness    Patient is 58 years old white male who is here for follow-up.  He has history of chronic alcoholism.  He drinks quite heavy and states that he is still drinking a lot of alcohol.  He has prior hypomagnesemia hypokalemia hypophosphatemia  in the past.    Patient recently had upper GI bleeding and was seen at [facility].  He underwent EGD and gastric polypectomy.  He is doing very well and has no further bleeding.    Blood pressure is running little bit high.    He also complains of blurry vision.  Will get ophthalmology consult.    Coronary artery disease status post CABG 2020 asymptomatic.    Hyperlipidemia with statin intolerance.  He recently was seen at  where gastroenterologist stopped his atorvastatin because of liver functions according to the patient  Will arrange PCSK9 inhibitor therapy      Past Surgical History:   Procedure Laterality Date    CARDIAC CATHETERIZATION  06/06/2015    CARDIAC CATHETERIZATION N/A 8/19/2020    Procedure: Left Heart Cath;  Surgeon: Jack Beebe MD;  Location: [facility] CATH INVASIVE LOCATION;  Service: Cardiology;  Laterality: N/A;    CARDIOVASCULAR STRESS TEST  06/06/2015    CHOLECYSTECTOMY N/A 10/11/2021    Procedure: CHOLECYSTECTOMY LAPAROSCOPIC;  Surgeon: Ghulam Pillai MD;  Location: [facility] OR;  Service: General;  Laterality: N/A;    CORONARY ARTERY BYPASS GRAFT N/A 8/26/2020    Procedure: MEDIAN STERNOTOMY, CORONARY ARTERY BYPASS GRAFTING X  4, UTILIZING THE LEFT INTERNAL MAMMARY ARTERY AND EVH OF THE RIGHT GREATER SAPHENOUS VEIN;  Surgeon: David Kuo MD;   Location: [facility] OR;  Service: Cardiothoracic;  Laterality: N/A;  LEG START - 0740  VEIN OUT - 0820  VEIN READY - 0832    ECHO - CONVERTED  2015    EYE SURGERY      x3 eye surgeries as a child     Family History   Problem Relation Age of Onset    Heart attack Father     Heart disease Father     Heart failure Father     Heart disease Sister     Heart failure Sister     Heart disease Brother     Heart failure Brother     Heart failure Sister     Heart disease Sister      Past Medical History:   Diagnosis Date    Anxiety disorder     CAD (coronary artery disease)     Chronic alcoholism     Hyperlipidemia     Hypertension     WPW (Zakiya-Parkinson-White syndrome)      Patient Active Problem List   Diagnosis    CAD (coronary artery disease) status post CABG     Anxiety disorder    Chronic alcoholism    Gastroesophageal reflux disease without esophagitis    Sorethroat    Hypokalemia    Hypomagnesemia    Hypocalcemia    Chest pain    Hyperlipidemia    Grade I diastolic dysfunction    CARLITOS (acute kidney injury)    Abnormal nuclear stress test    S/P CABG x 4 2020    Hypophosphatemia    Sinus tachycardia    Screening for prostate cancer    Lower abdominal pain    Status post laparoscopic cholecystectomy    Pain in both feet    Statin intolerance       Social History     Tobacco Use    Smoking status: Former     Packs/day: 1.00     Years: 20.00     Additional pack years: 0.00     Total pack years: 20.00     Types: Cigarettes     Quit date:      Years since quittin.0    Smokeless tobacco: Current     Types: Chew, Snuff    Tobacco comments:     chew 1 can per week   Vaping Use    Vaping Use: Never used   Substance Use Topics    Alcohol use: Yes     Alcohol/week: 10.0 standard drinks of alcohol     Types: 6 Cans of beer, 4 Shots of liquor per week     Comment: pint of alcohol every other day     Drug use: Never       Allergies   Allergen Reactions    Niacin Rash     Whelps       Current Outpatient  Medications on File Prior to Visit   Medication Sig    aspirin 81 MG EC tablet Take 1 tablet by mouth Daily.    clopidogrel (PLAVIX) 75 MG tablet Take 1 tablet by mouth Daily.    folic acid (FOLVITE) 1 MG tablet Take 1 tablet by mouth Daily.    hydrOXYzine pamoate (VISTARIL) 25 MG capsule Take 1 capsule by mouth Every 6 (Six) Hours As Needed.    lactulose (CHRONULAC) 10 GM/15ML solution Take 30 mL by mouth 2 (Two) Times a Day As Needed (constipation).    magnesium oxide (MAG-OX) 400 MG tablet Take 1 tablet by mouth 2 (Two) Times a Day.    nitroglycerin (NITROSTAT) 0.4 MG SL tablet Place 1 tablet under the tongue Every 5 (Five) Minutes As Needed for Chest Pain. Take no more than 3 doses in 15 minutes.    omega-3 acid ethyl esters (LOVAZA) 1 g capsule Take 1 capsule by mouth Daily.    Omega-3 Fatty Acids (fish oil) 1000 MG capsule capsule Take 1 capsule by mouth Daily With Breakfast.    ondansetron (ZOFRAN) 4 MG tablet Take 1 tablet by mouth Every 8 (Eight) Hours As Needed for Nausea or Vomiting.    pantoprazole (PROTONIX) 40 MG EC tablet Take 1 tablet by mouth Daily.    thiamine (VITAMIN B1) 100 MG tablet Take 1 tablet by mouth Daily.     No current facility-administered medications on file prior to visit.         The following portions of the patient's history were reviewed and updated as appropriate: allergies, current medications, past family history, past medical history, past social history, past surgical history and problem list.    Review of Systems   Constitutional: Negative.   HENT: Negative.  Negative for congestion.    Eyes: Negative.    Cardiovascular: Negative.  Negative for chest pain, cyanosis, dyspnea on exertion, irregular heartbeat, leg swelling, near-syncope, orthopnea, palpitations, paroxysmal nocturnal dyspnea and syncope.   Respiratory: Negative.  Negative for shortness of breath.    Hematologic/Lymphatic: Negative.    Musculoskeletal: Negative.    Gastrointestinal: Negative.    Neurological:  "Negative.  Negative for headaches.          Objective:     /86   Pulse 63   Resp 18   Ht 188 cm (74.02\")   Wt 104 kg (230 lb)   SpO2 98%   BMI 29.52 kg/m²   Pulmonary:      Effort: Pulmonary effort is normal.      Breath sounds: Normal breath sounds. No stridor. No wheezing. No rhonchi. No rales.   Cardiovascular:      PMI at left midclavicular line. Normal rate. Regular rhythm. Normal S1. Normal S2.       Murmurs: There is no murmur.      No gallop.  No click. No rub.   Pulses:     Intact distal pulses.   Edema:     Peripheral edema absent.           Lab Review  Lab Results   Component Value Date     08/04/2023    K 4.5 08/04/2023     (H) 08/04/2023    BUN 38 (H) 08/04/2023    CREATININE 1.29 (H) 08/04/2023    GLUCOSE 98 08/04/2023    CALCIUM 9.6 08/04/2023    ALT 19 08/04/2023    ALKPHOS 56 08/04/2023    LABIL2 1.7 05/04/2016     Lab Results   Component Value Date    CKTOTAL 62 07/20/2023     Lab Results   Component Value Date    WBC 6.94 08/10/2023    HGB 12.0 (L) 08/10/2023    HCT 36.7 (L) 08/10/2023     08/10/2023     Lab Results   Component Value Date    INR 1.1 08/04/2023     Lab Results   Component Value Date    MG 1.4 (L) 08/09/2023     Lab Results   Component Value Date    PSA 0.406 07/20/2023    TSH 1.500 07/20/2023     No results found for: \"BNP\"  Lab Results   Component Value Date    CHLPL 182 05/04/2016    CHOL 137 07/20/2023    TRIG 123 07/20/2023    HDL 37 (L) 07/20/2023    VLDL 22 07/20/2023    LDL 78 07/20/2023     Lab Results   Component Value Date    LDL 78 07/20/2023     (H) 04/28/2022     Lab Results   Component Value Date    PROBNP 131 08/04/2023   Contains abnormal data AMMONIA  Order: [id number]  Status: Edited Result - FINAL       Next appt: 04/11/2024 at 09:30 AM in Cardiology (Alex Canales MD)    Specimen Information: Blood, Venous Line       Component  Ref Range & Units 5 mo ago   Ammonia  11 - 51 umol/L 109 High    Resulting Agency [facility]                  Procedures       I personally viewed and interpreted the patient's LAB data         Assessment:     1. Coronary artery disease involving native coronary artery of native heart without angina pectoris    2. Mixed hyperlipidemia    3. Chronic alcoholism    4. Generalized anxiety disorder    5. Hypomagnesemia    6. Chronic fatigue    7. Statin intolerance          Plan:     CAD status post CABG  Patient is asymptomatic.  Healthy lifestyle and aggressive risk factor modification emphasized.  Unfortunately patient is a heavy drinker and continues to consume a lot of alcohol.  Recently he had upper GI bleed and was seen at  where ammonia level was also high.  He was advised to stop atorvastatin because of liver dysfunction.  Will try to arrange PCSK9 inhibitor therapy    He will continue Lopressor 150 twice daily along with antiplatelet therapy with Plavix    Blood pressure is elevated  Norvasc 5 mg daily was added.    Magnesium was continued.  Will check magnesium and potassium.  Patient had been taking dual antiplatelet therapy however he states that he was advised to stop antiplatelet therapy he is not sure which 1 he is taking now.  Will request records from  however patient will let me know when he goes home.  He was advised to discontinue dual antiplatelet therapy but should be taking Plavix if acceptable with GI.    Consultation with ophthalmology was also arranged    Follow-up scheduled      No follow-ups on file.

## 2024-01-15 DIAGNOSIS — H53.8 BLURRED VISION: Primary | ICD-10-CM

## 2024-02-28 RX ORDER — CLOPIDOGREL BISULFATE 75 MG/1
75 TABLET ORAL DAILY
Qty: 90 TABLET | Refills: 0 | Status: SHIPPED | OUTPATIENT
Start: 2024-02-28

## 2024-02-28 RX ORDER — PANTOPRAZOLE SODIUM 40 MG/1
40 TABLET, DELAYED RELEASE ORAL DAILY
Qty: 90 TABLET | Refills: 1 | Status: SHIPPED | OUTPATIENT
Start: 2024-02-28

## 2024-02-28 NOTE — TELEPHONE ENCOUNTER
Caller: Onel Jimenez SIN    Relationship: Self    Best call back number: [phone]    Requested Prescriptions:   Requested Prescriptions     Pending Prescriptions Disp Refills    clopidogrel (PLAVIX) 75 MG tablet [Pharmacy Med Name: CLOPIDOGREL 75MG TABLET 75 Tablet] 90 tablet 0     Sig: TAKE ONE TABLET BY MOUTH ONCE DAILY    pantoprazole (PROTONIX) 40 MG EC tablet       Sig: Take 1 tablet by mouth Daily.        Ph[facility] be sent[facility] - [phone] [facility] [phone] FX     Last office visit with prescribing clinician: 1/12/2024   Last telemedicine visit with prescribing clinician: Visit date not found   Next office visit with prescribing clinician: 4/11/2024     Additional details provided by patient:     Does the patient have less than a 3 day supply:  [x] Yes  [] No        Natan Fletcher Rep   02/28/24 11:27 EST

## 2024-05-20 ENCOUNTER — TELEPHONE (OUTPATIENT)
Dept: CARDIOLOGY | Facility: CLINIC | Age: 59
End: 2024-05-20
Payer: MEDICAID

## 2024-05-20 RX ORDER — ONDANSETRON 4 MG/1
4 TABLET, FILM COATED ORAL EVERY 8 HOURS PRN
Qty: 30 TABLET | Refills: 0 | Status: SHIPPED | OUTPATIENT
Start: 2024-05-20

## 2024-05-20 NOTE — TELEPHONE ENCOUNTER
Caller: Onel Jimenez    Relationship: Self    Best call back number: [phone]     What orders are you requesting (i.e. lab or imaging): MEDICATION FOR STOMACH NAUSEA    In what timeframe would the patient need to come in: ASAP    Where will you receive your lab/imaging services: [facility]

## 2024-05-20 NOTE — TELEPHONE ENCOUNTER
PT HAS PREVIOUSLY BEEN PRESCRIBED ZOFRAN. JOSSY WILL AUTHORIZE. ADV IF SYMPTOMS PERSIST TO SCHEDULE FOLLOW UP.

## 2024-06-17 ENCOUNTER — LAB (OUTPATIENT)
Dept: LAB | Facility: HOSPITAL | Age: 59
End: 2024-06-17
Payer: MEDICAID

## 2024-06-17 ENCOUNTER — OFFICE VISIT (OUTPATIENT)
Dept: CARDIOLOGY | Facility: CLINIC | Age: 59
End: 2024-06-17
Payer: MEDICAID

## 2024-06-17 VITALS
OXYGEN SATURATION: 98 % | WEIGHT: 238 LBS | BODY MASS INDEX: 30.54 KG/M2 | DIASTOLIC BLOOD PRESSURE: 82 MMHG | SYSTOLIC BLOOD PRESSURE: 126 MMHG | HEART RATE: 70 BPM | HEIGHT: 74 IN

## 2024-06-17 DIAGNOSIS — Z12.11 SCREENING FOR COLON CANCER: ICD-10-CM

## 2024-06-17 DIAGNOSIS — I25.10 CORONARY ARTERY DISEASE INVOLVING NATIVE CORONARY ARTERY OF NATIVE HEART WITHOUT ANGINA PECTORIS: Primary | Chronic | ICD-10-CM

## 2024-06-17 DIAGNOSIS — E83.42 HYPOMAGNESEMIA: ICD-10-CM

## 2024-06-17 DIAGNOSIS — F10.20 CHRONIC ALCOHOLISM: Chronic | ICD-10-CM

## 2024-06-17 DIAGNOSIS — E78.2 MIXED HYPERLIPIDEMIA: Chronic | ICD-10-CM

## 2024-06-17 DIAGNOSIS — E78.2 MIXED HYPERLIPIDEMIA: ICD-10-CM

## 2024-06-17 DIAGNOSIS — R53.82 CHRONIC FATIGUE: ICD-10-CM

## 2024-06-17 DIAGNOSIS — I10 ESSENTIAL HYPERTENSION: ICD-10-CM

## 2024-06-17 PROCEDURE — 80050 GENERAL HEALTH PANEL: CPT

## 2024-06-17 PROCEDURE — 82306 VITAMIN D 25 HYDROXY: CPT

## 2024-06-17 PROCEDURE — 80061 LIPID PANEL: CPT

## 2024-06-17 PROCEDURE — 99214 OFFICE O/P EST MOD 30 MIN: CPT | Performed by: INTERNAL MEDICINE

## 2024-06-17 PROCEDURE — 3079F DIAST BP 80-89 MM HG: CPT | Performed by: INTERNAL MEDICINE

## 2024-06-17 PROCEDURE — 36415 COLL VENOUS BLD VENIPUNCTURE: CPT

## 2024-06-17 PROCEDURE — 82607 VITAMIN B-12: CPT

## 2024-06-17 PROCEDURE — 83735 ASSAY OF MAGNESIUM: CPT

## 2024-06-17 PROCEDURE — 3074F SYST BP LT 130 MM HG: CPT | Performed by: INTERNAL MEDICINE

## 2024-06-17 RX ORDER — ATORVASTATIN CALCIUM 40 MG/1
40 TABLET, FILM COATED ORAL NIGHTLY
COMMUNITY
Start: 2024-05-09 | End: 2024-06-19 | Stop reason: SDUPTHER

## 2024-06-17 NOTE — PROGRESS NOTES
Patient scheduled for a phone visit Friday 08-07-20.  She will have enough medication until then.   Thank you.    subjective     Chief Complaint   Patient presents with    Coronary Artery Disease    Results     Labs still in process    Nausea     No vomiting        Problems Addressed This Visit  1. Coronary artery disease involving native coronary artery of native heart without angina pectoris    2. Screening for colon cancer    3. Mixed hyperlipidemia    4. Chronic alcoholism    5. Essential hypertension        History of Present Illness    Onel 59 years old white male who has history of coronary artery disease status post CABG patient is doing well from cardiac standpoint denies any chest pain palpitations or shortness of breath.    Hypertension is very well-controlled he is taking amlodipine.  Patient has hyperlipidemia with statin intolerance.  Last visit his Lipitor was discontinued at  because of chronic alcoholism and liver dysfunction .  Patient was started on PCSK9 Lipitor therapy however patient states that he never took any injection and never went to the hospital pharmacy for it.    He had lab work done today depending on the lab work he is willing to consider therapy.      Chronic alcoholism with history of hypokalemia hypomagnesemia hypophosphatemia patient states that he is not drinking much at all now however on direct questioning he says that his children came on Father's Day and brought him some bottles of gin to drink      Past Surgical History:   Procedure Laterality Date    CARDIAC CATHETERIZATION  06/06/2015    CARDIAC CATHETERIZATION N/A 8/19/2020    Procedure: Left Heart Cath;  Surgeon: Jack Beebe MD;  Location: [facility] CATH INVASIVE LOCATION;  Service: Cardiology;  Laterality: N/A;    CARDIOVASCULAR STRESS TEST  06/06/2015    CHOLECYSTECTOMY N/A 10/11/2021    Procedure: CHOLECYSTECTOMY LAPAROSCOPIC;  Surgeon: Ghulam Pillai MD;  Location: [facility] OR;  Service: General;  Laterality: N/A;    CORONARY ARTERY BYPASS GRAFT N/A 8/26/2020    Procedure: MEDIAN STERNOTOMY, CORONARY ARTERY BYPASS GRAFTING  X  4, UTILIZING THE LEFT INTERNAL MAMMARY ARTERY AND EVH OF THE RIGHT GREATER SAPHENOUS VEIN;  Surgeon: David Kuo MD;  Location: [facility];  Service: Cardiothoracic;  Laterality: N/A;  LEG START - 0740  VEIN OUT - 0820  VEIN READY - 0832    ECHO - CONVERTED  2015    EYE SURGERY      x3 eye surgeries as a child     Family History   Problem Relation Age of Onset    Heart attack Father     Heart disease Father     Heart failure Father     Heart disease Sister     Heart failure Sister     Heart disease Brother     Heart failure Brother     Heart failure Sister     Heart disease Sister      Past Medical History:   Diagnosis Date    Anxiety disorder     CAD (coronary artery disease)     Chronic alcoholism     Hyperlipidemia     Hypertension     WPW (Zakiya-Parkinson-White syndrome)      Patient Active Problem List   Diagnosis    CAD (coronary artery disease) status post CABG     Anxiety disorder    Chronic alcoholism    Gastroesophageal reflux disease without esophagitis    Sorethroat    Hypokalemia    Hypomagnesemia    Hypocalcemia    Chest pain    Hyperlipidemia    Grade I diastolic dysfunction    CARLITOS (acute kidney injury)    Abnormal nuclear stress test    S/P CABG x 4 2020    Hypophosphatemia    Sinus tachycardia    Screening for prostate cancer    Lower abdominal pain    Status post laparoscopic cholecystectomy    Pain in both feet    Statin intolerance    Screening for colon cancer    Essential hypertension       Social History     Tobacco Use    Smoking status: Former     Current packs/day: 0.00     Average packs/day: 1 pack/day for 20.0 years (20.0 ttl pk-yrs)     Types: Cigarettes     Start date:      Quit date:      Years since quittin.4    Smokeless tobacco: Current     Types: Chew, Snuff    Tobacco comments:     chew 1 can per week   Vaping Use    Vaping status: Never Used   Substance Use Topics    Alcohol use: Yes     Alcohol/week: 10.0 standard drinks of alcohol      Types: 6 Cans of beer, 4 Shots of liquor per week     Comment: pint of alcohol every other day     Drug use: Never       Allergies   Allergen Reactions    Niacin Rash     Whelps       Current Outpatient Medications on File Prior to Visit   Medication Sig    amLODIPine (NORVASC) 5 MG tablet Take 1 tablet by mouth Daily.    aspirin 81 MG EC tablet Take 1 tablet by mouth Daily.    atorvastatin (LIPITOR) 40 MG tablet Take 1 tablet by mouth Every Night.    clopidogrel (PLAVIX) 75 MG tablet TAKE ONE TABLET BY MOUTH ONCE DAILY    folic acid (FOLVITE) 1 MG tablet Take 1 tablet by mouth Daily.    magnesium oxide (MAG-OX) 400 MG tablet Take 1 tablet by mouth 2 (Two) Times a Day.    metoprolol tartrate (LOPRESSOR) 100 MG tablet Take 1.5 tablets by mouth 2 (Two) Times a Day. TAKE 1.5 TABLET BY MOUTH IN THE MORNING AND 1.5 TABLET IN THE EVENING    nitroglycerin (NITROSTAT) 0.4 MG SL tablet Place 1 tablet under the tongue Every 5 (Five) Minutes As Needed for Chest Pain. Take no more than 3 doses in 15 minutes.    omega-3 acid ethyl esters (LOVAZA) 1 g capsule Take 1 capsule by mouth Daily.    Omega-3 Fatty Acids (fish oil) 1000 MG capsule capsule Take 1 capsule by mouth Daily With Breakfast.    ondansetron (ZOFRAN) 4 MG tablet Take 1 tablet by mouth Every 8 (Eight) Hours As Needed for Nausea or Vomiting.    pantoprazole (PROTONIX) 40 MG EC tablet Take 1 tablet by mouth Daily.    thiamine (VITAMIN B1) 100 MG tablet Take 1 tablet by mouth Daily.    Evolocumab (REPATHA) solution auto-injector SureClick injection Inject 1 mL under the skin into the appropriate area as directed Every 14 (Fourteen) Days. (Patient not taking: Reported on 6/17/2024)    hydrOXYzine pamoate (VISTARIL) 25 MG capsule Take 1 capsule by mouth Every 6 (Six) Hours As Needed. (Patient not taking: Reported on 6/17/2024)    lactulose (CHRONULAC) 10 GM/15ML solution Take 30 mL by mouth 2 (Two) Times a Day As Needed (constipation). (Patient not taking: Reported on  "6/17/2024)     No current facility-administered medications on file prior to visit.         The following portions of the patient's history were reviewed and updated as appropriate: allergies, current medications, past family history, past medical history, past social history, past surgical history and problem list.    Review of Systems   Constitutional: Negative.   HENT: Negative.  Negative for congestion.    Eyes: Negative.    Cardiovascular: Negative.  Negative for chest pain, cyanosis, dyspnea on exertion, irregular heartbeat, leg swelling, near-syncope, orthopnea, palpitations, paroxysmal nocturnal dyspnea and syncope.   Respiratory: Negative.  Negative for shortness of breath.    Hematologic/Lymphatic: Negative.    Musculoskeletal: Negative.    Gastrointestinal: Negative.    Neurological: Negative.  Negative for headaches.          Objective:     /82 (BP Location: Left arm, Patient Position: Sitting, Cuff Size: Adult)   Pulse 70   Ht 188 cm (74\")   Wt 108 kg (238 lb)   SpO2 98%   BMI 30.56 kg/m²   Pulmonary:      Effort: Pulmonary effort is normal.      Breath sounds: Normal breath sounds. No stridor. No wheezing. No rhonchi. No rales.   Cardiovascular:      PMI at left midclavicular line. Normal rate. Regular rhythm. Normal S1. Normal S2.       Murmurs: There is no murmur.      No gallop.  No click. No rub.   Pulses:     Intact distal pulses.   Edema:     Peripheral edema absent.           Lab Review  Lab Results   Component Value Date     08/04/2023    K 4.5 08/04/2023     (H) 08/04/2023    BUN 38 (H) 08/04/2023    CREATININE 1.29 (H) 08/04/2023    GLUCOSE 98 08/04/2023    CALCIUM 9.6 08/04/2023    ALT 19 08/04/2023    ALKPHOS 56 08/04/2023    LABIL2 1.7 05/04/2016     Lab Results   Component Value Date    CKTOTAL 62 07/20/2023     Lab Results   Component Value Date    WBC 6.94 08/10/2023    HGB 12.0 (L) 08/10/2023    HCT 36.7 (L) 08/10/2023     08/10/2023     Lab Results " "  Component Value Date    INR 1.1 08/04/2023     Lab Results   Component Value Date    MG 1.4 (L) 08/09/2023     Lab Results   Component Value Date    PSA 0.406 07/20/2023    TSH 1.500 07/20/2023     No results found for: \"BNP\"  Lab Results   Component Value Date    CHLPL 182 05/04/2016    CHOL 137 07/20/2023    TRIG 123 07/20/2023    HDL 37 (L) 07/20/2023    VLDL 22 07/20/2023    LDL 78 07/20/2023     Lab Results   Component Value Date    LDL 78 07/20/2023     (H) 04/28/2022     Lab Results   Component Value Date    PROBNP 131 08/04/2023               Procedures       I personally viewed and interpreted the patient's LAB data         Assessment:     1. Coronary artery disease involving native coronary artery of native heart without angina pectoris    2. Screening for colon cancer    3. Mixed hyperlipidemia    4. Chronic alcoholism    5. Essential hypertension          Plan:        Coronary artery disease status post CABG 2020.  Patient is doing very well he is physically quite active and is asymptomatic no chest pain palpitations or shortness of breath.  He is taking aspirin and Plavix along with beta-blocker therapy with Lopressor however he is not taking Repatha as prescribed.  His Lipitor was discontinued at  in [place] by GI because of chronic alcoholism and liver dysfunction.    He had lab work done today we will decide when the report is available patient is willing to consider PCSK9 inhibitor therapy.    Chronic alcoholism unfortunately patient continues to drink alcohol cessation of alcohol discussed in detail.    Hypertension is very well-controlled.  GI consultation for colon cancer screening will be arranged.  Follow-up scheduled      No follow-ups on file.  "

## 2024-06-18 ENCOUNTER — TELEPHONE (OUTPATIENT)
Dept: CARDIOLOGY | Facility: CLINIC | Age: 59
End: 2024-06-18
Payer: MEDICAID

## 2024-06-18 LAB
25(OH)D3 SERPL-MCNC: 28.2 NG/ML (ref 30–100)
ALBUMIN SERPL-MCNC: 3.8 G/DL (ref 3.5–5.2)
ALBUMIN/GLOB SERPL: 1.3 G/DL
ALP SERPL-CCNC: 75 U/L (ref 39–117)
ALT SERPL W P-5'-P-CCNC: 25 U/L (ref 1–41)
ANION GAP SERPL CALCULATED.3IONS-SCNC: 14.7 MMOL/L (ref 5–15)
AST SERPL-CCNC: 16 U/L (ref 1–40)
BASOPHILS # BLD AUTO: 0.1 10*3/MM3 (ref 0–0.2)
BASOPHILS NFR BLD AUTO: 1.1 % (ref 0–1.5)
BILIRUB SERPL-MCNC: 0.4 MG/DL (ref 0–1.2)
BUN SERPL-MCNC: 18 MG/DL (ref 8–23)
BUN/CREAT SERPL: 15 (ref 7–25)
CALCIUM SPEC-SCNC: 8.7 MG/DL (ref 8.6–10.5)
CHLORIDE SERPL-SCNC: 104 MMOL/L (ref 98–107)
CHOLEST SERPL-MCNC: 144 MG/DL (ref 0–200)
CO2 SERPL-SCNC: 19.3 MMOL/L (ref 22–29)
CREAT SERPL-MCNC: 1.2 MG/DL (ref 0.76–1.27)
DEPRECATED RDW RBC AUTO: 46.5 FL (ref 37–54)
EGFRCR SERPLBLD CKD-EPI 2021: 68.8 ML/MIN/1.73
EOSINOPHIL # BLD AUTO: 0.21 10*3/MM3 (ref 0–0.4)
EOSINOPHIL NFR BLD AUTO: 2.4 % (ref 0.3–6.2)
ERYTHROCYTE [DISTWIDTH] IN BLOOD BY AUTOMATED COUNT: 16.5 % (ref 12.3–15.4)
GLOBULIN UR ELPH-MCNC: 2.9 GM/DL
GLUCOSE SERPL-MCNC: 126 MG/DL (ref 65–99)
HCT VFR BLD AUTO: 44.4 % (ref 37.5–51)
HDLC SERPL-MCNC: 28 MG/DL (ref 40–60)
HGB BLD-MCNC: 14.3 G/DL (ref 13–17.7)
IMM GRANULOCYTES # BLD AUTO: 0.05 10*3/MM3 (ref 0–0.05)
IMM GRANULOCYTES NFR BLD AUTO: 0.6 % (ref 0–0.5)
LDLC SERPL CALC-MCNC: 66 MG/DL (ref 0–100)
LDLC/HDLC SERPL: 1.91 {RATIO}
LYMPHOCYTES # BLD AUTO: 2.01 10*3/MM3 (ref 0.7–3.1)
LYMPHOCYTES NFR BLD AUTO: 22.9 % (ref 19.6–45.3)
MAGNESIUM SERPL-MCNC: 1.2 MG/DL (ref 1.6–2.4)
MCH RBC QN AUTO: 25.7 PG (ref 26.6–33)
MCHC RBC AUTO-ENTMCNC: 32.2 G/DL (ref 31.5–35.7)
MCV RBC AUTO: 79.9 FL (ref 79–97)
MONOCYTES # BLD AUTO: 1.13 10*3/MM3 (ref 0.1–0.9)
MONOCYTES NFR BLD AUTO: 12.9 % (ref 5–12)
NEUTROPHILS NFR BLD AUTO: 5.29 10*3/MM3 (ref 1.7–7)
NEUTROPHILS NFR BLD AUTO: 60.1 % (ref 42.7–76)
NRBC BLD AUTO-RTO: 0 /100 WBC (ref 0–0.2)
PLATELET # BLD AUTO: 244 10*3/MM3 (ref 140–450)
PMV BLD AUTO: 9.9 FL (ref 6–12)
POTASSIUM SERPL-SCNC: 3.7 MMOL/L (ref 3.5–5.2)
PROT SERPL-MCNC: 6.7 G/DL (ref 6–8.5)
RBC # BLD AUTO: 5.56 10*6/MM3 (ref 4.14–5.8)
SODIUM SERPL-SCNC: 138 MMOL/L (ref 136–145)
TRIGL SERPL-MCNC: 312 MG/DL (ref 0–150)
TSH SERPL DL<=0.05 MIU/L-ACNC: 1.39 UIU/ML (ref 0.27–4.2)
VIT B12 BLD-MCNC: 361 PG/ML (ref 211–946)
VLDLC SERPL-MCNC: 50 MG/DL (ref 5–40)
WBC NRBC COR # BLD AUTO: 8.79 10*3/MM3 (ref 3.4–10.8)

## 2024-06-18 NOTE — TELEPHONE ENCOUNTER
Spoke with pt, tanmay of Dr. Lamar message regarding lab work. Pt voiced understanding.    ----- Message from Alex Caanles sent at 6/18/2024  9:59 AM EDT -----  Blood work shows her cholesterol is good however vitamin D is low, B12 is low and magnesium level is low  Hide you are taking your magnesium twice a day.  If you are increase it to 3 a day and if you are not taking it start taking 2 a day  Also start over-the-counter vitamin D3 1000 unit daily  And over-the-counter B12 1000 daily.

## 2024-06-19 RX ORDER — ATORVASTATIN CALCIUM 40 MG/1
40 TABLET, FILM COATED ORAL NIGHTLY
Qty: 90 TABLET | Refills: 1 | Status: SHIPPED | OUTPATIENT
Start: 2024-06-19

## 2024-08-21 RX ORDER — AMLODIPINE BESYLATE 5 MG/1
5 TABLET ORAL DAILY
Qty: 30 TABLET | Refills: 1 | Status: SHIPPED | OUTPATIENT
Start: 2024-08-21

## 2024-10-21 RX ORDER — AMLODIPINE BESYLATE 5 MG/1
5 TABLET ORAL DAILY
Qty: 30 TABLET | Refills: 1 | Status: SHIPPED | OUTPATIENT
Start: 2024-10-21

## 2024-10-21 RX ORDER — MAGNESIUM OXIDE 400 MG/1
1 TABLET ORAL 2 TIMES DAILY
Qty: 60 TABLET | Refills: 1 | Status: SHIPPED | OUTPATIENT
Start: 2024-10-21

## 2024-10-30 ENCOUNTER — TELEPHONE (OUTPATIENT)
Dept: CARDIOLOGY | Facility: CLINIC | Age: 59
End: 2024-10-30
Payer: MEDICAID

## 2024-10-30 DIAGNOSIS — R10.30 LOWER ABDOMINAL PAIN: Primary | ICD-10-CM

## 2024-10-30 RX ORDER — ONDANSETRON 4 MG/1
4 TABLET, ORALLY DISINTEGRATING ORAL EVERY 8 HOURS PRN
Qty: 30 TABLET | Refills: 0 | Status: SHIPPED | OUTPATIENT
Start: 2024-10-30

## 2024-10-30 NOTE — TELEPHONE ENCOUNTER
Caller: Onel Jimenez    Relationship: Self    Best call back number: [phone]     Which medication are you concerned about: UNSURE OF MED, STATES IT TASTE LIKE STRAWBERRY     Who prescribed you this medication: MD CHRISTI     When did you start taking this medication: OFF AND ON WHEN NEEDED     What are your concerns: NEEDS AN RX FOR BEING SICK TO STOMACH, STATES HE HAS GOTTEN THIS FROM OUR OFFICE BEFORE. REQUESTING THIS BE STRONGER THIS TIME- PLEASE ADVISE.     SEND TO THE [facility]

## 2024-11-19 ENCOUNTER — OFFICE VISIT (OUTPATIENT)
Dept: CARDIOLOGY | Facility: CLINIC | Age: 59
End: 2024-11-19
Payer: MEDICAID

## 2024-11-19 VITALS
BODY MASS INDEX: 29 KG/M2 | DIASTOLIC BLOOD PRESSURE: 82 MMHG | HEIGHT: 74 IN | SYSTOLIC BLOOD PRESSURE: 130 MMHG | HEART RATE: 65 BPM | WEIGHT: 226 LBS | OXYGEN SATURATION: 98 %

## 2024-11-19 DIAGNOSIS — I10 ESSENTIAL HYPERTENSION: ICD-10-CM

## 2024-11-19 DIAGNOSIS — E55.9 VITAMIN D DEFICIENCY: ICD-10-CM

## 2024-11-19 DIAGNOSIS — R53.82 CHRONIC FATIGUE: ICD-10-CM

## 2024-11-19 DIAGNOSIS — I25.10 CORONARY ARTERY DISEASE INVOLVING NATIVE CORONARY ARTERY OF NATIVE HEART WITHOUT ANGINA PECTORIS: Primary | Chronic | ICD-10-CM

## 2024-11-19 DIAGNOSIS — Z23 NEED FOR PROPHYLACTIC VACCINATION AND INOCULATION AGAINST INFLUENZA: ICD-10-CM

## 2024-11-19 DIAGNOSIS — R06.09 DOE (DYSPNEA ON EXERTION): ICD-10-CM

## 2024-11-19 DIAGNOSIS — E83.42 HYPOMAGNESEMIA: ICD-10-CM

## 2024-11-19 DIAGNOSIS — E78.2 MIXED HYPERLIPIDEMIA: Chronic | ICD-10-CM

## 2024-11-19 DIAGNOSIS — F10.20 CHRONIC ALCOHOLISM: Chronic | ICD-10-CM

## 2024-11-19 PROBLEM — Z78.9 STATIN INTOLERANCE: Status: RESOLVED | Noted: 2024-01-12 | Resolved: 2024-11-19

## 2024-11-19 RX ORDER — METOPROLOL TARTRATE 100 MG/1
150 TABLET ORAL 2 TIMES DAILY
Qty: 270 TABLET | Refills: 1 | Status: SHIPPED | OUTPATIENT
Start: 2024-11-19

## 2024-11-19 RX ORDER — CLOPIDOGREL BISULFATE 75 MG/1
75 TABLET ORAL DAILY
Qty: 90 TABLET | Refills: 0 | Status: SHIPPED | OUTPATIENT
Start: 2024-11-19

## 2024-11-19 RX ORDER — OMEGA-3-ACID ETHYL ESTERS 1 G/1
1 CAPSULE, LIQUID FILLED ORAL DAILY
Qty: 90 CAPSULE | Refills: 0 | Status: SHIPPED | OUTPATIENT
Start: 2024-11-19

## 2024-11-19 RX ORDER — AMLODIPINE BESYLATE 5 MG/1
5 TABLET ORAL DAILY
Qty: 90 TABLET | Refills: 1 | Status: SHIPPED | OUTPATIENT
Start: 2024-11-19

## 2024-11-19 RX ORDER — PANTOPRAZOLE SODIUM 40 MG/1
40 TABLET, DELAYED RELEASE ORAL DAILY
Qty: 90 TABLET | Refills: 1 | Status: SHIPPED | OUTPATIENT
Start: 2024-11-19

## 2024-11-19 RX ORDER — LANOLIN ALCOHOL/MO/W.PET/CERES
100 CREAM (GRAM) TOPICAL DAILY
Qty: 30 TABLET | Refills: 5 | Status: SHIPPED | OUTPATIENT
Start: 2024-11-19

## 2024-11-19 RX ORDER — LACTULOSE 10 G/15ML
20 SOLUTION ORAL 2 TIMES DAILY PRN
Qty: 150 ML | Refills: 0 | Status: SHIPPED | OUTPATIENT
Start: 2024-11-19

## 2024-11-19 RX ORDER — ATORVASTATIN CALCIUM 40 MG/1
40 TABLET, FILM COATED ORAL NIGHTLY
Qty: 90 TABLET | Refills: 1 | Status: SHIPPED | OUTPATIENT
Start: 2024-11-19

## 2024-11-19 NOTE — PROGRESS NOTES
subjective     Chief Complaint   Patient presents with    Coronary Artery Disease    Med Refill     pending       Problems Addressed This Visit  1. Coronary artery disease involving native coronary artery of native heart without angina pectoris    2. Need for prophylactic vaccination and inoculation against influenza    3. Essential hypertension    4. Mixed hyperlipidemia    5. Chronic alcoholism    6. Hypomagnesemia    7. Chronic fatigue    8. CISSE (dyspnea on exertion)    9. Vitamin D deficiency        History of Present Illness    Onel is 59 years old gentleman who has known coronary artery disease.  Status post CABG.  He is asymptomatic  No chest pain palpitations or shortness of breath.  He missed his last appointment but states that he has no specific complaints.    He is taking dual antiplatelet therapy with aspirin and Plavix, beta-blocker therapy with Lopressor 150 twice daily and also is taking statin therapy with Lipitor.    Heart rate is around 71 bpm and blood pressure has been normal.    Hyperlipidemia  Patient initially stated that he could not take Lipitor because of body aches which were switched to PCSK9 inhibitor therapy with Repatha patient however tells me that he went back to Lipitor and he is not taking Repatha.  He denies any side effects from Lipitor.  Will check lab work.    Blood pressure is very well-controlled .  he is taking amlodipine along with metoprolol 150 twice daily.      Patient has chronic alcoholism and still drinking quite a bit he develops hypokalemia hypomagnesemia and also has vitamin D deficiency he is taking replacement therapy will check lab work next visit.    Patient is flu shot which will be given today.      Past Surgical History:   Procedure Laterality Date    CARDIAC CATHETERIZATION  06/06/2015    CARDIAC CATHETERIZATION N/A 8/19/2020    Procedure: Left Heart Cath;  Surgeon: Jack Beebe MD;  Location: [facility] INVASIVE LOCATION;  Service: Cardiology;   Laterality: N/A;    CARDIOVASCULAR STRESS TEST  06/06/2015    CHOLECYSTECTOMY N/A 10/11/2021    Procedure: CHOLECYSTECTOMY LAPAROSCOPIC;  Surgeon: Ghulam Pillai MD;  Location: [facility] OR;  Service: General;  Laterality: N/A;    CORONARY ARTERY BYPASS GRAFT N/A 8/26/2020    Procedure: MEDIAN STERNOTOMY, CORONARY ARTERY BYPASS GRAFTING X  4, UTILIZING THE LEFT INTERNAL MAMMARY ARTERY AND EVH OF THE RIGHT GREATER SAPHENOUS VEIN;  Surgeon: David Kuo MD;  Location: [facility] OR;  Service: Cardiothoracic;  Laterality: N/A;  LEG START - 0740  VEIN OUT - 0820  VEIN READY - 0832    ECHO - CONVERTED  06/06/2015    EYE SURGERY      x3 eye surgeries as a child     Family History   Problem Relation Age of Onset    Heart attack Father     Heart disease Father     Heart failure Father     Heart disease Sister     Heart failure Sister     Heart disease Brother     Heart failure Brother     Heart failure Sister     Heart disease Sister      Past Medical History:   Diagnosis Date    Anxiety disorder     CAD (coronary artery disease)     Chronic alcoholism     Hyperlipidemia     Hypertension     WPW (Zakiya-Parkinson-White syndrome)      Patient Active Problem List   Diagnosis    CAD (coronary artery disease) status post CABG 2020    Anxiety disorder    Chronic alcoholism    Gastroesophageal reflux disease without esophagitis    Sorethroat    Hypokalemia    Hypomagnesemia    Hypocalcemia    Chest pain    Hyperlipidemia    Grade I diastolic dysfunction    CARLITOS (acute kidney injury)    Abnormal nuclear stress test    S/P CABG x 4 8/26/2020    Hypophosphatemia    Sinus tachycardia    Screening for prostate cancer    Lower abdominal pain    Status post laparoscopic cholecystectomy    Pain in both feet    Screening for colon cancer    Essential hypertension       Social History     Tobacco Use    Smoking status: Former     Current packs/day: 0.00     Average packs/day: 1 pack/day for 20.0 years (20.0 ttl pk-yrs)     Types:  Cigarettes     Start date:      Quit date:      Years since quittin.8    Smokeless tobacco: Current     Types: Chew, Snuff    Tobacco comments:     chew 1 can per week   Vaping Use    Vaping status: Never Used   Substance Use Topics    Alcohol use: Yes     Alcohol/week: 10.0 standard drinks of alcohol     Types: 6 Cans of beer, 4 Shots of liquor per week     Comment: pint of alcohol every other day     Drug use: Never       Allergies   Allergen Reactions    Niacin Rash     Whelps       Current Outpatient Medications on File Prior to Visit   Medication Sig    aspirin 81 MG EC tablet Take 1 tablet by mouth Daily.    Diclofenac Sodium (VOLTAREN) 1 % gel gel Apply 4 g topically to the appropriate area as directed 4 (Four) Times a Day As Needed (pain).    folic acid (FOLVITE) 1 MG tablet Take 1 tablet by mouth Daily.    magnesium oxide (MAG-OX) 400 MG tablet TAKE ONE TABLET BY MOUTH TWICE DAILY    nitroglycerin (NITROSTAT) 0.4 MG SL tablet Place 1 tablet under the tongue Every 5 (Five) Minutes As Needed for Chest Pain. Take no more than 3 doses in 15 minutes.    Omega-3 Fatty Acids (fish oil) 1000 MG capsule capsule Take 1 capsule by mouth Daily With Breakfast.    ondansetron ODT (ZOFRAN-ODT) 4 MG disintegrating tablet Place 1 tablet on the tongue Every 8 (Eight) Hours As Needed for Nausea.    [DISCONTINUED] amLODIPine (NORVASC) 5 MG tablet TAKE ONE TABLET BY MOUTH ONCE DAILY    [DISCONTINUED] atorvastatin (LIPITOR) 40 MG tablet Take 1 tablet by mouth Every Night.    [DISCONTINUED] clopidogrel (PLAVIX) 75 MG tablet TAKE ONE TABLET BY MOUTH ONCE DAILY    [DISCONTINUED] metoprolol tartrate (LOPRESSOR) 100 MG tablet Take 1.5 tablets by mouth 2 (Two) Times a Day. TAKE 1.5 TABLET BY MOUTH IN THE MORNING AND 1.5 TABLET IN THE EVENING    [DISCONTINUED] omega-3 acid ethyl esters (LOVAZA) 1 g capsule Take 1 capsule by mouth Daily.    [DISCONTINUED] pantoprazole (PROTONIX) 40 MG EC tablet Take 1 tablet by mouth Daily.     [DISCONTINUED] thiamine (VITAMIN B1) 100 MG tablet Take 1 tablet by mouth Daily.    Evolocumab (REPATHA) solution auto-injector SureClick injection Inject 1 mL under the skin into the appropriate area as directed Every 14 (Fourteen) Days. (Patient not taking: Reported on 11/19/2024)    hydrOXYzine pamoate (VISTARIL) 25 MG capsule Take 1 capsule by mouth Every 6 (Six) Hours As Needed. (Patient not taking: Reported on 6/17/2024)    [DISCONTINUED] lactulose (CHRONULAC) 10 GM/15ML solution Take 30 mL by mouth 2 (Two) Times a Day As Needed (constipation). (Patient not taking: Reported on 11/19/2024)     No current facility-administered medications on file prior to visit.     (Not in a hospital admission)      Results for orders placed during the hospital encounter of 08/17/20    Adult Transthoracic Echo Complete W/ Cont if Necessary Per Protocol    Interpretation Summary  · Left ventricular diastolic dysfunction (grade I) consistent with impaired relaxation.  · Left ventricular systolic function is normal, EF 61-65%.  · Left ventricular wall thickness is consistent with mild concentric hypertrophy.    Results for orders placed during the hospital encounter of 08/17/20    Stress Test With Myocardial Perfusion One Day    Interpretation Summary  · Findings consistent with a normal ECG stress test.  · Left ventricular ejection fraction is normal (Calculated EF = 62%).  · There is no prior study available for comparison.  · Impressions are consistent with an intermediate risk study.  · Patient has a reversible defect at the basal inferior, inferolateral and mid inferior wall, with also reversible defect at the high lateral wall, consistent with ischemia of the above walls.    Results for orders placed during the hospital encounter of 08/17/20    Cardiac Catheterization/Vascular Study    Narrative  Images from the original result were not included.  Patient Name: Onel Jimenez                  MRN: [id number]    Procedure  Date: 08/19/20    HPI: Patient is a pleasant 55 y.o. male with history of prev cardiac cath on June 2015. Patient presented with abdominal pain radiating to the chest area. Stress MPI shows inferolateral ischemia. Patient was evaluated and recommended to proceed with diagnostic cardiac catheterization with possible need for intervention. All risks, benefits and alternatives were discussed with patient in detail. All his questions and his family's questions were answered to their satisfaction. They all understood and wished to proceed, and therefore the procedure was performed.    Pre-operative Diagnosis: Angina recent onset. Known subtotal occlusion of the RCA and mild disease in LAD      Procedure Performed:  Selective coronary angiography.      Technique: The patient was brought to the cardiac catheterization laboratory after informed consent was obtained. right radial artery area was prepped, draped, anesthestized in the usual manner. The radial artery was entered eric a Seldinger technique. A 6-Croatian sheath over the wire was introduced into the radial artery. This was followed by the use of a 6 -Croatian  diagnostic catheters JL 3.5 and JR4 to perform the diagnostic cardiac catheterization. Patient tolerated the procedure well, was hemodynamically stable throughout the procedure. Radial cocktail was administered via the sheath side arm at the time of access.    We tried JL 3.5 for non-selective shots but used Danette catheter for selective imaging.    At the end of the procedure sheath was removed, wrist band was placed. Excellent hemostasis was achieved. Patient transferred to post cath holding area in stable condition.    Findings:    Coronary anatomy:    The left main coronary artery bifurcated into the LAD and left circumflex coronary.  The LAD coursed in the anterior interventricular groove, gave rise to diagonal branches and reached the apex.    Left circumflex coursed in the left atrial ventricular groove  and gives rise to several marginal branches.    The right coronary artery course in the right atrial ventricular groove I gave rise to several acute marginal branches.    Dominant vessel: Right    LM: Large and shows a distal narrowing 40%. This leads to trifurcation.  LAD: True ostial lesion which extends into LM 75% severity.  Diagonal Branch: NL    LCX: Large and shows only mild mid segment plaque.  Obtuse marginal branch:   Second OM is 100% occluded with collateral filling observed with delayed filling.    RCA: Large and shows prox 99% stenosis leading into mild distal disease, There are good collaterals left to right which fill the distal circulation.      No specimens were removed  EBL: 30 ML  Impression:  LAD lesion has worsened and RCA is subtotally occluded with good collaterals.  Hi current symptoms begin with abdominal pain but likely anginal as there could be ischemia from progressive narrowing of LAD.      Post-operative Diagnosis:  3 vessel CAD with RCA, OM occluded, and high grade stenosis of the ostial LAD    Plan:  I recommend CABG for safe long term results.      Jack Beebe MD  08/19/20      Director, Cardiac Cath Lab        The following portions of the patient's history were reviewed and updated as appropriate: allergies, current medications, past family history, past medical history, past social history, past surgical history and problem list.    Review of Systems   Constitutional: Negative.   HENT: Negative.  Negative for congestion.    Eyes: Negative.    Cardiovascular: Negative.  Negative for chest pain, cyanosis, dyspnea on exertion, irregular heartbeat, leg swelling, near-syncope, orthopnea, palpitations, paroxysmal nocturnal dyspnea and syncope.   Respiratory: Negative.  Negative for shortness of breath.    Hematologic/Lymphatic: Negative.    Musculoskeletal: Negative.    Gastrointestinal: Negative.    Neurological: Negative.  Negative for headaches.          Objective:     /82  "(BP Location: Left arm, Patient Position: Sitting, Cuff Size: Adult)   Pulse 65   Ht 188 cm (74\")   Wt 103 kg (226 lb)   SpO2 98%   BMI 29.02 kg/m²   Pulmonary:      Effort: Pulmonary effort is normal.      Breath sounds: Normal breath sounds. No stridor. No wheezing. No rhonchi. No rales.   Cardiovascular:      PMI at left midclavicular line. Normal rate. Regular rhythm. Normal S1. Normal S2.       Murmurs: There is no murmur.      No gallop.  No click. No rub.   Pulses:     Intact distal pulses.   Edema:     Peripheral edema absent.           Lab Review  Lab Results   Component Value Date     06/17/2024    K 3.7 06/17/2024     06/17/2024    BUN 18 06/17/2024    CREATININE 1.20 06/17/2024    GLUCOSE 126 (H) 06/17/2024    CALCIUM 8.7 06/17/2024    ALT 25 06/17/2024    ALKPHOS 75 06/17/2024    LABIL2 1.7 05/04/2016     Lab Results   Component Value Date    CKTOTAL 62 07/20/2023     Lab Results   Component Value Date    WBC 8.79 06/17/2024    HGB 14.3 06/17/2024    HCT 44.4 06/17/2024     06/17/2024     Lab Results   Component Value Date    INR 1.1 08/04/2023     Lab Results   Component Value Date    MG 1.2 (L) 06/17/2024     Lab Results   Component Value Date    PSA 0.406 07/20/2023    TSH 1.390 06/17/2024     No results found for: \"BNP\"  Lab Results   Component Value Date    CHLPL 182 05/04/2016    CHOL 144 06/17/2024    TRIG 312 (H) 06/17/2024    HDL 28 (L) 06/17/2024    VLDL 50 (H) 06/17/2024    LDL 66 06/17/2024     Lab Results   Component Value Date    LDL 66 06/17/2024    LDL 78 07/20/2023     Lab Results   Component Value Date    PROBNP 131 08/04/2023                 ECG 12 Lead    Date/Time: 11/19/2024 3:31 PM  Performed by: Alex Canales MD    Authorized by: Alex Canales MD  Comparison: compared with previous ECG from 8/4/2023  Comparison to previous ECG: Heart rate is slower today otherwise no significant change.  Rhythm: sinus rhythm  Rate: normal  BPM: " 67  Conduction: conduction normal  QRS axis: normal  Other findings: non-specific ST-T wave changes    Clinical impression: non-specific ECG             I personally viewed and interpreted the patient's LAB data         Assessment:     1. Coronary artery disease involving native coronary artery of native heart without angina pectoris    2. Need for prophylactic vaccination and inoculation against influenza    3. Essential hypertension    4. Mixed hyperlipidemia    5. Chronic alcoholism    6. Hypomagnesemia    7. Chronic fatigue    8. CISSE (dyspnea on exertion)    9. Vitamin D deficiency          Plan:     Coronary artery disease status post CABG.  Patient is asymptomatic no chest pain palpitations or shortness of breath.  He will continue dual antiplatelet therapy along with statin therapy and beta-blocker therapy.  He is taking Lopressor 150 twice daily which was continued.    Blood pressure is also well-controlled with metoprolol 150 twice daily and Norvasc 5 mg daily.    Hyperlipidemia  Patient states that he is taking Lipitor and is able to tolerate it.  He has not had lab work done in 6 months lab work was ordered.    Patient has chronic alcoholism and gets frequent hypomagnesemia hypokalemia and also has vitamin D deficiency he is getting replacement therapy we will check lab work next visit.  Cessation of alcohol use was stressed.    He complains of fatigue and mild dyspnea on exertion will check proBNP.  Refill was given  Flu shot was given.    EKG does not show any acute changes.  Weight loss was emphasized with cessation of alcohol use  Follow-up scheduled      No follow-ups on file.

## 2024-11-21 ENCOUNTER — TELEPHONE (OUTPATIENT)
Dept: CARDIOLOGY | Facility: CLINIC | Age: 59
End: 2024-11-21
Payer: MEDICAID

## 2024-11-21 NOTE — TELEPHONE ENCOUNTER
Caller: Onel Jimenez    Relationship: Self    Best call back number: [phone]    Who are you requesting to speak with (clinical staff, provider,  specific staff member): CLINICAL    What was the call regarding: PT STATES DR. BARRAZA HAS BEEN PRESCRIBING HIM SOME TOPICAL GEL TO PUT ON HIS MUSCLES FOR PAIN RELIEF AND HE IS WONDERING IF COULD GET 2 MORE OF THOSE SENT OVER TO HIS PHARMACY ON FILE.       (PT UNSURE OF WHAT MEDICATION WAS CALLED, DID NOT SEND A REFILL REQUEST BECAUSE OF THAT)

## 2024-12-27 RX ORDER — MAGNESIUM OXIDE 400 MG/1
1 TABLET ORAL 2 TIMES DAILY
Qty: 60 TABLET | Refills: 1 | Status: SHIPPED | OUTPATIENT
Start: 2024-12-27

## 2025-03-10 RX ORDER — MAGNESIUM OXIDE 400 MG/1
1 TABLET ORAL 2 TIMES DAILY
Qty: 60 TABLET | Refills: 1 | Status: SHIPPED | OUTPATIENT
Start: 2025-03-10

## 2025-03-10 NOTE — TELEPHONE ENCOUNTER
Caller: Barbara Onel L    Relationship: Self    Best call back number: [phone]    Requested Prescriptions:   Requested Prescriptions     Pending Prescriptions Disp Refills    magnesium oxide (MAG-OX) 400 MG tablet 60 tablet 1     Sig: Take 1 tablet by mouth 2 (Two) Times a Day.        [facility] [place] [address] [phone]  - [phone] FX     Last office visit with prescribing clinician: 11/19/2024   Last telemedicine visit with prescribing clinician: Visit date not found   Next office visit with prescribing clinician: 3/20/2025       Does the patient have less than a 3 day supply:  [x] Yes  [] No    Would you like a call back once the refill request has been completed: [x] Yes [] No    If the office needs to give you a call back, can they leave a voicemail: [x] Yes [] No    ADDITIONAL NOTES: PATIENT STATES THEY NEED OTHER MEDICATIONS REFILLED, BUT DOES NOT KNOW WHAT THEY ARE CALLED, IN REGARDS TO CHOLESTEROL     Natan Styles Rep   03/10/25 09:22 EDT

## 2025-04-17 ENCOUNTER — TELEPHONE (OUTPATIENT)
Dept: CARDIOLOGY | Facility: CLINIC | Age: 60
End: 2025-04-17

## 2025-04-17 NOTE — TELEPHONE ENCOUNTER
Caller: Onel Jimenez    Relationship: Self    Best call back number: [phone]    What is the best time to reach you: ANY    Who are you requesting to speak with (clinical staff, provider,  specific staff member): CLINICAL    Do you know the name of the person who called: NOT SURE    What was the call regarding: PATIENT MISSED A CALL. NO NOTE IN CHART, IF OFFICE IS TRYING TO REACH PATIENT, PLEASE CALL BACK.    Is it okay if the provider responds through MyChart: CALL

## 2025-04-22 ENCOUNTER — TELEPHONE (OUTPATIENT)
Dept: CARDIOLOGY | Facility: CLINIC | Age: 60
End: 2025-04-22

## 2025-04-22 NOTE — TELEPHONE ENCOUNTER
Caller: Onel Jimenez    Relationship: Self    Best call back number: [phone]     What was the call regarding: PT IS HAVING TROUBLE FINDING A RIDE TO THE OFFICE, THIS IS WHY HE HAS TO KEEP RESCHD. JUST WANTED ME TO MAKE OFFICE AWARE.

## 2025-04-29 ENCOUNTER — LAB (OUTPATIENT)
Dept: LAB | Facility: HOSPITAL | Age: 60
End: 2025-04-29
Payer: MEDICAID

## 2025-04-29 ENCOUNTER — OFFICE VISIT (OUTPATIENT)
Dept: CARDIOLOGY | Facility: CLINIC | Age: 60
End: 2025-04-29
Payer: MEDICAID

## 2025-04-29 VITALS
DIASTOLIC BLOOD PRESSURE: 91 MMHG | HEIGHT: 74 IN | HEART RATE: 69 BPM | SYSTOLIC BLOOD PRESSURE: 138 MMHG | BODY MASS INDEX: 29.39 KG/M2 | OXYGEN SATURATION: 97 % | RESPIRATION RATE: 18 BRPM | WEIGHT: 229 LBS

## 2025-04-29 DIAGNOSIS — I25.10 CORONARY ARTERY DISEASE INVOLVING NATIVE CORONARY ARTERY OF NATIVE HEART WITHOUT ANGINA PECTORIS: Chronic | ICD-10-CM

## 2025-04-29 DIAGNOSIS — E78.2 MIXED HYPERLIPIDEMIA: ICD-10-CM

## 2025-04-29 DIAGNOSIS — E83.42 HYPOMAGNESEMIA: ICD-10-CM

## 2025-04-29 DIAGNOSIS — E78.2 MIXED HYPERLIPIDEMIA: Chronic | ICD-10-CM

## 2025-04-29 DIAGNOSIS — R53.82 CHRONIC FATIGUE: ICD-10-CM

## 2025-04-29 DIAGNOSIS — F10.20 CHRONIC ALCOHOLISM: Chronic | ICD-10-CM

## 2025-04-29 DIAGNOSIS — E83.51 HYPOCALCEMIA: ICD-10-CM

## 2025-04-29 DIAGNOSIS — I73.9 PAD (PERIPHERAL ARTERY DISEASE): Primary | ICD-10-CM

## 2025-04-29 DIAGNOSIS — E55.9 VITAMIN D DEFICIENCY: ICD-10-CM

## 2025-04-29 DIAGNOSIS — R06.09 DOE (DYSPNEA ON EXERTION): ICD-10-CM

## 2025-04-29 DIAGNOSIS — I10 ESSENTIAL HYPERTENSION: ICD-10-CM

## 2025-04-29 PROCEDURE — 80053 COMPREHEN METABOLIC PANEL: CPT

## 2025-04-29 PROCEDURE — 83735 ASSAY OF MAGNESIUM: CPT

## 2025-04-29 PROCEDURE — 3075F SYST BP GE 130 - 139MM HG: CPT | Performed by: INTERNAL MEDICINE

## 2025-04-29 PROCEDURE — 3080F DIAST BP >= 90 MM HG: CPT | Performed by: INTERNAL MEDICINE

## 2025-04-29 PROCEDURE — 82550 ASSAY OF CK (CPK): CPT

## 2025-04-29 PROCEDURE — 84439 ASSAY OF FREE THYROXINE: CPT

## 2025-04-29 PROCEDURE — 99214 OFFICE O/P EST MOD 30 MIN: CPT | Performed by: INTERNAL MEDICINE

## 2025-04-29 PROCEDURE — 80061 LIPID PANEL: CPT

## 2025-04-29 PROCEDURE — 84443 ASSAY THYROID STIM HORMONE: CPT

## 2025-04-29 PROCEDURE — 36415 COLL VENOUS BLD VENIPUNCTURE: CPT

## 2025-04-29 PROCEDURE — 82607 VITAMIN B-12: CPT

## 2025-04-29 PROCEDURE — 83880 ASSAY OF NATRIURETIC PEPTIDE: CPT

## 2025-04-29 PROCEDURE — 82306 VITAMIN D 25 HYDROXY: CPT

## 2025-04-29 PROCEDURE — 85025 COMPLETE CBC W/AUTO DIFF WBC: CPT

## 2025-04-29 NOTE — PROGRESS NOTES
subjective     Chief Complaint   Patient presents with    Follow-up     Coronary artery disease involving native coronary artery of native heart without angina pectoris       Problems Addressed This Visit  1. PAD (peripheral artery disease)    2. Coronary artery disease involving native coronary artery of native heart without angina pectoris    3. Mixed hyperlipidemia    4. Essential hypertension    5. Hypomagnesemia    6. Hypocalcemia    7. Chronic alcoholism        History of Present Illness  History of Present Illness  The patient is a 60-year-old white male here for a follow-up visit.    He has a history of coronary artery disease and underwent coronary artery bypass grafting (CABG) in 2020. From a cardiac standpoint, he is doing very well. No chest pain, palpitations, or shortness of breath are reported.    A history of essential hypertension is noted, which is currently well controlled with Norvasc 5 mg daily and Lopressor 150 mg twice a day.    Sinus tachycardia is also part of his medical history. With Lopressor 150 mg twice daily, his heart rate remains around 70 bpm, and he is doing very well. No palpitations, dyspnea, or shortness of breath are reported.    For hyperlipidemia, he is taking Lovaza, fish oil, and Lipitor. Lab work has not been done and will be arranged.    A history of chronic alcoholism is present, accompanied by hypocalcemia, hypomagnesemia, and hypokalemia. He ran out of magnesium a few weeks ago and has not been taking any since. Overall, he reports feeling well and has no specific complaints except for leg cramps. Significant leg pain is experienced at night, and there is concern about potential peripheral vascular disease.      SOCIAL HISTORY  Alcohol: Patient has history of chronic alcoholism and states that he has cut down his drinking and is not drinking much.      Past Surgical History:   Procedure Laterality Date    CARDIAC CATHETERIZATION  06/06/2015    CARDIAC CATHETERIZATION  N/A 8/19/2020    Procedure: Left Heart Cath;  Surgeon: Jack Beebe MD;  Location:  [facility];  Service: Cardiology;  Laterality: N/A;    CARDIOVASCULAR STRESS TEST  06/06/2015    CHOLECYSTECTOMY N/A 10/11/2021    Procedure: CHOLECYSTECTOMY LAPAROSCOPIC;  Surgeon: Ghulam Pillai MD;  Location:  [facility];  Service: General;  Laterality: N/A;    CORONARY ARTERY BYPASS GRAFT N/A 8/26/2020    Procedure: MEDIAN STERNOTOMY, CORONARY ARTERY BYPASS GRAFTING X  4, UTILIZING THE LEFT INTERNAL MAMMARY ARTERY AND EVH OF THE RIGHT GREATER SAPHENOUS VEIN;  Surgeon: David Kuo MD;  Location:  [facility];  Service: Cardiothoracic;  Laterality: N/A;  LEG START - 0740  VEIN OUT - 0820  VEIN READY - 0832    ECHO - CONVERTED  06/06/2015    EYE SURGERY      x3 eye surgeries as a child     Family History   Problem Relation Age of Onset    Heart attack Father     Heart disease Father     Heart failure Father     Heart disease Sister     Heart failure Sister     Heart disease Brother     Heart failure Brother     Heart failure Sister     Heart disease Sister      Past Medical History:   Diagnosis Date    Anxiety disorder     CAD (coronary artery disease)     Chronic alcoholism     Hyperlipidemia     Hypertension     WPW (Zakiya-Parkinson-White syndrome)      Patient Active Problem List   Diagnosis    CAD (coronary artery disease) status post CABG 2020    Anxiety disorder    Chronic alcoholism    Gastroesophageal reflux disease without esophagitis    Sorethroat    Hypokalemia    Hypomagnesemia    Hypocalcemia    Chest pain    Hyperlipidemia    Grade I diastolic dysfunction    CARLITOS (acute kidney injury)    Abnormal nuclear stress test    S/P CABG x 4 8/26/2020    Hypophosphatemia    Sinus tachycardia    Screening for prostate cancer    Lower abdominal pain    Status post laparoscopic cholecystectomy    Pain in both feet    Screening for colon cancer    Essential hypertension       Social History     Tobacco Use     Smoking status: Former     Current packs/day: 0.00     Average packs/day: 1 pack/day for 20.0 years (20.0 ttl pk-yrs)     Types: Cigarettes     Start date:      Quit date:      Years since quittin.3    Smokeless tobacco: Current     Types: Chew, Snuff    Tobacco comments:     chew 1 can per week   Vaping Use    Vaping status: Never Used   Substance Use Topics    Alcohol use: Yes     Alcohol/week: 10.0 standard drinks of alcohol     Types: 6 Cans of beer, 4 Shots of liquor per week     Comment: pint of alcohol every other day     Drug use: Never       Allergies   Allergen Reactions    Niacin Rash     Whelps       Current Outpatient Medications on File Prior to Visit   Medication Sig    amLODIPine (NORVASC) 5 MG tablet Take 1 tablet by mouth Daily.    aspirin 81 MG EC tablet Take 1 tablet by mouth Daily.    atorvastatin (LIPITOR) 40 MG tablet Take 1 tablet by mouth Every Night.    clopidogrel (PLAVIX) 75 MG tablet Take 1 tablet by mouth Daily.    Diclofenac Sodium (VOLTAREN) 1 % gel gel Apply 4 g topically to the appropriate area as directed 4 (Four) Times a Day As Needed (pain).    folic acid (FOLVITE) 1 MG tablet Take 1 tablet by mouth Daily.    lactulose (CHRONULAC) 10 GM/15ML solution Take 30 mL by mouth 2 (Two) Times a Day As Needed (constipation).    metoprolol tartrate (LOPRESSOR) 100 MG tablet Take 1.5 tablets by mouth 2 (Two) Times a Day. TAKE 1.5 TABLET BY MOUTH IN THE MORNING AND 1.5 TABLET IN THE EVENING    nitroglycerin (NITROSTAT) 0.4 MG SL tablet Place 1 tablet under the tongue Every 5 (Five) Minutes As Needed for Chest Pain. Take no more than 3 doses in 15 minutes.    omega-3 acid ethyl esters (LOVAZA) 1 g capsule Take 1 capsule by mouth Daily.    Omega-3 Fatty Acids (fish oil) 1000 MG capsule capsule Take 1 capsule by mouth Daily With Breakfast.    ondansetron ODT (ZOFRAN-ODT) 4 MG disintegrating tablet Place 1 tablet on the tongue Every 8 (Eight) Hours As Needed for Nausea.     pantoprazole (PROTONIX) 40 MG EC tablet Take 1 tablet by mouth Daily.    thiamine (VITAMIN B1) 100 MG tablet Take 1 tablet by mouth Daily.    [DISCONTINUED] magnesium oxide (MAG-OX) 400 MG tablet TAKE ONE TABLET BY MOUTH TWICE DAILY    [DISCONTINUED] magnesium oxide (MAG-OX) 400 MG tablet Take 1 tablet by mouth 2 (Two) Times a Day.     No current facility-administered medications on file prior to visit.     (Not in a hospital admission)      Results for orders placed during the hospital encounter of 08/17/20    Adult Transthoracic Echo Complete W/ Cont if Necessary Per Protocol    Interpretation Summary  · Left ventricular diastolic dysfunction (grade I) consistent with impaired relaxation.  · Left ventricular systolic function is normal, EF 61-65%.  · Left ventricular wall thickness is consistent with mild concentric hypertrophy.    Results for orders placed during the hospital encounter of 08/17/20    Stress Test With Myocardial Perfusion One Day    Interpretation Summary  · Findings consistent with a normal ECG stress test.  · Left ventricular ejection fraction is normal (Calculated EF = 62%).  · There is no prior study available for comparison.  · Impressions are consistent with an intermediate risk study.  · Patient has a reversible defect at the basal inferior, inferolateral and mid inferior wall, with also reversible defect at the high lateral wall, consistent with ischemia of the above walls.    Results for orders placed during the hospital encounter of 08/17/20    Cardiac Catheterization/Vascular Study    Narrative  Images from the original result were not included.  Patient Name: Onel Jimenez                  MRN: [id number]    Procedure Date: 08/19/20    HPI: Patient is a pleasant 55 y.o. male with history of prev cardiac cath on June 2015. Patient presented with abdominal pain radiating to the chest area. Stress MPI shows inferolateral ischemia. Patient was evaluated and recommended to proceed with  diagnostic cardiac catheterization with possible need for intervention. All risks, benefits and alternatives were discussed with patient in detail. All his questions and his family's questions were answered to their satisfaction. They all understood and wished to proceed, and therefore the procedure was performed.    Pre-operative Diagnosis: Angina recent onset. Known subtotal occlusion of the RCA and mild disease in LAD      Procedure Performed:  Selective coronary angiography.      Technique: The patient was brought to the cardiac catheterization laboratory after informed consent was obtained. right radial artery area was prepped, draped, anesthestized in the usual manner. The radial artery was entered eric a Seldinger technique. A 6-Ecuadorean sheath over the wire was introduced into the radial artery. This was followed by the use of a 6 -Ecuadorean  diagnostic catheters JL 3.5 and JR4 to perform the diagnostic cardiac catheterization. Patient tolerated the procedure well, was hemodynamically stable throughout the procedure. Radial cocktail was administered via the sheath side arm at the time of access.    We tried JL 3.5 for non-selective shots but used Danette catheter for selective imaging.    At the end of the procedure sheath was removed, wrist band was placed. Excellent hemostasis was achieved. Patient transferred to post cath holding area in stable condition.    Findings:    Coronary anatomy:    The left main coronary artery bifurcated into the LAD and left circumflex coronary.  The LAD coursed in the anterior interventricular groove, gave rise to diagonal branches and reached the apex.    Left circumflex coursed in the left atrial ventricular groove and gives rise to several marginal branches.    The right coronary artery course in the right atrial ventricular groove I gave rise to several acute marginal branches.    Dominant vessel: Right    LM: Large and shows a distal narrowing 40%. This leads to  "trifurcation.  LAD: True ostial lesion which extends into LM 75% severity.  Diagonal Branch: NL    LCX: Large and shows only mild mid segment plaque.  Obtuse marginal branch:   Second OM is 100% occluded with collateral filling observed with delayed filling.    RCA: Large and shows prox 99% stenosis leading into mild distal disease, There are good collaterals left to right which fill the distal circulation.      No specimens were removed  EBL: 30 ML  Impression:  LAD lesion has worsened and RCA is subtotally occluded with good collaterals.  Hi current symptoms begin with abdominal pain but likely anginal as there could be ischemia from progressive narrowing of LAD.      Post-operative Diagnosis:  3 vessel CAD with RCA, OM occluded, and high grade stenosis of the ostial LAD    Plan:  I recommend CABG for safe long term results.      aJck Beebe MD  08/19/20      Director, Cardiac Cath Lab        The following portions of the patient's history were reviewed and updated as appropriate: allergies, current medications, past family history, past medical history, past social history, past surgical history and problem list.    Review of Systems   Constitutional: Negative.   HENT: Negative.  Negative for congestion.    Eyes: Negative.    Cardiovascular: Negative.  Negative for chest pain, cyanosis, dyspnea on exertion, irregular heartbeat, leg swelling, near-syncope, orthopnea, palpitations, paroxysmal nocturnal dyspnea and syncope.   Respiratory: Negative.  Negative for shortness of breath.    Hematologic/Lymphatic: Negative.    Musculoskeletal: Negative.    Gastrointestinal: Negative.    Neurological: Negative.  Negative for headaches.   Leg cramps aches and pains below the knee bilaterally worse with walking.       Objective:     /91 (BP Location: Right arm, Patient Position: Sitting, Cuff Size: Adult)   Pulse 69   Resp 18   Ht 188 cm (74\")   Wt 104 kg (229 lb)   SpO2 97%   BMI 29.40 kg/m²   Pulmonary:      " "Effort: Pulmonary effort is normal.      Breath sounds: Normal breath sounds. No stridor. No wheezing. No rhonchi. No rales.   Cardiovascular:      PMI at left midclavicular line. Normal rate. Regular rhythm. Normal S1. Normal S2.       Murmurs: There is no murmur.      No gallop.  No click. No rub.   Edema:     Peripheral edema absent.     Peripheral pulses are not palpable after femoral pulses.  Both feet are warm.  Physical Exam        Lab Review  Lab Results   Component Value Date     04/29/2025    K 3.7 04/29/2025     04/29/2025    BUN 16 04/29/2025    CREATININE 1.56 (H) 04/29/2025    GLUCOSE 91 04/29/2025    CALCIUM 7.6 (L) 04/29/2025    ALT 19 04/29/2025    ALKPHOS 79 04/29/2025     Calcium 7.6 Low  8.7       25 Hydroxy, Vitamin D 22.3 Low      Lab Results   Component Value Date    CKTOTAL 121 04/29/2025     Lab Results   Component Value Date    WBC 7.91 04/29/2025    HGB 15.0 04/29/2025    HCT 47.4 04/29/2025     04/29/2025     Lab Results   Component Value Date    INR 1.1 08/04/2023     Lab Results   Component Value Date    MG 0.6 (C) 04/29/2025     Lab Results   Component Value Date    PSA 0.406 07/20/2023    TSH 2.250 04/29/2025     No results found for: \"BNP\"  Lab Results   Component Value Date    CHLPL 182 05/04/2016    CHOL 152 04/29/2025    TRIG 174 (H) 04/29/2025    HDL 30 (L) 04/29/2025    VLDL 30 04/29/2025    LDL 92 04/29/2025     Lab Results   Component Value Date    LDL 92 04/29/2025    LDL 66 06/17/2024     Lab Results   Component Value Date    PROBNP 102.0 04/29/2025           Lab 04/29/25  1314   PROBNP 102.0         Procedures    Results       I personally viewed and interpreted the patient's LAB data         Assessment:     1. PAD (peripheral artery disease)    2. Coronary artery disease involving native coronary artery of native heart without angina pectoris    3. Mixed hyperlipidemia    4. Essential hypertension    5. Hypomagnesemia    6. Hypocalcemia    7. Chronic " alcoholism        Assessment & Plan  1. Coronary artery disease status post CABG in 2020.  Aggressive risk factor modification was emphasized. He was advised to continue dual antiplatelet therapy with aspirin and Plavix. Statin therapy with Lipitor, along with Lovaza and fish oil, will be continued. Weight loss was stressed.    2. Hypertension.  Blood pressure is very well controlled with Norvasc 5 mg daily and Lopressor 150 mg twice a day.    3. Hyperlipidemia.  He will continue taking Lovaza, fish oil, and Lipitor. Lab work will be arranged.    4. Chronic alcoholism.  He reports reducing his alcohol consumption significantly. Complete cessation was stressed. His vitamin D deficiency, hypomagnesemia, and hypocalcemia are related to his alcohol intake. He ran out of magnesium. He was advised to restart magnesium 400 mg twice a day, along with vitamin D 1000 IU daily and calcium 500 mg twice a day. An arterial Doppler of both lower extremities was arranged. Lab work for magnesium, calcium, and electrolytes will be checked in a couple of weeks.    Follow-up: A follow-up appointment is scheduled in a couple of weeks.               No follow-ups on file.

## 2025-04-30 ENCOUNTER — TELEPHONE (OUTPATIENT)
Dept: CARDIOLOGY | Facility: CLINIC | Age: 60
End: 2025-04-30
Payer: MEDICAID

## 2025-04-30 LAB
25(OH)D3 SERPL-MCNC: 22.3 NG/ML (ref 30–100)
ALBUMIN SERPL-MCNC: 4.2 G/DL (ref 3.5–5.2)
ALBUMIN/GLOB SERPL: 1.7 G/DL
ALP SERPL-CCNC: 79 U/L (ref 39–117)
ALT SERPL W P-5'-P-CCNC: 19 U/L (ref 1–41)
ANION GAP SERPL CALCULATED.3IONS-SCNC: 16 MMOL/L (ref 5–15)
AST SERPL-CCNC: 18 U/L (ref 1–40)
BASOPHILS # BLD AUTO: 0.1 10*3/MM3 (ref 0–0.2)
BASOPHILS NFR BLD AUTO: 1.3 % (ref 0–1.5)
BILIRUB SERPL-MCNC: 0.5 MG/DL (ref 0–1.2)
BUN SERPL-MCNC: 16 MG/DL (ref 8–23)
BUN/CREAT SERPL: 10.3 (ref 7–25)
CALCIUM SPEC-SCNC: 7.6 MG/DL (ref 8.6–10.5)
CHLORIDE SERPL-SCNC: 104 MMOL/L (ref 98–107)
CHOLEST SERPL-MCNC: 152 MG/DL (ref 0–200)
CK SERPL-CCNC: 121 U/L (ref 20–200)
CO2 SERPL-SCNC: 21 MMOL/L (ref 22–29)
CREAT SERPL-MCNC: 1.56 MG/DL (ref 0.76–1.27)
DEPRECATED RDW RBC AUTO: 47.2 FL (ref 37–54)
EGFRCR SERPLBLD CKD-EPI 2021: 49.9 ML/MIN/1.73
EOSINOPHIL # BLD AUTO: 0.24 10*3/MM3 (ref 0–0.4)
EOSINOPHIL NFR BLD AUTO: 3 % (ref 0.3–6.2)
ERYTHROCYTE [DISTWIDTH] IN BLOOD BY AUTOMATED COUNT: 16.1 % (ref 12.3–15.4)
GLOBULIN UR ELPH-MCNC: 2.5 GM/DL
GLUCOSE SERPL-MCNC: 91 MG/DL (ref 65–99)
HCT VFR BLD AUTO: 47.4 % (ref 37.5–51)
HDLC SERPL-MCNC: 30 MG/DL (ref 40–60)
HGB BLD-MCNC: 15 G/DL (ref 13–17.7)
IMM GRANULOCYTES # BLD AUTO: 0.02 10*3/MM3 (ref 0–0.05)
IMM GRANULOCYTES NFR BLD AUTO: 0.3 % (ref 0–0.5)
LDLC SERPL CALC-MCNC: 92 MG/DL (ref 0–100)
LDLC/HDLC SERPL: 2.91 {RATIO}
LYMPHOCYTES # BLD AUTO: 2.52 10*3/MM3 (ref 0.7–3.1)
LYMPHOCYTES NFR BLD AUTO: 31.9 % (ref 19.6–45.3)
MAGNESIUM SERPL-MCNC: 0.6 MG/DL (ref 1.6–2.4)
MCH RBC QN AUTO: 25.7 PG (ref 26.6–33)
MCHC RBC AUTO-ENTMCNC: 31.6 G/DL (ref 31.5–35.7)
MCV RBC AUTO: 81.2 FL (ref 79–97)
MONOCYTES # BLD AUTO: 1.15 10*3/MM3 (ref 0.1–0.9)
MONOCYTES NFR BLD AUTO: 14.5 % (ref 5–12)
NEUTROPHILS NFR BLD AUTO: 3.88 10*3/MM3 (ref 1.7–7)
NEUTROPHILS NFR BLD AUTO: 49 % (ref 42.7–76)
NRBC BLD AUTO-RTO: 0 /100 WBC (ref 0–0.2)
NT-PROBNP SERPL-MCNC: 102 PG/ML (ref 0–900)
PLATELET # BLD AUTO: 292 10*3/MM3 (ref 140–450)
PMV BLD AUTO: 10.9 FL (ref 6–12)
POTASSIUM SERPL-SCNC: 3.7 MMOL/L (ref 3.5–5.2)
PROT SERPL-MCNC: 6.7 G/DL (ref 6–8.5)
RBC # BLD AUTO: 5.84 10*6/MM3 (ref 4.14–5.8)
SODIUM SERPL-SCNC: 141 MMOL/L (ref 136–145)
T4 FREE SERPL-MCNC: 1.51 NG/DL (ref 0.92–1.68)
TRIGL SERPL-MCNC: 174 MG/DL (ref 0–150)
TSH SERPL DL<=0.05 MIU/L-ACNC: 2.25 UIU/ML (ref 0.27–4.2)
VIT B12 BLD-MCNC: 432 PG/ML (ref 211–946)
VLDLC SERPL-MCNC: 30 MG/DL (ref 5–40)
WBC NRBC COR # BLD AUTO: 7.91 10*3/MM3 (ref 3.4–10.8)

## 2025-04-30 RX ORDER — MAGNESIUM OXIDE 400 MG/1
1 TABLET ORAL 2 TIMES DAILY
Qty: 60 TABLET | Refills: 1 | Status: SHIPPED | OUTPATIENT
Start: 2025-04-30

## 2025-04-30 NOTE — TELEPHONE ENCOUNTER
Under instruction from Dr. FLORINDA Canales - Called patient to verify the dosage of Magnesium patient is taking - No Answer - Patient's voicemail box is not set up unable to leave message

## 2025-04-30 NOTE — TELEPHONE ENCOUNTER
Called patient back to let him know that Dr. Canales instructed him him to start OTC medications   Calcium 500mg Twice daily   Vitamin D 1000mg once daily     Patient verbalized instructions and was agreeable with no further questions.

## 2025-04-30 NOTE — TELEPHONE ENCOUNTER
Patient called and stated he was taking 400mg of Mag - patient stated that he has been out of Mag for 3 weeks   Refill sent with 1 refill.

## 2025-05-20 ENCOUNTER — TELEPHONE (OUTPATIENT)
Dept: CARDIOLOGY | Facility: CLINIC | Age: 60
End: 2025-05-20
Payer: MEDICAID

## 2025-05-20 RX ORDER — ONDANSETRON 4 MG/1
4 TABLET, ORALLY DISINTEGRATING ORAL EVERY 8 HOURS PRN
Qty: 20 TABLET | Refills: 0 | Status: SHIPPED | OUTPATIENT
Start: 2025-05-20

## 2025-05-20 NOTE — TELEPHONE ENCOUNTER
Patient c/o nausea, upset stomach thinks he may have a stomach virus wants something called in to [facility],  [place]

## 2025-06-06 RX ORDER — LANOLIN ALCOHOL/MO/W.PET/CERES
100 CREAM (GRAM) TOPICAL DAILY
Qty: 30 TABLET | Refills: 5 | Status: SHIPPED | OUTPATIENT
Start: 2025-06-06

## 2025-06-06 RX ORDER — METOPROLOL TARTRATE 100 MG/1
TABLET ORAL
Qty: 270 TABLET | Refills: 1 | Status: SHIPPED | OUTPATIENT
Start: 2025-06-06

## 2025-06-06 RX ORDER — PANTOPRAZOLE SODIUM 40 MG/1
40 TABLET, DELAYED RELEASE ORAL DAILY
Qty: 90 TABLET | Refills: 1 | Status: SHIPPED | OUTPATIENT
Start: 2025-06-06

## 2025-07-11 RX ORDER — AMLODIPINE BESYLATE 5 MG/1
5 TABLET ORAL DAILY
Qty: 30 TABLET | Refills: 1 | Status: SHIPPED | OUTPATIENT
Start: 2025-07-11

## 2025-07-11 RX ORDER — ATORVASTATIN CALCIUM 40 MG/1
40 TABLET, FILM COATED ORAL
Qty: 90 TABLET | Refills: 1 | Status: SHIPPED | OUTPATIENT
Start: 2025-07-11

## (undated) DEVICE — [HIGH FLOW INSUFFLATOR,  DO NOT USE IF PACKAGE IS DAMAGED,  KEEP DRY,  KEEP AWAY FROM SUNLIGHT,  PROTECT FROM HEAT AND RADIOACTIVE SOURCES.]: Brand: PNEUMOSURE

## (undated) DEVICE — TTL1LYR 16FR10ML 100%SIL TMPST TR: Brand: MEDLINE

## (undated) DEVICE — LN INJ CONTRST FLXCIL HP F/M LL 1200PSI10

## (undated) DEVICE — CATH F5 INF JR 4 100CM: Brand: INFINITI

## (undated) DEVICE — Device

## (undated) DEVICE — Device: Brand: MEDEX

## (undated) DEVICE — ENDOPATH ELECTROSURGERY PROBE PLUS II PISTOL HAND CONTROL PISTOL GRIP HANDLES WITH SUCTION AND IRRRIGATION FOR HAND CONTROL MONOPOLAR ELCTROSURGERY USE ONLY WITH PROBE PLUS II SHAFTS: Brand: ENDOPATH

## (undated) DEVICE — TRAP,MUCUS SPECIMEN,40CC: Brand: MEDLINE

## (undated) DEVICE — TUBING, SUCTION, 1/4" X 10', STRAIGHT: Brand: MEDLINE

## (undated) DEVICE — RUNWAY RADL W/TOP PAD

## (undated) DEVICE — ENDOPATH XCEL BLADELESS TROCARS WITH STABILITY SLEEVES: Brand: ENDOPATH XCEL

## (undated) DEVICE — APPL CHLORAPREP HI/LITE 26ML ORNG

## (undated) DEVICE — TEMP PACING WIRE: Brand: MYO/WIRE

## (undated) DEVICE — MEDI-VAC NON-CONDUCTIVE SUCTION TUBING: Brand: CARDINAL HEALTH

## (undated) DEVICE — CATH F5 INF JL 3.5 100CM: Brand: INFINITI

## (undated) DEVICE — PATIENT RETURN ELECTRODE, SINGLE-USE, CONTACT QUALITY MONITORING, ADULT, WITH 9FT CORD, FOR PATIENTS WEIGING OVER 33LBS. (15KG): Brand: MEGADYNE

## (undated) DEVICE — VASOVIEW HEMOPRO: Brand: VASOVIEW HEMOPRO

## (undated) DEVICE — PAD ARMBRD SURG CONVOL 7.5X20X2IN

## (undated) DEVICE — DRSNG SURESITE WNDW 4X4.5

## (undated) DEVICE — GLV SURG PREMIERPRO MIC LTX PF SZ7.5 BRN

## (undated) DEVICE — SUT PROLN 7/0 CV BV1 24IN 8304H BX/36

## (undated) DEVICE — SUT PROLN 4/0 RB1 D/A 36IN 8557H

## (undated) DEVICE — NDL PERC 1PRT THNWALL W/BASEPLT 18G 7CM

## (undated) DEVICE — AVANTI + 4F STD W/GW: Brand: AVANTI

## (undated) DEVICE — ST INF PRI SMRTSTE 20DRP 2VLV 24ML 117

## (undated) DEVICE — ANTIBACTERIAL UNDYED BRAIDED (POLYGLACTIN 910), SYNTHETIC ABSORBABLE SUTURE: Brand: COATED VICRYL

## (undated) DEVICE — 12 FOOT DISPOSABLE EXTENSION CABLE WITH SAFE CONNECT / SCREW-DOWN

## (undated) DEVICE — RADIFOCUS OPTITORQUE ANGIOGRAPHIC CATHETER: Brand: OPTITORQUE

## (undated) DEVICE — SUT PROLN 4/0 SH D/A 36IN 8521H

## (undated) DEVICE — PK LAP GEN 70

## (undated) DEVICE — ST EXT IV SMARTSITE 2VLV SP M LL 5ML IV1

## (undated) DEVICE — PK CATH CARD 70

## (undated) DEVICE — SUT SILK 2 SUTUPAK TIE 60IN SA8H 2STRAND

## (undated) DEVICE — SWAN-GANZ CCOMBO V THERMODILUTION CATHETER: Brand: SWAN-GANZ CCOMBO V

## (undated) DEVICE — SUCTION CANISTER, 2500CC, RIGID: Brand: DEROYAL

## (undated) DEVICE — INTRAOPERATIVE COVER KIT, 10 PACK: Brand: SITE-RITE

## (undated) DEVICE — ENDOCUT SCISSOR TIP, DISPOSABLE: Brand: RENEW

## (undated) DEVICE — 3M™ MEDIPORE™ H SOFT CLOTH SURGICAL TAPE, 2863, 3 IN X 10 YD, 12/CASE: Brand: 3M™ MEDIPORE™

## (undated) DEVICE — TROCAR: Brand: KII FIOS FIRST ENTRY

## (undated) DEVICE — ENDOPATH ELECTROSURGERY PROBE PLUS II 5MM RIGHT ANGLE MONOPOLAR ELECTROSURGERY SUCTION AND IRRRIGATION SHAFTS WITH RIGHT ANGLE ELECTRODE - USE ONLY WITH PROBE PLUS II HANDLES: Brand: ENDOPATH

## (undated) DEVICE — SUT PDS 1 CTX 36IN VIO PDP371T

## (undated) DEVICE — SUT VIC 2/0 UR6 27IN J602H

## (undated) DEVICE — GW INQW FIX/CORE PTFE J/3MM .035 260CM

## (undated) DEVICE — GLV SURG BIOGEL LTX PF 7 1/2

## (undated) DEVICE — GLIDESHEATH SLENDER STAINLESS STEEL KIT: Brand: GLIDESHEATH SLENDER

## (undated) DEVICE — SYS VASOVIEW HEMOPRO ENDOSCOPIC HARVST VESL

## (undated) DEVICE — OASIS DRAIN, SINGLE, INLINE & ATS COMPATIBLE: Brand: OASIS

## (undated) DEVICE — PK HEART OPN 10

## (undated) DEVICE — SUT SILK 4/0 TIES 18IN A183H

## (undated) DEVICE — HOLDER: Brand: DEROYAL

## (undated) DEVICE — BLD SCLPL BEAVR MINI STR 2BVL 180D LF

## (undated) DEVICE — PRESSURE MONITORING SET: Brand: TRUWAVE, VAMP

## (undated) DEVICE — DECANT BG O JET

## (undated) DEVICE — JACKSON-PRATT 100CC BULB RESERVOIR: Brand: CARDINAL HEALTH

## (undated) DEVICE — PRESSURE MONITORING SET: Brand: TRUWAVE

## (undated) DEVICE — DRP UTIL 2/LAYR W/TP 15X26IN STRL PK/4

## (undated) DEVICE — CANNULA,OXY,ADULT,SUPER SOFT,W/14'TUB,UC: Brand: MEDLINE INDUSTRIES, INC.

## (undated) DEVICE — ELECTRD DEFIB M/FUNC STATPADZ PK/2

## (undated) DEVICE — SUT PROLN 6/0 C1 D/A 30IN 8706H

## (undated) DEVICE — SUT SILK 0/0 CT2 18IN C027D

## (undated) DEVICE — DRN JP FLT NO TROC SIL 3/4PERF 10MM

## (undated) DEVICE — CVR HNDL LIGHT RIGID

## (undated) DEVICE — SUT PROLN 3/0 SH D/A 36IN 8522H

## (undated) DEVICE — GLV SURG BIOGEL LTX PF 8

## (undated) DEVICE — CYSTO/BLADDER IRRIGATION SET, REGULATING CLAMP

## (undated) DEVICE — TOWEL,OR,DSP,ST,BLUE,STD,4/PK,20PK/CS: Brand: MEDLINE

## (undated) DEVICE — KT INTRO SHEATH PERC W/FULL DRP 9F

## (undated) DEVICE — TROCAR: Brand: KII® SLEEVE

## (undated) DEVICE — CLTH CLENS READYCLEANSE PERI CARE PK/5